# Patient Record
Sex: MALE | Race: WHITE | NOT HISPANIC OR LATINO | Employment: OTHER | RURAL
[De-identification: names, ages, dates, MRNs, and addresses within clinical notes are randomized per-mention and may not be internally consistent; named-entity substitution may affect disease eponyms.]

---

## 2017-01-25 ENCOUNTER — TRANSCRIBE ORDERS (OUTPATIENT)
Dept: PHYSICAL THERAPY | Facility: HOSPITAL | Age: 82
End: 2017-01-25

## 2017-01-25 DIAGNOSIS — M25.552 LEFT HIP PAIN: ICD-10-CM

## 2017-01-25 DIAGNOSIS — Z96.642 STATUS POST TOTAL HIP REPLACEMENT, LEFT: Primary | ICD-10-CM

## 2017-02-02 ENCOUNTER — HOSPITAL ENCOUNTER (OUTPATIENT)
Dept: PHYSICAL THERAPY | Facility: HOSPITAL | Age: 82
Setting detail: THERAPIES SERIES
Discharge: HOME | End: 2017-02-02

## 2017-02-02 DIAGNOSIS — M25.552 LEFT HIP PAIN: ICD-10-CM

## 2017-02-02 DIAGNOSIS — Z96.642 STATUS POST TOTAL HIP REPLACEMENT, LEFT: Primary | ICD-10-CM

## 2017-02-02 PROCEDURE — 97162 PT EVAL MOD COMPLEX 30 MIN: CPT

## 2017-02-02 PROCEDURE — 97110 THERAPEUTIC EXERCISES: CPT

## 2017-02-02 NOTE — PROGRESS NOTES
Outpatient Physical Therapy Ortho Initial Evaluation  [facility]  Luis Garcia, PT  17  10:30 AM       Patient Name: Jesus Smith  : 1935  MRN: [id number]  Today's Date: 2017      Visit Date: 2017     Subjective improvement: 0%     Attendance:     MD follow up:  date: 17           There is no problem list on file for this patient.       No past medical history on file.     Past Surgical History   Procedure Laterality Date   • Appendectomy     • Back surgery       Fusion   • Hernia repair     • Stomach surgery     • Cardiac surgery       Open Heart   • Pacemaker implantation     • Joint replacement Left      s/p L hip replacement     Medications:  Warfarin  Lortab  Metropolol    Visit Dx:     ICD-10-CM ICD-9-CM   1. 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 Left hip pain M25.552 719.45             Patient History       17 1000          History    Chief Complaint Pain;Muscle weakness;Difficulty Walking  -MD      Type of Pain Hip pain  -MD      Date Current Problem(s) Began 16  -MD      Brief Description of Current Complaint Not having the pain that he used to have, but is still having some mild pain. Just says that strength is his main concern at this time at the L hip. Dificulty with walking, stairs and squatting.   -MD      Onset Date- PT 17  -MD      Pain     Difficulties at work? Retired  -MD      Difficulties with recreational activities? Watch sports on TV  -MD        User Key  (r) = Recorded By, (t) = Taken By, (c) = Cosigned By    Initials Name Provider Type    MD Luis Garcia, PT Physical Therapist                PT Ortho       17 1000    Subjective Comments    Subjective Comments Pt reports that he is doing well, just lack of strength at this time.   -MD    Precautions and Contraindications    Precautions/Limitations no known precautions/limitations  -MD    Subjective Pain    Able to rate  subjective pain? yes  -MD    Pre-Treatment Pain Level 0  -MD    Post-Treatment Pain Level 0  -MD    Posture/Observations    Posture- WNL Posture is WNL  -MD    Sensation    Sensation WNL? WNL  -MD    Left Hip    Flexion AROM deficit 0-115  -MD    ABduction AROM Deficit 35  -MD    Left Hip    Hip Flexion Gross Movement (4/5) good  -MD    Hip ABduction Gross Movement (4+/5) good plus  -MD    Hip ADduction Gross Movement (4+/5) good plus  -MD    Left Knee    Knee Extension Gross Movement (4+/5) good plus  -MD    Knee Flexion Gross Movement (4+/5) good plus  -MD    Gait Assessment/Treatment    Gait, Assistive Device straight cane  -MD    Gait, Comment Non ant gt, FF posture.   -MD      User Key  (r) = Recorded By, (t) = Taken By, (c) = Cosigned By    Initials Name Provider Type    MD Luis Garcia, PT Physical Therapist                            Therapy Education       02/02/17 1028    Therapy Education    Given HEP;Symptoms/condition management;Posture/body mechanics  -MD    Program New  -MD    How Provided Verbal;Demonstration;Written  -MD    Provided to Patient;Caregiver  -MD    Level of Understanding Teach back education performed;Verbalized;Demonstrated  -MD      User Key  (r) = Recorded By, (t) = Taken By, (c) = Cosigned By    Initials Name Provider Type    MD Luis Garcia, PT Physical Therapist                PT OP Goals       02/02/17 1028 02/02/17 1000       PT Short Term Goals    STG Date to Achieve  02/23/17  -MD     STG 1  Tolerate 45 minute treatment session, no increase in pain.   -MD     STG 2  Perform sit to stand x10, no use of UEs.  -MD     STG 3  EO/ft together 1 minute.  -MD     STG 4  EC/ft apart 30 secs.  -MD     STG 5  EC/ft together 30 secs.  -MD     Long Term Goals    LTG Date to Achieve  02/23/17  -MD     LTG 1  60% improved  -MD     LTG 2  Ambulate full treatment session, no AD.   -MD     LTG 3  Ascend/descend 5 steps recip, no increase in pain, no comp, unilateral handrail.   -MD      LTG 4  4+/5 L hip flexion strength.   -MD     LTG 5  LEFS to 50/80  -MD     Time Calculation    PT Goal Re-Cert Due Date 02/23/17  -MD 02/23/17  -MD       User Key  (r) = Recorded By, (t) = Taken By, (c) = Cosigned By    Initials Name Provider Type    MD Luis Garcia, PT Physical Therapist                PT Assessment/Plan       02/02/17 1000          PT Assessment    Functional Limitations Impaired gait;Performance in self-care ADL;Performance in leisure activities;Limitations in community activities;Limitation in home management  -MD      Impairments Gait;Endurance;Balance;Pain;Muscle strength;Poor body mechanics  -MD      Assessment Comments Pt is s/p L hip replacment in March 2016, no pain noted, just having strength deficits at this time and wants to increase strength in whole L LE, pt could benefit from skilled PT to improve overall functional strength and endurance.   -MD      Rehab Potential Excellent  -MD      Patient/caregiver participated in establishment of treatment plan and goals Yes  -MD      Patient would benefit from skilled therapy intervention Yes  -MD      PT Plan    PT Frequency 3x/week  -MD      Predicted Duration of Therapy Intervention (days/wks) 6  -MD      Physical Therapy Interventions (Optional Details) gait training;balance training;aquatics exercise;ROM (Range of Motion);manual therapy techniques;stretching;stair training;strengthening;home exercise program  -MD      PT Plan Comments Quad/glute strength, gt training, CKC, balance, ther ex, MHP or ICE PRN, HEP  -MD        User Key  (r) = Recorded By, (t) = Taken By, (c) = Cosigned By    Initials Name Provider Type    MD Luis Garcia, PT Physical Therapist                  Exercises       02/02/17 1000          Subjective Comments    Subjective Comments Pt reports that he is doing well, just lack of strength at this time.   -MD      Subjective Pain    Able to rate subjective pain? yes  -MD      Pre-Treatment Pain Level 0  -MD       Post-Treatment Pain Level 0  -MD      Aquatics    Aquatics performed? No  -MD      Exercise 1    Exercise Name 1 QS  -MD      Sets 1 2  -MD      Reps 1 10  -MD      Time (Seconds) 1 5  -MD      Exercise 2    Exercise Name 2 Add Squeezes  -MD      Sets 2 2  -MD      Reps 2 10  -MD      Time (Seconds) 2 5  -MD      Exercise 3    Exercise Name 3 SL Clamshells  -MD      Sets 3 2  -MD      Reps 3 10  -MD      Exercise 4    Exercise Name 4 Standing CR/TR  -MD      Sets 4 2  -MD      Reps 4 15  -MD        User Key  (r) = Recorded By, (t) = Taken By, (c) = Cosigned By    Initials Name Provider Type    MD Luis Garcia, PT Physical Therapist                              Outcome Measures       02/02/17 1000          Lower Extremity Functional Index    Any of your usual work, housework or school activities 1  -MD      Your usual hobbies, recreational or sporting activities 2  -MD      Getting into or out of the bath 1  -MD      Walking between rooms 2  -MD      Putting on your shoes or socks 2  -MD      Squatting 2  -MD      Lifting an object, like a bag of groceries from the floor 2  -MD      Performing light activities around your home 2  -MD      Performing heavy activities around your home 1  -MD      Getting into or out of a car 1  -MD      Walking 2 blocks 1  -MD      Walking a mile 1  -MD      Going up or down 10 stairs (about 1 flight of stairs) 1  -MD      Standing for 1 hour 2  -MD      Sitting for 1 hour 4  -MD      Running on even ground 2  -MD      Running on uneven ground 2  -MD      Making sharp turns while running fast 2  -MD      Hopping 2  -MD      Rolling over in bed 3  -MD      Total 36  -MD      Functional Assessment    Outcome Measure Options Lower Extremity Functional Scale (LEFS)  -MD        User Key  (r) = Recorded By, (t) = Taken By, (c) = Cosigned By    Initials Name Provider Type    MD Luis Garcia, PT Physical Therapist            Time Calculation:   Start Time: 1000  Stop Time: 1035  Time  Calculation (min): 35 min     Therapy Charges for Today     Code Description Service Date Service Provider Modifiers Qty    87937856113 HC PT EVAL MOD COMPLEXITY 1 2/2/2017 Luis Garcia, PT GP 1    76235251446 HC PT THER PROC EA 15 MIN 2/2/2017 Luis Garcia, PT GP 1          PT G-Codes  Outcome Measure Options: Lower Extremity Functional Scale (LEFS)         Luis Garcia, PT  2/2/2017

## 2017-02-07 ENCOUNTER — HOSPITAL ENCOUNTER (OUTPATIENT)
Dept: PHYSICAL THERAPY | Facility: HOSPITAL | Age: 82
Setting detail: THERAPIES SERIES
Discharge: HOME | End: 2017-02-07

## 2017-02-07 DIAGNOSIS — Z96.642 STATUS POST TOTAL HIP REPLACEMENT, LEFT: Primary | ICD-10-CM

## 2017-02-07 DIAGNOSIS — M25.552 LEFT HIP PAIN: ICD-10-CM

## 2017-02-07 PROCEDURE — 97110 THERAPEUTIC EXERCISES: CPT

## 2017-02-07 NOTE — PROGRESS NOTES
"    Outpatient Physical Therapy Ortho Treatment Note  [facility]  Vani Hassan PTA  17  3:24 PM       Patient Name: Jesus Smith  : 1935  MRN: [id number]  Today's Date: 2017      Visit Date: 2017  Subjective improvement:\"a little\"     Attendance:         MD follow up:   date:  17   '  Visit Dx:    ICD-10-CM ICD-9-CM   1. 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 Left hip pain M25.552 719.45       There is no problem list on file for this patient.       No past medical history on file.     Past Surgical History   Procedure Laterality Date   • Appendectomy     • Back surgery       Fusion   • Hernia repair     • Stomach surgery     • Cardiac surgery       Open Heart   • Pacemaker implantation     • Joint replacement Left      s/p L hip replacement             PT Ortho       17 1400    Subjective Comments    Subjective Comments Pt states that he doesn't have a lot of pain just needs to get stronger/bal/walk.  -PM    Precautions and Contraindications    Precautions/Limitations no known precautions/limitations  -PM    Precautions pacemaker  -PM    Subjective Pain    Able to rate subjective pain? yes  -PM    Pre-Treatment Pain Level 0  -PM    Post-Treatment Pain Level 0  -PM    Posture/Observations    Posture/Observations Comments forward flexed; tight hams  -PM      User Key  (r) = Recorded By, (t) = Taken By, (c) = Cosigned By    Initials Name Provider Type    PM Vani Hassan PTA Physical Therapy Assistant                            PT Assessment/Plan       17 1400 17 1000       PT Assessment    Functional Limitations Impaired gait;Performance in self-care ADL;Performance in leisure activities;Limitations in community activities;Limitation in home management  -PM Impaired gait;Performance in self-care ADL;Performance in leisure activities;Limitations in community activities;Limitation in home management  -MD "     Impairments Gait;Endurance;Balance;Pain;Muscle strength;Poor body mechanics  -PM Gait;Endurance;Balance;Pain;Muscle strength;Poor body mechanics  -MD     Assessment Comments Limited functional activity collin with frequent rest breaks. Tends to hold breath.  -PM Pt is s/p L hip replacment in March 2016, no pain noted, just having strength deficits at this time and wants to increase strength in whole L LE, pt could benefit from skilled PT to improve overall functional strength and endurance.   -MD     Rehab Potential Excellent  -PM Excellent  -MD     Patient/caregiver participated in establishment of treatment plan and goals Yes  -PM Yes  -MD     Patient would benefit from skilled therapy intervention Yes  -PM Yes  -MD     PT Plan    PT Frequency 3x/week  -PM 3x/week  -MD     Predicted Duration of Therapy Intervention (days/wks) 6 wk  -PM 6  -MD     Physical Therapy Interventions (Optional Details)  gait training;balance training;aquatics exercise;ROM (Range of Motion);manual therapy techniques;stretching;stair training;strengthening;home exercise program  -MD     PT Plan Comments cont POC with bal/gait; review clam shell; monitor pt breathing  -PM Quad/glute strength, gt training, CKC, balance, ther ex, MHP or ICE PRN, HEP  -MD       User Key  (r) = Recorded By, (t) = Taken By, (c) = Cosigned By    Initials Name Provider Type    MD Luis Garcia, PT Physical Therapist    PM Vani Hassan PTA Physical Therapy Assistant                Modalities       02/07/17 1400          Ice    Ice Applied Yes  -PM      Location L Lat hip  -PM      Rx Minutes 15 mins  -PM      Ice S/P Rx Yes  -PM        User Key  (r) = Recorded By, (t) = Taken By, (c) = Cosigned By    Initials Name Provider Type    SERGEI Hassan PTA Physical Therapy Assistant                Exercises       02/07/17 1400          Subjective Comments    Subjective Comments Pt states that he doesn't have a lot of pain just needs to get  stronger/bal/walk.  -PM      Subjective Pain    Able to rate subjective pain? yes  -PM      Pre-Treatment Pain Level 0  -PM      Post-Treatment Pain Level 0  -PM      Aquatics    Aquatics performed? No  -PM      Exercise 1    Exercise Name 1 Pro II  -PM      Sets 1 --  -PM      Reps 1 --  -PM      Time (Minutes) 1 8  -PM      Time (Seconds) 1 --  -PM      Exercise 2    Exercise Name 2 Add Squeezes  -PM      Sets 2 2  -PM      Reps 2 10  -PM      Time (Seconds) 2 5  -PM      Exercise 3    Exercise Name 3 SL Clamshells  -PM      Sets 3 --  -PM      Reps 3 10  -PM      Exercise 4    Exercise Name 4 Standing CR/TR  -PM      Sets 4 2  -PM      Reps 4 15  -PM      Exercise 5    Exercise Name 5 standing march  -PM      Sets 5 2  -PM      Reps 5 10  -PM      Exercise 6    Exercise Name 6 standing hip abd SLR  -PM      Sets 6 2  -PM      Reps 6 10  -PM      Exercise 7    Exercise Name 7 std hip ext SLR  -PM      Sets 7 1  -PM      Reps 7 10  -PM      Exercise 8    Exercise Name 8 mini-squats at chair  -PM      Sets 8 2  -PM      Reps 8 10  -PM      Exercise 9    Exercise Name 9 standing feet together EO  -PM      Reps 9 2  -PM      Time (Seconds) 9 30  -PM      Exercise 10    Exercise Name 10 standing feet apart EC   shaky but controlled and no LOB; forward flexed  -PM      Reps 10 2  -PM      Time (Seconds) 10 30  -PM        User Key  (r) = Recorded By, (t) = Taken By, (c) = Cosigned By    Initials Name Provider Type    PM Vani Hassan PTA Physical Therapy Assistant                               PT OP Goals       02/07/17 1400 02/02/17 1028 02/02/17 1000    PT Short Term Goals    STG Date to Achieve 02/23/17  -PM  02/23/17  -MD    STG 1 Tolerate 45 minute treatment session, no increase in pain.   -PM  Tolerate 45 minute treatment session, no increase in pain.   -MD    STG 1 Progress Progressing  -PM      STG 2 Perform sit to stand x10, no use of UEs.  -PM  Perform sit to stand x10, no use of UEs.  -MD    STG 2  Progress Not Met  -PM      STG 3 EO/ft together 1 minute.  -PM  EO/ft together 1 minute.  -MD    STG 3 Progress Progressing  -PM      STG 4 EC/ft apart 30 secs.  -PM  EC/ft apart 30 secs.  -MD    STG 4 Progress Progressing  -PM      STG 5 EC/ft together 30 secs.  -PM  EC/ft together 30 secs.  -MD    Long Term Goals    LTG Date to Achieve 02/23/17  -PM  02/23/17  -MD    LTG 1 60% improved  -PM  60% improved  -MD    LTG 1 Progress Not Met  -PM      LTG 2 Ambulate full treatment session, no AD.   -PM  Ambulate full treatment session, no AD.   -MD    LTG 2 Progress Not Met  -PM      LTG 3 Ascend/descend 5 steps recip, no increase in pain, no comp, unilateral handrail.   -PM  Ascend/descend 5 steps recip, no increase in pain, no comp, unilateral handrail.   -MD    LTG 3 Progress Not Met  -PM      LTG 4 4+/5 L hip flexion strength.   -PM  4+/5 L hip flexion strength.   -MD    LTG 4 Progress Not Met  -PM      LTG 5 LEFS to 50/80  -PM  LEFS to 50/80  -MD    LTG 5 Progress Not Met  -PM      Time Calculation    PT Goal Re-Cert Due Date  02/23/17  -MD 02/23/17  -MD      User Key  (r) = Recorded By, (t) = Taken By, (c) = Cosigned By    Initials Name Provider Type    MD Luis Garcia, PT Physical Therapist    PM Vani Hassan, PTA Physical Therapy Assistant                Therapy Education       02/07/17 1400    Therapy Education    Given HEP;Symptoms/condition management;Posture/body mechanics   avoid breath holding; count reps aloud  -PM    Program New  -PM    How Provided Verbal;Demonstration;Written  -PM    Provided to Patient;Caregiver  -PM    Level of Understanding Teach back education performed;Verbalized;Demonstrated  -PM      02/02/17 1028    Therapy Education    Given HEP;Symptoms/condition management;Posture/body mechanics  -MD    Program New  -MD    How Provided Verbal;Demonstration;Written  -MD    Provided to Patient;Caregiver  -MD    Level of Understanding Teach back education  performed;Verbalized;Demonstrated  -MD      User Key  (r) = Recorded By, (t) = Taken By, (c) = Cosigned By    Initials Name Provider Type    MD Luis Garcia, PT Physical Therapist    PM Vani Hassan PTA Physical Therapy Assistant                Time Calculation:   Start Time: 1400  Stop Time: 1510  Time Calculation (min): 70 min  PT Non-Billable Time (min): 15 min    Therapy Charges for Today     Code Description Service Date Service Provider Modifiers Qty    83353179017 HC PT THER SUPP EA 15 MIN 2/7/2017 Vani Hassan PTA GP 1    12111239781 HC PT THER PROC EA 15 MIN 2/7/2017 Vani Hassan PTA GP 4                    Vani Hassan PTA  2/7/2017

## 2017-02-10 ENCOUNTER — HOSPITAL ENCOUNTER (OUTPATIENT)
Dept: PHYSICAL THERAPY | Facility: HOSPITAL | Age: 82
Setting detail: THERAPIES SERIES
Discharge: HOME | End: 2017-02-10

## 2017-02-10 DIAGNOSIS — M25.552 LEFT HIP PAIN: Primary | ICD-10-CM

## 2017-02-10 PROCEDURE — 97110 THERAPEUTIC EXERCISES: CPT

## 2017-02-10 NOTE — PROGRESS NOTES
"    Outpatient Physical Therapy Ortho Treatment Note  [facility]  Suki Sanchez PTA  02/10/17  11:10 AM       Patient Name: Jesus Smith  : 1935  MRN: [id number]  Today's Date: 2/10/2017      Visit Date: 02/10/2017  Subjective improvement: \"A little\"     Attendance: 3/3        MD follow up:  date: 17        Visit Dx:    ICD-10-CM ICD-9-CM   1. Left hip pain M25.552 719.45       There is no problem list on file for this patient.       No past medical history on file.     Past Surgical History   Procedure Laterality Date   • Appendectomy     • Back surgery       Fusion   • Hernia repair     • Stomach surgery     • Cardiac surgery       Open Heart   • Pacemaker implantation     • Joint replacement Left      s/p L hip replacement             PT Ortho       02/10/17 1000    Subjective Comments    Subjective Comments pt states: \"I spent the best part of yesterday out of town so I'm pretty tired  -SD    Precautions and Contraindications    Precautions pacemaker  -SD    Subjective Pain    Able to rate subjective pain? yes  -SD    Pre-Treatment Pain Level 0  -SD      17 1400    Subjective Comments    Subjective Comments Pt states that he doesn't have a lot of pain just needs to get stronger/bal/walk.  -PM    Precautions and Contraindications    Precautions/Limitations no known precautions/limitations  -PM    Precautions pacemaker  -PM    Subjective Pain    Able to rate subjective pain? yes  -PM    Pre-Treatment Pain Level 0  -PM    Post-Treatment Pain Level 0  -PM    Posture/Observations    Posture/Observations Comments forward flexed; tight hams  -PM      User Key  (r) = Recorded By, (t) = Taken By, (c) = Cosigned By    Initials Name Provider Type    PM Vani Hassan PTA Physical Therapy Assistant    SD Sanchez PTA Physical Therapy Assistant                            PT Assessment/Plan       02/10/17 1000 17 1400       PT Assessment    " "Functional Limitations Impaired gait;Performance in self-care ADL;Performance in leisure activities;Limitations in community activities;Limitation in home management  -SD Impaired gait;Performance in self-care ADL;Performance in leisure activities;Limitations in community activities;Limitation in home management  -PM     Impairments Gait;Endurance;Balance;Pain;Muscle strength;Poor body mechanics  -SD Gait;Endurance;Balance;Pain;Muscle strength;Poor body mechanics  -PM     Assessment Comments pts endurence is lacking, pt required frequent rest breaks taken throughout RX.    -SD Limited functional activity collin with frequent rest breaks. Tends to hold breath.  -PM     Rehab Potential  Excellent  -PM     Patient/caregiver participated in establishment of treatment plan and goals  Yes  -PM     Patient would benefit from skilled therapy intervention  Yes  -PM     PT Plan    PT Frequency 3x/week  -SD 3x/week  -PM     Predicted Duration of Therapy Intervention (days/wks) 6 weeks  -SD 6 wk  -PM     PT Plan Comments Cont to try and increase endurence. Possible Airex beam activities  -SD cont POC with bal/gait; review clam shell; monitor pt breathing  -PM       User Key  (r) = Recorded By, (t) = Taken By, (c) = Cosigned By    Initials Name Provider Type    PM Vani Hassan [facility] Physical Therapy Assistant    SD Sanchez [facility] Physical Therapy Assistant                Modalities       02/10/17 1000          Ice    Ice Applied Yes  -      Location L side of back sitting in chair  -      Rx Minutes 15 mins  -SD      Ice S/P Rx Yes  -SD        User Key  (r) = Recorded By, (t) = Taken By, (c) = Cosigned By    Initials Name Provider Type    SD Sanchez PTA Physical Therapy Assistant                Exercises       02/10/17 1000          Subjective Comments    Subjective Comments pt states: \"I spent the best part of yesterday out of town so I'm pretty tired  -      Subjective Pain    Able to rate " "subjective pain? yes  -SD      Pre-Treatment Pain Level 0  -SD      Aquatics    Aquatics performed? No  -SD      Exercise 1    Exercise Name 1 PROII  -SD      Resistance 1 --   L3.0  -SD      Time (Minutes) 1 10  -SD      Exercise 2    Exercise Name 2 CR/TR  -SD      Sets 2 2  -SD      Reps 2 10  -SD      Exercise 3    Exercise Name 3 Mini squats  -SD      Sets 3 2  -SD      Reps 3 10  -SD      Exercise 4    Exercise Name 4 ST. hip abd /ext  -SD      Sets 4 2  -SD      Reps 4 10  -SD      Exercise 5    Exercise Name 5 ST. march  -SD      Sets 5 2  -SD      Reps 5 10  -SD      Exercise 6    Exercise Name 6 sit to stands  -SD      Reps 6 10  -SD      Exercise 7    Exercise Name 7 Hip add squeeze  -SD      Sets 7 2  -SD      Reps 7 10  -SD      Time (Seconds) 7 5\" hold  -SD      Exercise 8    Exercise Name 8 ST. feet together EO  -SD      Sets 8 2  -SD      Time (Seconds) 8 30 sec hold  -SD      Exercise 9    Exercise Name 9 ST. feet apart EC  -SD      Reps 9 2  -SD      Time (Seconds) 9 30 sec hold  -SD      Exercise 10    Exercise Name 10 SL clamshells  -SD      Sets 10 2  -SD      Reps 10 10  -SD      Exercise 11    Exercise Name 11 SL SLR abduction  -SD      Sets 11 2  -SD      Reps 11 10  -SD        User Key  (r) = Recorded By, (t) = Taken By, (c) = Cosigned By    Initials Name Provider Type    SD Sanchez, PTA Physical Therapy Assistant                               PT OP Goals       02/10/17 1000 02/07/17 1400 02/02/17 1028    PT Short Term Goals    STG Date to Achieve 02/23/17  -SD 02/23/17  -PM     STG 1 Tolerate 45 minute treatment session, no increase in pain.   -SD Tolerate 45 minute treatment session, no increase in pain.   -PM     STG 1 Progress Progressing  -SD Progressing  -PM     STG 2 Perform sit to stand x10, no use of UEs.  -SD Perform sit to stand x10, no use of UEs.  -PM     STG 2 Progress Not Met  -SD Not Met  -PM     STG 3 EO/ft together 1 minute.  -SD EO/ft together 1 minute.  -PM     " STG 3 Progress Progressing  -SD Progressing  -PM     STG 4 EC/ft apart 30 secs.  -SD EC/ft apart 30 secs.  -PM     STG 4 Progress Progressing  -SD Progressing  -PM     STG 5 EC/ft together 30 secs.  -SD EC/ft together 30 secs.  -PM     Long Term Goals    LTG Date to Achieve 02/23/17  -SD 02/23/17  -PM     LTG 1 60% improved  -SD 60% improved  -PM     LTG 1 Progress Not Met  -SD Not Met  -PM     LTG 2 Ambulate full treatment session, no AD.   -SD Ambulate full treatment session, no AD.   -PM     LTG 2 Progress Not Met  -SD Not Met  -PM     LTG 3 Ascend/descend 5 steps recip, no increase in pain, no comp, unilateral handrail.   -SD Ascend/descend 5 steps recip, no increase in pain, no comp, unilateral handrail.   -PM     LTG 3 Progress Not Met  -SD Not Met  -PM     LTG 4 4+/5 L hip flexion strength.   -SD 4+/5 L hip flexion strength.   -PM     LTG 4 Progress Not Met  -SD Not Met  -PM     LTG 5 LEFS to 50/80  -SD LEFS to 50/80  -PM     LTG 5 Progress Not Met  -SD Not Met  -PM     Time Calculation    PT Goal Re-Cert Due Date 02/23/17  -SD  02/23/17  -MD      02/02/17 1000          PT Short Term Goals    STG Date to Achieve 02/23/17  -MD      STG 1 Tolerate 45 minute treatment session, no increase in pain.   -MD      STG 2 Perform sit to stand x10, no use of UEs.  -MD      STG 3 EO/ft together 1 minute.  -MD      STG 4 EC/ft apart 30 secs.  -MD      STG 5 EC/ft together 30 secs.  -MD      Long Term Goals    LTG Date to Achieve 02/23/17  -MD      LTG 1 60% improved  -MD      LTG 2 Ambulate full treatment session, no AD.   -MD      LTG 3 Ascend/descend 5 steps recip, no increase in pain, no comp, unilateral handrail.   -MD      LTG 4 4+/5 L hip flexion strength.   -MD      LTG 5 LEFS to 50/80  -MD      Time Calculation    PT Goal Re-Cert Due Date 02/23/17  -MD        User Key  (r) = Recorded By, (t) = Taken By, (c) = Cosigned By    Initials Name Provider Type    MD Luis Garcia, PT Physical Therapist    SERGEI Dawn  SUSAN Hassan Physical Therapy Assistant    SD Sanchez PTA Physical Therapy Assistant                Therapy Education       02/10/17 1000    Therapy Education    Given HEP;Symptoms/condition management;Posture/body mechanics  -SD    Program New  -SD    How Provided Verbal;Demonstration;Written  -SD    Provided to Patient;Caregiver  -SD    Level of Understanding Teach back education performed;Verbalized;Demonstrated  -SD      02/07/17 1400    Therapy Education    Given HEP;Symptoms/condition management;Posture/body mechanics   avoid breath holding; count reps aloud  -PM    Program New  -PM    How Provided Verbal;Demonstration;Written  -PM    Provided to Patient;Caregiver  -PM    Level of Understanding Teach back education performed;Verbalized;Demonstrated  -PM      User Key  (r) = Recorded By, (t) = Taken By, (c) = Cosigned By    Initials Name Provider Type    PM Vani Hassan PTA Physical Therapy Assistant    SD Sanchez PTA Physical Therapy Assistant                Time Calculation:   Start Time: 1000  Stop Time: 1110  Time Calculation (min): 70 min    Therapy Charges for Today     Code Description Service Date Service Provider Modifiers Qty    04302350776 HC PT THER PROC EA 15 MIN 2/10/2017 Suki Sanchez PTA GP 4    09243872383 HC PT THER SUPP EA 15 MIN 2/10/2017 Suki Sanchez PTA GP 1                    Suki Sanchez PTA  2/10/2017

## 2017-02-14 ENCOUNTER — HOSPITAL ENCOUNTER (OUTPATIENT)
Dept: PHYSICAL THERAPY | Facility: HOSPITAL | Age: 82
Setting detail: THERAPIES SERIES
Discharge: HOME | End: 2017-02-14

## 2017-02-14 DIAGNOSIS — M25.552 LEFT HIP PAIN: Primary | ICD-10-CM

## 2017-02-14 PROCEDURE — 97110 THERAPEUTIC EXERCISES: CPT

## 2017-02-14 NOTE — PROGRESS NOTES
Outpatient Physical Therapy Ortho Treatment Note  [facility]  Suki Sanchez PTA  17  2:55 PM       Patient Name: Jesus Smith  : 1935  MRN: [id number]  Today's Date: 2017      Visit Date: 2017  Subjective improvement: 50%    Attendance:         MD follow up:  date:17        Visit Dx:    ICD-10-CM ICD-9-CM   1. Left hip pain M25.552 719.45       There is no problem list on file for this patient.       No past medical history on file.     Past Surgical History   Procedure Laterality Date   • Appendectomy     • Back surgery       Fusion   • Hernia repair     • Stomach surgery     • Cardiac surgery       Open Heart   • Pacemaker implantation     • Joint replacement Left      s/p L hip replacement             PT Ortho       17 1400    Precautions and Contraindications    Precautions pacemaker  -SD    Subjective Pain    Pre-Treatment Pain Level 0  -SD    Post-Treatment Pain Level 0  -SD      User Key  (r) = Recorded By, (t) = Taken By, (c) = Cosigned By    Initials Name Provider Type     Suki Sanchez PTA Physical Therapy Assistant                            PT Assessment/Plan       17 1400 02/10/17 1000       PT Assessment    Functional Limitations Impaired gait;Performance in self-care ADL;Performance in leisure activities;Limitations in community activities;Limitation in home management  -SD Impaired gait;Performance in self-care ADL;Performance in leisure activities;Limitations in community activities;Limitation in home management  -     Impairments Gait;Endurance;Balance;Pain;Muscle strength;Poor body mechanics  - Gait;Endurance;Balance;Pain;Muscle strength;Poor body mechanics  -     Assessment Comments pt endurence is limited- requesting rest breaks after very few exercises. Compliant with HEP thus far.  - pts endurence is lacking, pt required frequent rest breaks taken throughout RX.    -     PT Plan    PT  "Frequency 3x/week  -SD 3x/week  -SD     Predicted Duration of Therapy Intervention (days/wks) 6 weeks  -SD 6 weeks  -SD     PT Plan Comments Airex beam activities  -SD Cont to try and increase endurence. Possible Airex beam activities  -SD       User Key  (r) = Recorded By, (t) = Taken By, (c) = Cosigned By    Initials Name Provider Type    SD Sanchez PTA Physical Therapy Assistant                Modalities       02/14/17 1400          Ice    Ice Applied Yes  -SD      Location L side of back sitting in chair  -      Rx Minutes 15 mins  -SD      Ice S/P Rx Yes  -SD        User Key  (r) = Recorded By, (t) = Taken By, (c) = Cosigned By    Initials Name Provider Type    SD Sanchez PTA Physical Therapy Assistant                Exercises       02/14/17 1400          Subjective Comments    Subjective Comments Pt states- \"I have been feeling a lot better lately- I had no pain after last therapy session.\"  -SD      Subjective Pain    Able to rate subjective pain? yes  -SD      Pre-Treatment Pain Level 0  -SD      Post-Treatment Pain Level 0  -SD      Aquatics    Aquatics performed? No  -SD      Exercise 1    Exercise Name 1 PROII  -SD      Resistance 1 --   L3.0  -SD      Time (Minutes) 1 10  -SD      Exercise 2    Exercise Name 2 CR/TR  -SD      Sets 2 2  -SD      Reps 2 10  -SD      Exercise 3    Exercise Name 3 Mini squats  -SD      Sets 3 2  -SD      Reps 3 10  -SD      Exercise 4    Exercise Name 4 ST. hip abd /ext  -SD      Sets 4 2  -SD      Reps 4 10  -SD      Exercise 5    Exercise Name 5 ST. march  -SD      Sets 5 2  -SD      Reps 5 10  -SD      Exercise 6    Exercise Name 6 sit to stands  -SD      Reps 6 10  -SD      Exercise 7    Exercise Name 7 Step up fwd 4 inch  -SD      Sets 7 2  -SD      Reps 7 10  -SD      Time (Seconds) 7 --  -SD      Exercise 8    Exercise Name 8 --  -SD      Sets 8 --  -DS      Time (Seconds) 8 --  -SD      Exercise 9    Exercise Name 9 --  -SD      Reps 9 --  " -SD      Time (Seconds) 9 --  -SD      Exercise 10    Exercise Name 10 SL clamshells  -SD      Sets 10 2  -SD      Reps 10 10  -SD      Exercise 11    Exercise Name 11 SL SLR abduction  -SD      Sets 11 2  -SD      Reps 11 10  -SD        User Key  (r) = Recorded By, (t) = Taken By, (c) = Cosigned By    Initials Name Provider Type    SD Sanchez, PTA Physical Therapy Assistant                               PT OP Goals       02/14/17 1400 02/10/17 1000 02/07/17 1400    PT Short Term Goals    STG Date to Achieve 02/23/17  -SD 02/23/17  -SD 02/23/17  -PM    STG 1 Tolerate 45 minute treatment session, no increase in pain.   -SD Tolerate 45 minute treatment session, no increase in pain.   -SD Tolerate 45 minute treatment session, no increase in pain.   -PM    STG 1 Progress Progressing  -SD Progressing  -SD Progressing  -PM    STG 2 Perform sit to stand x10, no use of UEs.  -SD Perform sit to stand x10, no use of UEs.  -SD Perform sit to stand x10, no use of UEs.  -PM    STG 2 Progress Not Met  -SD Not Met  -SD Not Met  -PM    STG 3 EO/ft together 1 minute.  -SD EO/ft together 1 minute.  -SD EO/ft together 1 minute.  -PM    STG 3 Progress Progressing  -SD Progressing  -SD Progressing  -PM    STG 4 EC/ft apart 30 secs.  -SD EC/ft apart 30 secs.  -SD EC/ft apart 30 secs.  -PM    STG 4 Progress Progressing  -SD Progressing  -SD Progressing  -PM    STG 5 EC/ft together 30 secs.  -SD EC/ft together 30 secs.  -SD EC/ft together 30 secs.  -PM    Long Term Goals    LTG Date to Achieve 02/23/17  -SD 02/23/17  -SD 02/23/17  -PM    LTG 1 60% improved  -SD 60% improved  -SD 60% improved  -PM    LTG 1 Progress Not Met  -SD Not Met  -SD Not Met  -PM    LTG 2 Ambulate full treatment session, no AD.   -SD Ambulate full treatment session, no AD.   -SD Ambulate full treatment session, no AD.   -PM    LTG 2 Progress Not Met  -SD Not Met  -SD Not Met  -PM    LTG 3 Ascend/descend 5 steps recip, no increase in pain, no comp,  unilateral handrail.   -SD Ascend/descend 5 steps recip, no increase in pain, no comp, unilateral handrail.   -SD Ascend/descend 5 steps recip, no increase in pain, no comp, unilateral handrail.   -PM    LTG 3 Progress Not Met  -SD Not Met  -SD Not Met  -PM    LTG 4 4+/5 L hip flexion strength.   -SD 4+/5 L hip flexion strength.   -SD 4+/5 L hip flexion strength.   -PM    LTG 4 Progress Not Met  -SD Not Met  -SD Not Met  -PM    LTG 5 LEFS to 50/80  -SD LEFS to 50/80  -SD LEFS to 50/80  -PM    LTG 5 Progress Not Met  -SD Not Met  -SD Not Met  -PM    Time Calculation    PT Goal Re-Cert Due Date 02/23/17  -SD 02/23/17  -SD       02/02/17 1028 02/02/17 1000       PT Short Term Goals    STG Date to Achieve  02/23/17  -MD     STG 1  Tolerate 45 minute treatment session, no increase in pain.   -MD     STG 2  Perform sit to stand x10, no use of UEs.  -MD     STG 3  EO/ft together 1 minute.  -MD     STG 4  EC/ft apart 30 secs.  -MD     STG 5  EC/ft together 30 secs.  -MD     Long Term Goals    LTG Date to Achieve  02/23/17  -MD     LTG 1  60% improved  -MD     LTG 2  Ambulate full treatment session, no AD.   -MD     LTG 3  Ascend/descend 5 steps recip, no increase in pain, no comp, unilateral handrail.   -MD     LTG 4  4+/5 L hip flexion strength.   -MD     LTG 5  LEFS to 50/80  -MD     Time Calculation    PT Goal Re-Cert Due Date 02/23/17  -MD 02/23/17  -MD       User Key  (r) = Recorded By, (t) = Taken By, (c) = Cosigned By    Initials Name Provider Type    MD Luis Garcia, PT Physical Therapist    PM Vani Hassan, PTA Physical Therapy Assistant    SD Sanchez, SUSAN Physical Therapy Assistant                Therapy Education       02/14/17 1400    Therapy Education    Given HEP;Symptoms/condition management;Posture/body mechanics  -SD    Program New  -SD    How Provided Verbal;Demonstration;Written  -SD    Provided to Patient;Caregiver  -SD    Level of Understanding Teach back education  performed;Verbalized;Demonstrated  -SD      02/10/17 1000    Therapy Education    Given HEP;Symptoms/condition management;Posture/body mechanics  -SD    Program New  -SD    How Provided Verbal;Demonstration;Written  -SD    Provided to Patient;Caregiver  -SD    Level of Understanding Teach back education performed;Verbalized;Demonstrated  -SD      User Key  (r) = Recorded By, (t) = Taken By, (c) = Cosigned By    Initials Name Provider Type    SD Sanchez PTA Physical Therapy Assistant                Time Calculation:   Start Time: 1400  Stop Time: 1500  Time Calculation (min): 60 min    Therapy Charges for Today     Code Description Service Date Service Provider Modifiers Qty    21066928029 HC PT THER PROC EA 15 MIN 2/14/2017 Suki Sanchez PTA GP 3    83630830756 HC PT THER SUPP EA 15 MIN 2/14/2017 Suki Sanchez PTA GP 1                    Suki Sanchez PTA  2/14/2017

## 2017-02-15 ENCOUNTER — HOSPITAL ENCOUNTER (OUTPATIENT)
Dept: PHYSICAL THERAPY | Facility: HOSPITAL | Age: 82
Setting detail: THERAPIES SERIES
Discharge: HOME | End: 2017-02-15

## 2017-02-15 DIAGNOSIS — M25.552 LEFT HIP PAIN: Primary | ICD-10-CM

## 2017-02-15 DIAGNOSIS — Z96.642 STATUS POST TOTAL HIP REPLACEMENT, LEFT: ICD-10-CM

## 2017-02-15 PROCEDURE — 97110 THERAPEUTIC EXERCISES: CPT

## 2017-02-15 NOTE — PROGRESS NOTES
Outpatient Physical Therapy Ortho Treatment Note  [facility]  Luis Tyson PTA  02/15/17  4:52 PM       Patient Name: Jesus Smith  : 1935  MRN: [id number]  Today's Date: 2/15/2017      Visit Date: 02/15/2017     Subjective improvement: 50%     Attendance:          MD follow up:  date: 17           Visit Dx:    ICD-10-CM ICD-9-CM   1. Left hip pain M25.552 719.45   2. 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       No past medical history on file.     Past Surgical History   Procedure Laterality Date   • Appendectomy     • Back surgery       Fusion   • Hernia repair     • Stomach surgery     • Cardiac surgery       Open Heart   • Pacemaker implantation     • Joint replacement Left      s/p L hip replacement             PT Ortho       02/15/17 1500    Precautions and Contraindications    Precautions Pacemaker  -MB      17 1400    Precautions and Contraindications    Precautions pacemaker  -SD    Subjective Pain    Pre-Treatment Pain Level 0  -SD    Post-Treatment Pain Level 0  -SD      User Key  (r) = Recorded By, (t) = Taken By, (c) = Cosigned By    Initials Name Provider Type    ASHUTOSH Tyson PTA Physical Therapy Assistant    SD Sanchez PTA Physical Therapy Assistant                            PT Assessment/Plan       02/15/17 1500 17 1400 02/10/17 1000    PT Assessment    Functional Limitations Impaired gait;Performance in self-care ADL;Performance in leisure activities;Limitations in community activities;Limitation in home management  -MB Impaired gait;Performance in self-care ADL;Performance in leisure activities;Limitations in community activities;Limitation in home management  -SD Impaired gait;Performance in self-care ADL;Performance in leisure activities;Limitations in community activities;Limitation in home management  -SD    Impairments  Gait;Endurance;Balance;Pain;Muscle strength;Poor body mechanics  -MB Gait;Endurance;Balance;Pain;Muscle strength;Poor body mechanics  -SD Gait;Endurance;Balance;Pain;Muscle strength;Poor body mechanics  -SD    Assessment Comments Pt needs verbal cues for proper technique with therex. Pt needs frequent rest breaks intermittenly. Pt gives good effort overall.  -MB pt endurence is limited- requesting rest breaks after very few exercises. Compliant with HEP thus far.  -SD pts endurence is lacking, pt required frequent rest breaks taken throughout RX.    -SD    Rehab Potential Good  -MB      Patient would benefit from skilled therapy intervention Yes  -MB      PT Plan    PT Frequency 3x/week  -MB 3x/week  -SD 3x/week  -SD    Predicted Duration of Therapy Intervention (days/wks) 6 weeks  -MB 6 weeks  -SD 6 weeks  -SD    PT Plan Comments Airex beam, balance/proprio.  -MB Airex beam activities  - Cont to try and increase endurence. Possible Airex beam activities  -      User Key  (r) = Recorded By, (t) = Taken By, (c) = Cosigned By    Initials Name Provider Type    MB Luis Tyson, PTA Physical Therapy Assistant    SD Suki Sanchez, PTA Physical Therapy Assistant                    Exercises       02/15/17 1500          Subjective Pain    Able to rate subjective pain? yes  -MB      Pre-Treatment Pain Level 0  -MB      Aquatics    Aquatics performed? No  -MB      Exercise 1    Exercise Name 1 PRO II ROM/End  -MB      Resistance 1 --   4.0  -MB      Time (Minutes) 1 10  -MB      Exercise 2    Exercise Name 2 Hamstring S  -MB      Sets 2 3  -MB      Time (Seconds) 2 30  -MB      Exercise 3    Exercise Name 3 Airex CR/TR  -MB      Sets 3 2  -MB      Reps 3 10  -MB      Exercise 4    Exercise Name 4 Airex Minisquats  -MB      Sets 4 2  -MB      Reps 4 10  -MB      Exercise 5    Exercise Name 5 Airex March  -MB      Sets 5 2  -MB      Reps 5 10  -MB      Exercise 6    Exercise Name 6 SLR Abd/ext standing  -MB       "Sets 6 2  -MB      Reps 6 10  -MB      Exercise 7    Exercise Name 7 Step up Fwd  -MB      Equipment 7 --   4\" step  -MB      Sets 7 2  -MB      Reps 7 10  -MB      Exercise 8    Exercise Name 8 Lat step up  -MB      Equipment 8 --   4\" step  -MB      Sets 8 2  -MB      Reps 8 10  -MB        User Key  (r) = Recorded By, (t) = Taken By, (c) = Cosigned By    Initials Name Provider Type    ASHUTOSH Tyson, PTA Physical Therapy Assistant                               PT OP Goals       02/15/17 1500 02/14/17 1400 02/10/17 1000    PT Short Term Goals    STG Date to Achieve 02/23/17  -MB 02/23/17  -SD 02/23/17  -SD    STG 1 Tolerate 45 minute treatment session, no increase in pain.   -MB Tolerate 45 minute treatment session, no increase in pain.   -SD Tolerate 45 minute treatment session, no increase in pain.   -SD    STG 1 Progress Met  -MB Progressing  -SD Progressing  -SD    STG 2 Perform sit to stand x10, no use of UEs.  -MB Perform sit to stand x10, no use of UEs.  -SD Perform sit to stand x10, no use of UEs.  -SD    STG 2 Progress Not Met  -MB Not Met  -SD Not Met  -SD    STG 3 EO/ft together 1 minute.  -MB EO/ft together 1 minute.  -SD EO/ft together 1 minute.  -SD    STG 3 Progress Progressing  -MB Progressing  -SD Progressing  -SD    STG 4 EC/ft apart 30 secs.  -MB EC/ft apart 30 secs.  -SD EC/ft apart 30 secs.  -SD    STG 4 Progress Progressing  -MB Progressing  -SD Progressing  -SD    STG 5 EC/ft together 30 secs.  -MB EC/ft together 30 secs.  -SD EC/ft together 30 secs.  -SD    Long Term Goals    LTG Date to Achieve 02/23/17  -MB 02/23/17  -SD 02/23/17  -SD    LTG 1 60% improved  -MB 60% improved  -SD 60% improved  -SD    LTG 1 Progress Not Met  -MB Not Met  -SD Not Met  -SD    LTG 2 Ambulate full treatment session, no AD.   -MB Ambulate full treatment session, no AD.   -SD Ambulate full treatment session, no AD.   -SD    LTG 2 Progress Not Met  -MB Not Met  -SD Not Met  -SD    LTG 3 Ascend/descend 5 steps " recip, no increase in pain, no comp, unilateral handrail.   -MB Ascend/descend 5 steps recip, no increase in pain, no comp, unilateral handrail.   -SD Ascend/descend 5 steps recip, no increase in pain, no comp, unilateral handrail.   -SD    LTG 3 Progress Not Met  -MB Not Met  -SD Not Met  -SD    LTG 4 4+/5 L hip flexion strength.   -MB 4+/5 L hip flexion strength.   -SD 4+/5 L hip flexion strength.   -SD    LTG 4 Progress Not Met  -MB Not Met  -SD Not Met  -SD    LTG 5 LEFS to 50/80  -MB LEFS to 50/80  -SD LEFS to 50/80  -SD    LTG 5 Progress Not Met  -MB Not Met  -SD Not Met  -SD    Time Calculation    PT Goal Re-Cert Due Date 02/23/17  -MB 02/23/17  -SD 02/23/17  -SD      02/07/17 1400 02/02/17 1028 02/02/17 1000    PT Short Term Goals    STG Date to Achieve 02/23/17  -PM  02/23/17  -MD    STG 1 Tolerate 45 minute treatment session, no increase in pain.   -PM  Tolerate 45 minute treatment session, no increase in pain.   -MD    STG 1 Progress Progressing  -PM      STG 2 Perform sit to stand x10, no use of UEs.  -PM  Perform sit to stand x10, no use of UEs.  -MD    STG 2 Progress Not Met  -PM      STG 3 EO/ft together 1 minute.  -PM  EO/ft together 1 minute.  -MD    STG 3 Progress Progressing  -PM      STG 4 EC/ft apart 30 secs.  -PM  EC/ft apart 30 secs.  -MD    STG 4 Progress Progressing  -PM      STG 5 EC/ft together 30 secs.  -PM  EC/ft together 30 secs.  -MD    Long Term Goals    LTG Date to Achieve 02/23/17  -PM  02/23/17  -MD    LTG 1 60% improved  -PM  60% improved  -MD    LTG 1 Progress Not Met  -PM      LTG 2 Ambulate full treatment session, no AD.   -PM  Ambulate full treatment session, no AD.   -MD    LTG 2 Progress Not Met  -PM      LTG 3 Ascend/descend 5 steps recip, no increase in pain, no comp, unilateral handrail.   -PM  Ascend/descend 5 steps recip, no increase in pain, no comp, unilateral handrail.   -MD    LTG 3 Progress Not Met  -PM      LTG 4 4+/5 L hip flexion strength.   -PM  4+/5 L hip  flexion strength.   -MD    LTG 4 Progress Not Met  -PM      LTG 5 LEFS to 50/80  -PM  LEFS to 50/80  -MD    LTG 5 Progress Not Met  -PM      Time Calculation    PT Goal Re-Cert Due Date  02/23/17  -MD 02/23/17  -MD      User Key  (r) = Recorded By, (t) = Taken By, (c) = Cosigned By    Initials Name Provider Type    ASHUTOSH Tyson, [facility] Physical Therapy Assistant    MD Luis Garcia, PT Physical Therapist    PM Vani Hassan, [facility] [place] [facility] Physical Therapy Assistant    SD Sanchez [facility] Physical Therapy Assistant                Therapy Education       02/15/17 1500    Therapy Education    Given HEP;Symptoms/condition management;Posture/body mechanics  -MB    Program New  -MB    How Provided Verbal;Demonstration;Written  -MB    Provided to Patient;Caregiver  -MB    Level of Understanding Teach back education performed;Verbalized;Demonstrated  -MB      02/14/17 1400    Therapy Education    Given HEP;Symptoms/condition management;Posture/body mechanics  -SD    Program New  -SD    How Provided Verbal;Demonstration;Written  -SD    Provided to Patient;Caregiver  -SD    Level of Understanding Teach back education performed;Verbalized;Demonstrated  -SD      02/10/17 1000    Therapy Education    Given HEP;Symptoms/condition management;Posture/body mechanics  -SD    Program New  -SD    How Provided Verbal;Demonstration;Written  -SD    Provided to Patient;Caregiver  -SD    Level of Understanding Teach back education performed;Verbalized;Demonstrated  -SD      User Key  (r) = Recorded By, (t) = Taken By, (c) = Cosigned By    Initials Name Provider Type    ASHUTOSH Tyson, [facility] Physical Therapy Assistant    SD Sanchez, [facility] Physical Therapy Assistant                Time Calculation:   Start Time: 1555  Stop Time: 1647  Time Calculation (min): 52 min    Therapy Charges for Today     Code Description Service Date Service Provider Modifiers Qty    83445711807 HC PT THER PROC EA 15 MIN 2/15/2017 Luis LUCAS  [place], PTA GP 3                    Luis Tyson, PTA  2/15/2017

## 2017-02-16 ENCOUNTER — APPOINTMENT (OUTPATIENT)
Dept: PHYSICAL THERAPY | Facility: HOSPITAL | Age: 82
End: 2017-02-16

## 2017-02-21 ENCOUNTER — HOSPITAL ENCOUNTER (OUTPATIENT)
Dept: PHYSICAL THERAPY | Facility: HOSPITAL | Age: 82
Setting detail: THERAPIES SERIES
Discharge: HOME | End: 2017-02-21

## 2017-02-21 DIAGNOSIS — Z96.642 STATUS POST TOTAL HIP REPLACEMENT, LEFT: ICD-10-CM

## 2017-02-21 DIAGNOSIS — M25.552 LEFT HIP PAIN: Primary | ICD-10-CM

## 2017-02-21 PROCEDURE — 97110 THERAPEUTIC EXERCISES: CPT

## 2017-02-21 NOTE — PROGRESS NOTES
Outpatient Physical Therapy Ortho Treatment Note  [facility]  Suki Sanchez PTA  17  11:12 AM       Patient Name: Jesus Smith  : 1935  MRN: [id number]  Today's Date: 2017      Visit Date: 2017  Subjective improvement: 50%     Attendance:          MD follow up:  PRN        date: 17        Visit Dx:    ICD-10-CM ICD-9-CM   1. Left hip pain M25.552 719.45   2. 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       No past medical history on file.     Past Surgical History   Procedure Laterality Date   • Appendectomy     • Back surgery       Fusion   • Hernia repair     • Stomach surgery     • Cardiac surgery       Open Heart   • Pacemaker implantation     • Joint replacement Left      s/p L hip replacement             PT Ortho       17 1000    Precautions and Contraindications    Precautions Pacemaker  -      User Key  (r) = Recorded By, (t) = Taken By, (c) = Cosigned By    Initials Name Provider Type     Suki Sanchez PTA Physical Therapy Assistant                            PT Assessment/Plan       17 1000 02/15/17 1500 17 1400    PT Assessment    Functional Limitations Impaired gait;Performance in self-care ADL;Performance in leisure activities;Limitations in community activities;Limitation in home management  -DS Impaired gait;Performance in self-care ADL;Performance in leisure activities;Limitations in community activities;Limitation in home management  -MB Impaired gait;Performance in self-care ADL;Performance in leisure activities;Limitations in community activities;Limitation in home management  -    Impairments Gait;Endurance;Balance;Pain;Muscle strength;Poor body mechanics  -SD Gait;Endurance;Balance;Pain;Muscle strength;Poor body mechanics  -MB Gait;Endurance;Balance;Pain;Muscle strength;Poor body mechanics  -    Assessment Comments pt met all STGs  "today. Endurance is still lacking but good performance of therex  -SD Pt needs verbal cues for proper technique with therex. Pt needs frequent rest breaks intermittenly. Pt gives good effort overall.  -MB pt endurence is limited- requesting rest breaks after very few exercises. Compliant with HEP thus far.  -SD    Rehab Potential  Good  -MB     Patient would benefit from skilled therapy intervention  Yes  -MB     PT Plan    PT Frequency 3x/week  -SD 3x/week  -MB 3x/week  -SD    Predicted Duration of Therapy Intervention (days/wks) 6 weeks  -SD 6 weeks  -MB 6 weeks  -SD    PT Plan Comments Airex beam activities.  -SD Airex beam, balance/proprio.  -MB Airex beam activities  -      User Key  (r) = Recorded By, (t) = Taken By, (c) = Cosigned By    Initials Name Provider Type    MB Luis Tyson, [facility] Physical Therapy Assistant    SD Suki Sanchez [facility] Physical Therapy Assistant                Modalities       02/21/17 1000          Ice    Ice Applied Yes  -SD      Location L side of back sitting in chair  -SD      Rx Minutes 15 mins  -SD      Ice S/P Rx Yes  -SD        User Key  (r) = Recorded By, (t) = Taken By, (c) = Cosigned By    Initials Name Provider Type    SD Sanchez [facility] Physical Therapy Assistant                Exercises       02/21/17 1000          Subjective Comments    Subjective Comments Pt reports \"I don't have any pain I'm just tired\"  -SD      Subjective Pain    Able to rate subjective pain? yes  -SD      Pre-Treatment Pain Level 0  -SD      Post-Treatment Pain Level 0  -SD      Aquatics    Aquatics performed? No  -SD      Exercise 1    Exercise Name 1 PRO II ROM/End  -SD      Resistance 1 --   4.0  -SD      Time (Minutes) 1 10  -SD      Exercise 2    Exercise Name 2 Hamstring S  -SD      Sets 2 3  -SD      Time (Seconds) 2 30  -SD      Exercise 3    Exercise Name 3 Airex CR/TR  -SD      Sets 3 2  -SD      Reps 3 10  -SD      Exercise 4    Exercise Name 4 Airex Minisquats  -SD      " "Sets 4 2  -SD      Reps 4 10  -SD      Exercise 5    Exercise Name 5 Airex March  -SD      Sets 5 2  -SD      Reps 5 10  -SD      Exercise 6    Exercise Name 6 SLR Abd/ext standing  -SD      Sets 6 2  -DS      Reps 6 10  -SD      Exercise 7    Exercise Name 7 Step up Fwd  -SD      Equipment 7 --   4\" step  -SD      Sets 7 2  -SD      Reps 7 10  -SD      Exercise 8    Exercise Name 8 Lat step up  -SD      Equipment 8 --   4\" step  -SD      Sets 8 2  -SD      Reps 8 10  -SD      Exercise 9    Exercise Name 9 sit to stands- no UE support  -SD      Reps 9 10  -SD      Exercise 10    Exercise Name 10 EO feet together  -SD      Time (Minutes) 10 1 min  -SD      Exercise 11    Exercise Name 11 EC feet apart  -SD      Time (Seconds) 11 30 sec  -SD      Exercise 12    Exercise Name 12 EC feet together   -SD      Time (Seconds) 12 30 sec  -SD        User Key  (r) = Recorded By, (t) = Taken By, (c) = Cosigned By    Initials Name Provider Type    SD Sanchez, PTA Physical Therapy Assistant                               PT OP Goals       02/21/17 1000 02/15/17 1500 02/14/17 1400    PT Short Term Goals    STG Date to Achieve 02/23/17  -SD 02/23/17  -MB 02/23/17  -SD    STG 1 Tolerate 45 minute treatment session, no increase in pain.   -SD Tolerate 45 minute treatment session, no increase in pain.   -MB Tolerate 45 minute treatment session, no increase in pain.   -SD    STG 1 Progress Met  -SD Met  -MB Progressing  -SD    STG 2 Perform sit to stand x10, no use of UEs.  -SD Perform sit to stand x10, no use of UEs.  -MB Perform sit to stand x10, no use of UEs.  -SD    STG 2 Progress Met  -SD Not Met  -MB Not Met  -SD    STG 3 EO/ft together 1 minute.  -SD EO/ft together 1 minute.  -MB EO/ft together 1 minute.  -SD    STG 3 Progress Met  -SD Progressing  -MB Progressing  -SD    STG 4 EC/ft apart 30 secs.  -SD EC/ft apart 30 secs.  -MB EC/ft apart 30 secs.  -SD    STG 4 Progress Met  -SD Progressing  -MB Progressing  -SD    " STG 5 EC/ft together 30 secs.  -SD EC/ft together 30 secs.  -MB EC/ft together 30 secs.  -SD    STG 5 Progress Met  -SD      Long Term Goals    LTG Date to Achieve 02/23/17  -SD 02/23/17  -MB 02/23/17  -SD    LTG 1 60% improved  -SD 60% improved  -MB 60% improved  -SD    LTG 1 Progress Not Met  -SD Not Met  -MB Not Met  -SD    LTG 2 Ambulate full treatment session, no AD.   -SD Ambulate full treatment session, no AD.   -MB Ambulate full treatment session, no AD.   -SD    LTG 2 Progress Not Met  -SD Not Met  -MB Not Met  -SD    LTG 3 Ascend/descend 5 steps recip, no increase in pain, no comp, unilateral handrail.   -SD Ascend/descend 5 steps recip, no increase in pain, no comp, unilateral handrail.   -MB Ascend/descend 5 steps recip, no increase in pain, no comp, unilateral handrail.   -SD    LTG 3 Progress Not Met  -SD Not Met  -MB Not Met  -SD    LTG 4 4+/5 L hip flexion strength.   -SD 4+/5 L hip flexion strength.   -MB 4+/5 L hip flexion strength.   -SD    LTG 4 Progress Not Met  -SD Not Met  -MB Not Met  -SD    LTG 5 LEFS to 50/80  -SD LEFS to 50/80  -MB LEFS to 50/80  -SD    LTG 5 Progress Not Met  -SD Not Met  -MB Not Met  -SD    Time Calculation    PT Goal Re-Cert Due Date 02/23/17   Visits: 6/6 MD: PRN Improvement:50%  -SD 02/23/17  -MB 02/23/17  -SD      02/10/17 1000          PT Short Term Goals    STG Date to Achieve 02/23/17  -SD      STG 1 Tolerate 45 minute treatment session, no increase in pain.   -SD      STG 1 Progress Progressing  -SD      STG 2 Perform sit to stand x10, no use of UEs.  -SD      STG 2 Progress Not Met  -SD      STG 3 EO/ft together 1 minute.  -SD      STG 3 Progress Progressing  -SD      STG 4 EC/ft apart 30 secs.  -SD      STG 4 Progress Progressing  -SD      STG 5 EC/ft together 30 secs.  -SD      Long Term Goals    LTG Date to Achieve 02/23/17  -SD      LTG 1 60% improved  -SD      LTG 1 Progress Not Met  -SD      LTG 2 Ambulate full treatment session, no AD.   -SD      LTG 2  Progress Not Met  -SD      LTG 3 Ascend/descend 5 steps recip, no increase in pain, no comp, unilateral handrail.   -SD      LTG 3 Progress Not Met  -SD      LTG 4 4+/5 L hip flexion strength.   -SD      LTG 4 Progress Not Met  -SD      LTG 5 LEFS to 50/80  -SD      LTG 5 Progress Not Met  -SD      Time Calculation    PT Goal Re-Cert Due Date 02/23/17  -SD        User Key  (r) = Recorded By, (t) = Taken By, (c) = Cosigned By    Initials Name Provider Type    ASHUTOSH Tyson [facility] Physical Therapy Assistant    SD Sanchez [facility] Physical Therapy Assistant                Therapy Education       02/21/17 1100    Therapy Education    Given HEP;Symptoms/condition management;Posture/body mechanics  -SD    Program New  -SD    How Provided Verbal;Demonstration;Written  -SD    Provided to Patient;Caregiver  -SD    Level of Understanding Teach back education performed;Verbalized;Demonstrated  -SD      02/15/17 1500    Therapy Education    Given HEP;Symptoms/condition management;Posture/body mechanics  -MB    Program New  -MB    How Provided Verbal;Demonstration;Written  -MB    Provided to Patient;Caregiver  -MB    Level of Understanding Teach back education performed;Verbalized;Demonstrated  -MB      02/14/17 1400    Therapy Education    Given HEP;Symptoms/condition management;Posture/body mechanics  -SD    Program New  -SD    How Provided Verbal;Demonstration;Written  -SD    Provided to Patient;Caregiver  -SD    Level of Understanding Teach back education performed;Verbalized;Demonstrated  -SD      User Key  (r) = Recorded By, (t) = Taken By, (c) = Cosigned By    Initials Name Provider Type    ASHUTOSH Tyson PTA Physical Therapy Assistant    SD Sanchez [facility] Physical Therapy Assistant                Time Calculation:   Start Time: 1012  Stop Time: 1120  Time Calculation (min): 68 min    Therapy Charges for Today     Code Description Service Date Service Provider Modifiers Qty    [id number]  PT  THER PROC EA 15 MIN 2/21/2017 Suki Sanchez, PTA GP 4    [id number] HC PT THER SUPP EA 15 MIN 2/21/2017 Suki Sanchez PTA GP 1                    Suki Sanchez, SUSAN  2/21/2017

## 2017-02-23 ENCOUNTER — HOSPITAL ENCOUNTER (OUTPATIENT)
Dept: PHYSICAL THERAPY | Facility: HOSPITAL | Age: 82
Setting detail: THERAPIES SERIES
Discharge: HOME | End: 2017-02-23

## 2017-02-23 DIAGNOSIS — Z96.642 STATUS POST TOTAL HIP REPLACEMENT, LEFT: ICD-10-CM

## 2017-02-23 DIAGNOSIS — M25.552 LEFT HIP PAIN: Primary | ICD-10-CM

## 2017-02-23 PROCEDURE — 97110 THERAPEUTIC EXERCISES: CPT

## 2017-02-23 NOTE — PROGRESS NOTES
"    Outpatient Physical Therapy Ortho Treatment Note  [facility]  Suki Sanchez PTA  17  11:44 AM       Patient Name: Jesus Smith  : 1935  MRN: [id number]  Today's Date: 2017      Visit Date: 2017  Subjective improvement: 50%    Attendance:         MD follow up: PRN         RC date: 17        Visit Dx:    ICD-10-CM ICD-9-CM   1. Left hip pain M25.552 719.45   2. 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       No past medical history on file.     Past Surgical History   Procedure Laterality Date   • Appendectomy     • Back surgery       Fusion   • Hernia repair     • Stomach surgery     • Cardiac surgery       Open Heart   • Pacemaker implantation     • Joint replacement Left      s/p L hip replacement             PT Ortho       17 1100    Subjective Comments    Subjective Comments \"I feel pretty good, not great\"  -    Precautions and Contraindications    Precautions Pacemaker  -    Subjective Pain    Able to rate subjective pain? yes  -    Pre-Treatment Pain Level 0  -SD    Post-Treatment Pain Level 0  -    Subjective Pain Comment --   tired  -SD      17 1000    Precautions and Contraindications    Precautions Pacemaker  -      User Key  (r) = Recorded By, (t) = Taken By, (c) = Cosigned By    Initials Name Provider Type    SD Sanchez PTA Physical Therapy Assistant                            PT Assessment/Plan       17 1100 17 1000       PT Assessment    Functional Limitations Impaired gait;Performance in self-care ADL;Performance in leisure activities;Limitations in community activities;Limitation in home management  -SD Impaired gait;Performance in self-care ADL;Performance in leisure activities;Limitations in community activities;Limitation in home management  -SD     Impairments Gait;Endurance;Balance;Pain;Muscle strength;Poor body mechanics " " -SD Gait;Endurance;Balance;Pain;Muscle strength;Poor body mechanics  -     Assessment Comments pt much more tired than previous visits. Required rest breaks after every exercise.   - pt met all STGs today. Endurance is still lacking but good performance of therex  -     PT Plan    PT Frequency 3x/week  -SD 3x/week  -SD     Predicted Duration of Therapy Intervention (days/wks) 6 weeks  -SD 6 weeks  -     PT Plan Comments Airex beam tandem  - Airex beam activities.  -       User Key  (r) = Recorded By, (t) = Taken By, (c) = Cosigned By    Initials Name Provider Type    SD Sanchez PTA Physical Therapy Assistant                Modalities       02/23/17 1100          Ice    Ice Applied Yes  -      Location L side of back sitting in chair  -      Rx Minutes 15 mins  -      Ice S/P Rx Yes  -        User Key  (r) = Recorded By, (t) = Taken By, (c) = Cosigned By    Initials Name Provider Type    SD Sanchez PTA Physical Therapy Assistant                Exercises       02/23/17 1100          Subjective Comments    Subjective Comments \"I feel pretty good, not great\"  -      Subjective Pain    Able to rate subjective pain? yes  -      Pre-Treatment Pain Level 0  -SD      Post-Treatment Pain Level 0  -SD      Subjective Pain Comment --   tired  -SD      Aquatics    Aquatics performed? No  -SD      Exercise 1    Exercise Name 1 PROII ROM/end  -SD      Resistance 1 --   L4.0  -SD      Time (Minutes) 1 10 min  -SD      Exercise 2    Exercise Name 2 HS stretch  -SD      Reps 2 3  -SD      Time (Seconds) 2 30 sec hold  -SD      Exercise 3    Exercise Name 3 Airex: CR/TR  -SD      Reps 3 20  -SD      Exercise 4    Exercise Name 4 Airex mini squats  -SD      Sets 4 2  -SD      Reps 4 10  -SD      Exercise 5    Exercise Name 5 Airex march  -SD      Sets 5 2  -SD      Reps 5 10  -SD      Exercise 6    Exercise Name 6 Airex beam: side stepping  -      Reps 6 --   4 laps  -SD      Exercise " 7    Exercise Name 7 --  -SD      Reps 7 --  -SD      Exercise 8    Exercise Name 8 Step up fwd  -SD      Sets 8 2  -SD      Reps 8 10  -SD      Exercise 9    Exercise Name 9 Lat step up  -SD      Sets 9 2  -SD      Reps 9 10  -SD        User Key  (r) = Recorded By, (t) = Taken By, (c) = Cosigned By    Initials Name Provider Type    SD Sanchez, SUSAN Physical Therapy Assistant                               PT OP Goals       02/23/17 1100 02/23/17 1000 02/21/17 1000    PT Short Term Goals    STG Date to Achieve 02/23/17  -SD 02/23/17  -SD 02/23/17  -SD    STG 1 Tolerate 45 minute treatment session, no increase in pain.   -SD Tolerate 45 minute treatment session, no increase in pain.   -SD Tolerate 45 minute treatment session, no increase in pain.   -SD    STG 1 Progress Met  -SD Met  -SD Met  -SD    STG 2 Perform sit to stand x10, no use of UEs.  -SD Perform sit to stand x10, no use of UEs.  -SD Perform sit to stand x10, no use of UEs.  -SD    STG 2 Progress Met  -SD Met  -SD Met  -SD    STG 3 EO/ft together 1 minute.  -SD EO/ft together 1 minute.  -SD EO/ft together 1 minute.  -SD    STG 3 Progress Met  -SD Met  -SD Met  -SD    STG 4 EC/ft apart 30 secs.  -SD EC/ft apart 30 secs.  -SD EC/ft apart 30 secs.  -SD    STG 4 Progress Met  -SD Met  -SD Met  -SD    STG 5 EC/ft together 30 secs.  -SD EC/ft together 30 secs.  -SD EC/ft together 30 secs.  -SD    STG 5 Progress Met  -SD Met  -SD Met  -SD    Long Term Goals    LTG Date to Achieve 02/23/17  -SD 02/23/17  -SD 02/23/17  -SD    LTG 1 60% improved  -SD 60% improved  -SD 60% improved  -SD    LTG 1 Progress Not Met  -SD Not Met  -SD Not Met  -SD    LTG 2 Ambulate full treatment session, no AD.   -SD Ambulate full treatment session, no AD.   -SD Ambulate full treatment session, no AD.   -SD    LTG 2 Progress Not Met  -SD Not Met  -SD Not Met  -SD    LTG 3 Ascend/descend 5 steps recip, no increase in pain, no comp, unilateral handrail.   -SD Ascend/descend 5  steps recip, no increase in pain, no comp, unilateral handrail.   -SD Ascend/descend 5 steps recip, no increase in pain, no comp, unilateral handrail.   -SD    LTG 3 Progress Not Met  -SD Not Met  -SD Not Met  -SD    LTG 4 4+/5 L hip flexion strength.   -SD 4+/5 L hip flexion strength.   -SD 4+/5 L hip flexion strength.   -SD    LTG 4 Progress Not Met  -SD Not Met  -SD Not Met  -SD    LTG 5 LEFS to 50/80  -SD LEFS to 50/80  -SD LEFS to 50/80  -SD    LTG 5 Progress Not Met  -SD Not Met  -SD Not Met  -SD    Time Calculation    PT Goal Re-Cert Due Date  02/23/17   Visits: 7/7 MD: PRN Improvement: 50%  -SD 02/23/17   Visits: 6/6 MD: PRN Improvement:50%  -SD      02/15/17 1500 02/14/17 1400 02/10/17 1000    PT Short Term Goals    STG Date to Achieve 02/23/17  -MB 02/23/17  -SD 02/23/17  -SD    STG 1 Tolerate 45 minute treatment session, no increase in pain.   -MB Tolerate 45 minute treatment session, no increase in pain.   -SD Tolerate 45 minute treatment session, no increase in pain.   -SD    STG 1 Progress Met  -MB Progressing  -SD Progressing  -SD    STG 2 Perform sit to stand x10, no use of UEs.  -MB Perform sit to stand x10, no use of UEs.  -SD Perform sit to stand x10, no use of UEs.  -SD    STG 2 Progress Not Met  -MB Not Met  -SD Not Met  -SD    STG 3 EO/ft together 1 minute.  -MB EO/ft together 1 minute.  -SD EO/ft together 1 minute.  -SD    STG 3 Progress Progressing  -MB Progressing  -SD Progressing  -SD    STG 4 EC/ft apart 30 secs.  -MB EC/ft apart 30 secs.  -SD EC/ft apart 30 secs.  -SD    STG 4 Progress Progressing  -MB Progressing  -SD Progressing  -SD    STG 5 EC/ft together 30 secs.  -MB EC/ft together 30 secs.  -SD EC/ft together 30 secs.  -SD    Long Term Goals    LTG Date to Achieve 02/23/17  -MB 02/23/17  -SD 02/23/17  -SD    LTG 1 60% improved  -MB 60% improved  -SD 60% improved  -SD    LTG 1 Progress Not Met  -MB Not Met  -SD Not Met  -SD    LTG 2 Ambulate full treatment session, no AD.   -MB  Ambulate full treatment session, no AD.   -SD Ambulate full treatment session, no AD.   -SD    LTG 2 Progress Not Met  -MB Not Met  -SD Not Met  -SD    LTG 3 Ascend/descend 5 steps recip, no increase in pain, no comp, unilateral handrail.   -MB Ascend/descend 5 steps recip, no increase in pain, no comp, unilateral handrail.   -SD Ascend/descend 5 steps recip, no increase in pain, no comp, unilateral handrail.   -SD    LTG 3 Progress Not Met  -MB Not Met  -SD Not Met  -SD    LTG 4 4+/5 L hip flexion strength.   -MB 4+/5 L hip flexion strength.   -SD 4+/5 L hip flexion strength.   -SD    LTG 4 Progress Not Met  -MB Not Met  -SD Not Met  -SD    LTG 5 LEFS to 50/80  -MB LEFS to 50/80  -SD LEFS to 50/80  -SD    LTG 5 Progress Not Met  -MB Not Met  -SD Not Met  -SD    Time Calculation    PT Goal Re-Cert Due Date 02/23/17  -MB 02/23/17  -SD 02/23/17  -SD      User Key  (r) = Recorded By, (t) = Taken By, (c) = Cosigned By    Initials Name Provider Type    ASHUTOSH Tyson, [facility] Physical Therapy Assistant    SD Sanchez, [facility] Physical Therapy Assistant                Therapy Education       02/23/17 1100    Therapy Education    Given HEP;Symptoms/condition management;Posture/body mechanics  -SD    Program Reinforced  -SD    How Provided Verbal;Demonstration;Written  -SD    Provided to Patient;Caregiver  -SD    Level of Understanding Teach back education performed;Verbalized;Demonstrated  -SD      02/23/17 1000    Therapy Education    Given HEP;Symptoms/condition management;Posture/body mechanics  -SD    Program Reinforced  -SD    How Provided Verbal;Demonstration;Written  -SD    Provided to Patient;Caregiver  -SD    Level of Understanding Teach back education performed;Verbalized;Demonstrated  -SD      02/21/17 1100    Therapy Education    Given HEP;Symptoms/condition management;Posture/body mechanics  -SD    Program New  -SD    How Provided Verbal;Demonstration;Written  -SD    Provided to Patient;Caregiver  -SD     Level of Understanding Teach back education performed;Verbalized;Demonstrated  -SD      User Key  (r) = Recorded By, (t) = Taken By, (c) = Cosigned By    Initials Name Provider Type    SD Sanchez PTA Physical Therapy Assistant                Time Calculation:   Start Time: 1055  Stop Time: 1200  Time Calculation (min): 65 min    Therapy Charges for Today     Code Description Service Date Service Provider Modifiers Qty    19467081906 HC PT THER PROC EA 15 MIN 2/23/2017 Suki Sanchez PTA GP 3    27590464301 HC PT THER SUPP EA 15 MIN 2/23/2017 Suki Sanchez PTA GP 1                    Suki Sanchez PTA  2/23/2017

## 2017-02-28 ENCOUNTER — HOSPITAL ENCOUNTER (OUTPATIENT)
Dept: PHYSICAL THERAPY | Facility: HOSPITAL | Age: 82
Setting detail: THERAPIES SERIES
Discharge: HOME | End: 2017-02-28

## 2017-02-28 PROCEDURE — 97164 PT RE-EVAL EST PLAN CARE: CPT

## 2017-02-28 PROCEDURE — 97110 THERAPEUTIC EXERCISES: CPT

## 2017-03-02 ENCOUNTER — HOSPITAL ENCOUNTER (OUTPATIENT)
Dept: PHYSICAL THERAPY | Facility: HOSPITAL | Age: 82
Setting detail: THERAPIES SERIES
Discharge: HOME | End: 2017-03-02

## 2017-03-02 DIAGNOSIS — M25.552 LEFT HIP PAIN: Primary | ICD-10-CM

## 2017-03-02 DIAGNOSIS — Z96.642 STATUS POST TOTAL HIP REPLACEMENT, LEFT: ICD-10-CM

## 2017-03-02 PROCEDURE — 97110 THERAPEUTIC EXERCISES: CPT

## 2017-03-02 NOTE — PROGRESS NOTES
Outpatient Physical Therapy Ortho Treatment Note  [facility]  Suki Sanchez, PTA  17  10:21 AM       Patient Name: Jesus Smith  : 1935  MRN: [id number]  Today's Date: 3/2/2017      Visit Date: 2017  Subjective Improvement: 20-30%       Attendance:   Approved: 23 visits          MD follow up: PRN           RC date: 3/21/17        Visit Dx:    ICD-10-CM ICD-9-CM   1. Left hip pain M25.552 719.45   2. 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       No past medical history on file.     Past Surgical History   Procedure Laterality Date   • Appendectomy     • Back surgery       Fusion   • Hernia repair     • Stomach surgery     • Cardiac surgery       Open Heart   • Pacemaker implantation     • Joint replacement Left      s/p L hip replacement             PT Ortho       17 0900    Precautions and Contraindications    Precautions Pacemaker  -SD      17 0800    Subjective Comments    Subjective Comments [facility] and [facility] out, no pain currently, just tired.   -MD    Precautions and Contraindications    Precautions Pacemaker  -MD    Subjective Pain    Able to rate subjective pain? yes  -MD    Pre-Treatment Pain Level 0  -MD    Posture/Observations    Posture- WNL Posture is WNL  -MD    Posture/Observations Comments Forward flexed posture.  -MD    Sensation    Sensation WNL? WNL  -MD    Left Hip    Hip Flexion Gross Movement (4/5) good  -MD    Hip ABduction Gross Movement (4+/5) good plus  -MD    Hip ADduction Gross Movement (4+/5) good plus  -MD    Left Knee    Knee Extension Gross Movement (5/5) normal  -MD    Knee Flexion Gross Movement (5/5) normal  -MD    Gait Assessment/Treatment    Gait, [place] Level independent  -MD    Gait, Assistive Device straight cane   R UE  -MD    Gait, Comment Slow guarded gt, cues to stand up straight, patient fatigues quickly.   -MD       User Key  (r) = Recorded By, (t) = Taken By, (c) = Cosigned By    Initials Name Provider Type    MD Luis Garcia, PT Physical Therapist    SD Suki Sanchez PTA Physical Therapy Assistant                            PT Assessment/Plan       03/02/17 1000       PT Assessment    Functional Limitations Impaired gait;Decreased safety during functional activities;Limitation in home management;Limitations in community activities;Limitations in functional capacity and performance;Performance in leisure activities;Performance in self-care ADL  -     Impairments Balance;Endurance;Gait;Posture;Pain;Muscle strength  -     Assessment Comments pt needed frequent rests today. Good performance. Cues for pursed lip breathing.  -     PT Plan    PT Frequency 2x/week  -     Predicted Duration of Therapy Intervention (days/wks) 3 more weeks  -     PT Plan Comments Airex beam. Obstacles  -       User Key  (r) = Recorded By, (t) = Taken By, (c) = Cosigned By    Initials Name Provider Type    SD Sanchez PTA Physical Therapy Assistant                Modalities       03/02/17 0900          Ice    Ice Applied Yes  -      Location L side of back sitting in chair  -      Rx Minutes 15 mins  -SD      Ice S/P Rx Yes  -        User Key  (r) = Recorded By, (t) = Taken By, (c) = Cosigned By    Initials Name Provider Type    SD Sanchez PTA Physical Therapy Assistant                Exercises       03/02/17 0900          Subjective Comments    Subjective Comments No pain just tired today  -      Subjective Pain    Able to rate subjective pain? yes  -SD      Pre-Treatment Pain Level 0  -SD      Post-Treatment Pain Level 0  -SD      Aquatics    Aquatics performed? No  -SD      Exercise 1    Exercise Name 1 HS stretch  -SD      Reps 1 3  -SD      Time (Seconds) 1 30 sec hold  -SD      Exercise 2    Exercise Name 2 Airex: CR/TR  -SD      Sets 2 2  -SD      Reps 2 10  -SD      Exercise 3    Exercise  Name 3 Airex: march  -SD      Sets 3 2  -SD      Reps 3 10  -SD      Exercise 4    Exercise Name 4 Airex: Mini squats  -SD      Sets 4 2  -SD      Reps 4 10  -SD      Exercise 5    Exercise Name 5 PRO II for ROM  -SD      Resistance 5 --   L4.0  -SD      Time (Minutes) 5 10 min  -SD      Exercise 6    Exercise Name 6 Step up fwd 4 inch  -SD      Reps 6 10  -SD      Exercise 7    Exercise Name 7 Step up lat 4 inch  -SD      Reps 7 10  -SD        User Key  (r) = Recorded By, (t) = Taken By, (c) = Cosigned By    Initials Name Provider Type    SD Sanchez, PTA Physical Therapy Assistant                               PT OP Goals       03/02/17 1000       PT Short Term Goals    STG Date to Achieve 03/21/17  -SD     STG 1 Tolerate 45 minute treatment session, no increase in pain.   -SD     STG 1 Progress Met  -SD     STG 2 Perform sit to stand x10, no use of UEs.  -SD     STG 2 Progress Met  -SD     STG 3 EO/ft together 1 minute.  -SD     STG 3 Progress Met  -SD     STG 4 EC/ft apart 30 secs.  -SD     STG 4 Progress Met  -SD     STG 5 EC/ft together 30 secs.  -SD     STG 5 Progress Met  -SD     Long Term Goals    LTG Date to Achieve 03/21/17  -SD     LTG 1 60% improved  -SD     LTG 1 Progress Not Met  -SD     LTG 2 Ambulate full treatment session, no AD.   -SD     LTG 2 Progress Not Met  -SD     LTG 3 Ascend/descend 5 steps recip, no increase in pain, no comp, unilateral handrail.   -SD     LTG 3 Progress Not Met  -SD     LTG 4 4+/5 L hip flexion strength.   -SD     LTG 4 Progress Not Met  -SD     LTG 5 LEFS to 50/80  -SD     LTG 5 Progress Not Met  -SD     LTG 6 Perform standing ther ex for 10 min with no seated rest break.   -SD     LTG 6 Progress Ongoing;Progressing  -SD     LTG 7 I with HEP.   -SD     LTG 7 Progress Ongoing  -SD     LTG 8 Tandem R 15 secs or greater.   -SD     LTG 8 Progress Ongoing  -SD     LTG 9 Tandem L 15 secs or greater.   -SD     LTG 9 Progress Ongoing  -SD     Time Calculation    PT  Goal Re-Cert Due Date 03/21/17   Visits:9/9 MD: PRN Improvement: 20-30%  -SD       User Key  (r) = Recorded By, (t) = Taken By, (c) = Cosigned By    Initials Name Provider Type    SD Sanchez PTA Physical Therapy Assistant                Therapy Education       03/02/17 1000          Therapy Education    Given HEP;Symptoms/condition management;Pain management;Posture/body mechanics  -SD      Program Reinforced  -SD      How Provided Verbal;Demonstration  -SD      Provided to Patient  -DS      Level of Understanding Verbalized;Demonstrated;Teach back education performed  -SD        User Key  (r) = Recorded By, (t) = Taken By, (c) = Cosigned By    Initials Name Provider Type    SD Sanchez PTA Physical Therapy Assistant                Time Calculation:   Start Time: 0939  Stop Time: 1030  Time Calculation (min): 51 min    Therapy Charges for Today     Code Description Service Date Service Provider Modifiers Qty    21304263466 HC PT THER PROC EA 15 MIN 3/2/2017 Suki Sanchez PTA GP 3    21564107028 HC PT THER SUPP EA 15 MIN 3/2/2017 Suki Sanchez PTA GP 1                    Suki Sanchez PTA  3/2/2017

## 2017-03-07 ENCOUNTER — APPOINTMENT (OUTPATIENT)
Dept: PHYSICAL THERAPY | Facility: HOSPITAL | Age: 82
End: 2017-03-07

## 2017-08-25 ENCOUNTER — DOCUMENTATION (OUTPATIENT)
Dept: PHYSICAL THERAPY | Facility: HOSPITAL | Age: 82
End: 2017-08-25

## 2017-08-25 DIAGNOSIS — Z96.642 STATUS POST TOTAL HIP REPLACEMENT, LEFT: ICD-10-CM

## 2017-08-25 DIAGNOSIS — M25.552 LEFT HIP PAIN: Primary | ICD-10-CM

## 2017-08-25 NOTE — THERAPY DISCHARGE NOTE
Outpatient Physical Therapy Discharge Summary         Patient Name: Jesus Smith  : 1935  MRN: [id number]    Today's Date: 2017    Visit Dx:    ICD-10-CM ICD-9-CM   1. Left hip pain M25.552 719.45   2. 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   -MB     STG 1 Progress Met  -MB     STG 2 Perform sit to stand x10, no use of UEs.  -MB     STG 2 Progress Met  -MB     STG 3 EO/ft together 1 minute.  -MB     STG 3 Progress Met  -MB     STG 4 EC/ft apart 30 secs.  -MB     STG 4 Progress Met  -MB     STG 5 EC/ft together 30 secs.  -MB     STG 5 Progress Met  -MB     Long Term Goals    LTG Date to Achieve 17  -MB     LTG 1 60% improved  -MB     LTG 1 Progress Not Met  -MB     LTG 2 Ambulate full treatment session, no AD.   -MB     LTG 2 Progress Not Met  -MB     LTG 3 Ascend/descend 5 steps recip, no increase in pain, no comp, unilateral handrail.   -MB     LTG 3 Progress Not Met  -MB     LTG 4 4+/5 L hip flexion strength.   -MB     LTG 4 Progress Not Met  -MB     LTG 5 LEFS to 50/80  -MB     LTG 5 Progress Not Met  -MB     LTG 6 Perform standing ther ex for 10 min with no seated rest break.   -MB     LTG 6 Progress Ongoing;Progressing  -MB     LTG 7 I with HEP.   -MB     LTG 7 Progress Not Met  -MB     LTG 8 Tandem R 15 secs or greater.   -MB     LTG 8 Progress Not Met  -MB     LTG 9 Tandem L 15 secs or greater.   -MB     LTG 9 Progress Not Met  -MB       User Key  (r) = Recorded By, (t) = Taken By, (c) = Cosigned By    Initials Name Provider Type    ASHUTOSH Tyson PTA Physical Therapy Assistant          OP PT Discharge Summary  Date of Discharge: 17  Reason for Discharge: other (comment) (Pt did not return to therapy.)  Outcomes Achieved: Patient able to partially acheive established  goals  Discharge Destination: Home with home program      Time Calculation:                    Luis Tyson, PTA  8/25/2017

## 2020-04-03 ENCOUNTER — HOSPITAL ENCOUNTER (INPATIENT)
Facility: HOSPITAL | Age: 85
LOS: 10 days | Discharge: NURSING, REHAB OR LONG-TERM CARE | End: 2020-04-13
Attending: EMERGENCY MEDICINE | Admitting: SPECIALIST

## 2020-04-03 ENCOUNTER — APPOINTMENT (OUTPATIENT)
Dept: GENERAL RADIOLOGY | Facility: HOSPITAL | Age: 85
End: 2020-04-03

## 2020-04-03 ENCOUNTER — APPOINTMENT (OUTPATIENT)
Dept: CT IMAGING | Facility: HOSPITAL | Age: 85
End: 2020-04-03

## 2020-04-03 DIAGNOSIS — R79.89 ABNORMAL LFTS: ICD-10-CM

## 2020-04-03 DIAGNOSIS — K81.0 ACUTE CHOLECYSTITIS: ICD-10-CM

## 2020-04-03 DIAGNOSIS — R65.10 SYSTEMIC INFLAMMATORY RESPONSE SYNDROME (HCC): ICD-10-CM

## 2020-04-03 DIAGNOSIS — R74.01 TRANSAMINITIS: ICD-10-CM

## 2020-04-03 DIAGNOSIS — I10 BENIGN ESSENTIAL HTN: ICD-10-CM

## 2020-04-03 DIAGNOSIS — N18.30 CKD (CHRONIC KIDNEY DISEASE) STAGE 3, GFR 30-59 ML/MIN (HCC): ICD-10-CM

## 2020-04-03 DIAGNOSIS — R78.81 BACTEREMIA: ICD-10-CM

## 2020-04-03 DIAGNOSIS — N17.9 AKI (ACUTE KIDNEY INJURY) (HCC): ICD-10-CM

## 2020-04-03 DIAGNOSIS — Z78.9 DECREASED ACTIVITIES OF DAILY LIVING (ADL): ICD-10-CM

## 2020-04-03 DIAGNOSIS — Z95.2 HISTORY OF PROSTHETIC AORTIC VALVE: ICD-10-CM

## 2020-04-03 DIAGNOSIS — N39.0 ACUTE UTI (URINARY TRACT INFECTION): Primary | ICD-10-CM

## 2020-04-03 DIAGNOSIS — Z74.09 IMPAIRED FUNCTIONAL MOBILITY AND ACTIVITY TOLERANCE: ICD-10-CM

## 2020-04-03 LAB
ALBUMIN SERPL-MCNC: 3.2 G/DL (ref 3.5–5.2)
ALBUMIN/GLOB SERPL: 0.9 G/DL
ALP SERPL-CCNC: 155 U/L (ref 39–117)
ALT SERPL W P-5'-P-CCNC: 61 U/L (ref 1–41)
ANION GAP SERPL CALCULATED.3IONS-SCNC: 16 MMOL/L (ref 5–15)
APTT PPP: 38.2 SECONDS (ref 24.1–35)
ARTERIAL PATENCY WRIST A: POSITIVE
AST SERPL-CCNC: 93 U/L (ref 1–40)
ATMOSPHERIC PRESS: 751 MMHG
B PARAPERT DNA SPEC QL NAA+PROBE: NOT DETECTED
B PERT DNA SPEC QL NAA+PROBE: NOT DETECTED
BACTERIA UR QL AUTO: ABNORMAL /HPF
BASE EXCESS BLDA CALC-SCNC: 1.1 MMOL/L (ref 0–2)
BDY SITE: ABNORMAL
BILIRUB SERPL-MCNC: 3.8 MG/DL (ref 0.2–1.2)
BILIRUB UR QL STRIP: ABNORMAL
BODY TEMPERATURE: 37 C
BUN BLD-MCNC: 50 MG/DL (ref 8–23)
BUN/CREAT SERPL: 31.8 (ref 7–25)
C PNEUM DNA NPH QL NAA+NON-PROBE: NOT DETECTED
CALCIUM SPEC-SCNC: 8.5 MG/DL (ref 8.6–10.5)
CHLORIDE SERPL-SCNC: 94 MMOL/L (ref 98–107)
CK SERPL-CCNC: 756 U/L (ref 20–200)
CLARITY UR: ABNORMAL
CO2 SERPL-SCNC: 21 MMOL/L (ref 22–29)
COLOR UR: ABNORMAL
CREAT BLD-MCNC: 1.57 MG/DL (ref 0.76–1.27)
D-LACTATE SERPL-SCNC: 2.2 MMOL/L (ref 0.5–2)
D-LACTATE SERPL-SCNC: 3.3 MMOL/L (ref 0.5–2)
DEPRECATED RDW RBC AUTO: 45 FL (ref 37–54)
ERYTHROCYTE [DISTWIDTH] IN BLOOD BY AUTOMATED COUNT: 13.8 % (ref 12.3–15.4)
FLUAV H1 2009 PAND RNA NPH QL NAA+PROBE: NOT DETECTED
FLUAV H1 HA GENE NPH QL NAA+PROBE: NOT DETECTED
FLUAV H3 RNA NPH QL NAA+PROBE: NOT DETECTED
FLUAV SUBTYP SPEC NAA+PROBE: NOT DETECTED
FLUBV RNA ISLT QL NAA+PROBE: NOT DETECTED
GFR SERPL CREATININE-BSD FRML MDRD: 42 ML/MIN/1.73
GLOBULIN UR ELPH-MCNC: 3.5 GM/DL
GLUCOSE BLD-MCNC: 206 MG/DL (ref 65–99)
GLUCOSE UR STRIP-MCNC: NEGATIVE MG/DL
HADV DNA SPEC NAA+PROBE: NOT DETECTED
HCO3 BLDA-SCNC: 24.7 MMOL/L (ref 20–26)
HCOV 229E RNA SPEC QL NAA+PROBE: NOT DETECTED
HCOV HKU1 RNA SPEC QL NAA+PROBE: NOT DETECTED
HCOV NL63 RNA SPEC QL NAA+PROBE: NOT DETECTED
HCOV OC43 RNA SPEC QL NAA+PROBE: NOT DETECTED
HCT VFR BLD AUTO: 34.3 % (ref 37.5–51)
HGB BLD-MCNC: 11.5 G/DL (ref 13–17.7)
HGB UR QL STRIP.AUTO: ABNORMAL
HMPV RNA NPH QL NAA+NON-PROBE: NOT DETECTED
HOLD SPECIMEN: NORMAL
HPIV1 RNA SPEC QL NAA+PROBE: NOT DETECTED
HPIV2 RNA SPEC QL NAA+PROBE: NOT DETECTED
HPIV3 RNA NPH QL NAA+PROBE: NOT DETECTED
HPIV4 P GENE NPH QL NAA+PROBE: NOT DETECTED
HYALINE CASTS UR QL AUTO: ABNORMAL /LPF
INR PPP: 1.64 (ref 0.91–1.09)
KETONES UR QL STRIP: ABNORMAL
LACTATE HOLD SPECIMEN: NORMAL
LDH SERPL-CCNC: 217 U/L (ref 135–225)
LEUKOCYTE ESTERASE UR QL STRIP.AUTO: ABNORMAL
LYMPHOCYTES # BLD MANUAL: 0.85 10*3/MM3 (ref 0.7–3.1)
LYMPHOCYTES NFR BLD MANUAL: 5 % (ref 19.6–45.3)
LYMPHOCYTES NFR BLD MANUAL: 8 % (ref 5–12)
Lab: ABNORMAL
M PNEUMO IGG SER IA-ACNC: NOT DETECTED
MCH RBC QN AUTO: 30 PG (ref 26.6–33)
MCHC RBC AUTO-ENTMCNC: 33.5 G/DL (ref 31.5–35.7)
MCV RBC AUTO: 89.6 FL (ref 79–97)
MODALITY: ABNORMAL
MONOCYTES # BLD AUTO: 1.36 10*3/MM3 (ref 0.1–0.9)
NEUTROPHILS # BLD AUTO: 14.82 10*3/MM3 (ref 1.7–7)
NEUTROPHILS NFR BLD MANUAL: 69 % (ref 42.7–76)
NEUTS BAND NFR BLD MANUAL: 18 % (ref 0–5)
NITRITE UR QL STRIP: POSITIVE
NT-PROBNP SERPL-MCNC: 4334 PG/ML (ref 5–1800)
PCO2 BLDA: 34.7 MM HG (ref 35–45)
PH BLDA: 7.46 PH UNITS (ref 7.35–7.45)
PH UR STRIP.AUTO: <=5 [PH] (ref 5–8)
PLAT MORPH BLD: NORMAL
PLATELET # BLD AUTO: 167 10*3/MM3 (ref 140–450)
PMV BLD AUTO: 10 FL (ref 6–12)
PO2 BLDA: 80.9 MM HG (ref 83–108)
POIKILOCYTOSIS BLD QL SMEAR: ABNORMAL
POLYCHROMASIA BLD QL SMEAR: ABNORMAL
POTASSIUM BLD-SCNC: 4.3 MMOL/L (ref 3.5–5.2)
PROCALCITONIN SERPL-MCNC: 5.87 NG/ML (ref 0.1–0.25)
PROT SERPL-MCNC: 6.7 G/DL (ref 6–8.5)
PROT UR QL STRIP: ABNORMAL
PROTHROMBIN TIME: 19.4 SECONDS (ref 11.9–14.6)
RBC # BLD AUTO: 3.83 10*6/MM3 (ref 4.14–5.8)
RBC # UR: ABNORMAL /HPF
REF LAB TEST METHOD: ABNORMAL
RHINOVIRUS RNA SPEC NAA+PROBE: NOT DETECTED
RSV RNA NPH QL NAA+NON-PROBE: NOT DETECTED
S PNEUM AG SPEC QL LA: NEGATIVE
SAO2 % BLDCOA: 97.1 % (ref 94–99)
SODIUM BLD-SCNC: 131 MMOL/L (ref 136–145)
SP GR UR STRIP: 1.02 (ref 1–1.03)
SQUAMOUS #/AREA URNS HPF: ABNORMAL /HPF
TROPONIN T SERPL-MCNC: 0.03 NG/ML (ref 0–0.03)
UROBILINOGEN UR QL STRIP: ABNORMAL
VENTILATOR MODE: ABNORMAL
WBC MORPH BLD: NORMAL
WBC NRBC COR # BLD: 17.03 10*3/MM3 (ref 3.4–10.8)
WBC UR QL AUTO: ABNORMAL /HPF
WHOLE BLOOD HOLD SPECIMEN: NORMAL
WHOLE BLOOD HOLD SPECIMEN: NORMAL

## 2020-04-03 PROCEDURE — 87040 BLOOD CULTURE FOR BACTERIA: CPT | Performed by: EMERGENCY MEDICINE

## 2020-04-03 PROCEDURE — 85025 COMPLETE CBC W/AUTO DIFF WBC: CPT | Performed by: EMERGENCY MEDICINE

## 2020-04-03 PROCEDURE — 87899 AGENT NOS ASSAY W/OPTIC: CPT | Performed by: EMERGENCY MEDICINE

## 2020-04-03 PROCEDURE — 85730 THROMBOPLASTIN TIME PARTIAL: CPT | Performed by: EMERGENCY MEDICINE

## 2020-04-03 PROCEDURE — 85610 PROTHROMBIN TIME: CPT | Performed by: EMERGENCY MEDICINE

## 2020-04-03 PROCEDURE — 25010000002 ENOXAPARIN PER 10 MG: Performed by: INTERNAL MEDICINE

## 2020-04-03 PROCEDURE — 83880 ASSAY OF NATRIURETIC PEPTIDE: CPT | Performed by: EMERGENCY MEDICINE

## 2020-04-03 PROCEDURE — 84145 PROCALCITONIN (PCT): CPT | Performed by: EMERGENCY MEDICINE

## 2020-04-03 PROCEDURE — 84484 ASSAY OF TROPONIN QUANT: CPT | Performed by: EMERGENCY MEDICINE

## 2020-04-03 PROCEDURE — 36600 WITHDRAWAL OF ARTERIAL BLOOD: CPT

## 2020-04-03 PROCEDURE — 93010 ELECTROCARDIOGRAM REPORT: CPT | Performed by: INTERNAL MEDICINE

## 2020-04-03 PROCEDURE — 74176 CT ABD & PELVIS W/O CONTRAST: CPT

## 2020-04-03 PROCEDURE — 81001 URINALYSIS AUTO W/SCOPE: CPT | Performed by: EMERGENCY MEDICINE

## 2020-04-03 PROCEDURE — 80053 COMPREHEN METABOLIC PANEL: CPT | Performed by: EMERGENCY MEDICINE

## 2020-04-03 PROCEDURE — 82550 ASSAY OF CK (CPK): CPT | Performed by: INTERNAL MEDICINE

## 2020-04-03 PROCEDURE — 0100U HC BIOFIRE FILMARRAY RESP PANEL 2: CPT | Performed by: EMERGENCY MEDICINE

## 2020-04-03 PROCEDURE — 83605 ASSAY OF LACTIC ACID: CPT | Performed by: INTERNAL MEDICINE

## 2020-04-03 PROCEDURE — 93005 ELECTROCARDIOGRAM TRACING: CPT | Performed by: EMERGENCY MEDICINE

## 2020-04-03 PROCEDURE — 99284 EMERGENCY DEPT VISIT MOD MDM: CPT

## 2020-04-03 PROCEDURE — 83605 ASSAY OF LACTIC ACID: CPT | Performed by: EMERGENCY MEDICINE

## 2020-04-03 PROCEDURE — 87186 SC STD MICRODIL/AGAR DIL: CPT | Performed by: EMERGENCY MEDICINE

## 2020-04-03 PROCEDURE — 25010000002 FUROSEMIDE PER 20 MG: Performed by: INTERNAL MEDICINE

## 2020-04-03 PROCEDURE — 83615 LACTATE (LD) (LDH) ENZYME: CPT | Performed by: EMERGENCY MEDICINE

## 2020-04-03 PROCEDURE — 87150 DNA/RNA AMPLIFIED PROBE: CPT | Performed by: EMERGENCY MEDICINE

## 2020-04-03 PROCEDURE — 82803 BLOOD GASES ANY COMBINATION: CPT

## 2020-04-03 PROCEDURE — 36415 COLL VENOUS BLD VENIPUNCTURE: CPT | Performed by: EMERGENCY MEDICINE

## 2020-04-03 PROCEDURE — 85007 BL SMEAR W/DIFF WBC COUNT: CPT | Performed by: EMERGENCY MEDICINE

## 2020-04-03 PROCEDURE — 25010000002 CEFTRIAXONE PER 250 MG: Performed by: EMERGENCY MEDICINE

## 2020-04-03 PROCEDURE — 71045 X-RAY EXAM CHEST 1 VIEW: CPT

## 2020-04-03 RX ORDER — ASPIRIN 81 MG/1
81 TABLET, CHEWABLE ORAL DAILY
Status: DISCONTINUED | OUTPATIENT
Start: 2020-04-03 | End: 2020-04-13 | Stop reason: HOSPADM

## 2020-04-03 RX ORDER — LOSARTAN POTASSIUM 100 MG/1
100 TABLET ORAL DAILY
COMMUNITY

## 2020-04-03 RX ORDER — ROSUVASTATIN CALCIUM 20 MG/1
20 TABLET, COATED ORAL DAILY
Status: DISCONTINUED | OUTPATIENT
Start: 2020-04-04 | End: 2020-04-04

## 2020-04-03 RX ORDER — SODIUM CHLORIDE 0.9 % (FLUSH) 0.9 %
10 SYRINGE (ML) INJECTION EVERY 12 HOURS SCHEDULED
Status: DISCONTINUED | OUTPATIENT
Start: 2020-04-03 | End: 2020-04-05

## 2020-04-03 RX ORDER — SODIUM CHLORIDE 0.9 % (FLUSH) 0.9 %
10 SYRINGE (ML) INJECTION AS NEEDED
Status: DISCONTINUED | OUTPATIENT
Start: 2020-04-03 | End: 2020-04-13 | Stop reason: HOSPADM

## 2020-04-03 RX ORDER — WARFARIN SODIUM 1 MG
1 TABLET ORAL
Status: ON HOLD | COMMUNITY
End: 2020-04-04 | Stop reason: DRUGHIGH

## 2020-04-03 RX ORDER — ONDANSETRON 2 MG/ML
4 INJECTION INTRAMUSCULAR; INTRAVENOUS EVERY 6 HOURS PRN
Status: DISCONTINUED | OUTPATIENT
Start: 2020-04-03 | End: 2020-04-05

## 2020-04-03 RX ORDER — BISOPROLOL FUMARATE 10 MG/1
10 TABLET, FILM COATED ORAL DAILY
Status: ON HOLD | COMMUNITY
End: 2020-04-04

## 2020-04-03 RX ORDER — SODIUM CHLORIDE 0.9 % (FLUSH) 0.9 %
10 SYRINGE (ML) INJECTION AS NEEDED
Status: DISCONTINUED | OUTPATIENT
Start: 2020-04-03 | End: 2020-04-05

## 2020-04-03 RX ORDER — LOSARTAN POTASSIUM 50 MG/1
100 TABLET ORAL DAILY
Status: DISCONTINUED | OUTPATIENT
Start: 2020-04-04 | End: 2020-04-04

## 2020-04-03 RX ORDER — FUROSEMIDE 10 MG/ML
40 INJECTION INTRAMUSCULAR; INTRAVENOUS EVERY 12 HOURS
Status: COMPLETED | OUTPATIENT
Start: 2020-04-03 | End: 2020-04-04

## 2020-04-03 RX ORDER — ASPIRIN 81 MG/1
81 TABLET ORAL DAILY
COMMUNITY

## 2020-04-03 RX ORDER — BISOPROLOL FUMARATE 5 MG/1
10 TABLET, FILM COATED ORAL DAILY
Status: DISCONTINUED | OUTPATIENT
Start: 2020-04-03 | End: 2020-04-13 | Stop reason: HOSPADM

## 2020-04-03 RX ORDER — ROSUVASTATIN CALCIUM 20 MG/1
20 TABLET, COATED ORAL DAILY
Status: ON HOLD | COMMUNITY
End: 2020-04-04

## 2020-04-03 RX ADMIN — CEFTRIAXONE SODIUM 1 G: 1 INJECTION, POWDER, FOR SOLUTION INTRAMUSCULAR; INTRAVENOUS at 15:33

## 2020-04-03 RX ADMIN — ENOXAPARIN SODIUM 30 MG: 30 INJECTION SUBCUTANEOUS at 21:24

## 2020-04-03 RX ADMIN — FUROSEMIDE 40 MG: 10 INJECTION, SOLUTION INTRAMUSCULAR; INTRAVENOUS at 21:24

## 2020-04-03 RX ADMIN — SODIUM CHLORIDE 500 ML: 9 INJECTION, SOLUTION INTRAVENOUS at 15:33

## 2020-04-03 RX ADMIN — ASPIRIN 81 MG: 81 TABLET ORAL at 21:33

## 2020-04-03 RX ADMIN — DOXYCYCLINE 100 MG: 100 INJECTION, POWDER, LYOPHILIZED, FOR SOLUTION INTRAVENOUS at 17:57

## 2020-04-03 RX ADMIN — SODIUM CHLORIDE, PRESERVATIVE FREE 10 ML: 5 INJECTION INTRAVENOUS at 21:25

## 2020-04-03 RX ADMIN — BISOPROLOL FUMARATE 10 MG: 5 TABLET ORAL at 21:24

## 2020-04-03 NOTE — ED PROVIDER NOTES
Subjective   Patient with complains of shortness of breath cough fever nausea no vomiting nonspecific flank pain increased frequency of urination no history of travel no history of exposure to another infection patient but he is being traveling around locally among different stores      Shortness of Breath   Severity:  Moderate  Onset quality:  Gradual  Timing:  Intermittent  Progression:  Worsening  Chronicity:  New  Context: activity    Context: not animal exposure, not emotional upset, not occupational exposure, not pollens, not smoke exposure, not strong odors and not URI    Relieved by:  Nothing  Worsened by:  Nothing  Ineffective treatments:  None tried  Associated symptoms: abdominal pain, cough, diaphoresis, fever and wheezing    Associated symptoms: no chest pain, no claudication, no headaches, no hemoptysis, no neck pain, no PND, no rash, no sputum production, no syncope, no swollen glands and no vomiting    Abdominal pain:     Pain location: Right-sided pain patient states that he had lost balance and fell on the right side 3 to 4 days ago and is having complaining of pain in the right side.  Fever:     Fever duration: Has had fever at home none now.      Review of Systems   Constitutional: Positive for diaphoresis and fever.   HENT: Negative.    Respiratory: Positive for cough, shortness of breath and wheezing. Negative for hemoptysis and sputum production.    Cardiovascular: Negative for chest pain, claudication, syncope and PND.   Gastrointestinal: Positive for abdominal pain. Negative for abdominal distention, nausea and vomiting.   Endocrine: Negative.    Genitourinary: Negative.    Musculoskeletal: Negative.  Negative for back pain and neck pain.   Skin: Negative for color change, pallor and rash.   Neurological: Negative.  Negative for syncope, weakness, light-headedness, numbness and headaches.   Hematological: Negative.  Does not bruise/bleed easily.   All other systems reviewed and are  negative.      History reviewed. No pertinent past medical history.    No Known Allergies    Past Surgical History:   Procedure Laterality Date   • APPENDECTOMY     • BACK SURGERY      Fusion   • CARDIAC SURGERY      Open Heart   • HERNIA REPAIR     • JOINT REPLACEMENT Left     s/p L hip replacement   • PACEMAKER IMPLANTATION     • STOMACH SURGERY         History reviewed. No pertinent family history.    Social History     Socioeconomic History   • Marital status:      Spouse name: Not on file   • Number of children: Not on file   • Years of education: Not on file   • Highest education level: Not on file   Tobacco Use   • Smoking status: Never Smoker   • Smokeless tobacco: Never Used   Substance and Sexual Activity   • Alcohol use: No   • Drug use: No   • Sexual activity: Defer           Objective   Physical Exam   Constitutional: He is oriented to person, place, and time. He appears well-developed.  Non-toxic appearance. He appears ill.   HENT:   Head: Normocephalic and atraumatic.   Mouth/Throat: Uvula is midline and mucous membranes are normal.   Eyes: Pupils are equal, round, and reactive to light. Conjunctivae and lids are normal. Lids are everted and swept, no foreign bodies found.   Neck: Trachea normal, normal range of motion, full passive range of motion without pain and phonation normal. Neck supple. Normal carotid pulses and no JVD present. Carotid bruit is not present. No neck rigidity. No tracheal deviation present.   Cardiovascular: Normal rate, regular rhythm, normal heart sounds, intact distal pulses and normal pulses. PMI is not displaced.   Pulmonary/Chest: Effort normal. No accessory muscle usage or stridor. Tachypnea noted. No apnea. He has decreased breath sounds in the left lower field. He has wheezes in the right middle field, the right lower field, the left middle field and the left lower field. He has no rhonchi. He has no rales.   Abdominal: Soft. Normal appearance, normal aorta and  bowel sounds are normal. There is no hepatosplenomegaly. There is tenderness.   Nonspecific tenderness in the right abdomen no CVA tenderness no bruising no guarding no rebound   Musculoskeletal: Normal range of motion.   Extremity edema   Neurological: He is alert and oriented to person, place, and time. He has normal strength and normal reflexes. He displays normal reflexes. No cranial nerve deficit or sensory deficit. Gait normal. GCS eye subscore is 4. GCS verbal subscore is 5. GCS motor subscore is 6.   Skin: Skin is warm, dry and intact. No cyanosis. No pallor. Nails show no clubbing.   Psychiatric: He has a normal mood and affect. His speech is normal and behavior is normal.   Nursing note and vitals reviewed.      Procedures           ED Course  ED Course as of Apr 03 1716 Fri Apr 03, 2020 1711 This patient has got a confusing history he came in with shortness of breath but he complains about some flank pain and vomiting.  I did not CT his belly on account of the fact that we had him in isolation.  I have discussed this case with the hospitalist and also discussed whether the patient needs to cope with testing after reviewing the case the recommendation was to hold off the testing admit the patient to the hospital the patient probably has prostatitis/UTI.  There is no clinical evidence of shortness of breath that he originally complained about.  Patient also has acute renal insufficiency.  This patient will be admitted to the hospitalist service    [TS]      ED Course User Index  [TS] Juan Antonio Segal MD                                           MDM  Number of Diagnoses or Management Options  Diagnosis management comments: Differential Diagnosis:  I considered pulmonary etiology, asthma, chronic obstructive pulmonary disease, pneumonia, pulmonary embolism, adult respiratory distress syndrome, pneumothorax, pleural effusion, pulmonary fibrosis, cardiac etiology, congestive heart failure, myocardial  infarction, metabolic etiology, diabetic ketoacidosis, uremia, acidosis, sepsis, anemia, drug related etiology, hyperventilation and CNS disease as a possible cause of dyspnea in this patient. This is a partial list of diagnoses considered.            Amount and/or Complexity of Data Reviewed  Clinical lab tests: ordered and reviewed  Tests in the radiology section of CPT®: ordered and reviewed  Tests in the medicine section of CPT®: reviewed and ordered    Risk of Complications, Morbidity, and/or Mortality  Presenting problems: moderate  Diagnostic procedures: moderate  Management options: moderate        Final diagnoses:   Acute UTI (urinary tract infection)   Systemic inflammatory response syndrome (CMS/HCC)   NOLAN (acute kidney injury) (CMS/[facility])   Transaminitis            Juan Antonio Segal MD  04/03/20 5700

## 2020-04-03 NOTE — H&P
[facility] Medicine Services  HISTORY AND PHYSICAL    Date of Admission: 4/3/2020  Primary Care Physician: Matheus Olivera PA    Subjective     Chief Complaint: dry heave,     History of Present Illness  I am asked to admit the patient for acute urinary tract infection, systemic inflammatory response syndrome, acute kidney injury and transaminitis.    Patient claims to have some urinary frequency as well as change in his other.  Patient has been having chills, nausea and vomiting.  He denies any coughing spell.     On evaluation he has leukocytosis with bandemia; creatinine is elevated at 1.57.  He has moderate amount of blood in his urine although been found only with 3-5 per high-power field of RBC.  His procalcitonin is elevated.  He has positive nitrite and trace leukocyte esterase as well as trace bacteriuria. chest x-ray showed no consolidation  Blood gas showed normal pH at 7.46, PCO2 35 and PO2 of 81 taken on room air  Respiratory panel PCR is negative  proBNP is elevated at 4334.  He has a pacemaker in situ and noted to have prior valvuloplasty.  Transaminases are slightly elevated at AST and ALT are 61 and 93 respectively.  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  He had grade 2 diastolic dysfunction.    His main complaint is dry heave culminating to vomiting since Monday.  He complains of abdominal discomfort as he points to his right upper quadrant that goes down to his hypogastric area.  He said that he has no control over his bladder habits and has decreased urinary stream but denies any dysuria.    He also mentioned to have paroxysmal nocturnal dyspnea.  He has been swollen for the past 3 years.  He claimed to have increasing weight.  Positive shortness of breath but denies any fever or chills.  He denies any exposure to  "someone who is sick.  He denies coughing spells    He claimed to have fallen yesterday and must have hit his upper abdomen.  He could not tell me further details pertaining to this    Review of Systems     Otherwise complete ROS reviewed and negative except as mentioned in the HPI.    Past Medical History:   I tried to get information from him about his past medical history however he is not able to share this information.  I was not quite sure what is limiting him but looking at his medications as outlined in the list it indicates that he has hyperlipidemia, hypertension.  I will have to verify why he is on Coumadin.  Past Surgical History:  Past Surgical History:   Procedure Laterality Date   • APPENDECTOMY     • BACK SURGERY      Fusion   • CARDIAC SURGERY      Open Heart   • HERNIA REPAIR     • JOINT REPLACEMENT Left     s/p L hip replacement   • PACEMAKER IMPLANTATION     • STOMACH SURGERY       Social History:  reports that he has never smoked. He has never used smokeless tobacco. He reports that he does not drink alcohol or use drugs. He states that he had quit smoking nor had he drank any alcohol since the 60s.    Family History: He has not endorsed any medical problems that runs in the family.    Allergies:  No Known Allergies  Medications:  Record indicates that his home medications include aspirin 81 mg daily, Zebeta 10 mg daily  Cholecalciferol 5000 units daily  Cozaar 100 mg daily  Crestor 20 mg daily  Coumadin 0.5 mg daily    Objective     Vital Signs: /69   Pulse 52   Temp 98.9 °F (37.2 °C) (Oral)   Resp 25   Ht 180.3 cm (71\")   Wt 99.8 kg (220 lb)   SpO2 99%   BMI 30.68 kg/m²   Physical Exam  He is not in any distress.  His seated comfortably on the rney  Alert and oriented x3  Grossly nonfocal neurologic exam  Normocephalic  Anicteric sclera  Supple neck  No thyromegaly  Diminished breath sounds, positive few fine crackles noted at bases  Noticeable protuberant abdomen, tender to " touch, voluntary guarding  No CVA tenderness  Noticeable swelling of bilateral lower extremities  Warm dry skin      Results Reviewed:  Lab Results (last 24 hours)     Procedure Component Value Units Date/Time    S. Pneumo Ag Urine or CSF - Urine, Urine, Clean Catch [[id number]]  (Normal) Collected:  04/03/20 1524    Specimen:  Urine, Clean Catch Updated:  04/03/20 1558     Strep Pneumo Ag Negative    Urinalysis, Microscopic Only - Urine, Clean Catch [[id number]]  (Abnormal) Collected:  04/03/20 1524    Specimen:  Urine, Clean Catch Updated:  04/03/20 1556     RBC, UA 3-5 /HPF      WBC, UA 0-2 /HPF      Bacteria, UA Trace /HPF      Squamous Epithelial Cells, UA None Seen /HPF      Hyaline Casts, UA None Seen /LPF      Methodology Manual Light Microscopy    Respiratory Panel, PCR - Swab, Nasopharynx [[id number]]  (Normal) Collected:  04/03/20 1441    Specimen:  Swab from Nasopharynx Updated:  04/03/20 1556     ADENOVIRUS, PCR Not Detected     Coronavirus 229E Not Detected     Coronavirus HKU1 Not Detected     Coronavirus NL63 Not Detected     Coronavirus OC43 Not Detected     Human Metapneumovirus Not Detected     Human Rhinovirus/Enterovirus Not Detected     Influenza B PCR Not Detected     Parainfluenza Virus 1 Not Detected     Parainfluenza Virus 2 Not Detected     Parainfluenza Virus 3 Not Detected     Parainfluenza Virus 4 Not Detected     Bordetella pertussis pcr Not Detected     Influenza A H1 2009 PCR Not Detected     Chlamydophila pneumoniae PCR Not Detected     Mycoplasma pneumo by PCR Not Detected     Influenza A PCR Not Detected     Influenza A H3 Not Detected     Influenza A H1 Not Detected     RSV, PCR Not Detected     Bordetella parapertussis PCR Not Detected    Narrative:       The coronavirus on the RVP is NOT COVID-19 and is NOT indicative of infection with COVID-19.     Blood Culture - Blood, Arm, Right [[id number]] Collected:  04/03/20 1532    Specimen:  Blood from Arm, Right Updated:  04/03/20 1550     [place] Draw [[id number]] Collected:  04/03/20 1439    Specimen:  Blood Updated:  04/03/20 1545    Narrative:       The following orders were created for panel order [place] Draw.  Procedure                               Abnormality         Status                     ---------                               -----------         ------                     Light Blue Top[[id number]]                                   Final result               Green Top (Gel)[[id number]]                                  Final result               Lavender Top[[id number]]                                     Final result               Red Top[[id number]]                                          Final result               [place] Blood Culture Priyank...[[id number]]                      Final result               Gray Top - Ice[[id number]]                                   Final result                 Please view results for these tests on the individual orders.    Light Blue Top [[id number]] Collected:  04/03/20 1439    Specimen:  Blood Updated:  04/03/20 1545     Extra Tube hold for add-on     Comment: Auto resulted       Green Top (Gel) [[id number]] Collected:  04/03/20 1439    Specimen:  Blood Updated:  04/03/20 1545     Extra Tube Hold for add-ons.     Comment: Auto resulted.       Lavender Top [[id number]] Collected:  04/03/20 1439    Specimen:  Blood Updated:  04/03/20 1545     Extra Tube hold for add-on     Comment: Auto resulted       Red Top [[id number]] Collected:  04/03/20 1439    Specimen:  Blood Updated:  04/03/20 1545     Extra Tube Hold for add-ons.     Comment: Auto resulted.       [place] Blood Culture Bottle Set [[id number]] Collected:  04/03/20 1439    Specimen:  Blood from Arm, Right Updated:  04/03/20 1545     Extra Tube Hold for add-ons.     Comment: Auto resulted.       Gray Top - Ice [[id number]] Collected:  04/03/20 1439    Specimen:  Blood Updated:  04/03/20 1545     Extra Tube Hold for add-ons.     Comment: Auto resulted.        "Urinalysis With Culture If Indicated - Urine, Clean Catch [[id number]]  (Abnormal) Collected:  04/03/20 1524    Specimen:  Urine, Clean Catch Updated:  04/03/20 1538     Color, UA Dark Yellow     Appearance, UA Cloudy     pH, UA <=5.0     Specific Gravity, UA 1.024     Glucose, UA Negative     Ketones, UA Trace     Bilirubin, UA Moderate (2+)     Blood, UA Moderate (2+)     Protein,  mg/dL (2+)     Leuk Esterase, UA Trace     Nitrite, UA Positive     Urobilinogen, UA 1.0 E.U./dL    CBC Auto Differential [[id number]]  (Abnormal) Collected:  04/03/20 1439    Specimen:  Blood Updated:  04/03/20 1518     WBC 17.03 10*3/mm3      RBC 3.83 10*6/mm3      Hemoglobin 11.5 g/dL      Hematocrit 34.3 %      MCV 89.6 fL      MCH 30.0 pg      MCHC 33.5 g/dL      RDW 13.8 %      RDW-SD 45.0 fl      MPV 10.0 fL      Platelets 167 10*3/mm3     Narrative:       ckd    Procalcitonin [[id number]]  (Abnormal) Collected:  04/03/20 1439    Specimen:  Blood Updated:  04/03/20 1518     Procalcitonin 5.87 ng/mL     Narrative:       As a Marker for Sepsis (Non-Neonates):   1. <0.5 ng/mL represents a low risk of severe sepsis and/or septic shock.  1. >2 ng/mL represents a high risk of severe sepsis and/or septic shock.    As a Marker for Lower Respiratory Tract Infections that require antibiotic therapy:  PCT on Admission     Antibiotic Therapy             6-12 Hrs later  > 0.5                Strongly Recommended            >0.25 - <0.5         Recommended  0.1 - 0.25           Discouraged                   Remeasure/reassess PCT  <0.1                 Strongly Discouraged          Remeasure/reassess PCT      As 28 day mortality risk marker: \"Change in Procalcitonin Result\" (> 80 % or <=80 %) if Day 0 (or Day 1) and Day 4 values are available. Refer to http://www.G-clusters-pct-calculator.com/   Change in PCT <=80 %   A decrease of PCT levels below or equal to 80 % defines a positive change in PCT test result representing a higher risk for 28-day " all-cause mortality of patients diagnosed with severe sepsis or septic shock.  Change in PCT > 80 %   A decrease of PCT levels of more than 80 % defines a negative change in PCT result representing a lower risk for 28-day all-cause mortality of patients diagnosed with severe sepsis or septic shock.                Results may be falsely decreased if patient taking Biotin.     Manual Differential [[id number]]  (Abnormal) Collected:  04/03/20 1439    Specimen:  Blood Updated:  04/03/20 1518     Neutrophil % 69.0 %      Lymphocyte % 5.0 %      Monocyte % 8.0 %      Bands %  18.0 %      Neutrophils Absolute 14.82 10*3/mm3      Lymphocytes Absolute 0.85 10*3/mm3      Monocytes Absolute 1.36 10*3/mm3      Poikilocytes Slight/1+     Polychromasia Slight/1+     WBC Morphology Normal     Platelet Morphology Normal    Lactic Acid, Plasma [[id number]]  (Abnormal) Collected:  04/03/20 1439    Specimen:  Blood Updated:  04/03/20 1514     Lactate 3.3 mmol/L     Lactic Acid, Reflex Timer (This will reflex a repeat order 3-3:15 hours after ordered.) [[id number]] Collected:  04/03/20 1439    Specimen:  Blood Updated:  04/03/20 1514    Comprehensive Metabolic Panel [[id number]]  (Abnormal) Collected:  04/03/20 1439    Specimen:  Blood Updated:  04/03/20 1513     Glucose 206 mg/dL      BUN 50 mg/dL      Creatinine 1.57 mg/dL      Sodium 131 mmol/L      Potassium 4.3 mmol/L      Chloride 94 mmol/L      CO2 21.0 mmol/L      Calcium 8.5 mg/dL      Total Protein 6.7 g/dL      Albumin 3.20 g/dL      ALT (SGPT) 61 U/L      AST (SGOT) 93 U/L      Alkaline Phosphatase 155 U/L      Total Bilirubin 3.8 mg/dL      eGFR Non African Amer 42 mL/min/1.73      Globulin 3.5 gm/dL      A/G Ratio 0.9 g/dL      BUN/Creatinine Ratio 31.8     Anion Gap 16.0 mmol/L     Narrative:       GFR Normal >60  Chronic Kidney Disease <60  Kidney Failure <15      Lactate Dehydrogenase [[id number]]  (Normal) Collected:  04/03/20 1439    Specimen:  Blood Updated:  04/03/20  1513      U/L     Troponin [[id number]]  (Normal) Collected:  04/03/20 1439    Specimen:  Blood Updated:  04/03/20 1512     Troponin T 0.026 ng/mL     Narrative:       Troponin T Reference Range:  <= 0.03 ng/mL-   Negative for AMI  >0.03 ng/mL-     Abnormal for myocardial necrosis.  Clinicians would have to utilize clinical acumen, EKG, Troponin and serial changes to determine if it is an Acute Myocardial Infarction or myocardial injury due to an underlying chronic condition.       Results may be falsely decreased if patient taking Biotin.      BNP [[id number]]  (Abnormal) Collected:  04/03/20 1439    Specimen:  Blood Updated:  04/03/20 1511     proBNP 4,334.0 pg/mL     Narrative:       Among patients with dyspnea, NT-proBNP is highly sensitive for the detection of acute congestive heart failure. In addition NT-proBNP of <300 pg/ml effectively rules out acute congestive heart failure with 99% negative predictive value.    Results may be falsely decreased if patient taking Biotin.      aPTT [[id number]]  (Abnormal) Collected:  04/03/20 1439    Specimen:  Blood Updated:  04/03/20 1504     PTT 38.2 seconds     Protime-INR [[id number]]  (Abnormal) Collected:  04/03/20 1439    Specimen:  Blood Updated:  04/03/20 1504     Protime 19.4 Seconds      INR 1.64    Blood Gas, Arterial [[id number]]  (Abnormal) Collected:  04/03/20 1456    Specimen:  Arterial Blood Updated:  04/03/20 1459     Site Right Radial     Aldo's Test Positive     pH, Arterial 7.460 pH units      Comment: 83 Value above reference range        pCO2, Arterial 34.7 mm Hg      Comment: 84 Value below reference range        pO2, Arterial 80.9 mm Hg      Comment: 84 Value below reference range        HCO3, Arterial 24.7 mmol/L      Base Excess, Arterial 1.1 mmol/L      O2 Saturation, Arterial 97.1 %      Temperature 37.0 C      Barometric Pressure for Blood Gas 751 mmHg      Modality Room Air     Ventilator Mode NA     Collected by [id number]     Comment: Meter:  [id number]     :  [id number]       Blood Culture - Blood, Arm, Right [[id number]] Collected:  04/03/20 1439    Specimen:  Blood from Arm, Right Updated:  04/03/20 1457        Imaging Results (Last 24 Hours)     Procedure Component Value Units Date/Time    XR Chest 1 View [[id number]] Collected:  04/03/20 1646     Updated:  04/03/20 1651    Narrative:       Exam:   XR CHEST 1 VW-       Date:  4/3/2020      History:  Male, age  84 years;soa     COMPARISON:  Chest x-ray dated 2/23/2016     Findings :  Left-sided cardiac device with leads projecting of the right atrium and  right ventricle. Median sternotomy wires appear similar. Prior valvular  replacement. Low lung volumes.  Borderline size of the cardiac size silhouette. Lungs are without focal  infiltrate, mass or effusions.  The bones show no acute pathology.         Impression:       Impression:     1.  Borderline size of the cardiac mediastinal silhouette. No convincing  evidence of fluid overload.  2.  Lower lung volumes.     This report was finalized on 04/03/2020 16:48 by Dr. Irene Valles MD.        I have personally reviewed and interpreted the radiology studies and ECG obtained at time of admission.     Assessment / Plan     Assessment:   Active Hospital Problems    Diagnosis   • Acute UTI (urinary tract infection)       · Abnormal urinalysis possible urinary tract infection   · urinary incontinence  · Abdominal pain/dry heaving and reported vomiting  · Elevated BNP in the setting of paroxysmal nocturnal dyspnea, swelling, shortness of breath and patient with prior open heart surgery and valvuloplasty  · Leukocytosis with bandemia  · Fall  · Mild transaminitis possibly from hepatic congestion from possibility of heart failure  · Lactic acidosis possibly from sepsis.  Blood pressure is adequate.  Patient not meeting requirement for IV fluid bolus.  Also in the light of possibility of heart failure that I did not choose to give patient IV fluid  bolus, in fact the hyponatremia may be from fluid retention as evidenced by crackles on exam, symptoms of PND and swelling.  Therefore I am going to give him diuretic and follow his renal function    Plan:      Echocardiogram  CK (given history of fall and in the setting of presence of blood with minimal RBC in urine)  Empiric treatment for urinary tract infection; abdominopelvic CT scan without contrast  [facility]  Sepsis protocol   Other plans per orders    Code Status:   Full code     I discussed the patient's findings and my recommendations with the patient and nurse Ronda    Estimated length of stay  To be determined  Patient seen and examined by me on   Bharath Maloney MD   04/03/20   17:21

## 2020-04-04 ENCOUNTER — APPOINTMENT (OUTPATIENT)
Dept: CARDIOLOGY | Facility: HOSPITAL | Age: 85
End: 2020-04-04

## 2020-04-04 PROBLEM — I10 BENIGN ESSENTIAL HTN: Status: ACTIVE | Noted: 2020-04-04

## 2020-04-04 PROBLEM — N18.30 CKD (CHRONIC KIDNEY DISEASE) STAGE 3, GFR 30-59 ML/MIN: Status: ACTIVE | Noted: 2020-04-04

## 2020-04-04 PROBLEM — R79.89 ABNORMAL LFTS: Status: ACTIVE | Noted: 2020-04-04

## 2020-04-04 PROBLEM — K81.0 ACUTE CHOLECYSTITIS: Status: ACTIVE | Noted: 2020-04-04

## 2020-04-04 PROBLEM — R78.81 BACTEREMIA: Status: ACTIVE | Noted: 2020-04-04

## 2020-04-04 LAB
ABO GROUP BLD: NORMAL
ALBUMIN SERPL-MCNC: 3 G/DL (ref 3.5–5.2)
ALBUMIN/GLOB SERPL: 0.8 G/DL
ALP SERPL-CCNC: 155 U/L (ref 39–117)
ALT SERPL W P-5'-P-CCNC: 73 U/L (ref 1–41)
ANION GAP SERPL CALCULATED.3IONS-SCNC: 13 MMOL/L (ref 5–15)
AST SERPL-CCNC: 94 U/L (ref 1–40)
BACTERIA BLD CULT: ABNORMAL
BH CV ECHO MEAS - AO MAX PG (FULL): 5.9 MMHG
BH CV ECHO MEAS - AO MAX PG: 9.9 MMHG
BH CV ECHO MEAS - AO MEAN PG (FULL): 3.5 MMHG
BH CV ECHO MEAS - AO MEAN PG: 6.5 MMHG
BH CV ECHO MEAS - AO ROOT AREA (BSA CORRECTED): 1.7
BH CV ECHO MEAS - AO ROOT AREA: 10.8 CM^2
BH CV ECHO MEAS - AO ROOT DIAM: 3.7 CM
BH CV ECHO MEAS - AO V2 MAX: 157 CM/SEC
BH CV ECHO MEAS - AO V2 MEAN: 120 CM/SEC
BH CV ECHO MEAS - AO V2 VTI: 39.7 CM
BH CV ECHO MEAS - AVA(I,A): 2.5 CM^2
BH CV ECHO MEAS - AVA(I,D): 2.5 CM^2
BH CV ECHO MEAS - AVA(V,A): 2.4 CM^2
BH CV ECHO MEAS - AVA(V,D): 2.4 CM^2
BH CV ECHO MEAS - BSA(HAYCOCK): 2.2 M^2
BH CV ECHO MEAS - BSA: 2.2 M^2
BH CV ECHO MEAS - BZI_BMI: 31.6 KILOGRAMS/M^2
BH CV ECHO MEAS - BZI_METRIC_HEIGHT: 177.8 CM
BH CV ECHO MEAS - BZI_METRIC_WEIGHT: 99.8 KG
BH CV ECHO MEAS - EDV(CUBED): 67.9 ML
BH CV ECHO MEAS - EDV(MOD-SP4): 101 ML
BH CV ECHO MEAS - EDV(TEICH): 73.4 ML
BH CV ECHO MEAS - EF(MOD-SP4): 49.2 %
BH CV ECHO MEAS - ESV(MOD-SP4): 51.3 ML
BH CV ECHO MEAS - IVS/LVPW: 1
BH CV ECHO MEAS - IVSD: 1.6 CM
BH CV ECHO MEAS - LA DIMENSION: 4.7 CM
BH CV ECHO MEAS - LA/AO: 1.3
BH CV ECHO MEAS - LAT PEAK E' VEL: 9.9 CM/SEC
BH CV ECHO MEAS - LV DIASTOLIC VOL/BSA (35-75): 46.5 ML/M^2
BH CV ECHO MEAS - LV MASS(C)D: 255.9 GRAMS
BH CV ECHO MEAS - LV MASS(C)DI: 117.7 GRAMS/M^2
BH CV ECHO MEAS - LV MAX PG: 4 MMHG
BH CV ECHO MEAS - LV MEAN PG: 3 MMHG
BH CV ECHO MEAS - LV SYSTOLIC VOL/BSA (12-30): 23.6 ML/M^2
BH CV ECHO MEAS - LV V1 MAX: 99.8 CM/SEC
BH CV ECHO MEAS - LV V1 MEAN: 79 CM/SEC
BH CV ECHO MEAS - LV V1 VTI: 25.8 CM
BH CV ECHO MEAS - LVIDD: 4.1 CM
BH CV ECHO MEAS - LVLD AP4: 7.9 CM
BH CV ECHO MEAS - LVLS AP4: 7.1 CM
BH CV ECHO MEAS - LVOT AREA (M): 3.8 CM^2
BH CV ECHO MEAS - LVOT AREA: 3.8 CM^2
BH CV ECHO MEAS - LVOT DIAM: 2.2 CM
BH CV ECHO MEAS - LVPWD: 1.6 CM
BH CV ECHO MEAS - MED PEAK E' VEL: 8.2 CM/SEC
BH CV ECHO MEAS - MR MAX PG: 91 MMHG
BH CV ECHO MEAS - MR MAX VEL: 477 CM/SEC
BH CV ECHO MEAS - MR MEAN PG: 67 MMHG
BH CV ECHO MEAS - MR MEAN VEL: 397 CM/SEC
BH CV ECHO MEAS - MR VTI: 224 CM
BH CV ECHO MEAS - MV A MAX VEL: 37.5 CM/SEC
BH CV ECHO MEAS - MV DEC SLOPE: 408 CM/SEC^2
BH CV ECHO MEAS - MV DEC TIME: 0.3 SEC
BH CV ECHO MEAS - MV E MAX VEL: 135 CM/SEC
BH CV ECHO MEAS - MV E/A: 3.6
BH CV ECHO MEAS - MV P1/2T MAX VEL: 159 CM/SEC
BH CV ECHO MEAS - MV P1/2T: 114.1 MSEC
BH CV ECHO MEAS - MVA P1/2T LCG: 1.4 CM^2
BH CV ECHO MEAS - MVA(P1/2T): 1.9 CM^2
BH CV ECHO MEAS - RAP SYSTOLE: 10 MMHG
BH CV ECHO MEAS - RVSP: 34 MMHG
BH CV ECHO MEAS - SI(AO): 196.4 ML/M^2
BH CV ECHO MEAS - SI(LVOT): 45.1 ML/M^2
BH CV ECHO MEAS - SI(MOD-SP4): 22.9 ML/M^2
BH CV ECHO MEAS - SV(AO): 426.9 ML
BH CV ECHO MEAS - SV(LVOT): 98.1 ML
BH CV ECHO MEAS - SV(MOD-SP4): 49.7 ML
BH CV ECHO MEAS - TR MAX VEL: 245 CM/SEC
BH CV ECHO MEASUREMENTS AVERAGE E/E' RATIO: 14.92
BILIRUB SERPL-MCNC: 3 MG/DL (ref 0.2–1.2)
BLD GP AB SCN SERPL QL: NEGATIVE
BOTTLE TYPE: ABNORMAL
BUN BLD-MCNC: 50 MG/DL (ref 8–23)
BUN/CREAT SERPL: 37.9 (ref 7–25)
BURR CELLS BLD QL SMEAR: ABNORMAL
CALCIUM SPEC-SCNC: 8.3 MG/DL (ref 8.6–10.5)
CHLORIDE SERPL-SCNC: 96 MMOL/L (ref 98–107)
CK SERPL-CCNC: 423 U/L (ref 20–200)
CO2 SERPL-SCNC: 22 MMOL/L (ref 22–29)
CREAT BLD-MCNC: 1.32 MG/DL (ref 0.76–1.27)
D-LACTATE SERPL-SCNC: 1.8 MMOL/L (ref 0.5–2)
D-LACTATE SERPL-SCNC: 2.2 MMOL/L (ref 0.5–2)
D-LACTATE SERPL-SCNC: 2.3 MMOL/L (ref 0.5–2)
DEPRECATED RDW RBC AUTO: 45.4 FL (ref 37–54)
DOHLE BODIES: PRESENT
ERYTHROCYTE [DISTWIDTH] IN BLOOD BY AUTOMATED COUNT: 13.7 % (ref 12.3–15.4)
GFR SERPL CREATININE-BSD FRML MDRD: 52 ML/MIN/1.73
GIANT PLATELETS: ABNORMAL
GLOBULIN UR ELPH-MCNC: 3.6 GM/DL
GLUCOSE BLD-MCNC: 203 MG/DL (ref 65–99)
GLUCOSE BLDC GLUCOMTR-MCNC: 174 MG/DL (ref 70–130)
GLUCOSE BLDC GLUCOMTR-MCNC: 188 MG/DL (ref 70–130)
HBA1C MFR BLD: 6.6 % (ref 4.8–5.6)
HCT VFR BLD AUTO: 33.7 % (ref 37.5–51)
HGB BLD-MCNC: 11.1 G/DL (ref 13–17.7)
LEFT ATRIUM VOLUME INDEX: 31 ML/M2
LEFT ATRIUM VOLUME: 67.3 CM3
LYMPHOCYTES # BLD MANUAL: 0.65 10*3/MM3 (ref 0.7–3.1)
LYMPHOCYTES NFR BLD MANUAL: 5 % (ref 19.6–45.3)
LYMPHOCYTES NFR BLD MANUAL: 6 % (ref 5–12)
MCH RBC QN AUTO: 29.6 PG (ref 26.6–33)
MCHC RBC AUTO-ENTMCNC: 32.9 G/DL (ref 31.5–35.7)
MCV RBC AUTO: 89.9 FL (ref 79–97)
MONOCYTES # BLD AUTO: 0.78 10*3/MM3 (ref 0.1–0.9)
NEUTROPHILS # BLD AUTO: 11.55 10*3/MM3 (ref 1.7–7)
NEUTROPHILS NFR BLD MANUAL: 81 % (ref 42.7–76)
NEUTS BAND NFR BLD MANUAL: 8 % (ref 0–5)
NEUTS VAC BLD QL SMEAR: ABNORMAL
PLATELET # BLD AUTO: 163 10*3/MM3 (ref 140–450)
PMV BLD AUTO: 10.5 FL (ref 6–12)
POIKILOCYTOSIS BLD QL SMEAR: ABNORMAL
POTASSIUM BLD-SCNC: 3.9 MMOL/L (ref 3.5–5.2)
PROT SERPL-MCNC: 6.6 G/DL (ref 6–8.5)
RBC # BLD AUTO: 3.75 10*6/MM3 (ref 4.14–5.8)
RH BLD: POSITIVE
SODIUM BLD-SCNC: 131 MMOL/L (ref 136–145)
T&S EXPIRATION DATE: NORMAL
TROPONIN T SERPL-MCNC: 0.02 NG/ML (ref 0–0.03)
WBC NRBC COR # BLD: 12.98 10*3/MM3 (ref 3.4–10.8)

## 2020-04-04 PROCEDURE — 84484 ASSAY OF TROPONIN QUANT: CPT | Performed by: INTERNAL MEDICINE

## 2020-04-04 PROCEDURE — 86901 BLOOD TYPING SEROLOGIC RH(D): CPT | Performed by: SPECIALIST

## 2020-04-04 PROCEDURE — 93306 TTE W/DOPPLER COMPLETE: CPT | Performed by: INTERNAL MEDICINE

## 2020-04-04 PROCEDURE — 85025 COMPLETE CBC W/AUTO DIFF WBC: CPT | Performed by: INTERNAL MEDICINE

## 2020-04-04 PROCEDURE — 80053 COMPREHEN METABOLIC PANEL: CPT | Performed by: INTERNAL MEDICINE

## 2020-04-04 PROCEDURE — 86850 RBC ANTIBODY SCREEN: CPT | Performed by: SPECIALIST

## 2020-04-04 PROCEDURE — 25010000002 PIPERACILLIN SOD-TAZOBACTAM PER 1 G: Performed by: INTERNAL MEDICINE

## 2020-04-04 PROCEDURE — 83036 HEMOGLOBIN GLYCOSYLATED A1C: CPT | Performed by: INTERNAL MEDICINE

## 2020-04-04 PROCEDURE — 82962 GLUCOSE BLOOD TEST: CPT

## 2020-04-04 PROCEDURE — 25010000002 ENOXAPARIN PER 10 MG: Performed by: INTERNAL MEDICINE

## 2020-04-04 PROCEDURE — 25010000002 FUROSEMIDE PER 20 MG: Performed by: INTERNAL MEDICINE

## 2020-04-04 PROCEDURE — 83605 ASSAY OF LACTIC ACID: CPT | Performed by: INTERNAL MEDICINE

## 2020-04-04 PROCEDURE — 25010000002 PERFLUTREN 6.52 MG/ML SUSPENSION: Performed by: INTERNAL MEDICINE

## 2020-04-04 PROCEDURE — 82550 ASSAY OF CK (CPK): CPT | Performed by: INTERNAL MEDICINE

## 2020-04-04 PROCEDURE — 86900 BLOOD TYPING SEROLOGIC ABO: CPT | Performed by: SPECIALIST

## 2020-04-04 PROCEDURE — 93306 TTE W/DOPPLER COMPLETE: CPT

## 2020-04-04 PROCEDURE — 63710000001 INSULIN REGULAR HUMAN PER 5 UNITS: Performed by: INTERNAL MEDICINE

## 2020-04-04 PROCEDURE — 85007 BL SMEAR W/DIFF WBC COUNT: CPT | Performed by: INTERNAL MEDICINE

## 2020-04-04 RX ORDER — MULTIPLE VITAMINS W/ MINERALS TAB 9MG-400MCG
1 TAB ORAL 3 TIMES DAILY
COMMUNITY
End: 2020-04-13 | Stop reason: HOSPADM

## 2020-04-04 RX ORDER — NICOTINE POLACRILEX 4 MG
15 LOZENGE BUCCAL
Status: DISCONTINUED | OUTPATIENT
Start: 2020-04-04 | End: 2020-04-13 | Stop reason: HOSPADM

## 2020-04-04 RX ORDER — LANOLIN ALCOHOL/MO/W.PET/CERES
1000 CREAM (GRAM) TOPICAL DAILY
COMMUNITY

## 2020-04-04 RX ORDER — DEXTROSE MONOHYDRATE 25 G/50ML
25 INJECTION, SOLUTION INTRAVENOUS
Status: DISCONTINUED | OUTPATIENT
Start: 2020-04-04 | End: 2020-04-13 | Stop reason: HOSPADM

## 2020-04-04 RX ORDER — WARFARIN SODIUM 1 MG/1
TABLET ORAL TAKE AS DIRECTED
COMMUNITY
End: 2020-04-13 | Stop reason: HOSPADM

## 2020-04-04 RX ADMIN — ROSUVASTATIN CALCIUM 20 MG: 20 TABLET, FILM COATED ORAL at 08:26

## 2020-04-04 RX ADMIN — TAZOBACTAM SODIUM AND PIPERACILLIN SODIUM 3.38 G: 375; 3 INJECTION, SOLUTION INTRAVENOUS at 13:14

## 2020-04-04 RX ADMIN — LOSARTAN POTASSIUM 100 MG: 50 TABLET, FILM COATED ORAL at 08:25

## 2020-04-04 RX ADMIN — ENOXAPARIN SODIUM 30 MG: 30 INJECTION SUBCUTANEOUS at 19:51

## 2020-04-04 RX ADMIN — PERFLUTREN 8.48 MG: 6.52 INJECTION, SUSPENSION INTRAVENOUS at 15:52

## 2020-04-04 RX ADMIN — ASPIRIN 81 MG: 81 TABLET ORAL at 08:23

## 2020-04-04 RX ADMIN — INSULIN HUMAN 2 UNITS: 100 INJECTION, SOLUTION PARENTERAL at 13:23

## 2020-04-04 RX ADMIN — FUROSEMIDE 40 MG: 10 INJECTION, SOLUTION INTRAMUSCULAR; INTRAVENOUS at 05:38

## 2020-04-04 RX ADMIN — BISOPROLOL FUMARATE 10 MG: 5 TABLET ORAL at 08:23

## 2020-04-04 RX ADMIN — SODIUM CHLORIDE, PRESERVATIVE FREE 10 ML: 5 INJECTION INTRAVENOUS at 08:23

## 2020-04-04 RX ADMIN — INSULIN HUMAN 2 UNITS: 100 INJECTION, SOLUTION PARENTERAL at 18:48

## 2020-04-04 RX ADMIN — TAZOBACTAM SODIUM AND PIPERACILLIN SODIUM 3.38 G: 375; 3 INJECTION, SOLUTION INTRAVENOUS at 19:52

## 2020-04-04 NOTE — PROGRESS NOTES
Discharge Planning Assessment  [facility]     Patient Name: Jesus Smith  MRN: [id number]  Today's Date: 4/4/2020    Admit Date: 4/3/2020    Discharge Needs Assessment     Row Name 04/04/20 0952       Living Environment    Lives With  alone  (Pended)     Current Living Arrangements  home/apartment/condo  (Pended)     Primary Care Provided by  self  (Pended)     Provides Primary Care For  no one  (Pended)     Quality of Family Relationships  helpful;involved;supportive  (Pended)     Able to Return to Prior Arrangements  yes  (Pended)        Resource/Environmental Concerns    Transportation Concerns  car, none  (Pended)        Transition Planning    Patient/Family Anticipates Transition to  home  (Pended)     Transportation Anticipated  family or friend will provide  (Pended)        Discharge Needs Assessment    Readmission Within the Last 30 Days  no previous admission in last 30 days  (Pended)     Concerns to be Addressed  no discharge needs identified;denies needs/concerns at this time  (Pended)     Equipment Currently Used at Home  wheelchair;cane, straight;walker, standard;shower chair  (Pended)     Equipment Needed After Discharge  none  (Pended)     Discharge Coordination/Progress  Pt has RX coverage and a PCP. Pt goes to [facility]. Pt is open to ; MD please consider. Pt has sister in laws that can help with transportation. Pt denies any needs at this time. SW will follow and assist with discharge needs as they may arise.   (Pended)         Discharge Plan    No documentation.       Destination      Coordination has not been started for this encounter.      Durable Medical Equipment      Coordination has not been started for this encounter.      Dialysis/Infusion      Coordination has not been started for this encounter.      Home Medical Care      Coordination has not been started for this encounter.      Therapy      Coordination has not been started for this encounter.      Community Resources       Coordination has not been started for this encounter.          Demographic Summary    No documentation.       Functional Status    No documentation.       Psychosocial    No documentation.       Abuse/Neglect    No documentation.       Legal    No documentation.       Substance Abuse    No documentation.       Patient Forms    No documentation.           Octavia Lebron

## 2020-04-04 NOTE — PLAN OF CARE
Problem: Patient Care Overview  Goal: Plan of Care Review  Outcome: Ongoing (interventions implemented as appropriate)  Flowsheets (Taken 4/4/2020 1100)  Progress: no change  Plan of Care Reviewed With: patient  Outcome Summary: Predicted suboptimal nutrient intake r/t increased needs for wound healing. Pt reports his appetite has been declined. He says he ate a little better at breakfast this morning. He does not report any weight loss. He says that he has used supplements in the past and would like to get them here. He prefers vanilla flavor. Will send Boost glucose control BID r/t hyperglycemia. Advised pt of alternate selections as needed. Also obtained specific food preferences. Will cont to follow for nutrition needs.

## 2020-04-04 NOTE — PROGRESS NOTES
[facility] Medicine Services  INPATIENT PROGRESS NOTE    Patient Name: Jesus Smith  Date of Admission: 4/3/2020  Today's Date: 04/04/20  Length of Stay: 1  Primary Care Physician: Matheus Olivera PA    Subjective   Chief Complaint: Follow-up weakness    HPI   Patient seen and examined.  Apparently came to the hospital yesterday for not feeling well for several days.  Story has somewhat changed and or evolved looking at different notes. Patient tells me he was having right upper quadrant abdominal pain with nausea and vomiting earlier in the week but it seems to be settling down.  He has felt weak and not like himself.  Initially admitted yesterday for concern for possible UTI but subsequently a CT his abdomen pelvis done last night showing potential cholecystitis.  This morning patient actually ate a little bit of food and denied any pain with eating.  He denies any nausea or vomiting today.  No chest pain or shortness of breath.  Overall just feels weak.  Afebrile overnight but 1 of his blood cultures came back positive this morning.        Review of Systems   All pertinent negatives and positives are as above. All other systems have been reviewed and are negative unless otherwise stated.     Objective    Temp:  [97.5 °F (36.4 °C)-98.9 °F (37.2 °C)] 97.5 °F (36.4 °C)  Heart Rate:  [50-81] 53  Resp:  [18-25] 18  BP: (107-152)/(46-89) 113/55  Physical Exam  GEN: Awake, alert, interactive, in NAD  HEENT: PERRLA, EOMI, Anicteric, Trachea midline  Lungs: CTAB, no wheezing/rales/rhonchi  Heart: RRR, +S1/s2, no rub  ABD: RUQ tenderness to palpation, +BS, no rebound/guarding  Extremities: atraumatic, no cyanosis, trace edema b/l LE  Skin: no rashes or petechiae   Neuro: AAOx3, no focal deficits  Psych: normal mood & affect        Results Review:  I have reviewed the labs, radiology results, and diagnostic studies.    Laboratory Data:   Results from last 7 days   Lab Units  04/04/20  0520 04/03/20  1439   WBC 10*3/mm3 12.98* 17.03*   HEMOGLOBIN g/dL 11.1* 11.5*   HEMATOCRIT % 33.7* 34.3*   PLATELETS 10*3/mm3 163 167        Results from last 7 days   Lab Units 04/04/20  0520 04/03/20  1439   SODIUM mmol/L 131* 131*   POTASSIUM mmol/L 3.9 4.3   CHLORIDE mmol/L 96* 94*   CO2 mmol/L 22.0 21.0*   BUN mg/dL 50* 50*   CREATININE mg/dL 1.32* 1.57*   CALCIUM mg/dL 8.3* 8.5*   BILIRUBIN mg/dL 3.0* 3.8*   ALK PHOS U/L 155* 155*   ALT (SGPT) U/L 73* 61*   AST (SGOT) U/L 94* 93*   GLUCOSE mg/dL 203* 206*       Culture Data:   Blood Culture   Date Value Ref Range Status   04/03/2020 Abnormal Stain (C)  Preliminary       Radiology Data:   Imaging Results (Last 24 Hours)     Procedure Component Value Units Date/Time    CT Abdomen Pelvis Without Contrast [[id number]] Collected:  04/03/20 1817     Updated:  04/03/20 1826    Narrative:       CT ABDOMEN PELVIS WO CONTRAST- 4/3/2020 6:09 PM CDT     HISTORY: Abdominal pain, unspecified; N39.0-Urinary tract infection,  site not specified; R65.10-Systemic inflammatory response syndrome  (sirs) of non-infectious origin without acute organ dysfunction;  N17.9-Acute kidney failure, unspecified; R74.0-Nonspecific elevation of  levels of transaminase and lactic acid dehydrogenase (ldh)      COMPARISON: None     DOSE LENGTH PRODUCT: 547 mGy cm. Automated exposure control was also  utilized to decrease patient radiation dose.     TECHNIQUE: Axial images of the abdomen pelvis are obtained without IV or  oral contrast     FINDINGS:  The nonenhanced liver and spleen are unremarkable. There is  fatty atrophy of the pancreas. The gallbladder is distended containing  sludge and likely noncalcified stones. There is gallbladder wall  thickening with pericholecystic inflammation. There is trace perihepatic  ascites. No bile duct dilatation identified. Findings suspicious for  acute cholecystitis.     The adrenal glands are prominent but do maintain adreniform shape. There  is  nonspecific perinephric fat stranding but no loculated fluid. There  is a left renal cyst. There is no hydronephrosis. The bladder appears  intact.     There is no free intraperitoneal air or loculated abscess. There is  sigmoid colonic diverticulosis with no convincing acute diverticulitis.  Wall thickening of the hepatic flexure may be reactive from the  inflammatory changes described in the right upper quadrant. No evidence  for bowel obstruction. Stomach distended with fluid. Fatty containing  small umbilical hernia. Moderate vascular calcification with no  aneurysm. No pathologic lymphadenopathy. Reactive appearing portacaval  lymph node.     Prior mediastinal surgery. Prosthetic coronary valve. Cardiac pacer  device. Visible lung bases unremarkable.     Postoperative changes of the lumbar spine with left hip prosthesis.  Degenerative change seen throughout the regional skeleton with a left  convexity to the lumbar spine.       Impression:       1. Findings supporting acute cholecystitis as described above. No  biliary dilatation identified. Trace perihepatic and pelvic ascites.  Wall thickening of the hepatic flexure of the colon may be reactive  given the inflammation of the right upper quadrant.  2. No bowel obstruction. Moderately distended fluid-filled stomach. No  bowel dilatation. Sigmoid colonic diverticulosis.  3. Diffuse vascular calcification. Prior mediastinal surgery with  prosthetic coronary valves. Cardiac pacer device.  This report was finalized on 04/03/2020 18:23 by Dr. Opal Rashid MD.    XR Chest 1 View [[id number]] Collected:  04/03/20 1646     Updated:  04/03/20 1651    Narrative:       Exam:   XR CHEST 1 VW-       Date:  4/3/2020      History:  Male, age  84 years;soa     COMPARISON:  Chest x-ray dated 2/23/2016     Findings :  Left-sided cardiac device with leads projecting of the right atrium and  right ventricle. Median sternotomy wires appear similar. Prior valvular  replacement.  Low lung volumes.  Borderline size of the cardiac size silhouette. Lungs are without focal  infiltrate, mass or effusions.  The bones show no acute pathology.         Impression:       Impression:     1.  Borderline size of the cardiac mediastinal silhouette. No convincing  evidence of fluid overload.  2.  Lower lung volumes.     This report was finalized on 04/03/2020 16:48 by Dr. Irene Valles MD.          I have reviewed the patient's current medications.     Assessment/Plan     Active Hospital Problems    Diagnosis   • Bacteremia   • Abnormal LFTs   • Acute cholecystitis   • Benign essential HTN   • CKD (chronic kidney disease) stage 3, GFR 30-59 ml/min (CMS/[facility])   • Acute UTI (urinary tract infection)       #1 bacteremia -patient's first blood cultures come back positive for GNR.  Second culture is still pending but not positive or negative.  He presented with generalized weakness and elevated white count.  Earlier in the week he abdominal pain with nausea and vomiting.  There was initially some concern for UTI but he only has trace bacteria and 2 white cells on the urinalysis.  However patient also has elevated bilirubin and transaminases with a CT showing acute cholecystitis.  Clinically patient looks nontoxic currently.  Will change patient's antibiotics to Zosyn for better coverage.  Make the patient n.p.o. for now.  Will get a general surgery consult for evaluation.    #2 ?uti -awaiting cultures.  Will be covered by Zosyn most likely.    #3 CKD 3 -possibly by history.  Only previous renal function prior to hospitalization was not normal.  Slight improvement from yesterday.  Not currently getting IV fluids.  Supportive care.  Monitor renal function closely.    #4 abnormal LFTs-presenting with elevated bilirubin and transaminases.  Also had an elevated BNP and there was some potential question if patient had underlying heart issues or chf/low flow state.  He denies any history of liver issues.  Not the  best historian however.  CT of abdomen pelvis showed some possible cholecystitis.  Granted chf can cause gallbladder edema but given patient's recent abdominal pain with nausea and vomiting and now positive blood cultures there is concern for gallbladder issue.  He is tender in the right upper quadrant on exam.  Will get surgery evaluation.  2D echo was also ordered not yet done.  Discontinue statin.    #5 essential hypertension -by history.  Hold losartan.  Monitor blood pressure closely.    #6 hyponatremia -mild at 131.  Had not received any fluid resuscitation with recent poor Po intake given concerns for volume.  He does not look overloaded.  Has not sound wet.  Holding losartan.  Monitor.  May need to trial gentle IVF at some point.  Waiting on echo.     #7 hyperglycemia -check an A1c.  Cover with sliding scale and hypoglycemia protocol.    #8 elevated CK -patient apparently had an elevated creatinine kinase of 756 on arrival.  He is not complaining of any muscle pains or aches.  Just generalized weakness.  No recent downtime.  He has not received fluids at this point.  We will recheck a CK and troponin now.  DC statin.      Discharge Planning: Ongoing    Luis Knox DO   04/04/20   12:22

## 2020-04-04 NOTE — PLAN OF CARE
Problem: Patient Care Overview  Goal: Plan of Care Review  Flowsheets  Taken 4/4/2020 1458 by Tiesha Calderon LPN  Progress: no change  Outcome Summary: GENERAL SURGEON CONSULTED - DR. RUSLAN ARCE. IV ANTIBIOTIS PER ORDER.  PT. C/O ABDOMINAL DISCOMFORT. VOIDING PER URINAL. ROOM AIR. IV ANTIBIOTICS PER ORDER. NO DSITRESS NOTED.  Taken 4/4/2020 1100 by Shannan Restrepo  Plan of Care Reviewed With: patient

## 2020-04-04 NOTE — CONSULTS
"Agustina Saleem MD FACS Consult Note    Referring Provider: Bharath Maloney*    Patient Care Team:  Matheus Olivera PA as PCP - General (Physician Assistant)    Chief complaint abdominal pain    Subjective .     History of present illness:  The patient is an 84-year-old male who presents complaining of right upper quadrant pain, nausea, dry heaves, and weakness.  He was seen in the ED and found to have bacteremia, elevated WBC, and elevated LFT\"s.  CT a/p was then performed and the gallbladder was found to have significant surrounding inflammatory changes, wall thickening, sludge, and stones.  There was no evidence of ductal dilatation.  He has been admitted and placed on IV abx.  He states that he is feeling better.    Review of Systems  All systems were reviewed and negative for    Constitution:chills, fevers, night sweats and weight loss, weight gain  Eyes:  double vision, blurriness and loss of vision  ENT:  earaches, hearing loss and hoarseness  Respiratory:  cough, dry, cough, productive, hemoptysis and shortness of air  Cardiovascular:  chest pressure / pain, at rest, chest pressure / pain, on exertion, irregular pulse and palpitations  Gastrointestinal: bright red blood per rectum, change in bowel habits, constipation, diarrhea, heartburn, hematemesis, melena, nausea, pain and vomiting  Genitourinary:  difficulty / inability to void, pain, blood in urine and painful urination  Integument:  itching, rash, redness and swelling  Breast:  lump / mass, nipple discharge and pain  Hematologic / Lymphatic: easy bruising and lymphadenopathy  Musculoskeletal: joint pain, muscle pain and muscle weakness  Neurological: dizziness, loss of consciousness, numbness, vertigo and weakness  Behavioral/Psych: anxiety and depression  Endocrine: diabetes, thyroid disorder      History  Past Medical History:   Diagnosis Date   • Bradycardia    • Coronary artery disease    • GERD (gastroesophageal reflux disease)  "   • Hypertension    • Injury of back    • TIA (transient ischemic attack)    ,   Past Surgical History:   Procedure Laterality Date   • APPENDECTOMY     • BACK SURGERY      Fusion   • CARDIAC SURGERY      Open Heart   • HERNIA REPAIR     • JOINT REPLACEMENT Left     s/p L hip replacement   • PACEMAKER IMPLANTATION     • STOMACH SURGERY     , History reviewed. No pertinent family history.,   Social History     Tobacco Use   • Smoking status: Never Smoker   • Smokeless tobacco: Never Used   Substance Use Topics   • Alcohol use: No   • Drug use: No   ,   Medications Prior to Admission   Medication Sig Dispense Refill Last Dose   • aspirin 81 MG chewable tablet Chew 81 mg Daily.   Past Week at Unknown time   • Cholecalciferol (VITAMIN D3) 125 MCG (5000 UT) capsule capsule Take 5,000 Units by mouth Daily.   Past Week at Unknown time   • losartan (COZAAR) 100 MG tablet Take 50 mg by mouth Daily.   Past Week at Unknown time   • Multiple Vitamins-Minerals (MULTIVITAMIN WITH MINERALS) tablet tablet Take 1 tablet by mouth 3 (Three) Times a Day.   Past Week at Unknown time   • vitamin B-12 (CYANOCOBALAMIN) 1000 MCG tablet Take 1,000 mcg by mouth Daily.   Past Week at Unknown time   • warfarin (COUMADIN) 1 MG tablet Take  by mouth Take As Directed. Take one whole-tablet (1 mg) on Monday, Wednesday, Friday, and Saturday.  Take one half-tablet (0.5 mg) on Sunday, Tuesday, and Thursday.   Past Week at Unknown time    and Allergies:  Patient has no known allergies.    Current Facility-Administered Medications:   •  aspirin chewable tablet 81 mg, 81 mg, Oral, Daily, Bharath Maloney MD, 81 mg at 04/04/20 0823  •  bisoprolol (ZEBeta) tablet 10 mg, 10 mg, Oral, Daily, Bharath Maloney MD, 10 mg at 04/04/20 0823  •  dextrose (D50W) 25 g/ 50mL Intravenous Solution 25 g, 25 g, Intravenous, Q15 Min PRN, Luis Knox, DO  •  dextrose (GLUTOSE) oral gel 15 g, 15 g, Oral, Q15 Min PRN, Luis Knox, DO  •  enoxaparin  (LOVENOX) syringe 30 mg, 30 mg, Subcutaneous, Q24H, Bharath Maloney MD, 30 mg at 04/03/20 2124  •  glucagon (human recombinant) (GLUCAGEN DIAGNOSTIC) injection 1 mg, 1 mg, Subcutaneous, Q15 Min PRN, Luis Knox DO  •  insulin regular (humuLIN R,novoLIN R) injection 2-7 Units, 2-7 Units, Subcutaneous, Q6H, Luis Knox DO, 2 Units at 04/04/20 1323  •  ondansetron (ZOFRAN) injection 4 mg, 4 mg, Intravenous, Q6H PRN, Bharath Maloney MD  •  piperacillin-tazobactam (ZOSYN) 3.375 g in iso-osmotic dextrose 50 ml (premix), 3.375 g, Intravenous, Q8H, Luis Knox DO  •  [COMPLETED] Insert peripheral IV, , , Once **AND** sodium chloride 0.9 % flush 10 mL, 10 mL, Intravenous, PRN, Bharath Maloney MD  •  sodium chloride 0.9 % flush 10 mL, 10 mL, Intravenous, Q12H, Bharath Maloney MD, 10 mL at 04/04/20 0823  •  sodium chloride 0.9 % flush 10 mL, 10 mL, Intravenous, PRN, Bharath Maloney MD    Objective     Vital Signs   Temp:  [97.5 °F (36.4 °C)-97.8 °F (36.6 °C)] 97.8 °F (36.6 °C)  Heart Rate:  [50-64] 51  Resp:  [18-22] 18  BP: (107-152)/(55-64) 121/64    Physical Exam:  General appearance - alert, well appearing, and in no distress  Mental status - alert, oriented to person, place, and time  Neck - supple, no significant adenopathy  Chest - clear to auscultation, no wheezes, rales or rhonchi, symmetric air entry  Heart - normal rate, regular rhythm, normal S1, S2, no murmurs, rubs, clicks or gallops  Abdomen - soft, nontender, nondistended, tender RUQ, no masses  Neurological - alert, oriented, normal speech, no focal findings or movement disorder noted  Musculoskeletal - no joint tenderness, deformity or swelling  Extremities - peripheral pulses normal, no pedal edema, no clubbing or cyanosis    Results Review:    Lab Results (last 24 hours)     Procedure Component Value Units Date/Time    Blood Culture - Blood, Arm, Right [[id number]] Collected:  04/03/20 1533     Specimen:  Blood from Arm, Right Updated:  04/04/20 1600     Blood Culture No growth at 24 hours    POC Glucose Once [[id number]]  (Abnormal) Collected:  04/04/20 1320    Specimen:  Blood Updated:  04/04/20 1332     Glucose 174 mg/dL      Comment: : [id number] [place] ID: [id number]       Hemoglobin A1c [[id number]]  (Abnormal) Collected:  04/04/20 0520    Specimen:  Blood Updated:  04/04/20 1322     Hemoglobin A1C 6.60 %     Narrative:       Hemoglobin A1C Ranges:    Increased Risk for Diabetes  5.7% to 6.4%  Diabetes                     >= 6.5%  Diabetic Goal                < 7.0%    Troponin [[id number]]  (Normal) Collected:  04/04/20 1252    Specimen:  Blood Updated:  04/04/20 1317     Troponin T 0.023 ng/mL     Narrative:       Troponin T Reference Range:  <= 0.03 ng/mL-   Negative for AMI  >0.03 ng/mL-     Abnormal for myocardial necrosis.  Clinicians would have to utilize clinical acumen, EKG, Troponin and serial changes to determine if it is an Acute Myocardial Infarction or myocardial injury due to an underlying chronic condition.       Results may be falsely decreased if patient taking Biotin.      CK [[id number]]  (Abnormal) Collected:  04/04/20 1252    Specimen:  Blood Updated:  04/04/20 1317     Creatine Kinase 423 U/L     Lactic Acid, Plasma [[id number]]  (Abnormal) Collected:  04/04/20 1209    Specimen:  Blood Updated:  04/04/20 1253     Lactate 2.3 mmol/L     Blood Culture ID, PCR - Blood, Arm, Right [[id number]]  (Abnormal) Collected:  04/03/20 1439    Specimen:  Blood from Arm, Right Updated:  04/04/20 1239     BCID, PCR Escherichia coli. Identification by BCID PCR.     BOTTLE TYPE Aerobic Bottle    Blood Culture - Blood, Arm, Right [[id number]]  (Abnormal) Collected:  04/03/20 1439    Specimen:  Blood from Arm, Right Updated:  04/04/20 1100     Blood Culture Abnormal Stain     Gram Stain Aerobic Bottle Gram negative bacilli    CBC & Differential [[id number]] Collected:  04/04/20 0520     Specimen:  Blood Updated:  04/04/20 0650    Narrative:       The following orders were created for panel order CBC & Differential.  Procedure                               Abnormality         Status                     ---------                               -----------         ------                     CBC Auto Differential[[id number]]        Abnormal            Final result                 Please view results for these tests on the individual orders.    CBC Auto Differential [[id number]]  (Abnormal) Collected:  04/04/20 0520    Specimen:  Blood Updated:  04/04/20 0650     WBC 12.98 10*3/mm3      RBC 3.75 10*6/mm3      Hemoglobin 11.1 g/dL      Hematocrit 33.7 %      MCV 89.9 fL      MCH 29.6 pg      MCHC 32.9 g/dL      RDW 13.7 %      RDW-SD 45.4 fl      MPV 10.5 fL      Platelets 163 10*3/mm3     Manual Differential [[id number]]  (Abnormal) Collected:  04/04/20 0520    Specimen:  Blood Updated:  04/04/20 0650     Neutrophil % 81.0 %      Lymphocyte % 5.0 %      Monocyte % 6.0 %      Bands %  8.0 %      Neutrophils Absolute 11.55 10*3/mm3      Lymphocytes Absolute 0.65 10*3/mm3      Monocytes Absolute 0.78 10*3/mm3      Manilla Cells Slight/1+     Poikilocytes Mod/2+     Dohle Bodies Present     Vacuolated Neutrophils Large/3+     Giant Platelets Slight/1+    Comprehensive Metabolic Panel [[id number]]  (Abnormal) Collected:  04/04/20 0520    Specimen:  Blood Updated:  04/04/20 0557     Glucose 203 mg/dL      BUN 50 mg/dL      Creatinine 1.32 mg/dL      Sodium 131 mmol/L      Potassium 3.9 mmol/L      Chloride 96 mmol/L      CO2 22.0 mmol/L      Calcium 8.3 mg/dL      Total Protein 6.6 g/dL      Albumin 3.00 g/dL      ALT (SGPT) 73 U/L      AST (SGOT) 94 U/L      Alkaline Phosphatase 155 U/L      Total Bilirubin 3.0 mg/dL      eGFR Non African Amer 52 mL/min/1.73      Globulin 3.6 gm/dL      A/G Ratio 0.8 g/dL      BUN/Creatinine Ratio 37.9     Anion Gap 13.0 mmol/L     Narrative:       GFR Normal >60  Chronic Kidney  Disease <60  Kidney Failure <15      Lactic Acid, Plasma [[id number]]  (Normal) Collected:  04/04/20 0520    Specimen:  Blood Updated:  04/04/20 0543     Lactate 1.8 mmol/L     Lactic Acid, Plasma [[id number]]  (Abnormal) Collected:  04/03/20 2335    Specimen:  Blood Updated:  04/04/20 0016     Lactate 2.2 mmol/L     Lactic Acid, Reflex [[id number]]  (Abnormal) Collected:  04/03/20 1854    Specimen:  Blood from Arm, Right Updated:  04/03/20 1931     Lactate 2.2 mmol/L     CK [[id number]]  (Abnormal) Collected:  04/03/20 1439    Specimen:  Blood Updated:  04/03/20 1842     Creatine Kinase 756 U/L     Lactic Acid, Reflex Timer (This will reflex a repeat order 3-3:15 hours after ordered.) [[id number]] Collected:  04/03/20 1439    Specimen:  Blood Updated:  04/03/20 1816     Hold Tube Hold for add-ons.     Comment: Auto resulted.           Imaging Results (Last 24 Hours)     Procedure Component Value Units Date/Time    CT Abdomen Pelvis Without Contrast [[id number]] Collected:  04/03/20 1817     Updated:  04/03/20 1826    Narrative:       CT ABDOMEN PELVIS WO CONTRAST- 4/3/2020 6:09 PM CDT     HISTORY: Abdominal pain, unspecified; N39.0-Urinary tract infection,  site not specified; R65.10-Systemic inflammatory response syndrome  (sirs) of non-infectious origin without acute organ dysfunction;  N17.9-Acute kidney failure, unspecified; R74.0-Nonspecific elevation of  levels of transaminase and lactic acid dehydrogenase (ldh)      COMPARISON: None     DOSE LENGTH PRODUCT: 547 mGy cm. Automated exposure control was also  utilized to decrease patient radiation dose.     TECHNIQUE: Axial images of the abdomen pelvis are obtained without IV or  oral contrast     FINDINGS:  The nonenhanced liver and spleen are unremarkable. There is  fatty atrophy of the pancreas. The gallbladder is distended containing  sludge and likely noncalcified stones. There is gallbladder wall  thickening with pericholecystic inflammation. There is trace  perihepatic  ascites. No bile duct dilatation identified. Findings suspicious for  acute cholecystitis.     The adrenal glands are prominent but do maintain adreniform shape. There  is nonspecific perinephric fat stranding but no loculated fluid. There  is a left renal cyst. There is no hydronephrosis. The bladder appears  intact.     There is no free intraperitoneal air or loculated abscess. There is  sigmoid colonic diverticulosis with no convincing acute diverticulitis.  Wall thickening of the hepatic flexure may be reactive from the  inflammatory changes described in the right upper quadrant. No evidence  for bowel obstruction. Stomach distended with fluid. Fatty containing  small umbilical hernia. Moderate vascular calcification with no  aneurysm. No pathologic lymphadenopathy. Reactive appearing portacaval  lymph node.     Prior mediastinal surgery. Prosthetic coronary valve. Cardiac pacer  device. Visible lung bases unremarkable.     Postoperative changes of the lumbar spine with left hip prosthesis.  Degenerative change seen throughout the regional skeleton with a left  convexity to the lumbar spine.       Impression:       1. Findings supporting acute cholecystitis as described above. No  biliary dilatation identified. Trace perihepatic and pelvic ascites.  Wall thickening of the hepatic flexure of the colon may be reactive  given the inflammation of the right upper quadrant.  2. No bowel obstruction. Moderately distended fluid-filled stomach. No  bowel dilatation. Sigmoid colonic diverticulosis.  3. Diffuse vascular calcification. Prior mediastinal surgery with  prosthetic coronary valves. Cardiac pacer device.  This report was finalized on 04/03/2020 18:23 by Dr. Opal Rashid MD.                Assessment/Plan       Acute cholecystitis with cholelithiasis.  Due to the degree of inflammatory changes about the gallbladder and his anticoagulated state, he will undergo open cholecystostomy tube placement.   Suggest to continue iv abx.  The risks, benefits, complications, and possible alternatives were discussed with the patient who agreed to proceed.      Agustina Saleem MD  04/04/20  17:45

## 2020-04-04 NOTE — PLAN OF CARE
Problem: Patient Care Overview  Goal: Plan of Care Review  Outcome: Ongoing (interventions implemented as appropriate)  Flowsheets (Taken 4/4/2020 0251)  Progress: no change  Plan of Care Reviewed With: patient  Outcome Summary: Pt denies pain.  IID, receiving IV lasix.  Lactic acid 2.2.  Dr. June notified, no new orders.  Mild edema noted to BLL.  Incont of urine at times.  Cont to monitor.

## 2020-04-05 ENCOUNTER — ANESTHESIA EVENT (OUTPATIENT)
Dept: PERIOP | Facility: HOSPITAL | Age: 85
End: 2020-04-05

## 2020-04-05 ENCOUNTER — ANESTHESIA (OUTPATIENT)
Dept: PERIOP | Facility: HOSPITAL | Age: 85
End: 2020-04-05

## 2020-04-05 PROBLEM — Z95.2 HISTORY OF PROSTHETIC AORTIC VALVE: Status: ACTIVE | Noted: 2020-04-05

## 2020-04-05 LAB
ALBUMIN SERPL-MCNC: 3.2 G/DL (ref 3.5–5.2)
ALBUMIN/GLOB SERPL: 0.9 G/DL
ALP SERPL-CCNC: 151 U/L (ref 39–117)
ALT SERPL W P-5'-P-CCNC: 113 U/L (ref 1–41)
ANION GAP SERPL CALCULATED.3IONS-SCNC: 14 MMOL/L (ref 5–15)
AST SERPL-CCNC: 115 U/L (ref 1–40)
BASOPHILS # BLD AUTO: 0.04 10*3/MM3 (ref 0–0.2)
BASOPHILS NFR BLD AUTO: 0.2 % (ref 0–1.5)
BILIRUB SERPL-MCNC: 1.3 MG/DL (ref 0.2–1.2)
BUN BLD-MCNC: 56 MG/DL (ref 8–23)
BUN/CREAT SERPL: 43.1 (ref 7–25)
CALCIUM SPEC-SCNC: 8.5 MG/DL (ref 8.6–10.5)
CHLORIDE SERPL-SCNC: 93 MMOL/L (ref 98–107)
CO2 SERPL-SCNC: 24 MMOL/L (ref 22–29)
CREAT BLD-MCNC: 1.3 MG/DL (ref 0.76–1.27)
DEPRECATED RDW RBC AUTO: 45.1 FL (ref 37–54)
EOSINOPHIL # BLD AUTO: 0 10*3/MM3 (ref 0–0.4)
EOSINOPHIL NFR BLD AUTO: 0 % (ref 0.3–6.2)
ERYTHROCYTE [DISTWIDTH] IN BLOOD BY AUTOMATED COUNT: 13.7 % (ref 12.3–15.4)
GFR SERPL CREATININE-BSD FRML MDRD: 53 ML/MIN/1.73
GLOBULIN UR ELPH-MCNC: 3.6 GM/DL
GLUCOSE BLD-MCNC: 149 MG/DL (ref 65–99)
GLUCOSE BLDC GLUCOMTR-MCNC: 132 MG/DL (ref 70–130)
GLUCOSE BLDC GLUCOMTR-MCNC: 144 MG/DL (ref 70–130)
GLUCOSE BLDC GLUCOMTR-MCNC: 148 MG/DL (ref 70–130)
GLUCOSE BLDC GLUCOMTR-MCNC: 184 MG/DL (ref 70–130)
HCT VFR BLD AUTO: 35.1 % (ref 37.5–51)
HGB BLD-MCNC: 11.6 G/DL (ref 13–17.7)
IMM GRANULOCYTES # BLD AUTO: 0.21 10*3/MM3 (ref 0–0.05)
IMM GRANULOCYTES NFR BLD AUTO: 1.2 % (ref 0–0.5)
LYMPHOCYTES # BLD AUTO: 1.09 10*3/MM3 (ref 0.7–3.1)
LYMPHOCYTES NFR BLD AUTO: 6.4 % (ref 19.6–45.3)
MAGNESIUM SERPL-MCNC: 2.7 MG/DL (ref 1.6–2.4)
MCH RBC QN AUTO: 29.7 PG (ref 26.6–33)
MCHC RBC AUTO-ENTMCNC: 33 G/DL (ref 31.5–35.7)
MCV RBC AUTO: 89.8 FL (ref 79–97)
MONOCYTES # BLD AUTO: 2.11 10*3/MM3 (ref 0.1–0.9)
MONOCYTES NFR BLD AUTO: 12.4 % (ref 5–12)
NEUTROPHILS # BLD AUTO: 13.63 10*3/MM3 (ref 1.7–7)
NEUTROPHILS NFR BLD AUTO: 79.8 % (ref 42.7–76)
NRBC BLD AUTO-RTO: 0.1 /100 WBC (ref 0–0.2)
PLATELET # BLD AUTO: 156 10*3/MM3 (ref 140–450)
PMV BLD AUTO: 10.9 FL (ref 6–12)
POTASSIUM BLD-SCNC: 4.2 MMOL/L (ref 3.5–5.2)
PROT SERPL-MCNC: 6.8 G/DL (ref 6–8.5)
RBC # BLD AUTO: 3.91 10*6/MM3 (ref 4.14–5.8)
SODIUM BLD-SCNC: 131 MMOL/L (ref 136–145)
WBC NRBC COR # BLD: 17.08 10*3/MM3 (ref 3.4–10.8)

## 2020-04-05 PROCEDURE — 0F9400Z DRAINAGE OF GALLBLADDER WITH DRAINAGE DEVICE, OPEN APPROACH: ICD-10-PCS | Performed by: SPECIALIST

## 2020-04-05 PROCEDURE — 25010000002 ONDANSETRON PER 1 MG: Performed by: NURSE ANESTHETIST, CERTIFIED REGISTERED

## 2020-04-05 PROCEDURE — 25010000002 SUCCINYLCHOLINE PER 20 MG: Performed by: NURSE ANESTHETIST, CERTIFIED REGISTERED

## 2020-04-05 PROCEDURE — 25010000002 MORPHINE (PF) 10 MG/ML SOLUTION: Performed by: SPECIALIST

## 2020-04-05 PROCEDURE — 87040 BLOOD CULTURE FOR BACTERIA: CPT | Performed by: INTERNAL MEDICINE

## 2020-04-05 PROCEDURE — 80053 COMPREHEN METABOLIC PANEL: CPT | Performed by: INTERNAL MEDICINE

## 2020-04-05 PROCEDURE — 94799 UNLISTED PULMONARY SVC/PX: CPT

## 2020-04-05 PROCEDURE — 63710000001 INSULIN REGULAR HUMAN PER 5 UNITS: Performed by: INTERNAL MEDICINE

## 2020-04-05 PROCEDURE — 25010000002 PROPOFOL 10 MG/ML EMULSION: Performed by: NURSE ANESTHETIST, CERTIFIED REGISTERED

## 2020-04-05 PROCEDURE — 82962 GLUCOSE BLOOD TEST: CPT

## 2020-04-05 PROCEDURE — 25010000002 FENTANYL CITRATE (PF) 100 MCG/2ML SOLUTION: Performed by: NURSE ANESTHETIST, CERTIFIED REGISTERED

## 2020-04-05 PROCEDURE — 83735 ASSAY OF MAGNESIUM: CPT | Performed by: INTERNAL MEDICINE

## 2020-04-05 PROCEDURE — 25010000002 NEOSTIGMINE 10 MG/10ML SOLUTION: Performed by: NURSE ANESTHETIST, CERTIFIED REGISTERED

## 2020-04-05 PROCEDURE — 85025 COMPLETE CBC W/AUTO DIFF WBC: CPT | Performed by: INTERNAL MEDICINE

## 2020-04-05 PROCEDURE — 25010000002 PIPERACILLIN SOD-TAZOBACTAM PER 1 G: Performed by: INTERNAL MEDICINE

## 2020-04-05 PROCEDURE — 25010000002 CEFTRIAXONE PER 250 MG: Performed by: SPECIALIST

## 2020-04-05 PROCEDURE — 25010000002 ONDANSETRON PER 1 MG: Performed by: SPECIALIST

## 2020-04-05 PROCEDURE — 25010000002 PIPERACILLIN SOD-TAZOBACTAM PER 1 G: Performed by: SPECIALIST

## 2020-04-05 PROCEDURE — 25010000002 FENTANYL CITRATE (PF) 250 MCG/5ML SOLUTION: Performed by: NURSE ANESTHETIST, CERTIFIED REGISTERED

## 2020-04-05 RX ORDER — SODIUM CHLORIDE 0.9 % (FLUSH) 0.9 %
3-10 SYRINGE (ML) INJECTION AS NEEDED
Status: DISCONTINUED | OUTPATIENT
Start: 2020-04-05 | End: 2020-04-05 | Stop reason: HOSPADM

## 2020-04-05 RX ORDER — MORPHINE SULFATE 1 MG/ML
INJECTION INTRAVENOUS CONTINUOUS
Status: DISCONTINUED | OUTPATIENT
Start: 2020-04-05 | End: 2020-04-10

## 2020-04-05 RX ORDER — SUCCINYLCHOLINE CHLORIDE 20 MG/ML
INJECTION INTRAMUSCULAR; INTRAVENOUS AS NEEDED
Status: DISCONTINUED | OUTPATIENT
Start: 2020-04-05 | End: 2020-04-05 | Stop reason: SURG

## 2020-04-05 RX ORDER — LABETALOL HYDROCHLORIDE 5 MG/ML
5 INJECTION, SOLUTION INTRAVENOUS
Status: DISCONTINUED | OUTPATIENT
Start: 2020-04-05 | End: 2020-04-05 | Stop reason: HOSPADM

## 2020-04-05 RX ORDER — NALOXONE HCL 0.4 MG/ML
0.04 VIAL (ML) INJECTION AS NEEDED
Status: DISCONTINUED | OUTPATIENT
Start: 2020-04-05 | End: 2020-04-05 | Stop reason: HOSPADM

## 2020-04-05 RX ORDER — FENTANYL CITRATE 50 UG/ML
25 INJECTION, SOLUTION INTRAMUSCULAR; INTRAVENOUS
Status: DISCONTINUED | OUTPATIENT
Start: 2020-04-05 | End: 2020-04-05 | Stop reason: HOSPADM

## 2020-04-05 RX ORDER — ONDANSETRON 2 MG/ML
INJECTION INTRAMUSCULAR; INTRAVENOUS AS NEEDED
Status: DISCONTINUED | OUTPATIENT
Start: 2020-04-05 | End: 2020-04-05 | Stop reason: SURG

## 2020-04-05 RX ORDER — GLYCOPYRROLATE 0.2 MG/ML
INJECTION INTRAMUSCULAR; INTRAVENOUS AS NEEDED
Status: DISCONTINUED | OUTPATIENT
Start: 2020-04-05 | End: 2020-04-05 | Stop reason: SURG

## 2020-04-05 RX ORDER — NEOSTIGMINE METHYLSULFATE 1 MG/ML
INJECTION, SOLUTION INTRAVENOUS AS NEEDED
Status: DISCONTINUED | OUTPATIENT
Start: 2020-04-05 | End: 2020-04-05 | Stop reason: SURG

## 2020-04-05 RX ORDER — FAMOTIDINE 20 MG/1
20 TABLET, FILM COATED ORAL
Status: DISCONTINUED | OUTPATIENT
Start: 2020-04-05 | End: 2020-04-05 | Stop reason: HOSPADM

## 2020-04-05 RX ORDER — ONDANSETRON 2 MG/ML
4 INJECTION INTRAMUSCULAR; INTRAVENOUS AS NEEDED
Status: DISCONTINUED | OUTPATIENT
Start: 2020-04-05 | End: 2020-04-05 | Stop reason: HOSPADM

## 2020-04-05 RX ORDER — OXYCODONE AND ACETAMINOPHEN 7.5; 325 MG/1; MG/1
2 TABLET ORAL ONCE AS NEEDED
Status: DISCONTINUED | OUTPATIENT
Start: 2020-04-05 | End: 2020-04-05 | Stop reason: HOSPADM

## 2020-04-05 RX ORDER — LIDOCAINE HYDROCHLORIDE 20 MG/ML
INJECTION, SOLUTION INFILTRATION; PERINEURAL AS NEEDED
Status: DISCONTINUED | OUTPATIENT
Start: 2020-04-05 | End: 2020-04-05 | Stop reason: SURG

## 2020-04-05 RX ORDER — FENTANYL CITRATE 50 UG/ML
INJECTION, SOLUTION INTRAMUSCULAR; INTRAVENOUS AS NEEDED
Status: DISCONTINUED | OUTPATIENT
Start: 2020-04-05 | End: 2020-04-05 | Stop reason: SURG

## 2020-04-05 RX ORDER — FAMOTIDINE 10 MG/ML
20 INJECTION, SOLUTION INTRAVENOUS
Status: DISCONTINUED | OUTPATIENT
Start: 2020-04-05 | End: 2020-04-05 | Stop reason: HOSPADM

## 2020-04-05 RX ORDER — MORPHINE SULFATE 2 MG/ML
2 INJECTION, SOLUTION INTRAMUSCULAR; INTRAVENOUS
Status: DISCONTINUED | OUTPATIENT
Start: 2020-04-05 | End: 2020-04-05 | Stop reason: HOSPADM

## 2020-04-05 RX ORDER — SODIUM CHLORIDE, SODIUM LACTATE, POTASSIUM CHLORIDE, CALCIUM CHLORIDE 600; 310; 30; 20 MG/100ML; MG/100ML; MG/100ML; MG/100ML
INJECTION, SOLUTION INTRAVENOUS CONTINUOUS PRN
Status: DISCONTINUED | OUTPATIENT
Start: 2020-04-05 | End: 2020-04-05 | Stop reason: SURG

## 2020-04-05 RX ORDER — SODIUM CHLORIDE 9 MG/ML
50 INJECTION, SOLUTION INTRAVENOUS CONTINUOUS
Status: DISCONTINUED | OUTPATIENT
Start: 2020-04-05 | End: 2020-04-09

## 2020-04-05 RX ORDER — SODIUM CHLORIDE, SODIUM LACTATE, POTASSIUM CHLORIDE, CALCIUM CHLORIDE 600; 310; 30; 20 MG/100ML; MG/100ML; MG/100ML; MG/100ML
100 INJECTION, SOLUTION INTRAVENOUS CONTINUOUS
Status: DISCONTINUED | OUTPATIENT
Start: 2020-04-05 | End: 2020-04-05 | Stop reason: HOSPADM

## 2020-04-05 RX ORDER — NALOXONE HCL 0.4 MG/ML
0.1 VIAL (ML) INJECTION
Status: DISCONTINUED | OUTPATIENT
Start: 2020-04-05 | End: 2020-04-13 | Stop reason: HOSPADM

## 2020-04-05 RX ORDER — OXYCODONE AND ACETAMINOPHEN 10; 325 MG/1; MG/1
1 TABLET ORAL ONCE AS NEEDED
Status: DISCONTINUED | OUTPATIENT
Start: 2020-04-05 | End: 2020-04-05 | Stop reason: HOSPADM

## 2020-04-05 RX ORDER — SODIUM CHLORIDE 0.9 % (FLUSH) 0.9 %
3 SYRINGE (ML) INJECTION EVERY 12 HOURS SCHEDULED
Status: DISCONTINUED | OUTPATIENT
Start: 2020-04-05 | End: 2020-04-05 | Stop reason: HOSPADM

## 2020-04-05 RX ORDER — ONDANSETRON 2 MG/ML
4 INJECTION INTRAMUSCULAR; INTRAVENOUS EVERY 4 HOURS PRN
Status: DISCONTINUED | OUTPATIENT
Start: 2020-04-05 | End: 2020-04-07 | Stop reason: SDUPTHER

## 2020-04-05 RX ORDER — FLUMAZENIL 0.1 MG/ML
0.2 INJECTION INTRAVENOUS AS NEEDED
Status: DISCONTINUED | OUTPATIENT
Start: 2020-04-05 | End: 2020-04-05 | Stop reason: HOSPADM

## 2020-04-05 RX ORDER — FAMOTIDINE 10 MG/ML
INJECTION, SOLUTION INTRAVENOUS
Status: COMPLETED
Start: 2020-04-05 | End: 2020-04-05

## 2020-04-05 RX ORDER — METOPROLOL TARTRATE 5 MG/5ML
2 INJECTION INTRAVENOUS ONCE AS NEEDED
Status: DISCONTINUED | OUTPATIENT
Start: 2020-04-05 | End: 2020-04-05 | Stop reason: HOSPADM

## 2020-04-05 RX ORDER — PROPOFOL 10 MG/ML
VIAL (ML) INTRAVENOUS AS NEEDED
Status: DISCONTINUED | OUTPATIENT
Start: 2020-04-05 | End: 2020-04-05 | Stop reason: SURG

## 2020-04-05 RX ORDER — ROCURONIUM BROMIDE 10 MG/ML
INJECTION, SOLUTION INTRAVENOUS AS NEEDED
Status: DISCONTINUED | OUTPATIENT
Start: 2020-04-05 | End: 2020-04-05 | Stop reason: SURG

## 2020-04-05 RX ORDER — MAGNESIUM HYDROXIDE 1200 MG/15ML
LIQUID ORAL AS NEEDED
Status: DISCONTINUED | OUTPATIENT
Start: 2020-04-05 | End: 2020-04-05 | Stop reason: HOSPADM

## 2020-04-05 RX ORDER — FAMOTIDINE 10 MG/ML
INJECTION, SOLUTION INTRAVENOUS AS NEEDED
Status: DISCONTINUED | OUTPATIENT
Start: 2020-04-05 | End: 2020-04-05 | Stop reason: SURG

## 2020-04-05 RX ORDER — HYDROMORPHONE HYDROCHLORIDE 1 MG/ML
0.5 INJECTION, SOLUTION INTRAMUSCULAR; INTRAVENOUS; SUBCUTANEOUS EVERY 4 HOURS PRN
Status: DISCONTINUED | OUTPATIENT
Start: 2020-04-05 | End: 2020-04-12

## 2020-04-05 RX ORDER — LIDOCAINE HYDROCHLORIDE 10 MG/ML
0.5 INJECTION, SOLUTION EPIDURAL; INFILTRATION; INTRACAUDAL; PERINEURAL ONCE AS NEEDED
Status: DISCONTINUED | OUTPATIENT
Start: 2020-04-05 | End: 2020-04-05 | Stop reason: HOSPADM

## 2020-04-05 RX ORDER — PHENYLEPHRINE HCL IN 0.9% NACL 0.8MG/10ML
SYRINGE (ML) INTRAVENOUS AS NEEDED
Status: DISCONTINUED | OUTPATIENT
Start: 2020-04-05 | End: 2020-04-05 | Stop reason: SURG

## 2020-04-05 RX ORDER — EPHEDRINE SULFATE 50 MG/ML
INJECTION INTRAVENOUS AS NEEDED
Status: DISCONTINUED | OUTPATIENT
Start: 2020-04-05 | End: 2020-04-05 | Stop reason: SURG

## 2020-04-05 RX ADMIN — LIDOCAINE HYDROCHLORIDE 100 MG: 20 INJECTION, SOLUTION INFILTRATION; PERINEURAL at 08:20

## 2020-04-05 RX ADMIN — ONDANSETRON HYDROCHLORIDE 4 MG: 2 SOLUTION INTRAMUSCULAR; INTRAVENOUS at 10:29

## 2020-04-05 RX ADMIN — ROCURONIUM BROMIDE 10 MG: 10 INJECTION INTRAVENOUS at 08:19

## 2020-04-05 RX ADMIN — ONDANSETRON HYDROCHLORIDE 4 MG: 2 SOLUTION INTRAMUSCULAR; INTRAVENOUS at 13:17

## 2020-04-05 RX ADMIN — INSULIN HUMAN 2 UNITS: 100 INJECTION, SOLUTION PARENTERAL at 00:25

## 2020-04-05 RX ADMIN — NEOSTIGMINE METHYLSULFATE 3 MG: 1 INJECTION INTRAVENOUS at 09:43

## 2020-04-05 RX ADMIN — TAZOBACTAM SODIUM AND PIPERACILLIN SODIUM 3.38 G: 375; 3 INJECTION, SOLUTION INTRAVENOUS at 01:12

## 2020-04-05 RX ADMIN — FENTANYL CITRATE 25 MCG: 50 INJECTION, SOLUTION INTRAMUSCULAR; INTRAVENOUS at 10:36

## 2020-04-05 RX ADMIN — SUCCINYLCHOLINE CHLORIDE 120 MG: 20 INJECTION, SOLUTION INTRAMUSCULAR; INTRAVENOUS at 08:20

## 2020-04-05 RX ADMIN — FAMOTIDINE 20 MG: 10 INJECTION INTRAVENOUS at 08:29

## 2020-04-05 RX ADMIN — FENTANYL CITRATE 25 MCG: 50 INJECTION, SOLUTION INTRAMUSCULAR; INTRAVENOUS at 10:31

## 2020-04-05 RX ADMIN — Medication 160 MCG: at 08:25

## 2020-04-05 RX ADMIN — SODIUM CHLORIDE, PRESERVATIVE FREE 10 ML: 5 INJECTION INTRAVENOUS at 00:25

## 2020-04-05 RX ADMIN — MORPHINE SULFATE: 10 INJECTION, SOLUTION INTRAMUSCULAR; INTRAVENOUS at 12:13

## 2020-04-05 RX ADMIN — SODIUM CHLORIDE 50 ML/HR: 9 INJECTION, SOLUTION INTRAVENOUS at 16:34

## 2020-04-05 RX ADMIN — SODIUM CHLORIDE, POTASSIUM CHLORIDE, SODIUM LACTATE AND CALCIUM CHLORIDE: 600; 310; 30; 20 INJECTION, SOLUTION INTRAVENOUS at 08:00

## 2020-04-05 RX ADMIN — EPHEDRINE SULFATE 10 MG: 50 INJECTION INTRAVENOUS at 08:25

## 2020-04-05 RX ADMIN — EPHEDRINE SULFATE 10 MG: 50 INJECTION INTRAVENOUS at 08:55

## 2020-04-05 RX ADMIN — TAZOBACTAM SODIUM AND PIPERACILLIN SODIUM 3.38 G: 375; 3 INJECTION, SOLUTION INTRAVENOUS at 13:11

## 2020-04-05 RX ADMIN — FENTANYL CITRATE 25 MCG: 50 INJECTION, SOLUTION INTRAMUSCULAR; INTRAVENOUS at 10:26

## 2020-04-05 RX ADMIN — Medication 160 MCG: at 08:55

## 2020-04-05 RX ADMIN — GLYCOPYRROLATE 0.4 MG: 0.2 INJECTION, SOLUTION INTRAMUSCULAR; INTRAVENOUS at 09:43

## 2020-04-05 RX ADMIN — TAZOBACTAM SODIUM AND PIPERACILLIN SODIUM 3.38 G: 375; 3 INJECTION, SOLUTION INTRAVENOUS at 19:38

## 2020-04-05 RX ADMIN — FENTANYL CITRATE 150 MCG: 50 INJECTION INTRAMUSCULAR; INTRAVENOUS at 08:18

## 2020-04-05 RX ADMIN — PROPOFOL 150 MG: 10 INJECTION, EMULSION INTRAVENOUS at 08:20

## 2020-04-05 RX ADMIN — ONDANSETRON HYDROCHLORIDE 4 MG: 2 SOLUTION INTRAMUSCULAR; INTRAVENOUS at 21:14

## 2020-04-05 RX ADMIN — BISOPROLOL FUMARATE 10 MG: 5 TABLET ORAL at 12:30

## 2020-04-05 RX ADMIN — Medication 80 MCG: at 09:17

## 2020-04-05 RX ADMIN — EPHEDRINE SULFATE 10 MG: 50 INJECTION INTRAVENOUS at 09:17

## 2020-04-05 RX ADMIN — ONDANSETRON HYDROCHLORIDE 4 MG: 2 SOLUTION INTRAMUSCULAR; INTRAVENOUS at 09:49

## 2020-04-05 NOTE — PLAN OF CARE
Problem: Patient Care Overview  Goal: Plan of Care Review  Outcome: Ongoing (interventions implemented as appropriate)  Flowsheets (Taken 4/5/2020 0695)  Progress: no change  Plan of Care Reviewed With: patient  Note:   Pt is NPO for surgery 4-5-20;  consent signed;  left leg and ankle edema > than right ankle and leg edema;  Rocephin  DC'd  but zosyn started as Abx for pt;  with assist of 2 got pt up to BR to have a small BM;

## 2020-04-05 NOTE — PROGRESS NOTES
[facility] Medicine Services  INPATIENT PROGRESS NOTE    Patient Name: Jesus Smith  Date of Admission: 4/3/2020  Today's Date: 04/05/20  Length of Stay: 2  Primary Care Physician: Matheus Olivera PA    Subjective   Chief Complaint: Follow-up weakness    HPI   Patient seen and examined postop.  Complaining of significant pain in the right upper quadrant surgical site.  Drains are in place currently.  Denies any chest pain or shortness of breath.  No dizziness.       ROS:  All pertinent negatives and positives are as above. All other systems have been reviewed and are negative unless otherwise stated.     Objective    Temp:  [97.3 °F (36.3 °C)-97.9 °F (36.6 °C)] 97.5 °F (36.4 °C)  Heart Rate:  [50-60] 50  Resp:  [14-18] 16  BP: (106-130)/(50-71) 126/67  Physical Exam  GEN: Awake, alert, interactive, appears uncomfortable but in NAD  HEENT: PERRLA, EOMI, Anicteric, Trachea midline  Lungs: CTAB, no wheezing/rales/rhonchi  Heart: RRR, +S1/s2, no rub  ABD: Right upper quadrant bandaged with 2 drains in place, +BS, soft, no guarding  Extremities: atraumatic, no cyanosis, trace edema b/l LE  Skin: no rashes or petechiae   Neuro: AAOx3, no focal deficits  Psych: normal mood & affect        Results Review:  I have reviewed the labs, radiology results, and diagnostic studies.    Laboratory Data:   Results from last 7 days   Lab Units 04/05/20  0405 04/04/20 0520 04/03/20  1439   WBC 10*3/mm3 17.08* 12.98* 17.03*   HEMOGLOBIN g/dL 11.6* 11.1* 11.5*   HEMATOCRIT % 35.1* 33.7* 34.3*   PLATELETS 10*3/mm3 156 163 167        Results from last 7 days   Lab Units 04/05/20  0405 04/04/20  0520 04/03/20  1439   SODIUM mmol/L 131* 131* 131*   POTASSIUM mmol/L 4.2 3.9 4.3   CHLORIDE mmol/L 93* 96* 94*   CO2 mmol/L 24.0 22.0 21.0*   BUN mg/dL 56* 50* 50*   CREATININE mg/dL 1.30* 1.32* 1.57*   CALCIUM mg/dL 8.5* 8.3* 8.5*   BILIRUBIN mg/dL 1.3* 3.0* 3.8*   ALK PHOS U/L 151* 155* 155*   ALT  (SGPT) U/L 113* 73* 61*   AST (SGOT) U/L 115* 94* 93*   GLUCOSE mg/dL 149* 203* 206*       Culture Data:   Blood Culture   Date Value Ref Range Status   04/03/2020 Abnormal Stain (C)  Preliminary       Radiology Data:   Imaging Results (Last 24 Hours)     ** No results found for the last 24 hours. **          I have reviewed the patient's current medications.     Assessment/Plan     Active Hospital Problems    Diagnosis   • Bacteremia   • Abnormal LFTs   • Acute cholecystitis   • Benign essential HTN   • CKD (chronic kidney disease) stage 3, GFR 30-59 ml/min (CMS/formerly [facility])   • Acute UTI (urinary tract infection)       #1  E. coli bacteremia -patient's first blood cultures come back positive for E. coli, sensitivities not yet back.  Second culture ended up being negative.  This occurred in the setting of acute cholecystitis.  Being covered with Zosyn.  Repeat blood cultures drawn this morning.  We will continue to follow.  Patient is postop for open cholecystostomy tube placement with drains in place.    #2 ?uti -abnormal urinalysis on arrival with leukocyte esterase and nitrates.  However there was 0-2 white cells and trace bacteria.  Apparently due to this was never sent for culture.  Hopefully will be covered by Zosyn most likely.  If patient continues to worsen or have fevers may need to reculture.    #3 CKD 3 -possibly by history.  Only previous renal function prior to hospitalization was not normal.  Appears stable.  Will start gentle IVF as his oral intake may be down postop.  Supportive care.  Monitor renal function closely.  Renal ultrasound ordered but not yet done.    #4  acute cholecystitis -taken to the OR today by surgery and had placement of cholecystostomy tube.  Postop care per surgery.  Currently on PCA pump.  Continue Zosyn.  Monitor for any ileus.    #5 essential hypertension -by history.  Pressure stable and holding losartan.  Monitor blood pressure closely.    #6 hyponatremia -mild at 131.  We will  try some gentle IVF today and monitor.  Repeat labs in the morning.    #7  DM 2 -patient known borderline diabetic.  [facility]  A1c found to be 6.6 here.  Covering with sliding scale at this time.  Hypoglycemia protocol in place.    #8 elevated CK -had an elevated CK of 756 on arrival.  Trended down to 423.  Has no muscle pains.  Will recheck in the morning.  Holding statin.    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  Denies any history of arrhythmias.  Getting prophylactic Lovenox today.  If blood count stable in the morning okay for full dose Lovenox per surgery.  Will eventually resume Coumadin when taking stable p.o.  Check a PT/INR in the morning.      Discharge Planning: Ongoing, likely several more days.    Luis Knox DO   04/05/20   10:55

## 2020-04-05 NOTE — ANESTHESIA PROCEDURE NOTES
Airway  Urgency: elective    Date/Time: 4/5/2020 8:21 AM  Airway not difficult    General Information and Staff    Patient location during procedure: OR  CRNA: Jadon Holloway CRNA    Indications and Patient Condition  Indications for airway management: airway protection    Preoxygenated: yes  Mask difficulty assessment: 0 - not attempted    Final Airway Details  Final airway type: endotracheal airway      Successful airway: ETT  Cuffed: yes   Successful intubation technique: direct laryngoscopy  Facilitating devices/methods: intubating stylet  Endotracheal tube insertion site: oral  Blade: Zazueta  Blade size: 2  ETT size (mm): 7.5  Cormack-Lehane Classification: grade IIa - partial view of glottis  Placement verified by: capnometry   Cuff volume (mL): 7  Measured from: lips  ETT/EBT  to lips (cm): 23  Number of attempts at approach: 1  Assessment: lips, teeth, and gum same as pre-op and atraumatic intubation

## 2020-04-05 NOTE — ANESTHESIA POSTPROCEDURE EVALUATION
Patient: Jesus Smith    Procedure Summary     Date:  04/05/20 Room / Location:   [facility] OR  /  [facility] OR    Anesthesia Start:  0809 Anesthesia Stop:  1010    Procedure:  open cholecystostomy tube placement  (N/A Abdomen) Diagnosis:       Acute cholecystitis      (Acute cholecystitis [K81.0])    Surgeon:  Agustina Saleem MD Provider:  Jadon Holloway CRNA    Anesthesia Type:  general ASA Status:  3 - Emergent          Anesthesia Type: general    Vitals  Vitals Value Taken Time   /60 4/5/2020 11:00 AM   Temp 98.1 °F (36.7 °C) 4/5/2020 11:00 AM   Pulse 50 4/5/2020 11:00 AM   Resp 14 4/5/2020 11:00 AM   SpO2 98 % 4/5/2020 11:00 AM           Post Anesthesia Care and Evaluation      Comments: Discharged per PACU Criteria

## 2020-04-05 NOTE — OP NOTE
Preoperative diagnosis:  Acute cholecystitis with cholelithiasis  Postoperative diagnosis: same  Procedure:  Open cholecystostomy tube placement  Surgeon:  Agustina Saleem MD  Anesthesia:  Get  Ebl:  200  Ivf:  See anesthesia  Indications :the patient is an 84-year-old male who is chronically anticoagulated with coumadin who has been found to have acute cholecystitis with cholelithiasis as well as bacteremia.  He presents for open cholecystostomy tube placement.  The risks, benefits, complications, and possible alternatives were discussed with the patient who agreed to proceed.  Description of procedure: the patient was laid supine. The abdomen was prepped and draped.  An incision was created at the right subcostal margin.  The greater omentum was tightly adherent to the gallbladder and was thickened and inflamed.  Blunt dissection and bovie were used to free the greater omentum from the fundus of the gallbladder.  It was dilated with a thickened wall.  Two #15 NAM drains were placed thru the anterior abdominal wall.  An opening was created at the fundus of the gallbladder.  Purulent hydrops was aspirated.  One of the drains was placed into the gallbladder.  It was anchored to the gallbladder with 2-0 silk.  The other drain was placed in the abdominal cavity near the gallbladder.  The abdominal cavity was copiously irrigated with sterile saline with Rochephin.  The drains were anchored to the skin with 2-0 silk and placed to bulb suction.  The posterior layer of the rectus sheath was closed with #1 looped PDS.  The anterior layer was closed with 2-0 vicryl. The deep dermis was closed with 3-0 vicyrl and the skin was closed with staples.  Dry dressings were applied.  The sponge, needle, and instrument counts were correct.  Complications:none  Disposition: good to pacu  Findings:  Inflamed gallbladder with purulent hydrops

## 2020-04-05 NOTE — ANESTHESIA PREPROCEDURE EVALUATION
Anesthesia Evaluation     no history of anesthetic complications:  NPO Solid Status: > 8 hours  NPO Liquid Status: > 8 hours           Airway   Mallampati: II  TM distance: >3 FB  No difficulty expected  Dental    (+) poor dentition        Pulmonary    Cardiovascular   Exercise tolerance: poor (<4 METS)    ECG reviewed  Beta blocker not taken-may be given intraoperatively    (+) hypertension, CAD, dysrhythmias Atrial Flutter, Bradycardia,       Neuro/Psych  (+) TIA,     GI/Hepatic/Renal/Endo    (+) obesity,  GERD,  renal disease CRI,     Musculoskeletal     (+) back pain, gait problem,   Abdominal    Substance History      OB/GYN          Other                      Anesthesia Plan    ASA 3 - emergent     general     intravenous induction     Anesthetic plan, all risks, benefits, and alternatives have been provided, discussed and informed consent has been obtained with: patient.

## 2020-04-05 NOTE — PLAN OF CARE
Problem: Patient Care Overview  Goal: Plan of Care Review  Outcome: Ongoing (interventions implemented as appropriate)  Flowsheets  Taken 4/5/2020 7390  Progress: improving  Outcome Summary: SURGERY TODAY. NAM X 2 PATENT (SEROSANG DRNG IN ONE AND BILE DRNG. IN THE OTHER NAM). PT. TOLERATING CLEAR LIQUIDS OK. ROOM AIR. SCD'S ON. TELEMETRY ON. ZAMBRANO PATENT- URINE CLEAR. IV ANTIBIOTICS PER ORDER. ACCUCHECKS WITH SS COVERAGE AVAILABLE. PCA AVAILABLE FOR PAIN CONTROL. NO DISTRESS NOTED.  Taken 4/5/2020 1120  Plan of Care Reviewed With: patient

## 2020-04-06 ENCOUNTER — APPOINTMENT (OUTPATIENT)
Dept: ULTRASOUND IMAGING | Facility: HOSPITAL | Age: 85
End: 2020-04-06

## 2020-04-06 LAB
ALBUMIN SERPL-MCNC: 3.3 G/DL (ref 3.5–5.2)
ALBUMIN/GLOB SERPL: 0.9 G/DL
ALP SERPL-CCNC: 129 U/L (ref 39–117)
ALT SERPL W P-5'-P-CCNC: 99 U/L (ref 1–41)
ANION GAP SERPL CALCULATED.3IONS-SCNC: 13 MMOL/L (ref 5–15)
AST SERPL-CCNC: 57 U/L (ref 1–40)
BASOPHILS # BLD AUTO: 0.1 10*3/MM3 (ref 0–0.2)
BASOPHILS NFR BLD AUTO: 0.7 % (ref 0–1.5)
BILIRUB SERPL-MCNC: 1.3 MG/DL (ref 0.2–1.2)
BUN BLD-MCNC: 50 MG/DL (ref 8–23)
BUN/CREAT SERPL: 37.6 (ref 7–25)
CALCIUM SPEC-SCNC: 8.5 MG/DL (ref 8.6–10.5)
CHLORIDE SERPL-SCNC: 95 MMOL/L (ref 98–107)
CK SERPL-CCNC: 175 U/L (ref 20–200)
CO2 SERPL-SCNC: 26 MMOL/L (ref 22–29)
CREAT BLD-MCNC: 1.33 MG/DL (ref 0.76–1.27)
D-LACTATE SERPL-SCNC: 1.5 MMOL/L (ref 0.5–2)
DEPRECATED RDW RBC AUTO: 47.6 FL (ref 37–54)
EOSINOPHIL # BLD AUTO: 0.01 10*3/MM3 (ref 0–0.4)
EOSINOPHIL NFR BLD AUTO: 0.1 % (ref 0.3–6.2)
ERYTHROCYTE [DISTWIDTH] IN BLOOD BY AUTOMATED COUNT: 14 % (ref 12.3–15.4)
GFR SERPL CREATININE-BSD FRML MDRD: 51 ML/MIN/1.73
GLOBULIN UR ELPH-MCNC: 3.7 GM/DL
GLUCOSE BLD-MCNC: 157 MG/DL (ref 65–99)
GLUCOSE BLDC GLUCOMTR-MCNC: 133 MG/DL (ref 70–130)
GLUCOSE BLDC GLUCOMTR-MCNC: 136 MG/DL (ref 70–130)
GLUCOSE BLDC GLUCOMTR-MCNC: 141 MG/DL (ref 70–130)
GLUCOSE BLDC GLUCOMTR-MCNC: 148 MG/DL (ref 70–130)
GLUCOSE BLDC GLUCOMTR-MCNC: 151 MG/DL (ref 70–130)
GLUCOSE BLDC GLUCOMTR-MCNC: 156 MG/DL (ref 70–130)
HCT VFR BLD AUTO: 38.2 % (ref 37.5–51)
HGB BLD-MCNC: 12.3 G/DL (ref 13–17.7)
IMM GRANULOCYTES # BLD AUTO: 0.48 10*3/MM3 (ref 0–0.05)
IMM GRANULOCYTES NFR BLD AUTO: 3.4 % (ref 0–0.5)
INR PPP: 1.56 (ref 0.91–1.09)
LYMPHOCYTES # BLD AUTO: 1.25 10*3/MM3 (ref 0.7–3.1)
LYMPHOCYTES NFR BLD AUTO: 8.9 % (ref 19.6–45.3)
MCH RBC QN AUTO: 29.7 PG (ref 26.6–33)
MCHC RBC AUTO-ENTMCNC: 32.2 G/DL (ref 31.5–35.7)
MCV RBC AUTO: 92.3 FL (ref 79–97)
MONOCYTES # BLD AUTO: 2 10*3/MM3 (ref 0.1–0.9)
MONOCYTES NFR BLD AUTO: 14.2 % (ref 5–12)
NEUTROPHILS # BLD AUTO: 10.21 10*3/MM3 (ref 1.7–7)
NEUTROPHILS NFR BLD AUTO: 72.7 % (ref 42.7–76)
NRBC BLD AUTO-RTO: 0.2 /100 WBC (ref 0–0.2)
PHOSPHATE SERPL-MCNC: 4.8 MG/DL (ref 2.5–4.5)
PLATELET # BLD AUTO: 183 10*3/MM3 (ref 140–450)
PMV BLD AUTO: 10.4 FL (ref 6–12)
POTASSIUM BLD-SCNC: 4.6 MMOL/L (ref 3.5–5.2)
PROT SERPL-MCNC: 7 G/DL (ref 6–8.5)
PROTHROMBIN TIME: 18.6 SECONDS (ref 11.9–14.6)
RBC # BLD AUTO: 4.14 10*6/MM3 (ref 4.14–5.8)
SODIUM BLD-SCNC: 134 MMOL/L (ref 136–145)
WBC NRBC COR # BLD: 14.05 10*3/MM3 (ref 3.4–10.8)

## 2020-04-06 PROCEDURE — 25010000002 ENOXAPARIN PER 10 MG: Performed by: INTERNAL MEDICINE

## 2020-04-06 PROCEDURE — 76775 US EXAM ABDO BACK WALL LIM: CPT

## 2020-04-06 PROCEDURE — 25010000002 ONDANSETRON PER 1 MG: Performed by: SPECIALIST

## 2020-04-06 PROCEDURE — 25010000002 HYDROMORPHONE PER 4 MG: Performed by: SPECIALIST

## 2020-04-06 PROCEDURE — 25010000002 MORPHINE (PF) 10 MG/ML SOLUTION: Performed by: SPECIALIST

## 2020-04-06 PROCEDURE — 25010000002 PIPERACILLIN SOD-TAZOBACTAM PER 1 G: Performed by: SPECIALIST

## 2020-04-06 PROCEDURE — 83605 ASSAY OF LACTIC ACID: CPT | Performed by: INTERNAL MEDICINE

## 2020-04-06 PROCEDURE — 84100 ASSAY OF PHOSPHORUS: CPT | Performed by: INTERNAL MEDICINE

## 2020-04-06 PROCEDURE — 82962 GLUCOSE BLOOD TEST: CPT

## 2020-04-06 PROCEDURE — 82550 ASSAY OF CK (CPK): CPT | Performed by: INTERNAL MEDICINE

## 2020-04-06 PROCEDURE — 85025 COMPLETE CBC W/AUTO DIFF WBC: CPT | Performed by: INTERNAL MEDICINE

## 2020-04-06 PROCEDURE — 63710000001 INSULIN REGULAR HUMAN PER 5 UNITS: Performed by: SPECIALIST

## 2020-04-06 PROCEDURE — 85610 PROTHROMBIN TIME: CPT | Performed by: INTERNAL MEDICINE

## 2020-04-06 PROCEDURE — 97162 PT EVAL MOD COMPLEX 30 MIN: CPT | Performed by: PHYSICAL THERAPIST

## 2020-04-06 PROCEDURE — 80053 COMPREHEN METABOLIC PANEL: CPT | Performed by: SPECIALIST

## 2020-04-06 RX ADMIN — ENOXAPARIN SODIUM 100 MG: 100 INJECTION SUBCUTANEOUS at 09:01

## 2020-04-06 RX ADMIN — TAZOBACTAM SODIUM AND PIPERACILLIN SODIUM 3.38 G: 375; 3 INJECTION, SOLUTION INTRAVENOUS at 12:07

## 2020-04-06 RX ADMIN — SODIUM CHLORIDE 125 ML/HR: 9 INJECTION, SOLUTION INTRAVENOUS at 18:34

## 2020-04-06 RX ADMIN — ASPIRIN 81 MG: 81 TABLET ORAL at 09:03

## 2020-04-06 RX ADMIN — INSULIN HUMAN 2 UNITS: 100 INJECTION, SOLUTION PARENTERAL at 00:34

## 2020-04-06 RX ADMIN — ENOXAPARIN SODIUM 100 MG: 100 INJECTION SUBCUTANEOUS at 20:34

## 2020-04-06 RX ADMIN — TAZOBACTAM SODIUM AND PIPERACILLIN SODIUM 3.38 G: 375; 3 INJECTION, SOLUTION INTRAVENOUS at 19:16

## 2020-04-06 RX ADMIN — INSULIN HUMAN 2 UNITS: 100 INJECTION, SOLUTION PARENTERAL at 12:20

## 2020-04-06 RX ADMIN — BISOPROLOL FUMARATE 10 MG: 5 TABLET ORAL at 09:03

## 2020-04-06 RX ADMIN — TAZOBACTAM SODIUM AND PIPERACILLIN SODIUM 3.38 G: 375; 3 INJECTION, SOLUTION INTRAVENOUS at 00:34

## 2020-04-06 RX ADMIN — TAZOBACTAM SODIUM AND PIPERACILLIN SODIUM 3.38 G: 375; 3 INJECTION, SOLUTION INTRAVENOUS at 06:19

## 2020-04-06 RX ADMIN — MORPHINE SULFATE: 10 INJECTION, SOLUTION INTRAMUSCULAR; INTRAVENOUS at 08:53

## 2020-04-06 RX ADMIN — SODIUM CHLORIDE 125 ML/HR: 9 INJECTION, SOLUTION INTRAVENOUS at 09:35

## 2020-04-06 RX ADMIN — HYDROMORPHONE HYDROCHLORIDE 0.5 MG: 1 INJECTION, SOLUTION INTRAMUSCULAR; INTRAVENOUS; SUBCUTANEOUS at 00:35

## 2020-04-06 RX ADMIN — ONDANSETRON HYDROCHLORIDE 4 MG: 2 SOLUTION INTRAMUSCULAR; INTRAVENOUS at 09:12

## 2020-04-06 NOTE — PLAN OF CARE
Problem: Patient Care Overview  Goal: Plan of Care Review  Outcome: Ongoing (interventions implemented as appropriate)  Flowsheets (Taken 4/6/2020 1108)  Progress: improving  Plan of Care Reviewed With: patient  Outcome Summary: PT evaluation completed. The patient presents alert and oriented x4, but is slow to process information and to follow directions. The patient began to dry heave once sitting EOB, nurse notified. The patient was able to take small steps to the chair with a FWW with CGA. He is generally weak and will benefit from skilled PT services. He does live alone with little famiily to assist him. He may need SNF placement if he is not strong enough to care for himself by discharge.

## 2020-04-06 NOTE — PROGRESS NOTES
Agustina Saleem MD FACS  Progress Note     LOS: 3 days   Patient Care Team:  Matheus Olivera PA as PCP - General (Physician Assistant)      Subjective     Interval History:      no events over pm.  No nausea.  Pain controlled     Objective     Vital Signs  Temp:  [97.3 °F (36.3 °C)-98.1 °F (36.7 °C)] 97.4 °F (36.3 °C)  Heart Rate:  [50-63] 52  Resp:  [12-20] 12  BP: (106-133)/(51-89) 127/67    Physical Exam:  General appearance - alert, well appearing, and in no distress  Abdomen - soft, distended, NAM serous/ bilious      Results Review:    Lab Results (last 24 hours)     Procedure Component Value Units Date/Time    Blood Culture - Blood, Arm, Right [[id number]]  (Abnormal)  (Susceptibility) Collected:  04/03/20 1439    Specimen:  Blood from Arm, Right Updated:  04/06/20 0559     Blood Culture Escherichia coli     Gram Stain Aerobic Bottle Gram negative bacilli    Susceptibility      Escherichia coli     MISTI     Ampicillin Susceptible     Ampicillin + Sulbactam Susceptible     Cefepime Susceptible     Ceftazidime Susceptible     Ceftriaxone Susceptible     Gentamicin Susceptible     Levofloxacin Susceptible     Piperacillin + Tazobactam Susceptible     Trimethoprim + Sulfamethoxazole Susceptible                Susceptibility Comments     Escherichia coli    Cefazolin sensitivity will not be reported for Enterobacteriaceae in non-urine isolates. If cefazolin is preferred, please call the microbiology lab to request an E-test.  With the exception of urinary-sourced infections, aminoglycosides should not be used as monotherapy.             POC Glucose Once [[id number]]  (Abnormal) Collected:  04/06/20 0527    Specimen:  Blood Updated:  04/06/20 0538     Glucose 148 mg/dL      Comment: : [id number] Royce RubinbeverleyaMeter ID: [id number]       Blood Culture - Blood, Arm, Right [[id number]] Collected:  04/05/20 0405    Specimen:  Blood from Arm, Right Updated:  04/06/20 0515     Blood Culture No growth at 24 hours     Blood Culture - Blood, Hand, Right [[id number]] Collected:  04/05/20 0405    Specimen:  Blood from Hand, Right Updated:  04/06/20 0515     Blood Culture No growth at 24 hours    Comprehensive Metabolic Panel [[id number]]  (Abnormal) Collected:  04/06/20 0446    Specimen:  Blood Updated:  04/06/20 0505     Glucose 157 mg/dL      BUN 50 mg/dL      Creatinine 1.33 mg/dL      Sodium 134 mmol/L      Potassium 4.6 mmol/L      Chloride 95 mmol/L      CO2 26.0 mmol/L      Calcium 8.5 mg/dL      Total Protein 7.0 g/dL      Albumin 3.30 g/dL      ALT (SGPT) 99 U/L      AST (SGOT) 57 U/L      Alkaline Phosphatase 129 U/L      Total Bilirubin 1.3 mg/dL      eGFR Non African Amer 51 mL/min/1.73      Globulin 3.7 gm/dL      A/G Ratio 0.9 g/dL      BUN/Creatinine Ratio 37.6     Anion Gap 13.0 mmol/L     Narrative:       GFR Normal >60  Chronic Kidney Disease <60  Kidney Failure <15      Phosphorus [[id number]]  (Abnormal) Collected:  04/06/20 0446    Specimen:  Blood Updated:  04/06/20 0505     Phosphorus 4.8 mg/dL     CK [[id number]]  (Normal) Collected:  04/06/20 0446    Specimen:  Blood Updated:  04/06/20 0505     Creatine Kinase 175 U/L     Lactic Acid, Plasma [[id number]]  (Normal) Collected:  04/06/20 0441    Specimen:  Blood Updated:  04/06/20 0501     Lactate 1.5 mmol/L     Protime-INR [[id number]]  (Abnormal) Collected:  04/06/20 0441    Specimen:  Blood Updated:  04/06/20 0458     Protime 18.6 Seconds      INR 1.56    CBC & Differential [[id number]] Collected:  04/06/20 0441    Specimen:  Blood Updated:  04/06/20 0450    Narrative:       The following orders were created for panel order CBC & Differential.  Procedure                               Abnormality         Status                     ---------                               -----------         ------                     CBC Auto Differential[[id number]]        Abnormal            Final result                 Please view results for these tests on the individual orders.     CBC Auto Differential [[id number]]  (Abnormal) Collected:  04/06/20 0441    Specimen:  Blood Updated:  04/06/20 0450     WBC 14.05 10*3/mm3      RBC 4.14 10*6/mm3      Hemoglobin 12.3 g/dL      Hematocrit 38.2 %      MCV 92.3 fL      MCH 29.7 pg      MCHC 32.2 g/dL      RDW 14.0 %      RDW-SD 47.6 fl      MPV 10.4 fL      Platelets 183 10*3/mm3      Neutrophil % 72.7 %      Lymphocyte % 8.9 %      Monocyte % 14.2 %      Eosinophil % 0.1 %      Basophil % 0.7 %      Immature Grans % 3.4 %      Neutrophils, Absolute 10.21 10*3/mm3      Lymphocytes, Absolute 1.25 10*3/mm3      Monocytes, Absolute 2.00 10*3/mm3      Eosinophils, Absolute 0.01 10*3/mm3      Basophils, Absolute 0.10 10*3/mm3      Immature Grans, Absolute 0.48 10*3/mm3      nRBC 0.2 /100 WBC     POC Glucose Once [[id number]]  (Abnormal) Collected:  04/06/20 0016    Specimen:  Blood Updated:  04/06/20 0028     Glucose 151 mg/dL      Comment: : [id number] [place] TeresaMeter ID: [id number]       POC Glucose Once [[id number]]  (Abnormal) Collected:  04/05/20 1712    Specimen:  Blood Updated:  04/05/20 1741     Glucose 132 mg/dL      Comment: : [id number] Calderon Tiesha  AMeter ID: [id number]       Blood Culture - Blood, Arm, Right [[id number]] Collected:  04/03/20 1532    Specimen:  Blood from Arm, Right Updated:  04/05/20 1600     Blood Culture No growth at 2 days    POC Glucose Once [[id number]]  (Abnormal) Collected:  04/05/20 1058    Specimen:  Blood Updated:  04/05/20 1111     Glucose 144 mg/dL      Comment: : [id number] Lakeisha Castrejon  LMeter ID: [id number]           Imaging Results (Last 24 Hours)     ** No results found for the last 24 hours. **            Assessment/Plan       POD#1 cholecystostomy tube placement.  Continue iv abx.  Ok to start therapeutic lovenox.  Continue ivf as dry.  Increase activity      Agustina Saleem MD  04/06/20  07:44

## 2020-04-06 NOTE — PLAN OF CARE
Problem: Patient Care Overview  Goal: Plan of Care Review  Outcome: Ongoing (interventions implemented as appropriate)  Flowsheets (Taken 4/6/2020 9063)  Outcome Summary: Pt has been restless this shift complaining of nausea at times. IV fluids, IV antibiotic and MS per PCA continued. Zofran given prn nausea and order received from Dr. Saleem to insert NG tube if pt begins vomiting.

## 2020-04-06 NOTE — NURSING NOTE
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 male, being seen by WOCN for wound on coccyx.     Patient presents with a wound to the left of the coccyx.  On admission there was concern of him having a DTI.  The picture below was taken on admission by nursing staff:       This is not a DTI.  The area blanches and has faded greatly since this picture was taken.  This is determined to be a bruise. He states he fell the day before admission when at home.  He has other bruises on his left side.      Recommendations:   Turn q2h    )Mena Woodward, АЛЕКСАНДР 2020

## 2020-04-06 NOTE — PROGRESS NOTES
"Pharmacy Dosing Service  Anticoagulant  Enoxaparin    Assessment/Action/Plan:  Pharmacy to dose enoxaparin for prosthetic AVR (full anticoagulation).  Based on indication and renal function, initiated enoxaparin 1 mg/kg (100 mg) subq Q12H.  Pharmacy will continue monitor and adjust dose accordingly.     Subjective:  Jesus Smith is a 84 y.o. male on Enoxaparin 100 mg SQ every 12 hours for indication of prosthetic AVR .  Objective:  [Ht: 180.3 cm (70.98\"); Wt: 100 kg (220 lb 7.4 oz); BMI: Body mass index is 30.76 kg/m².]  Estimated Creatinine Clearance: 50.7 mL/min (A) (by C-G formula based on SCr of 1.33 mg/dL (H)).   Lab Results   Component Value Date    INR 1.56 (H) 04/06/2020    INR 1.64 (H) 04/03/2020    INR 1.19 (H) 03/11/2016    PROTIME 18.6 (H) 04/06/2020    PROTIME 19.4 (H) 04/03/2020    PROTIME 15.4 (H) 03/11/2016      Lab Results   Component Value Date    HGB 12.3 (L) 04/06/2020    HGB 11.6 (L) 04/05/2020    HGB 11.1 (L) 04/04/2020      Lab Results   Component Value Date     04/06/2020     04/05/2020     04/04/2020       Felicia Wilkins, PharmD  04/06/20 08:03     "

## 2020-04-06 NOTE — THERAPY EVALUATION
Patient Name: Jesus Smith  : 1935    MRN: [id number]                              Today's Date: 2020       Admit Date: 4/3/2020    Visit Dx:     ICD-10-CM ICD-9-CM   1. Acute UTI (urinary tract infection) N39.0 599.0   2. Systemic inflammatory response syndrome (CMS/[facility]) R65.10 995.90   3. NOLAN (acute kidney injury) (CMS/[facility]) N17.9 584.9   4. Transaminitis R74.0 790.4   5. Acute cholecystitis K81.0 575.0   6. Impaired functional mobility and activity tolerance Z74.09 V49.89     Patient Active Problem List   Diagnosis   • Acute UTI (urinary tract infection)   • Bacteremia   • Abnormal LFTs   • Acute cholecystitis   • Benign essential HTN   • CKD (chronic kidney disease) stage 3, GFR 30-59 ml/min (CMS/[facility])   • History of prosthetic aortic valve     Past Medical History:   Diagnosis Date   • Bradycardia    • Coronary artery disease    • GERD (gastroesophageal reflux disease)    • Hypertension    • Injury of back    • TIA (transient ischemic attack)      Past Surgical History:   Procedure Laterality Date   • APPENDECTOMY     • BACK SURGERY      Fusion   • CARDIAC SURGERY      Open Heart   • EXPLORATORY LAPAROTOMY N/A 2020    Procedure: open cholecystostomy tube placement ;  Surgeon: Agustina Saleem MD;  Location: [facility];  Service: General;  Laterality: N/A;   • HERNIA REPAIR     • JOINT REPLACEMENT Left     s/p L hip replacement   • PACEMAKER IMPLANTATION     • STOMACH SURGERY       General Information     Row Name 20 1056          PT Evaluation Time/Intention    Document Type  evaluation s/p open cholecystomstomy tube placement, UTI, urinary inccontinence, abdominal pain, acute cholecystitis with cholelithiasis  -MS     Mode of Treatment  physical therapy;individual therapy  -MS     Row Name 20 1051          General Information    Patient Profile Reviewed?  yes  -MS     Prior Level of Function  independent:;all household mobility;community mobility walks with rollator  -MS      Existing Precautions/Restrictions  fall  -MS     Barriers to Rehab  physical barrier  -MS     Row Name 04/06/20 1056          Relationship/Environment    Lives With  alone  -MS     Row Name 04/06/20 1056          Resource/Environmental Concerns    Current Living Arrangements  home/apartment/condo ramp  -MS     Row Name 04/06/20 1056          Home Main Entrance    Number of Stairs, Main Entrance  none  -MS     Row Name 04/06/20 1056          Stairs Within Home, Primary    Number of Stairs, Within Home, Primary  none  -MS     Row Name 04/06/20 1056          Cognitive Assessment/Intervention- PT/OT    Orientation Status (Cognition)  oriented x 4  -MS     Cognitive Assessment/Intervention Comment  pt is slow to respond to directions or questions  -MS     Row Name 04/06/20 1056          Safety Issues, Functional Mobility    Safety Issues Affecting Function (Mobility)  ability to follow commands  -MS     Impairments Affecting Function (Mobility)  strength  -MS       User Key  (r) = Recorded By, (t) = Taken By, (c) = Cosigned By    Initials Name Provider Type    MS Olivia Giles, PT, DPT, NCS Physical Therapist        Mobility     Row Name 04/06/20 1148          Bed Mobility Assessment/Treatment    Bed Mobility Assessment/Treatment  supine-sit  -MS     Supine-Sit [place] (Bed Mobility)  conditional independence  -MS     Assistive Device (Bed Mobility)  bed rails;head of bed elevated  -MS     Comment (Bed Mobility)  HOB fully elevated  -MS     Row Name 04/06/20 1148          Transfer Assessment/Treatment    Comment (Transfers)  the patient was able to take steps from bed to chair  -MS     Row Name 04/06/20 1148          Bed-Chair Transfer    Bed-Chair [place] (Transfers)  contact guard;verbal cues;nonverbal cues (demo/gesture)  -MS     Assistive Device (Bed-Chair Transfers)  walker, front-wheeled  -MS     Row Name 04/06/20 1148          Sit-Stand Transfer    Sit-Stand [place] (Transfers)  minimum assist  (75% patient effort);verbal cues;nonverbal cues (demo/gesture)  -MS     Assistive Device (Sit-Stand Transfers)  walker, front-wheeled  -MS       User Key  (r) = Recorded By, (t) = Taken By, (c) = Cosigned By    Initials Name Provider Type    Olivia Feliciano ADRIAN, PT, DPT, NCS Physical Therapist        Obj/Interventions     Row Name 04/06/20 1149          General ROM    GENERAL ROM COMMENTS  all extremiites WFL  -MS     Row Name 04/06/20 1149          MMT (Manual Muscle Testing)    General MMT Comments  grossly 4-/5  -MS     Row Name 04/06/20 1149          Static Sitting Balance    Level of Walker (Unsupported Sitting, Static Balance)  independent  -MS     Sitting Position (Unsupported Sitting, Static Balance)  sitting on edge of bed  -MS     Row Name 04/06/20 1149          Static Standing Balance    Level of Walker (Supported Standing, Static Balance)  contact guard assist  -MS     Assistive Device Utilized (Supported Standing, Static Balance)  walker, rolling  -MS     Row Name 04/06/20 1149          Dynamic Standing Balance    Level of Walker, Reaches Outside Midline (Standing, Dynamic Balance)  contact guard assist  -MS     Assistive Device Utilized (Supported Standing, Dynamic Balance)  walker, rolling  -MS     Row Name 04/06/20 1149          Sensory Assessment/Intervention    Sensory General Assessment  no sensation deficits identified  -MS       User Key  (r) = Recorded By, (t) = Taken By, (c) = Cosigned By    Initials Name Provider Type    Olivia Feliciano ADRIAN, PT, DPT, NCS Physical Therapist        Goals/Plan     Row Name 04/06/20 1153          Bed Mobility Goal 1 (PT)    Activity/Assistive Device (Bed Mobility Goal 1, PT)  bed mobility activities, all  -MS     Walker Level/Cues Needed (Bed Mobility Goal 1, PT)  independent  -MS     Time Frame (Bed Mobility Goal 1, PT)  long term goal (LTG);by discharge  -MS     Progress/Outcomes (Bed Mobility Goal 1, PT)  goal ongoing  -MS     Row Name  04/06/20 1153          Transfer Goal 1 (PT)    Activity/Assistive Device (Transfer Goal 1, PT)  sit-to-stand/stand-to-sit;walker, rolling  -MS     [place] Level/Cues Needed (Transfer Goal 1, PT)  conditional independence  -MS     Time Frame (Transfer Goal 1, PT)  long term goal (LTG);by discharge  -MS     Progress/Outcome (Transfer Goal 1, PT)  goal ongoing  -MS     Row Name 04/06/20 1153          Gait Training Goal 1 (PT)    Activity/Assistive Device (Gait Training Goal 1, PT)  gait (walking locomotion);assistive device use;improve balance and speed;increase endurance/gait distance;walker, rolling  -MS     [place] Level (Gait Training Goal 1, PT)  supervision required  -MS     Distance (Gait Goal 1, PT)  100ft  -MS     Time Frame (Gait Training Goal 1, PT)  long term goal (LTG);by discharge  -MS     Progress/Outcome (Gait Training Goal 1, PT)  goal ongoing  -MS       User Key  (r) = Recorded By, (t) = Taken By, (c) = Cosigned By    Initials Name Provider Type    Olivia Feliciano R, PT, DPT, NCS Physical Therapist        Clinical Impression     Row Name 04/06/20 1108          Pain Assessment    Additional Documentation  Pain Scale: Numbers Pre/Post-Treatment (Group)  -MS     Row Name 04/06/20 1108          Pain Scale: Numbers Pre/Post-Treatment    Pain Scale: Numbers, Pretreatment  5/10  -MS     Pain Location - Side  Left  -MS     Pain Location - Orientation  incisional  -MS     Pain Location  abdomen  -MS     Pre/Post Treatment Pain Comment  reports no change  -MS     Pain Intervention(s)  Medication (See MAR);Repositioned;Ambulation/increased activity  -MS     Row Name 04/06/20 1108          Plan of Care Review    Plan of Care Reviewed With  patient  -MS     Progress  improving  -MS     Outcome Summary  PT evaluation completed. The patient presents alert and oriented x4, but is slow to process information and to follow directions. The patient began to dry heave once sitting EOB, nurse notified. The  patient was able to take small steps to the chair with a FWW with CGA. He is generally weak and will benefit from skilled PT services. He does live alone with little famiily to assist him. He may need SNF placement if he is not strong enough to care for himself by discharge.   -MS     Row Name 04/06/20 1108          Physical Therapy Clinical Impression    Patient/Family Goals Statement (PT Clinical Impression)  increase activity  -MS     Criteria for Skilled Interventions Met (PT Clinical Impression)  yes;treatment indicated  -MS     Rehab Potential (PT Clinical Summary)  good, to achieve stated therapy goals  -MS     Predicted Duration of Therapy (PT)  until discharge  -MS     Row Name 04/06/20 1108          Positioning and Restraints    Post Treatment Position  chair  -MS     In Chair  reclined;call light within reach;encouraged to call for assist;legs elevated  -MS       User Key  (r) = Recorded By, (t) = Taken By, (c) = Cosigned By    Initials Name Provider Type    Olivia Feliciano, PT, DPT, NCS Physical Therapist        Outcome Measures     Row Name 04/06/20 1155          How much help from another person do you currently need...    Turning from your back to your side while in flat bed without using bedrails?  2  -MS     Moving from lying on back to sitting on the side of a flat bed without bedrails?  2  -MS     Moving to and from a bed to a chair (including a wheelchair)?  3  -MS     Standing up from a chair using your arms (e.g., wheelchair, bedside chair)?  3  -MS     Climbing 3-5 steps with a railing?  1  -MS     To walk in hospital room?  1  -MS     AM-PAC 6 Clicks Score (PT)  12  -MS     Row Name 04/06/20 1155          Functional Assessment    Outcome Measure Options  AM-PAC 6 Clicks Basic Mobility (PT)  -MS       User Key  (r) = Recorded By, (t) = Taken By, (c) = Cosigned By    Initials Name Provider Type    Olivia Feliciano, PT, DPT, NCS Physical Therapist          PT Recommendation and  Plan  Planned Therapy Interventions (PT Eval): balance training, bed mobility training, gait training, patient/family education, strengthening, transfer training  Outcome Summary/Treatment Plan (PT)  Anticipated Discharge Disposition (PT): skilled nursing facility  Plan of Care Reviewed With: patient  Progress: improving  Outcome Summary: PT evaluation completed. The patient presents alert and oriented x4, but is slow to process information and to follow directions. The patient began to dry heave once sitting EOB, nurse notified. The patient was able to take small steps to the chair with a FWW with CGA. He is generally weak and will benefit from skilled PT services. He does live alone with little famiily to assist him. He may need SNF placement if he is not strong enough to care for himself by discharge.      Time Calculation:   PT Charges     Row Name 04/06/20 1155             Time Calculation    Start Time  1050  -MS      Stop Time  1150  -MS      Time Calculation (min)  60 min  -MS      PT Received On  04/06/20  -MS      PT Goal Re-Cert Due Date  04/16/20  -MS        User Key  (r) = Recorded By, (t) = Taken By, (c) = Cosigned By    Initials Name Provider Type    MS Olivia Giles, PT, DPT, NCS Physical Therapist        Therapy Charges for Today     Code Description Service Date Service Provider Modifiers Qty    [id number] HC PT EVAL MOD COMPLEXITY 4 4/6/2020 Olivia Giles PT, DPT, NCS GP 1          PT G-Codes  Outcome Measure Options: AM-PAC 6 Clicks Basic Mobility (PT)  AM-PAC 6 Clicks Score (PT): 12    Olivia Giles PT, DPT, KYLAH  4/6/2020

## 2020-04-07 LAB
ALBUMIN SERPL-MCNC: 3 G/DL (ref 3.5–5.2)
ALBUMIN/GLOB SERPL: 0.9 G/DL
ALP SERPL-CCNC: 105 U/L (ref 39–117)
ALT SERPL W P-5'-P-CCNC: 70 U/L (ref 1–41)
ANION GAP SERPL CALCULATED.3IONS-SCNC: 11 MMOL/L (ref 5–15)
AST SERPL-CCNC: 36 U/L (ref 1–40)
BACTERIA SPEC AEROBE CULT: ABNORMAL
BASOPHILS # BLD AUTO: 0.08 10*3/MM3 (ref 0–0.2)
BASOPHILS NFR BLD AUTO: 0.6 % (ref 0–1.5)
BILIRUB SERPL-MCNC: 1.4 MG/DL (ref 0.2–1.2)
BUN BLD-MCNC: 38 MG/DL (ref 8–23)
BUN/CREAT SERPL: 35.2 (ref 7–25)
CALCIUM SPEC-SCNC: 8 MG/DL (ref 8.6–10.5)
CHLORIDE SERPL-SCNC: 98 MMOL/L (ref 98–107)
CO2 SERPL-SCNC: 26 MMOL/L (ref 22–29)
CREAT BLD-MCNC: 1.08 MG/DL (ref 0.76–1.27)
DEPRECATED RDW RBC AUTO: 48.8 FL (ref 37–54)
EOSINOPHIL # BLD AUTO: 0.16 10*3/MM3 (ref 0–0.4)
EOSINOPHIL NFR BLD AUTO: 1.2 % (ref 0.3–6.2)
ERYTHROCYTE [DISTWIDTH] IN BLOOD BY AUTOMATED COUNT: 14.2 % (ref 12.3–15.4)
GFR SERPL CREATININE-BSD FRML MDRD: 65 ML/MIN/1.73
GLOBULIN UR ELPH-MCNC: 3.3 GM/DL
GLUCOSE BLD-MCNC: 123 MG/DL (ref 65–99)
GLUCOSE BLDC GLUCOMTR-MCNC: 110 MG/DL (ref 70–130)
GLUCOSE BLDC GLUCOMTR-MCNC: 124 MG/DL (ref 70–130)
GLUCOSE BLDC GLUCOMTR-MCNC: 136 MG/DL (ref 70–130)
GLUCOSE BLDC GLUCOMTR-MCNC: 136 MG/DL (ref 70–130)
GRAM STN SPEC: ABNORMAL
GRAM STN SPEC: ABNORMAL
HCT VFR BLD AUTO: 35.7 % (ref 37.5–51)
HGB BLD-MCNC: 11.4 G/DL (ref 13–17.7)
IMM GRANULOCYTES # BLD AUTO: 0.54 10*3/MM3 (ref 0–0.05)
IMM GRANULOCYTES NFR BLD AUTO: 4.1 % (ref 0–0.5)
INR PPP: 1.89 (ref 0.91–1.09)
ISOLATED FROM: ABNORMAL
LYMPHOCYTES # BLD AUTO: 1.31 10*3/MM3 (ref 0.7–3.1)
LYMPHOCYTES NFR BLD AUTO: 9.8 % (ref 19.6–45.3)
MCH RBC QN AUTO: 29.8 PG (ref 26.6–33)
MCHC RBC AUTO-ENTMCNC: 31.9 G/DL (ref 31.5–35.7)
MCV RBC AUTO: 93.2 FL (ref 79–97)
MONOCYTES # BLD AUTO: 1.69 10*3/MM3 (ref 0.1–0.9)
MONOCYTES NFR BLD AUTO: 12.7 % (ref 5–12)
NEUTROPHILS # BLD AUTO: 9.52 10*3/MM3 (ref 1.7–7)
NEUTROPHILS NFR BLD AUTO: 71.6 % (ref 42.7–76)
NRBC BLD AUTO-RTO: 0.2 /100 WBC (ref 0–0.2)
PLATELET # BLD AUTO: 161 10*3/MM3 (ref 140–450)
PMV BLD AUTO: 10.4 FL (ref 6–12)
POTASSIUM BLD-SCNC: 4.3 MMOL/L (ref 3.5–5.2)
PROT SERPL-MCNC: 6.3 G/DL (ref 6–8.5)
PROTHROMBIN TIME: 21.7 SECONDS (ref 11.9–14.6)
RBC # BLD AUTO: 3.83 10*6/MM3 (ref 4.14–5.8)
SODIUM BLD-SCNC: 135 MMOL/L (ref 136–145)
WBC NRBC COR # BLD: 13.3 10*3/MM3 (ref 3.4–10.8)

## 2020-04-07 PROCEDURE — 80053 COMPREHEN METABOLIC PANEL: CPT | Performed by: SPECIALIST

## 2020-04-07 PROCEDURE — 97530 THERAPEUTIC ACTIVITIES: CPT

## 2020-04-07 PROCEDURE — 25010000002 ENOXAPARIN PER 10 MG: Performed by: INTERNAL MEDICINE

## 2020-04-07 PROCEDURE — 85025 COMPLETE CBC W/AUTO DIFF WBC: CPT | Performed by: SPECIALIST

## 2020-04-07 PROCEDURE — 25010000002 PIPERACILLIN SOD-TAZOBACTAM PER 1 G: Performed by: SPECIALIST

## 2020-04-07 PROCEDURE — P9047 ALBUMIN (HUMAN), 25%, 50ML: HCPCS | Performed by: SPECIALIST

## 2020-04-07 PROCEDURE — 25010000002 MORPHINE (PF) 10 MG/ML SOLUTION: Performed by: SPECIALIST

## 2020-04-07 PROCEDURE — 25010000002 ALBUMIN HUMAN 25% PER 50 ML: Performed by: SPECIALIST

## 2020-04-07 PROCEDURE — 82962 GLUCOSE BLOOD TEST: CPT

## 2020-04-07 PROCEDURE — 63710000001 ONDANSETRON PER 8 MG: Performed by: SPECIALIST

## 2020-04-07 PROCEDURE — 25010000002 FUROSEMIDE PER 20 MG: Performed by: SPECIALIST

## 2020-04-07 PROCEDURE — 85610 PROTHROMBIN TIME: CPT | Performed by: SPECIALIST

## 2020-04-07 PROCEDURE — 25010000002 CEFTRIAXONE PER 250 MG: Performed by: INTERNAL MEDICINE

## 2020-04-07 RX ORDER — SUCRALFATE ORAL 1 G/10ML
1 SUSPENSION ORAL EVERY 6 HOURS SCHEDULED
Status: DISCONTINUED | OUTPATIENT
Start: 2020-04-07 | End: 2020-04-13 | Stop reason: HOSPADM

## 2020-04-07 RX ORDER — SIMETHICONE 80 MG
80 TABLET,CHEWABLE ORAL 4 TIMES DAILY PRN
Status: DISCONTINUED | OUTPATIENT
Start: 2020-04-07 | End: 2020-04-10

## 2020-04-07 RX ORDER — FUROSEMIDE 10 MG/ML
20 INJECTION INTRAMUSCULAR; INTRAVENOUS ONCE
Status: COMPLETED | OUTPATIENT
Start: 2020-04-07 | End: 2020-04-07

## 2020-04-07 RX ORDER — ALBUMIN (HUMAN) 12.5 G/50ML
25 SOLUTION INTRAVENOUS ONCE
Status: COMPLETED | OUTPATIENT
Start: 2020-04-07 | End: 2020-04-07

## 2020-04-07 RX ORDER — FAMOTIDINE 20 MG/1
20 TABLET, FILM COATED ORAL 2 TIMES DAILY
Status: DISCONTINUED | OUTPATIENT
Start: 2020-04-07 | End: 2020-04-13 | Stop reason: HOSPADM

## 2020-04-07 RX ORDER — ONDANSETRON HCL IN 0.9 % NACL 8 MG/50 ML
8 INTRAVENOUS SOLUTION, PIGGYBACK (ML) INTRAVENOUS EVERY 6 HOURS PRN
Status: DISCONTINUED | OUTPATIENT
Start: 2020-04-07 | End: 2020-04-13 | Stop reason: HOSPADM

## 2020-04-07 RX ORDER — ONDANSETRON 8 MG/1
8 TABLET, ORALLY DISINTEGRATING ORAL EVERY 6 HOURS PRN
Status: DISCONTINUED | OUTPATIENT
Start: 2020-04-07 | End: 2020-04-10

## 2020-04-07 RX ADMIN — SUCRALFATE 1 G: 1 SUSPENSION ORAL at 13:04

## 2020-04-07 RX ADMIN — ENOXAPARIN SODIUM 100 MG: 100 INJECTION SUBCUTANEOUS at 09:42

## 2020-04-07 RX ADMIN — BISOPROLOL FUMARATE 10 MG: 5 TABLET ORAL at 09:43

## 2020-04-07 RX ADMIN — MORPHINE SULFATE: 10 INJECTION, SOLUTION INTRAMUSCULAR; INTRAVENOUS at 12:53

## 2020-04-07 RX ADMIN — FUROSEMIDE 20 MG: 10 INJECTION, SOLUTION INTRAVENOUS at 18:20

## 2020-04-07 RX ADMIN — TAZOBACTAM SODIUM AND PIPERACILLIN SODIUM 3.38 G: 375; 3 INJECTION, SOLUTION INTRAVENOUS at 06:00

## 2020-04-07 RX ADMIN — SODIUM CHLORIDE 125 ML/HR: 9 INJECTION, SOLUTION INTRAVENOUS at 03:16

## 2020-04-07 RX ADMIN — FUROSEMIDE 20 MG: 10 INJECTION, SOLUTION INTRAVENOUS at 12:49

## 2020-04-07 RX ADMIN — TAZOBACTAM SODIUM AND PIPERACILLIN SODIUM 3.38 G: 375; 3 INJECTION, SOLUTION INTRAVENOUS at 00:21

## 2020-04-07 RX ADMIN — FAMOTIDINE 20 MG: 20 TABLET, FILM COATED ORAL at 09:42

## 2020-04-07 RX ADMIN — ALBUMIN HUMAN 25 G: 0.25 SOLUTION INTRAVENOUS at 12:15

## 2020-04-07 RX ADMIN — CEFTRIAXONE SODIUM 1 G: 1 INJECTION, POWDER, FOR SOLUTION INTRAMUSCULAR; INTRAVENOUS at 12:49

## 2020-04-07 RX ADMIN — SUCRALFATE 1 G: 1 SUSPENSION ORAL at 23:40

## 2020-04-07 RX ADMIN — FAMOTIDINE 20 MG: 20 TABLET, FILM COATED ORAL at 20:29

## 2020-04-07 RX ADMIN — SUCRALFATE 1 G: 1 SUSPENSION ORAL at 18:20

## 2020-04-07 RX ADMIN — ASPIRIN 81 MG: 81 TABLET ORAL at 09:43

## 2020-04-07 RX ADMIN — ONDANSETRON 8 MG: 8 TABLET, ORALLY DISINTEGRATING ORAL at 16:47

## 2020-04-07 RX ADMIN — ENOXAPARIN SODIUM 100 MG: 100 INJECTION SUBCUTANEOUS at 20:28

## 2020-04-07 NOTE — THERAPY TREATMENT NOTE
Patient Name: Jesus Smith  : 1935    MRN: [id number]                              Today's Date: 2020       Admit Date: 4/3/2020    Visit Dx:     ICD-10-CM ICD-9-CM   1. Acute UTI (urinary tract infection) N39.0 599.0   2. Systemic inflammatory response syndrome (CMS/Formerly [facility]) R65.10 995.90   3. NOLAN (acute kidney injury) (CMS/Formerly [facility]) N17.9 584.9   4. Transaminitis R74.0 790.4   5. Acute cholecystitis K81.0 575.0   6. Impaired functional mobility and activity tolerance Z74.09 V49.89     Patient Active Problem List   Diagnosis   • Acute UTI (urinary tract infection)   • Bacteremia   • Abnormal LFTs   • Acute cholecystitis   • Benign essential HTN   • CKD (chronic kidney disease) stage 3, GFR 30-59 ml/min (CMS/Formerly [facility])   • History of prosthetic aortic valve     Past Medical History:   Diagnosis Date   • Bradycardia    • Coronary artery disease    • GERD (gastroesophageal reflux disease)    • Hypertension    • Injury of back    • TIA (transient ischemic attack)      Past Surgical History:   Procedure Laterality Date   • APPENDECTOMY     • BACK SURGERY      Fusion   • CARDIAC SURGERY      Open Heart   • EXPLORATORY LAPAROTOMY N/A 2020    Procedure: open cholecystostomy tube placement ;  Surgeon: Agustina Saleem MD;  Location: [facility];  Service: General;  Laterality: N/A;   • HERNIA REPAIR     • JOINT REPLACEMENT Left     s/p L hip replacement   • PACEMAKER IMPLANTATION     • STOMACH SURGERY       General Information     Row Name 20 1310          PT Evaluation Time/Intention    Document Type  therapy note (daily note)  -NEO     Mode of Treatment  physical therapy  -NEO     Row Name 20 1310          General Information    Patient Profile Reviewed?  yes  -NEO     Existing Precautions/Restrictions  fall NAM drain x 2.  -NEO     Barriers to Rehab  medically complex  -NEO     Row Name 20 1310          Safety Issues, Functional Mobility    Impairments Affecting Function (Mobility)   balance;strength;shortness of breath;pain  -NEO       User Key  (r) = Recorded By, (t) = Taken By, (c) = Cosigned By    Initials Name Provider Type    Prateek Martinez PT DPT Physical Therapist        Mobility     Row Name 04/07/20 1310          Bed Mobility Assessment/Treatment    Bed Mobility Assessment/Treatment  supine-sit;sit-supine  -NEO     Supine-Sit [place] (Bed Mobility)  minimum assist (75% patient effort)  -NEO     Sit-Supine [place] (Bed Mobility)  moderate assist (50% patient effort)  -NEO     Assistive Device (Bed Mobility)  head of bed elevated;bed rails  -NEO     Row Name 04/07/20 1310          Transfer Assessment/Treatment    Comment (Transfers)  (S) Able to take a few side steps toward the head of the bed. Unsteady with steps and at risk for falls.   -NEO     Row Name 04/07/20 1310          Sit-Stand Transfer    Sit-Stand [place] (Transfers)  minimum assist (75% patient effort)  -NEO       User Key  (r) = Recorded By, (t) = Taken By, (c) = Cosigned By    Initials Name Provider Type    Prateek Martinez PT DPT Physical Therapist        Obj/Interventions     Row Name 04/07/20 1310          Static Sitting Balance    Level of [place] (Unsupported Sitting, Static Balance)  supervision  -NEO     Sitting Position (Unsupported Sitting, Static Balance)  sitting on edge of bed  -NEO     Row Name 04/07/20 1310          Dynamic Sitting Balance    Level of [place], Reaches Outside Midline (Sitting, Dynamic Balance)  supervision  -NEO     Sitting Position, Reaches Outside Midline (Sitting, Dynamic Balance)  sitting on edge of bed  -NEO     Row Name 04/07/20 1310          Static Standing Balance    Level of [place] (Supported Standing, Static Balance)  minimal assist, 75% patient effort  -NEO     Row Name 04/07/20 1310          Dynamic Standing Balance    Level of [place], Reaches Outside Midline (Standing, Dynamic Balance)  minimal assist, 75% patient effort  -NEO       User Key   (r) = Recorded By, (t) = Taken By, (c) = Cosigned By    Initials Name Provider Type    Prateek Martinez PT DPT Physical Therapist        Goals/Plan    No documentation.       Clinical Impression     Row Name 04/07/20 1310          Pain Assessment    Additional Documentation  Pain Scale: Numbers Pre/Post-Treatment (Group)  -NEO     Row Name 04/07/20 1310          Pain Scale: Numbers Pre/Post-Treatment    Pain Scale: Numbers, Pretreatment  7/10  -NEO     Pain Location - Orientation  incisional  -NEO     Pain Location  abdomen  -NEO     Pre/Post Treatment Pain Comment  c/o fatigue, pain, decreased balance.   -NEO     Pain Intervention(s)  Repositioned;Ambulation/increased activity  -NEO     Row Name 04/07/20 1310          Plan of Care Review    Plan of Care Reviewed With  patient  -NEO     Row Name 04/07/20 1310          Positioning and Restraints    Pre-Treatment Position  in bed  -NEO     Post Treatment Position  bed  -NEO     In Bed  fowlers;call light within reach;encouraged to call for assist pt refused SCD placement.   -NEO       User Key  (r) = Recorded By, (t) = Taken By, (c) = Cosigned By    Initials Name Provider Type    Prateek Martinez PT DPT Physical Therapist        Outcome Measures     [facility] Name 04/07/20 1310          How much help from another person do you currently need...    Turning from your back to your side while in flat bed without using bedrails?  3  -NEO     Moving from lying on back to sitting on the side of a flat bed without bedrails?  3  -NEO     Moving to and from a bed to a chair (including a wheelchair)?  3  -NEO     Standing up from a chair using your arms (e.g., wheelchair, bedside chair)?  3  -NEO     Climbing 3-5 steps with a railing?  1  -NEO     To walk in hospital room?  2  -NEO     AM-PAC 6 Clicks Score (PT)  15  -NEO     [facility] Name 04/07/20 1310          Functional Assessment    Outcome Measure Options  AM-PAC 6 Clicks Basic Mobility (PT)  -NEO       User Key  (r) = Recorded By, (t) =  Taken By, (c) = Cosigned By    Initials Name Provider Type    Prateek Martinez PT DPT Physical Therapist          PT Recommendation and Plan     Plan of Care Reviewed With: patient  Outcome Summary: PT treatment completed. He needed min assist to get to the EOB and min assist to stand. He was then able to take a few side steps toward the head of the bed. He was unsteady taking this steps with 2 LOB needing min assist to maintain dynamic balance. He is at risk for falls. PT will continue to progress gait, strength and balance. I anticipate he will need placement for therapy.     Time Calculation:   PT Charges     Row Name 04/07/20 1405             Time Calculation    Start Time  1310  -NEO      Stop Time  1355  -NEO      Time Calculation (min)  45 min  -NEO      PT Received On  04/07/20  -NEO      PT Goal Re-Cert Due Date  04/16/20  -NEO         Time Calculation- PT    Total Timed Code Minutes- PT  45 minute(s)  -NEO         Timed Charges    86373 - PT Therapeutic Activity Minutes  45  -NEO        User Key  (r) = Recorded By, (t) = Taken By, (c) = Cosigned By    Initials Name Provider Type    Prateek Martinez PT DPT Physical Therapist        Therapy Charges for Today     Code Description Service Date Service Provider Modifiers Qty    [id number]  PT THERAPEUTIC ACT EA 15 MIN 4/7/2020 Prateek Juan PT DPHEIDI GP 3          PT G-Codes  Outcome Measure Options: AM-PAC 6 Clicks Basic Mobility (PT)  AM-PAC 6 Clicks Score (PT): 15    MERCEDES MachadoT  4/7/2020

## 2020-04-07 NOTE — PLAN OF CARE
Problem: Patient Care Overview  Goal: Plan of Care Review  Flowsheets (Taken 4/7/2020 1310)  Outcome Summary: PT treatment completed. He needed min assist to get to the EOB and min assist to stand. He was then able to take a few side steps toward the head of the bed. He was unsteady taking this steps with 2 LOB needing min assist to maintain dynamic balance. He is at risk for falls. PT will continue to progress gait, strength and balance. I anticipate he will need placement for therapy.

## 2020-04-07 NOTE — PLAN OF CARE
Problem: Patient Care Overview  Goal: Plan of Care Review  Outcome: Ongoing (interventions implemented as appropriate)  Note:   Pt has been on Clear liquids x3 days. Once PO diet is advanced, will resume supplement order- he prefers vanilla Boost. PO intake documented as 50% avg of 2 meals. Will continue to follow.

## 2020-04-07 NOTE — DISCHARGE PLACEMENT REQUEST
"Jesus Smith (84 y.o. Male)     Date of Birth Social Security Number Address Home Phone MRN    1935 [id number] [address] [phone] [id number]    [employer] Marital Status          Religious        Admission Date Admission Type Admitting Provider Attending Provider Department, Room/Bed    4/3/20 Emergency Luis Knox, Luis Madera,  Psychiatric 3A, 331/1    Discharge Date Discharge Disposition Discharge Destination                       Attending Provider:  Luis Knox DO    Allergies:  No Known Allergies    Isolation:  None   Infection:  None   Code Status:  CPR    Ht:  180.3 cm (70.98\")   Wt:  100 kg (220 lb 7.4 oz)    Admission Cmt:  None   Principal Problem:  Bacteremia [R78.81]                 Active Insurance as of 4/3/2020     Primary Coverage     Payor Plan Insurance Group Employer/Plan Group    [facility] DE[id number]      Payor Plan Address Payor Plan Phone Number Payor Plan Fax Number Effective Dates    [employer] 04 [phone]  4/2/2020 - None Entered    [address] [facility] [address]       Subscriber Name Subscriber Birth Date Member ID       JESUS SMITH 1935 [id number]                 Emergency Contacts      (Rel.) Home Phone Work Phone Mobile Phone    HEAVEN RAYMOND (Relative) -- -- [phone]    IGNACIO ANGEL (Relative) -- -- [phone]               History & Physical      Bharath Maloney MD at 04/03/20 1720              [facility]  HISTORY AND PHYSICAL    Date of Admission: 4/3/2020  Primary Care Physician: Matheus Olivera PA    Subjective     Chief Complaint: dry heave,     History of Present Illness  I am asked to admit the patient for acute urinary tract infection, systemic inflammatory response syndrome, acute kidney injury and transaminitis.    Patient claims to have some urinary frequency as well as " change in his other.  Patient has been having chills, nausea and vomiting.  He denies any coughing spell.     On evaluation he has leukocytosis with bandemia; creatinine is elevated at 1.57.  He has moderate amount of blood in his urine although been found only with 3-5 per high-power field of RBC.  His procalcitonin is elevated.  He has positive nitrite and trace leukocyte esterase as well as trace bacteriuria. chest x-ray showed no consolidation  Blood gas showed normal pH at 7.46, PCO2 35 and PO2 of 81 taken on room air  Respiratory panel PCR is negative  proBNP is elevated at 4334.  He has a pacemaker in situ and noted to have prior valvuloplasty.  Transaminases are slightly elevated at AST and ALT are 61 and 93 respectively.  We do not have any echocardiogram of record but review of record dating back in [facility], he has been found with aortic bioprosthetic valve and normal EF of 55 to 60%.  He had grade 2 diastolic dysfunction.    His main complaint is dry heave culminating to vomiting since Monday.  He complains of abdominal discomfort as he points to his right upper quadrant that goes down to his hypogastric area.  He said that he has no control over his bladder habits and has decreased urinary stream but denies any dysuria.    He also mentioned to have paroxysmal nocturnal dyspnea.  He has been swollen for the past 3 years.  He claimed to have increasing weight.  Positive shortness of breath but denies any fever or chills.  He denies any exposure to someone who is sick.  He denies coughing spells    He claimed to have fallen yesterday and must have hit his upper abdomen.  He could not tell me further details pertaining to this    Review of Systems     Otherwise complete ROS reviewed and negative except as mentioned in the HPI.    Past Medical History:   I tried to get information from him about his past medical history however he is not able to share this information.  I was not quite sure what  "is limiting him but looking at his medications as outlined in the list it indicates that he has hyperlipidemia, hypertension.  I will have to verify why he is on Coumadin.  Past Surgical History:  Past Surgical History:   Procedure Laterality Date   • APPENDECTOMY     • BACK SURGERY      Fusion   • CARDIAC SURGERY      Open Heart   • HERNIA REPAIR     • JOINT REPLACEMENT Left     s/p L hip replacement   • PACEMAKER IMPLANTATION     • STOMACH SURGERY       Social History:  reports that he has never smoked. He has never used smokeless tobacco. He reports that he does not drink alcohol or use drugs. He states that he had quit smoking nor had he drank any alcohol since the 60s.    Family History: He has not endorsed any medical problems that runs in the family.    Allergies:  No Known Allergies  Medications:  Record indicates that his home medications include aspirin 81 mg daily, Zebeta 10 mg daily  Cholecalciferol 5000 units daily  Cozaar 100 mg daily  Crestor 20 mg daily  Coumadin 0.5 mg daily    Objective     Vital Signs: /69   Pulse 52   Temp 98.9 °F (37.2 °C) (Oral)   Resp 25   Ht 180.3 cm (71\")   Wt 99.8 kg (220 lb)   SpO2 99%   BMI 30.68 kg/m²    Physical Exam  He is not in any distress.  His seated comfortably on the [facility]  Alert and oriented x3  Grossly nonfocal neurologic exam  Normocephalic  Anicteric sclera  Supple neck  No thyromegaly  Diminished breath sounds, positive few fine crackles noted at bases  Noticeable protuberant abdomen, tender to touch, voluntary guarding  No CVA tenderness  Noticeable swelling of bilateral lower extremities  Warm dry skin      Results Reviewed:  Lab Results (last 24 hours)     Procedure Component Value Units Date/Time    S. Pneumo Ag Urine or CSF - Urine, Urine, Clean Catch [[id number]]  (Normal) Collected:  04/03/20 1524    Specimen:  Urine, Clean Catch Updated:  04/03/20 1558     Strep Pneumo Ag Negative    Urinalysis, Microscopic Only - Urine, Clean Catch " [[id number]]  (Abnormal) Collected:  04/03/20 1524    Specimen:  Urine, Clean Catch Updated:  04/03/20 1556     RBC, UA 3-5 /HPF      WBC, UA 0-2 /HPF      Bacteria, UA Trace /HPF      Squamous Epithelial Cells, UA None Seen /HPF      Hyaline Casts, UA None Seen /LPF      Methodology Manual Light Microscopy    Respiratory Panel, PCR - Swab, Nasopharynx [[id number]]  (Normal) Collected:  04/03/20 1441    Specimen:  Swab from Nasopharynx Updated:  04/03/20 1556     ADENOVIRUS, PCR Not Detected     Coronavirus 229E Not Detected     Coronavirus HKU1 Not Detected     Coronavirus NL63 Not Detected     Coronavirus OC43 Not Detected     Human Metapneumovirus Not Detected     Human Rhinovirus/Enterovirus Not Detected     Influenza B PCR Not Detected     Parainfluenza Virus 1 Not Detected     Parainfluenza Virus 2 Not Detected     Parainfluenza Virus 3 Not Detected     Parainfluenza Virus 4 Not Detected     Bordetella pertussis pcr Not Detected     Influenza A H1 2009 PCR Not Detected     Chlamydophila pneumoniae PCR Not Detected     Mycoplasma pneumo by PCR Not Detected     Influenza A PCR Not Detected     Influenza A H3 Not Detected     Influenza A H1 Not Detected     RSV, PCR Not Detected     Bordetella parapertussis PCR Not Detected    Narrative:       The coronavirus on the RVP is NOT COVID-19 and is NOT indicative of infection with COVID-19.     Blood Culture - Blood, Arm, Right [[id number]] Collected:  04/03/20 1532    Specimen:  Blood from Arm, Right Updated:  04/03/20 1550    [facility] Draw [[id number]] Collected:  04/03/20 1439    Specimen:  Blood Updated:  04/03/20 1545    Narrative:       The following orders were created for panel order [facility] Draw.  Procedure                               Abnormality         Status                     ---------                               -----------         ------                     Light Blue Top[[id number]]                                   Final result               Green Top  (Gel)[[id number]]                                  Final result               Lavender Top[[id number]]                                     Final result               Red Top[[id number]]                                          Final result               [place] Blood Culture Pri[place]...[[id number]]                      Final result               Gray Top - Ice[[id number]]                                   Final result                 Please view results for these tests on the individual orders.    Light Blue Top [[id number]] Collected:  04/03/20 1439    Specimen:  Blood Updated:  04/03/20 1545     Extra Tube hold for add-on     Comment: Auto resulted       Green Top (Gel) [[id number]] Collected:  04/03/20 1439    Specimen:  Blood Updated:  04/03/20 1545     Extra Tube Hold for add-ons.     Comment: Auto resulted.       Lavender Top [[id number]] Collected:  04/03/20 1439    Specimen:  Blood Updated:  04/03/20 1545     Extra Tube hold for add-on     Comment: Auto resulted       Red Top [[id number]] Collected:  04/03/20 1439    Specimen:  Blood Updated:  04/03/20 1545     Extra Tube Hold for add-ons.     Comment: Auto resulted.       [place] Blood Culture Bottle Set [[id number]] Collected:  04/03/20 1439    Specimen:  Blood from Arm, Right Updated:  04/03/20 1545     Extra Tube Hold for add-ons.     Comment: Auto resulted.       Gray Top - Ice [[id number]] Collected:  04/03/20 1439    Specimen:  Blood Updated:  04/03/20 1545     Extra Tube Hold for add-ons.     Comment: Auto resulted.       Urinalysis With Culture If Indicated - Urine, Clean Catch [[id number]]  (Abnormal) Collected:  04/03/20 1524    Specimen:  Urine, Clean Catch Updated:  04/03/20 1538     Color, UA Dark Yellow     Appearance, UA Cloudy     pH, UA <=5.0     Specific Gravity, UA 1.024     Glucose, UA Negative     Ketones, UA Trace     Bilirubin, UA Moderate (2+)     Blood, UA Moderate (2+)     Protein,  mg/dL (2+)     Leuk Esterase, UA Trace     Nitrite, UA  "Positive     Urobilinogen, UA 1.0 E.U./dL    CBC Auto Differential [[id number]]  (Abnormal) Collected:  04/03/20 1439    Specimen:  Blood Updated:  04/03/20 1518     WBC 17.03 10*3/mm3      RBC 3.83 10*6/mm3      Hemoglobin 11.5 g/dL      Hematocrit 34.3 %      MCV 89.6 fL      MCH 30.0 pg      MCHC 33.5 g/dL      RDW 13.8 %      RDW-SD 45.0 fl      MPV 10.0 fL      Platelets 167 10*3/mm3     Narrative:       ckd    Procalcitonin [[id number]]  (Abnormal) Collected:  04/03/20 1439    Specimen:  Blood Updated:  04/03/20 1518     Procalcitonin 5.87 ng/mL     Narrative:       As a Marker for Sepsis (Non-Neonates):   1. <0.5 ng/mL represents a low risk of severe sepsis and/or septic shock.  1. >2 ng/mL represents a high risk of severe sepsis and/or septic shock.    As a Marker for Lower Respiratory Tract Infections that require antibiotic therapy:  PCT on Admission     Antibiotic Therapy             6-12 Hrs later  > 0.5                Strongly Recommended            >0.25 - <0.5         Recommended  0.1 - 0.25           Discouraged                   Remeasure/reassess PCT  <0.1                 Strongly Discouraged          Remeasure/reassess PCT      As 28 day mortality risk marker: \"Change in Procalcitonin Result\" (> 80 % or <=80 %) if Day 0 (or Day 1) and Day 4 values are available. Refer to http://[email or web address]-pct-calculator.com/   Change in PCT <=80 %   A decrease of PCT levels below or equal to 80 % defines a positive change in PCT test result representing a higher risk for 28-day all-cause mortality of patients diagnosed with severe sepsis or septic shock.  Change in PCT > 80 %   A decrease of PCT levels of more than 80 % defines a negative change in PCT result representing a lower risk for 28-day all-cause mortality of patients diagnosed with severe sepsis or septic shock.                Results may be falsely decreased if patient taking Biotin.     Manual Differential [[id number]]  (Abnormal) Collected:  04/03/20 " 1439    Specimen:  Blood Updated:  04/03/20 1518     Neutrophil % 69.0 %      Lymphocyte % 5.0 %      Monocyte % 8.0 %      Bands %  18.0 %      Neutrophils Absolute 14.82 10*3/mm3      Lymphocytes Absolute 0.85 10*3/mm3      Monocytes Absolute 1.36 10*3/mm3      Poikilocytes Slight/1+     Polychromasia Slight/1+     WBC Morphology Normal     Platelet Morphology Normal    Lactic Acid, Plasma [[id number]]  (Abnormal) Collected:  04/03/20 1439    Specimen:  Blood Updated:  04/03/20 1514     Lactate 3.3 mmol/L     Lactic Acid, Reflex Timer (This will reflex a repeat order 3-3:15 hours after ordered.) [[id number]] Collected:  04/03/20 1439    Specimen:  Blood Updated:  04/03/20 1514    Comprehensive Metabolic Panel [[id number]]  (Abnormal) Collected:  04/03/20 1439    Specimen:  Blood Updated:  04/03/20 1513     Glucose 206 mg/dL      BUN 50 mg/dL      Creatinine 1.57 mg/dL      Sodium 131 mmol/L      Potassium 4.3 mmol/L      Chloride 94 mmol/L      CO2 21.0 mmol/L      Calcium 8.5 mg/dL      Total Protein 6.7 g/dL      Albumin 3.20 g/dL      ALT (SGPT) 61 U/L      AST (SGOT) 93 U/L      Alkaline Phosphatase 155 U/L      Total Bilirubin 3.8 mg/dL      eGFR Non African Amer 42 mL/min/1.73      Globulin 3.5 gm/dL      A/G Ratio 0.9 g/dL      BUN/Creatinine Ratio 31.8     Anion Gap 16.0 mmol/L     Narrative:       GFR Normal >60  Chronic Kidney Disease <60  Kidney Failure <15      Lactate Dehydrogenase [[id number]]  (Normal) Collected:  04/03/20 1439    Specimen:  Blood Updated:  04/03/20 1513      U/L     Troponin [[id number]]  (Normal) Collected:  04/03/20 1439    Specimen:  Blood Updated:  04/03/20 1512     Troponin T 0.026 ng/mL     Narrative:       Troponin T Reference Range:  <= 0.03 ng/mL-   Negative for AMI  >0.03 ng/mL-     Abnormal for myocardial necrosis.  Clinicians would have to utilize clinical acumen, EKG, Troponin and serial changes to determine if it is an Acute Myocardial Infarction or myocardial  injury due to an underlying chronic condition.       Results may be falsely decreased if patient taking Biotin.      BNP [[id number]]  (Abnormal) Collected:  04/03/20 1439    Specimen:  Blood Updated:  04/03/20 1511     proBNP 4,334.0 pg/mL     Narrative:       Among patients with dyspnea, NT-proBNP is highly sensitive for the detection of acute congestive heart failure. In addition NT-proBNP of <300 pg/ml effectively rules out acute congestive heart failure with 99% negative predictive value.    Results may be falsely decreased if patient taking Biotin.      aPTT [[id number]]  (Abnormal) Collected:  04/03/20 1439    Specimen:  Blood Updated:  04/03/20 1504     PTT 38.2 seconds     Protime-INR [[id number]]  (Abnormal) Collected:  04/03/20 1439    Specimen:  Blood Updated:  04/03/20 1504     Protime 19.4 Seconds      INR 1.64    Blood Gas, Arterial [[id number]]  (Abnormal) Collected:  04/03/20 1456    Specimen:  Arterial Blood Updated:  04/03/20 1459     Site Right Radial     Aldo's Test Positive     pH, Arterial 7.460 pH units      Comment: 83 Value above reference range        pCO2, Arterial 34.7 mm Hg      Comment: 84 Value below reference range        pO2, Arterial 80.9 mm Hg      Comment: 84 Value below reference range        HCO3, Arterial 24.7 mmol/L      Base Excess, Arterial 1.1 mmol/L      O2 Saturation, Arterial 97.1 %      Temperature 37.0 C      Barometric Pressure for Blood Gas 751 mmHg      Modality Room Air     Ventilator Mode NA     Collected by [id number]     Comment: Meter: [id number]     :  [id number]       Blood Culture - Blood, Arm, Right [[id number]] Collected:  04/03/20 1439    Specimen:  Blood from Arm, Right Updated:  04/03/20 1457        Imaging Results (Last 24 Hours)     Procedure Component Value Units Date/Time    XR Chest 1 View [[id number]] Collected:  04/03/20 1646     Updated:  04/03/20 1651    Narrative:       Exam:   XR CHEST 1 VW-       Date:  4/3/2020      History:  Male, age   84 years;soa     COMPARISON:  Chest x-ray dated 2/23/2016     Findings :  Left-sided cardiac device with leads projecting of the right atrium and  right ventricle. Median sternotomy wires appear similar. Prior valvular  replacement. Low lung volumes.  Borderline size of the cardiac size silhouette. Lungs are without focal  infiltrate, mass or effusions.  The bones show no acute pathology.         Impression:       Impression:     1.  Borderline size of the cardiac mediastinal silhouette. No convincing  evidence of fluid overload.  2.  Lower lung volumes.     This report was finalized on 04/03/2020 16:48 by Dr. Irene Valles MD.        I have personally reviewed and interpreted the radiology studies and ECG obtained at time of admission.     Assessment / Plan     Assessment:   Active Hospital Problems    Diagnosis   • Acute UTI (urinary tract infection)       · Abnormal urinalysis possible urinary tract infection   · urinary incontinence  · Abdominal pain/dry heaving and reported vomiting  · Elevated BNP in the setting of paroxysmal nocturnal dyspnea, swelling, shortness of breath and patient with prior open heart surgery and valvuloplasty  · Leukocytosis with bandemia  · Fall  · Mild transaminitis possibly from hepatic congestion from possibility of heart failure  · Lactic acidosis possibly from sepsis.  Blood pressure is adequate.  Patient not meeting requirement for IV fluid bolus.  Also in the light of possibility of heart failure that I did not choose to give patient IV fluid bolus, in fact the hyponatremia may be from fluid retention as evidenced by crackles on exam, symptoms of PND and swelling.  Therefore I am going to give him diuretic and follow his renal function    Plan:      Echocardiogram  CK (given history of fall and in the setting of presence of blood with minimal RBC in urine)  Empiric treatment for urinary tract infection; abdominopelvic CT scan without contrast  [facility]  Sepsis  protocol   Other plans per orders    Code Status:   Full code     I discussed the patient's findings and my recommendations with the patient and nurse Ronda    Estimated length of stay  To be determined  Patient seen and examined by me on   Bharath Maloney MD   04/03/20   17:21              Electronically signed by Bharath Maloney MD at 04/03/20 1820          Physician Progress Notes (most recent note)      Luis Knox DO at 04/07/20 0800              [facility]  INPATIENT PROGRESS NOTE    Patient Name: Jesus Smith  Date of Admission: 4/3/2020  Today's Date: 04/07/20  Length of Stay: 4  Primary Care Physician: Matheus Olivera PA    Subjective   Chief Complaint: Follow-up weakness    HPI:  Patient seen and examined.  The he looks the best I seen him since arrival.  He says he feels better.  Denies any trouble breathing.  Denies chest pain.  Nausea has improved.  Abdominal pain is improved.  Tolerating p.o. intake.  Patient states he had a bowel movement last evening but per charts and nursing notes he is not had one since the fourth.  No other acute issues or events overnight.  Afebrile.     ROS:  All pertinent negatives and positives are as above. All other systems have been reviewed and are negative unless otherwise stated.     Objective    Temp:  [97.5 °F (36.4 °C)-98 °F (36.7 °C)] 97.5 °F (36.4 °C)  Heart Rate:  [50-60] 50  Resp:  [12-14] 14  BP: (105-149)/(56-81) 118/62  Physical Exam  GEN: Awake, alert, interactive, appears more comfortable today and in NAD  HEENT: PERRLA, EOMI, Anicteric, Trachea midline  Lungs: CTAB, no wheezing/rales/rhonchi  Heart: RRR, +S1/s2, no rub  ABD: Right upper quadrant bandaged with 2 drains in place w/ output, somewhat firm today, no guarding  Extremities: atraumatic, no cyanosis, trace edema b/l LE  Neuro: AAOx3, no focal deficits  Psych: normal mood & affect        Results Review:  I have  reviewed the labs, radiology results, and diagnostic studies.    Laboratory Data:   Results from last 7 days   Lab Units 04/07/20  0507 04/06/20  0441 04/05/20  0405   WBC 10*3/mm3 13.30* 14.05* 17.08*   HEMOGLOBIN g/dL 11.4* 12.3* 11.6*   HEMATOCRIT % 35.7* 38.2 35.1*   PLATELETS 10*3/mm3 161 183 156        Results from last 7 days   Lab Units 04/07/20  0507 04/06/20  0446 04/05/20  0405   SODIUM mmol/L 135* 134* 131*   POTASSIUM mmol/L 4.3 4.6 4.2   CHLORIDE mmol/L 98 95* 93*   CO2 mmol/L 26.0 26.0 24.0   BUN mg/dL 38* 50* 56*   CREATININE mg/dL 1.08 1.33* 1.30*   CALCIUM mg/dL 8.0* 8.5* 8.5*   BILIRUBIN mg/dL 1.4* 1.3* 1.3*   ALK PHOS U/L 105 129* 151*   ALT (SGPT) U/L 70* 99* 113*   AST (SGOT) U/L 36 57* 115*   GLUCOSE mg/dL 123* 157* 149*       Culture Data:   Blood Culture   Date Value Ref Range Status   04/03/2020 Abnormal Stain (C)  Preliminary       Radiology Data:   Imaging Results (Last 24 Hours)     Procedure Component Value Units Date/Time    US Renal Bilateral [[id number]] Collected:  04/06/20 1453     Updated:  04/06/20 1459    Narrative:       US RENAL BILATERAL-     Indication: Acute on chronic kidney injury     Comparison: CT 4/3/2020     Findings:     Exam is slightly limited by difficulties with patient positioning.     The RIGHT kidney measures 11.6 cm in length and demonstrates normal  cortical thickness and echogenicity. No right-sided hydronephrosis. No  shadowing right-sided calculi.     The LEFT kidney measures 11.6 cm in length and demonstrates normal  cortical thickness and echogenicity. No left-sided hydronephrosis. No  shadowing left-sided renal calculi. A small 1.5 cm exophytic LEFT renal  cyst is present.     Urinary bladder is decompressed by Day catheter.       Impression:       Impression:     No evidence of hydronephrosis or renal atrophy.  This report was finalized on 04/06/2020 14:56 by Dr. Chan Goldberg MD.          I have reviewed the patient's current medications.          Assessment/Plan     Active Hospital Problems    Diagnosis   • **Bacteremia   • History of prosthetic aortic valve   • Abnormal LFTs   • Acute cholecystitis   • Benign essential HTN   • CKD (chronic kidney disease) stage 3, GFR 30-59 ml/min (CMS/HCC)   • Acute UTI (urinary tract infection)       #1  E. coli bacteremia -patient's first blood cultures come back positive for E. coli, pansensitive.  Second culture ended up being negative.  This occurred in the setting of acute cholecystitis.  Has been covered with Zosyn.  Repeat blood cultures from 4/5 are NTD. Will change to ceftriaxone today    #2 ?uti -abnormal urinalysis on arrival with leukocyte esterase and nitrates.  However there was 0-2 white cells and trace bacteria.  Apparently due to this was never sent for culture.  Should be covered by Zosyn most likely.  If patient continues to worsen or have fevers may need to reculture.    #3 CKD 3 -based on all labs prior to this hospitalization, however renal u/s is normal. Renal function has now improved w/ GFR of 65.     #4  acute cholecystitis - s/p OR on 4/5 for open diaz tube placement. 2 drains in place. On zosyn, changing to ceftriaxone.      #5 essential hypertension -by history.  Pressure stable and holding losartan.  Monitor blood pressure closely.    #6 hyponatremia -improving with ivf, up to 135    #7  DM 2 -patient known borderline diabetic.  [facility]  A1c found to be 6.6 here.  Covering with sliding scale at this time.  Hypoglycemia protocol in place.    #8 elevated CK -had an elevated CK of 756 on arrival.  No muscle pains. Now normalized. Statin has been on hold. Will resume statin    #9 prosthetic AVR -was on Coumadin prior.  Denies any history of arrhythmias.  On full dose lovenox. Plan for coumadin resumption when taking stable po with adequate bms in a day or two.     Discharge Planning: Ongoing, likely several more days. May need SNF at  d/c.    Luis Knox DO   20   08:00      Electronically signed by Luis Knox DO at 20 1133          Physical Therapy Notes (most recent note)      Olivia Giles R, PT, DPT, NCS at 20 1156  Version 1 of 1         Patient Name: Jesus Smith  : 1935    MRN: [id number]                              Today's Date: 2020       Admit Date: 4/3/2020    Visit Dx:     ICD-10-CM ICD-9-CM   1. Acute UTI (urinary tract infection) N39.0 599.0   2. Systemic inflammatory response syndrome (CMS/[facility]) R65.10 995.90   3. NOLAN (acute kidney injury) (CMS/[facility]) N17.9 584.9   4. Transaminitis R74.0 790.4   5. Acute cholecystitis K81.0 575.0   6. Impaired functional mobility and activity tolerance Z74.09 V49.89     Patient Active Problem List   Diagnosis   • Acute UTI (urinary tract infection)   • Bacteremia   • Abnormal LFTs   • Acute cholecystitis   • Benign essential HTN   • CKD (chronic kidney disease) stage 3, GFR 30-59 ml/min (CMS/[facility])   • History of prosthetic aortic valve     Past Medical History:   Diagnosis Date   • Bradycardia    • Coronary artery disease    • GERD (gastroesophageal reflux disease)    • Hypertension    • Injury of back    • TIA (transient ischemic attack)      Past Surgical History:   Procedure Laterality Date   • APPENDECTOMY     • BACK SURGERY      Fusion   • CARDIAC SURGERY      Open Heart   • EXPLORATORY LAPAROTOMY N/A 2020    Procedure: open cholecystostomy tube placement ;  Surgeon: Agustina Saleem MD;  Location: [facility];  Service: General;  Laterality: N/A;   • HERNIA REPAIR     • JOINT REPLACEMENT Left     s/p L hip replacement   • PACEMAKER IMPLANTATION     • STOMACH SURGERY       General Information     Row Name 20 1056          PT Evaluation Time/Intention    Document Type  evaluation s/p open cholecystomstomy tube placement, UTI, urinary inccontinence, abdominal pain, acute cholecystitis with cholelithiasis  -MS     Mode of Treatment  physical  therapy;individual therapy  -MS     Row Name 04/06/20 1056          General Information    Patient Profile Reviewed?  yes  -MS     Prior Level of Function  independent:;all household mobility;community mobility walks with rollator  -MS     Existing Precautions/Restrictions  fall  -MS     Barriers to Rehab  physical barrier  -MS     Row Name 04/06/20 1056          Relationship/Environment    Lives With  alone  -MS     Row Name 04/06/20 1056          Resource/Environmental Concerns    Current Living Arrangements  home/apartment/condo ramp  -MS     Row Name 04/06/20 1056          Home Main Entrance    Number of Stairs, Main Entrance  none  -MS     Row Name 04/06/20 1056          Stairs Within Home, Primary    Number of Stairs, Within Home, Primary  none  -MS     Row Name 04/06/20 1056          Cognitive Assessment/Intervention- PT/OT    Orientation Status (Cognition)  oriented x 4  -MS     Cognitive Assessment/Intervention Comment  pt is slow to respond to directions or questions  -MS     Row Name 04/06/20 1056          Safety Issues, Functional Mobility    Safety Issues Affecting Function (Mobility)  ability to follow commands  -MS     Impairments Affecting Function (Mobility)  strength  -MS       User Key  (r) = Recorded By, (t) = Taken By, (c) = Cosigned By    Initials Name Provider Type    MS Olivia Giles R, PT, DPT, NCS Physical Therapist        Mobility     Row Name 04/06/20 1148          Bed Mobility Assessment/Treatment    Bed Mobility Assessment/Treatment  supine-sit  -MS     Supine-Sit [place] (Bed Mobility)  conditional independence  -MS     Assistive Device (Bed Mobility)  bed rails;head of bed elevated  -MS     Comment (Bed Mobility)  HOB fully elevated  -MS     Row Name 04/06/20 1148          Transfer Assessment/Treatment    Comment (Transfers)  the patient was able to take steps from bed to chair  -MS     Row Name 04/06/20 1148          Bed-Chair Transfer    Bed-Chair [place] (Transfers)   contact guard;verbal cues;nonverbal cues (demo/gesture)  -MS     Assistive Device (Bed-Chair Transfers)  walker, front-wheeled  -MS     Row Name 04/06/20 1148          Sit-Stand Transfer    Sit-Stand [place] (Transfers)  minimum assist (75% patient effort);verbal cues;nonverbal cues (demo/gesture)  -MS     Assistive Device (Sit-Stand Transfers)  walker, front-wheeled  -MS       User Key  (r) = Recorded By, (t) = Taken By, (c) = Cosigned By    Initials Name Provider Type    Olivia Feliciano, PT, DPT, NCS Physical Therapist        Obj/Interventions     Row Name 04/06/20 1149          General ROM    GENERAL ROM COMMENTS  all extremiites WFL  -MS     Row Name 04/06/20 1149          MMT (Manual Muscle Testing)    General MMT Comments  grossly 4-/5  -MS     Row Name 04/06/20 1149          Static Sitting Balance    Level of [place] (Unsupported Sitting, Static Balance)  independent  -MS     Sitting Position (Unsupported Sitting, Static Balance)  sitting on edge of bed  -MS     Row Name 04/06/20 1149          Static Standing Balance    Level of [place] (Supported Standing, Static Balance)  contact guard assist  -MS     Assistive Device Utilized (Supported Standing, Static Balance)  walker, rolling  -MS     Row Name 04/06/20 1149          Dynamic Standing Balance    Level of [place], Reaches Outside Midline (Standing, Dynamic Balance)  contact guard assist  -MS     Assistive Device Utilized (Supported Standing, Dynamic Balance)  walker, rolling  -MS     Row Name 04/06/20 1149          Sensory Assessment/Intervention    Sensory General Assessment  no sensation deficits identified  -MS       User Key  (r) = Recorded By, (t) = Taken By, (c) = Cosigned By    Initials Name Provider Type    Olivia Feliciano, PT, DPT, NCS Physical Therapist        Goals/Plan     Row Name 04/06/20 1153          Bed Mobility Goal 1 (PT)    Activity/Assistive Device (Bed Mobility Goal 1, PT)  bed mobility activities, all  -MS      Heard Level/Cues Needed (Bed Mobility Goal 1, PT)  independent  -MS     Time Frame (Bed Mobility Goal 1, PT)  long term goal (LTG);by discharge  -MS     Progress/Outcomes (Bed Mobility Goal 1, PT)  goal ongoing  -MS     Row Name 04/06/20 1153          Transfer Goal 1 (PT)    Activity/Assistive Device (Transfer Goal 1, PT)  sit-to-stand/stand-to-sit;walker, rolling  -MS     Heard Level/Cues Needed (Transfer Goal 1, PT)  conditional independence  -MS     Time Frame (Transfer Goal 1, PT)  long term goal (LTG);by discharge  -MS     Progress/Outcome (Transfer Goal 1, PT)  goal ongoing  -MS     Row Name 04/06/20 1153          Gait Training Goal 1 (PT)    Activity/Assistive Device (Gait Training Goal 1, PT)  gait (walking locomotion);assistive device use;improve balance and speed;increase endurance/gait distance;walker, rolling  -MS     Heard Level (Gait Training Goal 1, PT)  supervision required  -MS     Distance (Gait Goal 1, PT)  100ft  -MS     Time Frame (Gait Training Goal 1, PT)  long term goal (LTG);by discharge  -MS     Progress/Outcome (Gait Training Goal 1, PT)  goal ongoing  -MS       User Key  (r) = Recorded By, (t) = Taken By, (c) = Cosigned By    Initials Name Provider Type    Olivia Feliciano R, PT, DPT, NCS Physical Therapist        Clinical Impression     Row Name 04/06/20 1108          Pain Assessment    Additional Documentation  Pain Scale: Numbers Pre/Post-Treatment (Group)  -MS     Row Name 04/06/20 1108          Pain Scale: Numbers Pre/Post-Treatment    Pain Scale: Numbers, Pretreatment  5/10  -MS     Pain Location - Side  Left  -MS     Pain Location - Orientation  incisional  -MS     Pain Location  abdomen  -MS     Pre/Post Treatment Pain Comment  reports no change  -MS     Pain Intervention(s)  Medication (See MAR);Repositioned;Ambulation/increased activity  -MS     Row Name 04/06/20 1105          Plan of Care Review    Plan of Care Reviewed With  patient  -MS     Progress   improving  -MS     Outcome Summary  PT evaluation completed. The patient presents alert and oriented x4, but is slow to process information and to follow directions. The patient began to dry heave once sitting EOB, nurse notified. The patient was able to take small steps to the chair with a FWW with CGA. He is generally weak and will benefit from skilled PT services. He does live alone with little famiily to assist him. He may need SNF placement if he is not strong enough to care for himself by discharge.   -MS     Row Name 04/06/20 1108          Physical Therapy Clinical Impression    Patient/Family Goals Statement (PT Clinical Impression)  increase activity  -MS     Criteria for Skilled Interventions Met (PT Clinical Impression)  yes;treatment indicated  -MS     Rehab Potential (PT Clinical Summary)  good, to achieve stated therapy goals  -MS     Predicted Duration of Therapy (PT)  until discharge  -MS     Row Name 04/06/20 110          Positioning and Restraints    Post Treatment Position  chair  -MS     In Chair  reclined;call light within reach;encouraged to call for assist;legs elevated  -MS       User Key  (r) = Recorded By, (t) = Taken By, (c) = Cosigned By    Initials Name Provider Type    MS Olivia Giles R, PT, DPT, NCS Physical Therapist        Outcome Measures     Row Name 04/06/20 1156          How much help from another person do you currently need...    Turning from your back to your side while in flat bed without using bedrails?  2  -MS     Moving from lying on back to sitting on the side of a flat bed without bedrails?  2  -MS     Moving to and from a bed to a chair (including a wheelchair)?  3  -MS     Standing up from a chair using your arms (e.g., wheelchair, bedside chair)?  3  -MS     Climbing 3-5 steps with a railing?  1  -MS     To walk in hospital room?  1  -MS     AM-PAC 6 Clicks Score (PT)  12  -MS     Row Name 04/06/20 1159          Functional Assessment    Outcome Measure Options   AM-PAC 6 Clicks Basic Mobility (PT)  -MS       User Key  (r) = Recorded By, (t) = Taken By, (c) = Cosigned By    Initials Name Provider Type    Paxton Felicianotodd CAMPBELL, MERCEDES, DPT, [place] Physical Therapist          PT Recommendation and Plan  Planned Therapy Interventions (PT Eval): balance training, bed mobility training, gait training, patient/family education, strengthening, transfer training  Outcome Summary/Treatment Plan (PT)  Anticipated Discharge Disposition (PT): skilled nursing facility  Plan of Care Reviewed With: patient  Progress: improving  Outcome Summary: PT evaluation completed. The patient presents alert and oriented x4, but is slow to process information and to follow directions. The patient began to dry heave once sitting EOB, nurse notified. The patient was able to take small steps to the chair with a FWW with CGA. He is generally weak and will benefit from skilled PT services. He does live alone with little famiily to assist him. He may need SNF placement if he is not strong enough to care for himself by discharge.      Time Calculation:   PT Charges     Row Name 04/06/20 1155             Time Calculation    Start Time  1050  -MS      Stop Time  1150  -MS      Time Calculation (min)  60 min  -MS      PT Received On  04/06/20  -MS      PT Goal Re-Cert Due Date  04/16/20  -MS        User Key  (r) = Recorded By, (t) = Taken By, (c) = Cosigned By    Initials Name Provider Type    MS Giles Olivia ADRIAN, VENKATA LONGT, [place] Physical Therapist        Therapy Charges for Today     Code Description Service Date Service Provider Modifiers Qty    [id number] HC PT EVAL MOD COMPLEXITY 4 4/6/2020 Olivia Giles PT DPT, [place] GP 1          PT G-Codes  Outcome Measure Options: AM-PAC 6 Clicks Basic Mobility (PT)  AM-PAC 6 Clicks Score (PT): 12    Olivia Giles PT, DPT, NCS  4/6/2020         Electronically signed by Oliiva Giles PT, DPT, NCS at 04/06/20 1156       Occupational Therapy Notes (most recent note)    No  notes exist for this encounter.         Speech Language Pathology Notes (most recent note)    No notes exist for this encounter.         Discharge Summary    No notes of this type exist for this encounter.

## 2020-04-07 NOTE — PLAN OF CARE
Problem: Patient Care Overview  Goal: Plan of Care Review  Flowsheets (Taken 4/7/2020 0138)  Progress: no change  Plan of Care Reviewed With: patient  Note:   Ivf/ iv abx/ patino / morphine pca cont. Abd large and distended, no bowel sounds noted. Chani x1 with serosang drainage, chani x1 with bile drainage. Denies nausea. Resting at intervals. Noted some difficulty understanding instructions. Small amount of bruising noted left buttocks. No acute distress noted. Cont to monitor.

## 2020-04-07 NOTE — PROGRESS NOTES
[facility] Medicine Services  INPATIENT PROGRESS NOTE    Patient Name: Jesus Smith  Date of Admission: 4/3/2020  Today's Date: 04/07/20  Length of Stay: 4  Primary Care Physician: Matheus Olivera PA    Subjective   Chief Complaint: Follow-up weakness    HPI:  Patient seen and examined.  The he looks the best I seen him since arrival.  He says he feels better.  Denies any trouble breathing.  Denies chest pain.  Nausea has improved.  Abdominal pain is improved.  Tolerating p.o. intake.  Patient states he had a bowel movement last evening but per charts and nursing notes he is not had one since the fourth.  No other acute issues or events overnight.  Afebrile.     ROS:  All pertinent negatives and positives are as above. All other systems have been reviewed and are negative unless otherwise stated.     Objective    Temp:  [97.5 °F (36.4 °C)-98 °F (36.7 °C)] 97.5 °F (36.4 °C)  Heart Rate:  [50-60] 50  Resp:  [12-14] 14  BP: (105-149)/(56-81) 118/62  Physical Exam  GEN: Awake, alert, interactive, appears more comfortable today and in NAD  HEENT: PERRLA, EOMI, Anicteric, Trachea midline  Lungs: CTAB, no wheezing/rales/rhonchi  Heart: RRR, +S1/s2, no rub  ABD: Right upper quadrant bandaged with 2 drains in place w/ output, somewhat firm today, no guarding  Extremities: atraumatic, no cyanosis, trace edema b/l LE  Neuro: AAOx3, no focal deficits  Psych: normal mood & affect        Results Review:  I have reviewed the labs, radiology results, and diagnostic studies.    Laboratory Data:   Results from last 7 days   Lab Units 04/07/20  0507 04/06/20  0441 04/05/20  0405   WBC 10*3/mm3 13.30* 14.05* 17.08*   HEMOGLOBIN g/dL 11.4* 12.3* 11.6*   HEMATOCRIT % 35.7* 38.2 35.1*   PLATELETS 10*3/mm3 161 183 156        Results from last 7 days   Lab Units 04/07/20  0507 04/06/20  0446 04/05/20  0405   SODIUM mmol/L 135* 134* 131*   POTASSIUM mmol/L 4.3 4.6 4.2   CHLORIDE mmol/L 98 95* 93*    CO2 mmol/L 26.0 26.0 24.0   BUN mg/dL 38* 50* 56*   CREATININE mg/dL 1.08 1.33* 1.30*   CALCIUM mg/dL 8.0* 8.5* 8.5*   BILIRUBIN mg/dL 1.4* 1.3* 1.3*   ALK PHOS U/L 105 129* 151*   ALT (SGPT) U/L 70* 99* 113*   AST (SGOT) U/L 36 57* 115*   GLUCOSE mg/dL 123* 157* 149*       Culture Data:   Blood Culture   Date Value Ref Range Status   04/03/2020 Abnormal Stain (C)  Preliminary       Radiology Data:   Imaging Results (Last 24 Hours)     Procedure Component Value Units Date/Time    US Renal Bilateral [[id number]] Collected:  04/06/20 1453     Updated:  04/06/20 1459    Narrative:       US RENAL BILATERAL-     Indication: Acute on chronic kidney injury     Comparison: CT 4/3/2020     Findings:     Exam is slightly limited by difficulties with patient positioning.     The RIGHT kidney measures 11.6 cm in length and demonstrates normal  cortical thickness and echogenicity. No right-sided hydronephrosis. No  shadowing right-sided calculi.     The LEFT kidney measures 11.6 cm in length and demonstrates normal  cortical thickness and echogenicity. No left-sided hydronephrosis. No  shadowing left-sided renal calculi. A small 1.5 cm exophytic LEFT renal  cyst is present.     Urinary bladder is decompressed by Day catheter.       Impression:       Impression:     No evidence of hydronephrosis or renal atrophy.  This report was finalized on 04/06/2020 14:56 by Dr. Chan Goldberg MD.          I have reviewed the patient's current medications.     Assessment/Plan     Active Hospital Problems    Diagnosis   • **Bacteremia   • History of prosthetic aortic valve   • Abnormal LFTs   • Acute cholecystitis   • Benign essential HTN   • CKD (chronic kidney disease) stage 3, GFR 30-59 ml/min (CMS/[facility])   • Acute UTI (urinary tract infection)       #1  E. coli bacteremia -patient's first blood cultures come back positive for E. coli, pansensitive.  Second culture ended up being negative.  This occurred in the setting of acute  cholecystitis.  Has been covered with Zosyn.  Repeat blood cultures from 4/5 are NTD. Will change to ceftriaxone today    #2 ?uti -abnormal urinalysis on arrival with leukocyte esterase and nitrates.  However there was 0-2 white cells and trace bacteria.  Apparently due to this was never sent for culture.  Should be covered by Zosyn most likely.  If patient continues to worsen or have fevers may need to reculture.    #3 CKD 3 -based on all labs prior to this hospitalization, however renal u/s is normal. Renal function has now improved w/ GFR of 65.     #4  acute cholecystitis - s/p OR on 4/5 for open diaz tube placement. 2 drains in place. On zosyn, changing to ceftriaxone.      #5 essential hypertension -by history.  Pressure stable and holding losartan.  Monitor blood pressure closely.    #6 hyponatremia -improving with ivf, up to 135    #7  DM 2 -patient known borderline diabetic.  [facility]  A1c found to be 6.6 here.  Covering with sliding scale at this time.  Hypoglycemia protocol in place.    #8 elevated CK -had an elevated CK of 756 on arrival.  No muscle pains. Now normalized. Statin has been on hold. Will resume statin    #9 prosthetic AVR -was on Coumadin prior.  Denies any history of arrhythmias.  On full dose lovenox. Plan for coumadin resumption when taking stable po with adequate bms in a day or two.     Discharge Planning: Ongoing, likely several more days. May need SNF at d/c.    Luis Knox DO   04/07/20   08:00

## 2020-04-07 NOTE — PLAN OF CARE
Problem: Patient Care Overview  Goal: Plan of Care Review  Outcome: Ongoing (interventions implemented as appropriate)  Note:   VSS, ventricularly paced in 50's; pt has no complaints of pain during shift, PCA pump in use with capnography; pt complains of intermittent nausea, tx w/ PO zofran; abdominal incision dressing changed, site appeared slightly red and staples intact; NAM drain sites appeared slightly red but intact as well; both drains had approx. 50 mL output during shift; drainage in left NAM drain was bile, and the right NAM drainage was serosanguineous; pt reports very occasional SOA, saturations in mid to upper 90's on rm air; pt received albumin, followed by IV lasix 20 mg per Dr. Emery, and had 450 mL urine output per patino cath; IV fluids and abx continued; walked approx. 5-10 steps with PT today; will continue to monitor       Problem: Patient Care Overview  Goal: Individualization and Mutuality  Outcome: Ongoing (interventions implemented as appropriate)     Problem: Patient Care Overview  Goal: Discharge Needs Assessment  Outcome: Ongoing (interventions implemented as appropriate)     Problem: Patient Care Overview  Goal: Interprofessional Rounds/Family Conf  Outcome: Ongoing (interventions implemented as appropriate)     Problem: Fall Risk (Adult)  Goal: Identify Related Risk Factors and Signs and Symptoms  Outcome: Ongoing (interventions implemented as appropriate)     Problem: Fall Risk (Adult)  Goal: Absence of Fall  Outcome: Ongoing (interventions implemented as appropriate)     Problem: Surgery Nonspecified (Adult)  Goal: Signs and Symptoms of Listed Potential Problems Will be Absent, Minimized or Managed (Surgery Nonspecified)  Outcome: Ongoing (interventions implemented as appropriate)     Problem: Surgery Nonspecified (Adult)  Goal: Anesthesia/Sedation Recovery  Outcome: Ongoing (interventions implemented as appropriate)

## 2020-04-07 NOTE — PROGRESS NOTES
LOS: 4 days   Patient Care Team:  Matheus Olivera PA as PCP - General (Physician Assistant)    Chief Complaint: Sepsis from cholecystitis  Subjective     Subjective     Postoperative day 4 following treatment for sepsis from cholecystitis.  His bilious tube is bile from his cholecystostomy there is minimal bile from the outside drain, he is awake he is alert I do not know how oriented he is we asked him several questions and he does not know when his surgery was which day it is is able to converse and respond to questions but has very poor recall.  Does not know his medical situation very well.  And he seems to have some tightness in his chest from fluid overload.  Objective      Objective     Vital Signs  Temp:  [97.5 °F (36.4 °C)-98 °F (36.7 °C)] 97.5 °F (36.4 °C)  Heart Rate:  [50-60] 50  Resp:  [12-14] 14  BP: (105-149)/(56-81) 118/62    Intake & Output (last 3 days)       04/04 0701 - 04/05 0700 04/05 0701 - 04/06 0700 04/06 0701 - 04/07 0700 04/07 0701 - 04/08 0700    P.O. 240  100     I.V. (mL/kg)  771 (7.7) 2146 (21.5)     IV Piggyback 250 300      Total Intake(mL/kg) 490 (4.9) 1071 (10.7) 2246 (22.5)     Urine (mL/kg/hr) 875 (0.4) 1225 (0.5) 1050 (0.4)     Drains  600 365     Stool 0  0     Total Output 875 1825 1415     Net -385 -754 +831             Urine Unmeasured Occurrence 201 x       Stool Unmeasured Occurrence 1 x             Physical Exam:     General Appearance:    Alert, cooperative, in no acute distress   Lungs:    Shallow, some wheezes noted, nonproductive.    Heart:    Regular rhythm and normal rate, normal S1 and S2, no            murmur, no gallop, no rub, no click   Chest Wall:    No abnormalities observed   Abdomen:    Occasional bowel sounds, the cholecystostomy tube with bile, there is no bile in the tube lateral to this, white count is reduced from 17-13 liver functions are normalizing.        Results Review:     I reviewed the patient's new clinical results.  I reviewed the  patient's new imaging results and agree with the interpretation.    Results from last 7 days   Lab Units 04/07/20  0507 04/06/20  0441 04/05/20  0405   WBC 10*3/mm3 13.30* 14.05* 17.08*   HEMOGLOBIN g/dL 11.4* 12.3* 11.6*   HEMATOCRIT % 35.7* 38.2 35.1*   PLATELETS 10*3/mm3 161 183 156        Results from last 7 days   Lab Units 04/07/20  0507 04/06/20  0446 04/05/20  0405   SODIUM mmol/L 135* 134* 131*   POTASSIUM mmol/L 4.3 4.6 4.2   CHLORIDE mmol/L 98 95* 93*   CO2 mmol/L 26.0 26.0 24.0   BUN mg/dL 38* 50* 56*   CREATININE mg/dL 1.08 1.33* 1.30*   CALCIUM mg/dL 8.0* 8.5* 8.5*   BILIRUBIN mg/dL 1.4* 1.3* 1.3*   ALK PHOS U/L 105 129* 151*   ALT (SGPT) U/L 70* 99* 113*   AST (SGOT) U/L 36 57* 115*   GLUCOSE mg/dL 123* 157* 149*       Assessment/Plan     Assessment/Plan       Bacteremia    Acute UTI (urinary tract infection)    Abnormal LFTs    Acute cholecystitis    Benign essential HTN    CKD (chronic kidney disease) stage 3, GFR 30-59 ml/min (CMS/[facility])    History of prosthetic aortic valve    Patient with improving liver functions some shortness of breath concerned about some overload will treat with albumin and Lasix to get this fluid off.  Suspect this is some of his problems are the majority of them we will check lab work in the morning potassium is adequate right now and as I said liver functions are normalizing.    Mk Emery MD  04/07/20  08:00      Time: time spent with patient 15 minutes     EMR Dragon/Transcription disclaimer: Much of this encounter note is an electronic transcription/translation of spoken language to printed text. The electronic translation of spoken language may permit erroneous, or at times, nonsensical words or phrases to be inadvertently transcribed; although I have reviewed the note for such errors, some may still exist.

## 2020-04-08 LAB
ALBUMIN SERPL-MCNC: 2.8 G/DL (ref 3.5–5.2)
ALBUMIN/GLOB SERPL: 0.9 G/DL
ALP SERPL-CCNC: 94 U/L (ref 39–117)
ALT SERPL W P-5'-P-CCNC: 55 U/L (ref 1–41)
ANION GAP SERPL CALCULATED.3IONS-SCNC: 11 MMOL/L (ref 5–15)
AST SERPL-CCNC: 33 U/L (ref 1–40)
BACTERIA SPEC AEROBE CULT: ABNORMAL
BILIRUB SERPL-MCNC: 1.8 MG/DL (ref 0.2–1.2)
BUN BLD-MCNC: 29 MG/DL (ref 8–23)
BUN/CREAT SERPL: 26.9 (ref 7–25)
CALCIUM SPEC-SCNC: 7.9 MG/DL (ref 8.6–10.5)
CHLORIDE SERPL-SCNC: 99 MMOL/L (ref 98–107)
CO2 SERPL-SCNC: 27 MMOL/L (ref 22–29)
CREAT BLD-MCNC: 1.08 MG/DL (ref 0.76–1.27)
DEPRECATED RDW RBC AUTO: 47.7 FL (ref 37–54)
ERYTHROCYTE [DISTWIDTH] IN BLOOD BY AUTOMATED COUNT: 14 % (ref 12.3–15.4)
GFR SERPL CREATININE-BSD FRML MDRD: 65 ML/MIN/1.73
GLOBULIN UR ELPH-MCNC: 3.1 GM/DL
GLUCOSE BLD-MCNC: 125 MG/DL (ref 65–99)
GLUCOSE BLDC GLUCOMTR-MCNC: 114 MG/DL (ref 70–130)
GLUCOSE BLDC GLUCOMTR-MCNC: 130 MG/DL (ref 70–130)
GLUCOSE BLDC GLUCOMTR-MCNC: 142 MG/DL (ref 70–130)
GLUCOSE BLDC GLUCOMTR-MCNC: 146 MG/DL (ref 70–130)
GRAM STN SPEC: ABNORMAL
HCT VFR BLD AUTO: 34.2 % (ref 37.5–51)
HGB BLD-MCNC: 10.8 G/DL (ref 13–17.7)
INR PPP: 2.03 (ref 0.91–1.09)
ISOLATED FROM: ABNORMAL
MAGNESIUM SERPL-MCNC: 2.2 MG/DL (ref 1.6–2.4)
MCH RBC QN AUTO: 29.5 PG (ref 26.6–33)
MCHC RBC AUTO-ENTMCNC: 31.6 G/DL (ref 31.5–35.7)
MCV RBC AUTO: 93.4 FL (ref 79–97)
PLATELET # BLD AUTO: 181 10*3/MM3 (ref 140–450)
PMV BLD AUTO: 10.6 FL (ref 6–12)
POTASSIUM BLD-SCNC: 3.9 MMOL/L (ref 3.5–5.2)
PROT SERPL-MCNC: 5.9 G/DL (ref 6–8.5)
PROTHROMBIN TIME: 23 SECONDS (ref 11.9–14.6)
RBC # BLD AUTO: 3.66 10*6/MM3 (ref 4.14–5.8)
SODIUM BLD-SCNC: 137 MMOL/L (ref 136–145)
WBC NRBC COR # BLD: 13.56 10*3/MM3 (ref 3.4–10.8)

## 2020-04-08 PROCEDURE — 97110 THERAPEUTIC EXERCISES: CPT

## 2020-04-08 PROCEDURE — 80053 COMPREHEN METABOLIC PANEL: CPT | Performed by: SPECIALIST

## 2020-04-08 PROCEDURE — 82962 GLUCOSE BLOOD TEST: CPT

## 2020-04-08 PROCEDURE — 83735 ASSAY OF MAGNESIUM: CPT | Performed by: INTERNAL MEDICINE

## 2020-04-08 PROCEDURE — 25010000002 CEFTRIAXONE PER 250 MG: Performed by: INTERNAL MEDICINE

## 2020-04-08 PROCEDURE — 97530 THERAPEUTIC ACTIVITIES: CPT

## 2020-04-08 PROCEDURE — 85027 COMPLETE CBC AUTOMATED: CPT | Performed by: SPECIALIST

## 2020-04-08 PROCEDURE — 85610 PROTHROMBIN TIME: CPT | Performed by: INTERNAL MEDICINE

## 2020-04-08 PROCEDURE — 25010000002 ENOXAPARIN PER 10 MG: Performed by: INTERNAL MEDICINE

## 2020-04-08 RX ADMIN — SUCRALFATE 1 G: 1 SUSPENSION ORAL at 23:42

## 2020-04-08 RX ADMIN — SODIUM CHLORIDE 50 ML/HR: 9 INJECTION, SOLUTION INTRAVENOUS at 06:21

## 2020-04-08 RX ADMIN — ENOXAPARIN SODIUM 100 MG: 100 INJECTION SUBCUTANEOUS at 09:12

## 2020-04-08 RX ADMIN — ASPIRIN 81 MG: 81 TABLET ORAL at 09:12

## 2020-04-08 RX ADMIN — SUCRALFATE 1 G: 1 SUSPENSION ORAL at 06:21

## 2020-04-08 RX ADMIN — CEFTRIAXONE SODIUM 1 G: 1 INJECTION, POWDER, FOR SOLUTION INTRAMUSCULAR; INTRAVENOUS at 11:57

## 2020-04-08 RX ADMIN — SUCRALFATE 1 G: 1 SUSPENSION ORAL at 17:30

## 2020-04-08 RX ADMIN — BISOPROLOL FUMARATE 10 MG: 5 TABLET ORAL at 09:12

## 2020-04-08 RX ADMIN — ENOXAPARIN SODIUM 100 MG: 100 INJECTION SUBCUTANEOUS at 21:37

## 2020-04-08 RX ADMIN — FAMOTIDINE 20 MG: 20 TABLET, FILM COATED ORAL at 09:12

## 2020-04-08 RX ADMIN — SUCRALFATE 1 G: 1 SUSPENSION ORAL at 11:57

## 2020-04-08 RX ADMIN — FAMOTIDINE 20 MG: 20 TABLET, FILM COATED ORAL at 21:38

## 2020-04-08 NOTE — DISCHARGE PLACEMENT REQUEST
"Jesus Smith (84 y.o. Male)     Date of Birth Social Security Number Address Home Phone MRN    [id number] [address] [phone] [id number]    Protestant Marital Status          Gnosticism        Admission Date Admission Type Admitting Provider Attending Provider Department, Room/Bed    4/3/20 Emergency Luis Knox, Luis Madera,  [facility] 3A, 331/1    Discharge Date Discharge Disposition Discharge Destination                       Attending Provider:  Luis Knox DO    Allergies:  No Known Allergies    Isolation:  None   Infection:  None   Code Status:  CPR    Ht:  180.3 cm (70.98\")   Wt:  100 kg (220 lb 7.4 oz)    Admission Cmt:  None   Principal Problem:  Bacteremia [R78.81]                 Active Insurance as of 4/3/2020     Primary Coverage     Payor Plan Insurance Group Employer/Plan Group    [facility] DEPT [id number]      Payor Plan Address Payor Plan Phone Number Payor Plan Fax Number Effective Dates    M[employer]HA [employer] 04 [phone]  4/2/2020 - None Entered    [address] [facility] [address]       Subscriber Name Subscriber Birth Date Member ID       JESUS SMITH [id number]                 Emergency Contacts      (Rel.) Home Phone Work Phone Mobile Phone    HEAVEN RAYMOND (Relative) -- -- [phone]    IGNACIO ANGEL (Relative) -- -- [phone]               History & Physical      Bharath Maloney MD at 04/03/20 1720              [facility] Medicine Services  HISTORY AND PHYSICAL    Date of Admission: 4/3/2020  Primary Care Physician: Matheus Olivera PA    Subjective     Chief Complaint: dry heave,     History of Present Illness  I am asked to admit the patient for acute urinary tract infection, systemic inflammatory response syndrome, acute kidney injury and transaminitis.    Patient claims to have some urinary frequency as well as " change in his other.  Patient has been having chills, nausea and vomiting.  He denies any coughing spell.     On evaluation he has leukocytosis with bandemia; creatinine is elevated at 1.57.  He has moderate amount of blood in his urine although been found only with 3-5 per high-power field of RBC.  His procalcitonin is elevated.  He has positive nitrite and trace leukocyte esterase as well as trace bacteriuria. chest x-ray showed no consolidation  Blood gas showed normal pH at 7.46, PCO2 35 and PO2 of 81 taken on room air  Respiratory panel PCR is negative  proBNP is elevated at 4334.  He has a pacemaker in situ and noted to have prior valvuloplasty.  Transaminases are slightly elevated at AST and ALT are 61 and 93 respectively.  We do not have any echocardiogram of record but review of record dating back in 2016 at [facility], he has been found with aortic bioprosthetic valve and normal EF of 55 to 60%.  He had grade 2 diastolic dysfunction.    His main complaint is dry heave culminating to vomiting since Monday.  He complains of abdominal discomfort as he points to his right upper quadrant that goes down to his hypogastric area.  He said that he has no control over his bladder habits and has decreased urinary stream but denies any dysuria.    He also mentioned to have paroxysmal nocturnal dyspnea.  He has been swollen for the past 3 years.  He claimed to have increasing weight.  Positive shortness of breath but denies any fever or chills.  He denies any exposure to someone who is sick.  He denies coughing spells    He claimed to have fallen yesterday and must have hit his upper abdomen.  He could not tell me further details pertaining to this    Review of Systems     Otherwise complete ROS reviewed and negative except as mentioned in the HPI.    Past Medical History:   I tried to get information from him about his past medical history however he is not able to share this information.  I was not quite sure what  "is limiting him but looking at his medications as outlined in the list it indicates that he has hyperlipidemia, hypertension.  I will have to verify why he is on Coumadin.  Past Surgical History:  Past Surgical History:   Procedure Laterality Date   • APPENDECTOMY     • BACK SURGERY      Fusion   • CARDIAC SURGERY      Open Heart   • HERNIA REPAIR     • JOINT REPLACEMENT Left     s/p L hip replacement   • PACEMAKER IMPLANTATION     • STOMACH SURGERY       Social History:  reports that he has never smoked. He has never used smokeless tobacco. He reports that he does not drink alcohol or use drugs. He states that he had quit smoking nor had he drank any alcohol since the 60s.    Family History: He has not endorsed any medical problems that runs in the family.    Allergies:  No Known Allergies  Medications:  Record indicates that his home medications include aspirin 81 mg daily, Zebeta 10 mg daily  Cholecalciferol 5000 units daily  Cozaar 100 mg daily  Crestor 20 mg daily  Coumadin 0.5 mg daily    Objective     Vital Signs: /69   Pulse 52   Temp 98.9 °F (37.2 °C) (Oral)   Resp 25   Ht 180.3 cm (71\")   Wt 99.8 kg (220 lb)   SpO2 99%   BMI 30.68 kg/m²    Physical Exam  He is not in any distress.  His seated comfortably on the [facility]  Alert and oriented x3  Grossly nonfocal neurologic exam  Normocephalic  Anicteric sclera  Supple neck  No thyromegaly  Diminished breath sounds, positive few fine crackles noted at bases  Noticeable protuberant abdomen, tender to touch, voluntary guarding  No CVA tenderness  Noticeable swelling of bilateral lower extremities  Warm dry skin      Results Reviewed:  Lab Results (last 24 hours)     Procedure Component Value Units Date/Time    S. Pneumo Ag Urine or CSF - Urine, Urine, Clean Catch [[id number]]  (Normal) Collected:  04/03/20 1524    Specimen:  Urine, Clean Catch Updated:  04/03/20 1558     Strep Pneumo Ag Negative    Urinalysis, Microscopic Only - Urine, Clean Catch " [[id number]]  (Abnormal) Collected:  04/03/20 1524    Specimen:  Urine, Clean Catch Updated:  04/03/20 1556     RBC, UA 3-5 /HPF      WBC, UA 0-2 /HPF      Bacteria, UA Trace /HPF      Squamous Epithelial Cells, UA None Seen /HPF      Hyaline Casts, UA None Seen /LPF      Methodology Manual Light Microscopy    Respiratory Panel, PCR - Swab, Nasopharynx [[id number]]  (Normal) Collected:  04/03/20 1441    Specimen:  Swab from Nasopharynx Updated:  04/03/20 1556     ADENOVIRUS, PCR Not Detected     Coronavirus 229E Not Detected     Coronavirus HKU1 Not Detected     Coronavirus NL63 Not Detected     Coronavirus OC43 Not Detected     Human Metapneumovirus Not Detected     Human Rhinovirus/Enterovirus Not Detected     Influenza B PCR Not Detected     Parainfluenza Virus 1 Not Detected     Parainfluenza Virus 2 Not Detected     Parainfluenza Virus 3 Not Detected     Parainfluenza Virus 4 Not Detected     Bordetella pertussis pcr Not Detected     Influenza A H1 2009 PCR Not Detected     Chlamydophila pneumoniae PCR Not Detected     Mycoplasma pneumo by PCR Not Detected     Influenza A PCR Not Detected     Influenza A H3 Not Detected     Influenza A H1 Not Detected     RSV, PCR Not Detected     Bordetella parapertussis PCR Not Detected    Narrative:       The coronavirus on the RVP is NOT COVID-19 and is NOT indicative of infection with COVID-19.     Blood Culture - Blood, Arm, Right [[id number]] Collected:  04/03/20 1532    Specimen:  Blood from Arm, Right Updated:  04/03/20 1550    [place] Draw [[id number]] Collected:  04/03/20 1439    Specimen:  Blood Updated:  04/03/20 1545    Narrative:       The following orders were created for panel order [place] Draw.  Procedure                               Abnormality         Status                     ---------                               -----------         ------                     Light Blue Top[[id number]]                                   Final result               Green Top  (Gel)[[id number]]                                  Final result               Lavender Top[[id number]]                                     Final result               Red Top[[id number]]                                          Final result               [place] Blood Culture Priyank...[[id number]]                      Final result               Gray Top - Ice[[id number]]                                   Final result                 Please view results for these tests on the individual orders.    Light Blue Top [[id number]] Collected:  04/03/20 1439    Specimen:  Blood Updated:  04/03/20 1545     Extra Tube hold for add-on     Comment: Auto resulted       Green Top (Gel) [[id number]] Collected:  04/03/20 1439    Specimen:  Blood Updated:  04/03/20 1545     Extra Tube Hold for add-ons.     Comment: Auto resulted.       Lavender Top [[id number]] Collected:  04/03/20 1439    Specimen:  Blood Updated:  04/03/20 1545     Extra Tube hold for add-on     Comment: Auto resulted       Red Top [[id number]] Collected:  04/03/20 1439    Specimen:  Blood Updated:  04/03/20 1545     Extra Tube Hold for add-ons.     Comment: Auto resulted.       [place] Blood Culture Bottle Set [[id number]] Collected:  04/03/20 1439    Specimen:  Blood from Arm, Right Updated:  04/03/20 1545     Extra Tube Hold for add-ons.     Comment: Auto resulted.       Gray Top - Ice [[id number]] Collected:  04/03/20 1439    Specimen:  Blood Updated:  04/03/20 1545     Extra Tube Hold for add-ons.     Comment: Auto resulted.       Urinalysis With Culture If Indicated - Urine, Clean Catch [[id number]]  (Abnormal) Collected:  04/03/20 1524    Specimen:  Urine, Clean Catch Updated:  04/03/20 1538     Color, UA Dark Yellow     Appearance, UA Cloudy     pH, UA <=5.0     Specific Gravity, UA 1.024     Glucose, UA Negative     Ketones, UA Trace     Bilirubin, UA Moderate (2+)     Blood, UA Moderate (2+)     Protein,  mg/dL (2+)     Leuk Esterase, UA Trace     Nitrite, UA  "Positive     Urobilinogen, UA 1.0 E.U./dL    CBC Auto Differential [[id number]]  (Abnormal) Collected:  04/03/20 1439    Specimen:  Blood Updated:  04/03/20 1518     WBC 17.03 10*3/mm3      RBC 3.83 10*6/mm3      Hemoglobin 11.5 g/dL      Hematocrit 34.3 %      MCV 89.6 fL      MCH 30.0 pg      MCHC 33.5 g/dL      RDW 13.8 %      RDW-SD 45.0 fl      MPV 10.0 fL      Platelets 167 10*3/mm3     Narrative:       ckd    Procalcitonin [[id number]]  (Abnormal) Collected:  04/03/20 1439    Specimen:  Blood Updated:  04/03/20 1518     Procalcitonin 5.87 ng/mL     Narrative:       As a Marker for Sepsis (Non-Neonates):   1. <0.5 ng/mL represents a low risk of severe sepsis and/or septic shock.  1. >2 ng/mL represents a high risk of severe sepsis and/or septic shock.    As a Marker for Lower Respiratory Tract Infections that require antibiotic therapy:  PCT on Admission     Antibiotic Therapy             6-12 Hrs later  > 0.5                Strongly Recommended            >0.25 - <0.5         Recommended  0.1 - 0.25           Discouraged                   Remeasure/reassess PCT  <0.1                 Strongly Discouraged          Remeasure/reassess PCT      As 28 day mortality risk marker: \"Change in Procalcitonin Result\" (> 80 % or <=80 %) if Day 0 (or Day 1) and Day 4 values are available. Refer to http://www.[employer]-pct-calculator.com/   Change in PCT <=80 %   A decrease of PCT levels below or equal to 80 % defines a positive change in PCT test result representing a higher risk for 28-day all-cause mortality of patients diagnosed with severe sepsis or septic shock.  Change in PCT > 80 %   A decrease of PCT levels of more than 80 % defines a negative change in PCT result representing a lower risk for 28-day all-cause mortality of patients diagnosed with severe sepsis or septic shock.                Results may be falsely decreased if patient taking Biotin.     Manual Differential [[id number]]  (Abnormal) Collected:  04/03/20 " 1439    Specimen:  Blood Updated:  04/03/20 1518     Neutrophil % 69.0 %      Lymphocyte % 5.0 %      Monocyte % 8.0 %      Bands %  18.0 %      Neutrophils Absolute 14.82 10*3/mm3      Lymphocytes Absolute 0.85 10*3/mm3      Monocytes Absolute 1.36 10*3/mm3      Poikilocytes Slight/1+     Polychromasia Slight/1+     WBC Morphology Normal     Platelet Morphology Normal    Lactic Acid, Plasma [[id number]]  (Abnormal) Collected:  04/03/20 1439    Specimen:  Blood Updated:  04/03/20 1514     Lactate 3.3 mmol/L     Lactic Acid, Reflex Timer (This will reflex a repeat order 3-3:15 hours after ordered.) [[id number]] Collected:  04/03/20 1439    Specimen:  Blood Updated:  04/03/20 1514    Comprehensive Metabolic Panel [[id number]]  (Abnormal) Collected:  04/03/20 1439    Specimen:  Blood Updated:  04/03/20 1513     Glucose 206 mg/dL      BUN 50 mg/dL      Creatinine 1.57 mg/dL      Sodium 131 mmol/L      Potassium 4.3 mmol/L      Chloride 94 mmol/L      CO2 21.0 mmol/L      Calcium 8.5 mg/dL      Total Protein 6.7 g/dL      Albumin 3.20 g/dL      ALT (SGPT) 61 U/L      AST (SGOT) 93 U/L      Alkaline Phosphatase 155 U/L      Total Bilirubin 3.8 mg/dL      eGFR Non African Amer 42 mL/min/1.73      Globulin 3.5 gm/dL      A/G Ratio 0.9 g/dL      BUN/Creatinine Ratio 31.8     Anion Gap 16.0 mmol/L     Narrative:       GFR Normal >60  Chronic Kidney Disease <60  Kidney Failure <15      Lactate Dehydrogenase [[id number]]  (Normal) Collected:  04/03/20 1439    Specimen:  Blood Updated:  04/03/20 1513      U/L     Troponin [[id number]]  (Normal) Collected:  04/03/20 1439    Specimen:  Blood Updated:  04/03/20 1512     Troponin T 0.026 ng/mL     Narrative:       Troponin T Reference Range:  <= 0.03 ng/mL-   Negative for AMI  >0.03 ng/mL-     Abnormal for myocardial necrosis.  Clinicians would have to utilize clinical acumen, EKG, Troponin and serial changes to determine if it is an Acute Myocardial Infarction or myocardial  injury due to an underlying chronic condition.       Results may be falsely decreased if patient taking Biotin.      BNP [[id number]]  (Abnormal) Collected:  04/03/20 1439    Specimen:  Blood Updated:  04/03/20 1511     proBNP 4,334.0 pg/mL     Narrative:       Among patients with dyspnea, NT-proBNP is highly sensitive for the detection of acute congestive heart failure. In addition NT-proBNP of <300 pg/ml effectively rules out acute congestive heart failure with 99% negative predictive value.    Results may be falsely decreased if patient taking Biotin.      aPTT [[id number]]  (Abnormal) Collected:  04/03/20 1439    Specimen:  Blood Updated:  04/03/20 1504     PTT 38.2 seconds     Protime-INR [[id number]]  (Abnormal) Collected:  04/03/20 1439    Specimen:  Blood Updated:  04/03/20 1504     Protime 19.4 Seconds      INR 1.64    Blood Gas, Arterial [[id number]]  (Abnormal) Collected:  04/03/20 1456    Specimen:  Arterial Blood Updated:  04/03/20 1459     Site Right Radial     Aldo's Test Positive     pH, Arterial 7.460 pH units      Comment: 83 Value above reference range        pCO2, Arterial 34.7 mm Hg      Comment: 84 Value below reference range        pO2, Arterial 80.9 mm Hg      Comment: 84 Value below reference range        HCO3, Arterial 24.7 mmol/L      Base Excess, Arterial 1.1 mmol/L      O2 Saturation, Arterial 97.1 %      Temperature 37.0 C      Barometric Pressure for Blood Gas 751 mmHg      Modality Room Air     Ventilator Mode NA     Collected by [id number]     Comment: Meter: [id number]     :  [id number]       Blood Culture - Blood, Arm, Right [[id number]] Collected:  04/03/20 1439    Specimen:  Blood from Arm, Right Updated:  04/03/20 1457        Imaging Results (Last 24 Hours)     Procedure Component Value Units Date/Time    XR Chest 1 View [[id number]] Collected:  04/03/20 1646     Updated:  04/03/20 1651    Narrative:       Exam:   XR CHEST 1 VW-       Date:  4/3/2020      History:  Male, age   84 years;soa     COMPARISON:  Chest x-ray dated 2/23/2016     Findings :  Left-sided cardiac device with leads projecting of the right atrium and  right ventricle. Median sternotomy wires appear similar. Prior valvular  replacement. Low lung volumes.  Borderline size of the cardiac size silhouette. Lungs are without focal  infiltrate, mass or effusions.  The bones show no acute pathology.         Impression:       Impression:     1.  Borderline size of the cardiac mediastinal silhouette. No convincing  evidence of fluid overload.  2.  Lower lung volumes.     This report was finalized on 04/03/2020 16:48 by Dr. Irene Valles MD.        I have personally reviewed and interpreted the radiology studies and ECG obtained at time of admission.     Assessment / Plan     Assessment:   Active Hospital Problems    Diagnosis   • Acute UTI (urinary tract infection)       · Abnormal urinalysis possible urinary tract infection   · urinary incontinence  · Abdominal pain/dry heaving and reported vomiting  · Elevated BNP in the setting of paroxysmal nocturnal dyspnea, swelling, shortness of breath and patient with prior open heart surgery and valvuloplasty  · Leukocytosis with bandemia  · Fall  · Mild transaminitis possibly from hepatic congestion from possibility of heart failure  · Lactic acidosis possibly from sepsis.  Blood pressure is adequate.  Patient not meeting requirement for IV fluid bolus.  Also in the light of possibility of heart failure that I did not choose to give patient IV fluid bolus, in fact the hyponatremia may be from fluid retention as evidenced by crackles on exam, symptoms of PND and swelling.  Therefore I am going to give him diuretic and follow his renal function    Plan:      Echocardiogram  CK (given history of fall and in the setting of presence of blood with minimal RBC in urine)  Empiric treatment for urinary tract infection; abdominopelvic CT scan without contrast  [facility]  Sepsis  protocol   Other plans per orders    Code Status:   Full code     I discussed the patient's findings and my recommendations with the patient and nurse Ronda    Estimated length of stay  To be determined  Patient seen and examined by me on   Bharath Maloney MD   04/03/20   17:21              Electronically signed by Bharath Maloney MD at 04/03/20 1820          Physician Progress Notes (most recent note)      Luis Knox DO at 04/08/20 0925              [facility] Medicine Services  INPATIENT PROGRESS NOTE    Patient Name: Jesus Smith  Date of Admission: 4/3/2020  Today's Date: 04/08/20  Length of Stay: 5  Primary Care Physician: Matheus Olivera PA    Subjective   Chief Complaint: Follow-up weakness    HPI:  Patient seen and examined.  He is alert and oriented but appears confused at times.  Said he is passing gas but has not had a bowel movement today.  Nurses state he has not been having any flatulence or bowel movements.  Also hard to interpret how much he is taking.  Says he is tolerating p.o. fine but then says he was nauseous and threw up little bit.  Denies any chest pain or shortness of breath.  Overall he feels better abdominal pain is improving.     ROS:  All pertinent negatives and positives are as above. All other systems have been reviewed and are negative unless otherwise stated.     Objective    Temp:  [97.4 °F (36.3 °C)-98.2 °F (36.8 °C)] 97.4 °F (36.3 °C)  Heart Rate:  [] 68  Resp:  [16-18] 16  BP: (105-164)/(45-82) 164/73  Physical Exam  GEN: Awake, alert, interactive, appears more comfortable today and in NAD  HEENT: PERRLA, EOMI, Anicteric, Trachea midline  Lungs: CTAB, no wheezing/rales/rhonchi  Heart: RRR, +S1/s2, no rub  ABD: Right upper quadrant bandaged with drains in place w/ serous/bilious output, no guarding  Extremities: atraumatic, no cyanosis, trace edema b/l LE  Neuro: AAOx3, no focal deficits  Psych: normal mood  & affect        Results Review:  I have reviewed the labs, radiology results, and diagnostic studies.    Laboratory Data:   Results from last 7 days   Lab Units 04/08/20  0454 04/07/20  0507 04/06/20  0441   WBC 10*3/mm3 13.56* 13.30* 14.05*   HEMOGLOBIN g/dL 10.8* 11.4* 12.3*   HEMATOCRIT % 34.2* 35.7* 38.2   PLATELETS 10*3/mm3 181 161 183        Results from last 7 days   Lab Units 04/08/20  0454 04/07/20  0507 04/06/20 0446   SODIUM mmol/L 137 135* 134*   POTASSIUM mmol/L 3.9 4.3 4.6   CHLORIDE mmol/L 99 98 95*   CO2 mmol/L 27.0 26.0 26.0   BUN mg/dL 29* 38* 50*   CREATININE mg/dL 1.08 1.08 1.33*   CALCIUM mg/dL 7.9* 8.0* 8.5*   BILIRUBIN mg/dL 1.8* 1.4* 1.3*   ALK PHOS U/L 94 105 129*   ALT (SGPT) U/L 55* 70* 99*   AST (SGOT) U/L 33 36 57*   GLUCOSE mg/dL 125* 123* 157*       Culture Data:   Blood Culture   Date Value Ref Range Status   04/03/2020 Abnormal Stain (C)  Preliminary       Radiology Data:   Imaging Results (Last 24 Hours)     ** No results found for the last 24 hours. **          I have reviewed the patient's current medications.     Assessment/Plan     Active Hospital Problems    Diagnosis   • **Bacteremia   • History of prosthetic aortic valve   • Abnormal LFTs   • Acute cholecystitis   • Benign essential HTN   • CKD (chronic kidney disease) stage 3, GFR 30-59 ml/min (CMS/[facility])   • Acute UTI (urinary tract infection)       #1  E. coli bacteremia -patient's first blood cultures come back positive for E. coli, pansensitive.  Second culture ended up being negative.  This occurred in the setting of acute cholecystitis.  Has been covered with Zosyn.  Repeat blood cultures from 4/5 are NTD. Changed from zosyn to ceftriaxone on 4/7.     #2 ?uti -abnormal urinalysis on arrival with leukocyte esterase and nitrates.  However there was 0-2 white cells and trace bacteria.  Apparently due to this was never sent for culture.  He has not had any repeat fevers or worsening of condition with current abx.     #3 CKD  "3 -based on all labs prior to this hospitalization, however renal u/s is normal. Renal function has now improved w/ GFR of 65.     #4  acute cholecystitis - s/p OR on 4/5 for open diaz tube placement. drains in place. Was on zosyn, now on ceftriaxone.      #5 essential hypertension -by history.  Pressure stable here off meds, starting to elevate, may need to add back losartan soon    #6 hyponatremia - resolved w/ ivf    #7  DM 2 -patient known borderline diabetic.  [facility]  A1c found to be 6.6 here.  Covering with sliding scale at this time.  Hypoglycemia protocol in place.    #8 elevated CK -had an elevated CK of 756 on arrival.  No muscle pains. Now normalized    #9 prosthetic AVR -was on Coumadin prior.  Denies any history of arrhythmias.  On full dose lovenox. Plan for coumadin resumption when taking stable po with adequate bms     Discharge Planning: Ongoing, likely several more days. May need SNF at d/c.    Luis Knox DO   04/08/20   09:25      Electronically signed by Luis Knox DO at 04/08/20 1127          Consult Notes (most recent note)      Agustina Saleem MD at 04/04/20 1417          Agustina PATEL. MD [facility] Consult Note    Referring Provider: Bharath Maloney*    Patient Care Team:  Matheus Olivera PA as PCP - General (Physician Assistant)    Chief complaint abdominal pain    Subjective .     History of present illness:  The patient is an 84-year-old male who presents complaining of right upper quadrant pain, nausea, dry heaves, and weakness.  He was seen in the ED and found to have bacteremia, elevated WBC, and elevated LFT\"s.  CT a/p was then performed and the gallbladder was found to have significant surrounding inflammatory changes, wall thickening, sludge, and stones.  There was no evidence of ductal dilatation.  He has been admitted and placed on IV abx.  He states that he is feeling better.    Review of Systems  All systems " were reviewed and negative for    Constitution:chills, fevers, night sweats and weight loss, weight gain  Eyes:  double vision, blurriness and loss of vision  ENT:  earaches, hearing loss and hoarseness  Respiratory:  cough, dry, cough, productive, hemoptysis and shortness of air  Cardiovascular:  chest pressure / pain, at rest, chest pressure / pain, on exertion, irregular pulse and palpitations  Gastrointestinal: bright red blood per rectum, change in bowel habits, constipation, diarrhea, heartburn, hematemesis, melena, nausea, pain and vomiting  Genitourinary:  difficulty / inability to void, pain, blood in urine and painful urination  Integument:  itching, rash, redness and swelling  Breast:  lump / mass, nipple discharge and pain  Hematologic / Lymphatic: easy bruising and lymphadenopathy  Musculoskeletal: joint pain, muscle pain and muscle weakness  Neurological: dizziness, loss of consciousness, numbness, vertigo and weakness  Behavioral/Psych: anxiety and depression  Endocrine: diabetes, thyroid disorder      History  Past Medical History:   Diagnosis Date   • Bradycardia    • Coronary artery disease    • GERD (gastroesophageal reflux disease)    • Hypertension    • Injury of back    • TIA (transient ischemic attack)    ,   Past Surgical History:   Procedure Laterality Date   • APPENDECTOMY     • BACK SURGERY      Fusion   • CARDIAC SURGERY      Open Heart   • HERNIA REPAIR     • JOINT REPLACEMENT Left     s/p L hip replacement   • PACEMAKER IMPLANTATION     • STOMACH SURGERY     , History reviewed. No pertinent family history.,   Social History     Tobacco Use   • Smoking status: Never Smoker   • Smokeless tobacco: Never Used   Substance Use Topics   • Alcohol use: No   • Drug use: No   ,   Medications Prior to Admission   Medication Sig Dispense Refill Last Dose   • aspirin 81 MG chewable tablet Chew 81 mg Daily.   Past Week at Unknown time   • Cholecalciferol (VITAMIN D3) 125 MCG (5000 UT) capsule capsule  Take 5,000 Units by mouth Daily.   Past Week at Unknown time   • losartan (COZAAR) 100 MG tablet Take 50 mg by mouth Daily.   Past Week at Unknown time   • Multiple Vitamins-Minerals (MULTIVITAMIN WITH MINERALS) tablet tablet Take 1 tablet by mouth 3 (Three) Times a Day.   Past Week at Unknown time   • vitamin B-12 (CYANOCOBALAMIN) 1000 MCG tablet Take 1,000 mcg by mouth Daily.   Past Week at Unknown time   • warfarin (COUMADIN) 1 MG tablet Take  by mouth Take As Directed. Take one whole-tablet (1 mg) on Monday, Wednesday, Friday, and Saturday.  Take one half-tablet (0.5 mg) on Sunday, Tuesday, and Thursday.   Past Week at Unknown time    and Allergies:  Patient has no known allergies.    Current Facility-Administered Medications:   •  aspirin chewable tablet 81 mg, 81 mg, Oral, Daily, Bharath Maloney MD, 81 mg at 04/04/20 0823  •  bisoprolol (ZEBeta) tablet 10 mg, 10 mg, Oral, Daily, Bharath Maloney MD, 10 mg at 04/04/20 0823  •  dextrose (D50W) 25 g/ 50mL Intravenous Solution 25 g, 25 g, Intravenous, Q15 Min PRN, Luis Knox DO  •  dextrose (GLUTOSE) oral gel 15 g, 15 g, Oral, Q15 Min PRN, Luis Knox DO  •  enoxaparin (LOVENOX) syringe 30 mg, 30 mg, Subcutaneous, Q24H, Bharath Maloney MD, 30 mg at 04/03/20 2124  •  glucagon (human recombinant) (GLUCAGEN DIAGNOSTIC) injection 1 mg, 1 mg, Subcutaneous, Q15 Min PRN, Luis Knox DO  •  insulin regular (humuLIN R,novoLIN R) injection 2-7 Units, 2-7 Units, Subcutaneous, Q6H, Luis Knox DO, 2 Units at 04/04/20 1323  •  ondansetron (ZOFRAN) injection 4 mg, 4 mg, Intravenous, Q6H PRN, Bharath Maloney MD  •  piperacillin-tazobactam (ZOSYN) 3.375 g in iso-osmotic dextrose 50 ml (premix), 3.375 g, Intravenous, Q8H, Luis Knox, DO  •  [COMPLETED] Insert peripheral IV, , , Once **AND** sodium chloride 0.9 % flush 10 mL, 10 mL, Intravenous, PRN, Bharath Maloney MD  •  sodium chloride 0.9 % flush 10  mL, 10 mL, Intravenous, Q12H, Bharath Maloney MD, 10 mL at 04/04/20 0823  •  sodium chloride 0.9 % flush 10 mL, 10 mL, Intravenous, PRN, Bharath Maloney MD    Objective     Vital Signs   Temp:  [97.5 °F (36.4 °C)-97.8 °F (36.6 °C)] 97.8 °F (36.6 °C)  Heart Rate:  [50-64] 51  Resp:  [18-22] 18  BP: (107-152)/(55-64) 121/64    Physical Exam:  General appearance - alert, well appearing, and in no distress  Mental status - alert, oriented to person, place, and time  Neck - supple, no significant adenopathy  Chest - clear to auscultation, no wheezes, rales or rhonchi, symmetric air entry  Heart - normal rate, regular rhythm, normal S1, S2, no murmurs, rubs, clicks or gallops  Abdomen - soft, nontender, nondistended, tender RUQ, no masses  Neurological - alert, oriented, normal speech, no focal findings or movement disorder noted  Musculoskeletal - no joint tenderness, deformity or swelling  Extremities - peripheral pulses normal, no pedal edema, no clubbing or cyanosis    Results Review:    Lab Results (last 24 hours)     Procedure Component Value Units Date/Time    Blood Culture - Blood, Arm, Right [[id number]] Collected:  04/03/20 1532    Specimen:  Blood from Arm, Right Updated:  04/04/20 1600     Blood Culture No growth at 24 hours    POC Glucose Once [[id number]]  (Abnormal) Collected:  04/04/20 1320    Specimen:  Blood Updated:  04/04/20 1332     Glucose 174 mg/dL      Comment: : [id number] Kvng Tiesha  Peggy ID: [id number]       Hemoglobin A1c [[id number]]  (Abnormal) Collected:  04/04/20 0520    Specimen:  Blood Updated:  04/04/20 1322     Hemoglobin A1C 6.60 %     Narrative:       Hemoglobin A1C Ranges:    Increased Risk for Diabetes  5.7% to 6.4%  Diabetes                     >= 6.5%  Diabetic Goal                < 7.0%    Troponin [[id number]]  (Normal) Collected:  04/04/20 1252    Specimen:  Blood Updated:  04/04/20 1317     Troponin T 0.023 ng/mL     Narrative:       Troponin T  Reference Range:  <= 0.03 ng/mL-   Negative for AMI  >0.03 ng/mL-     Abnormal for myocardial necrosis.  Clinicians would have to utilize clinical acumen, EKG, Troponin and serial changes to determine if it is an Acute Myocardial Infarction or myocardial injury due to an underlying chronic condition.       Results may be falsely decreased if patient taking Biotin.      CK [[id number]]  (Abnormal) Collected:  04/04/20 1252    Specimen:  Blood Updated:  04/04/20 1317     Creatine Kinase 423 U/L     Lactic Acid, Plasma [[id number]]  (Abnormal) Collected:  04/04/20 1209    Specimen:  Blood Updated:  04/04/20 1253     Lactate 2.3 mmol/L     Blood Culture ID, PCR - Blood, Arm, Right [[id number]]  (Abnormal) Collected:  04/03/20 1439    Specimen:  Blood from Arm, Right Updated:  04/04/20 1239     BCID, PCR Escherichia coli. Identification by BCID PCR.     BOTTLE TYPE Aerobic Bottle    Blood Culture - Blood, Arm, Right [[id number]]  (Abnormal) Collected:  04/03/20 1439    Specimen:  Blood from Arm, Right Updated:  04/04/20 1100     Blood Culture Abnormal Stain     Gram Stain Aerobic Bottle Gram negative bacilli    CBC & Differential [[id number]] Collected:  04/04/20 0520    Specimen:  Blood Updated:  04/04/20 0650    Narrative:       The following orders were created for panel order CBC & Differential.  Procedure                               Abnormality         Status                     ---------                               -----------         ------                     CBC Auto Differential[[id number]]        Abnormal            Final result                 Please view results for these tests on the individual orders.    CBC Auto Differential [[id number]]  (Abnormal) Collected:  04/04/20 0520    Specimen:  Blood Updated:  04/04/20 0650     WBC 12.98 10*3/mm3      RBC 3.75 10*6/mm3      Hemoglobin 11.1 g/dL      Hematocrit 33.7 %      MCV 89.9 fL      MCH 29.6 pg      MCHC 32.9 g/dL      RDW 13.7 %      RDW-SD 45.4 fl      MPV  10.5 fL      Platelets 163 10*3/mm3     Manual Differential [[id number]]  (Abnormal) Collected:  04/04/20 0520    Specimen:  Blood Updated:  04/04/20 0650     Neutrophil % 81.0 %      Lymphocyte % 5.0 %      Monocyte % 6.0 %      Bands %  8.0 %      Neutrophils Absolute 11.55 10*3/mm3      Lymphocytes Absolute 0.65 10*3/mm3      Monocytes Absolute 0.78 10*3/mm3      [place] Cells Slight/1+     Poikilocytes Mod/2+     Dohle Bodies Present     Vacuolated Neutrophils Large/3+     Giant Platelets Slight/1+    Comprehensive Metabolic Panel [[id number]]  (Abnormal) Collected:  04/04/20 0520    Specimen:  Blood Updated:  04/04/20 0557     Glucose 203 mg/dL      BUN 50 mg/dL      Creatinine 1.32 mg/dL      Sodium 131 mmol/L      Potassium 3.9 mmol/L      Chloride 96 mmol/L      CO2 22.0 mmol/L      Calcium 8.3 mg/dL      Total Protein 6.6 g/dL      Albumin 3.00 g/dL      ALT (SGPT) 73 U/L      AST (SGOT) 94 U/L      Alkaline Phosphatase 155 U/L      Total Bilirubin 3.0 mg/dL      eGFR Non African Amer 52 mL/min/1.73      Globulin 3.6 gm/dL      A/G Ratio 0.8 g/dL      BUN/Creatinine Ratio 37.9     Anion Gap 13.0 mmol/L     Narrative:       GFR Normal >60  Chronic Kidney Disease <60  Kidney Failure <15      Lactic Acid, Plasma [[id number]]  (Normal) Collected:  04/04/20 0520    Specimen:  Blood Updated:  04/04/20 0543     Lactate 1.8 mmol/L     Lactic Acid, Plasma [[id number]]  (Abnormal) Collected:  04/03/20 2335    Specimen:  Blood Updated:  04/04/20 0016     Lactate 2.2 mmol/L     Lactic Acid, Reflex [[id number]]  (Abnormal) Collected:  04/03/20 1854    Specimen:  Blood from Arm, Right Updated:  04/03/20 1931     Lactate 2.2 mmol/L     CK [[id number]]  (Abnormal) Collected:  04/03/20 1439    Specimen:  Blood Updated:  04/03/20 1842     Creatine Kinase 756 U/L     Lactic Acid, Reflex Timer (This will reflex a repeat order 3-3:15 hours after ordered.) [[id number]] Collected:  04/03/20 1439    Specimen:  Blood Updated:  04/03/20 1819       Hold Tube Hold for add-ons.     Comment: Auto resulted.           Imaging Results (Last 24 Hours)     Procedure Component Value Units Date/Time    CT Abdomen Pelvis Without Contrast [[id number]] Collected:  04/03/20 1817     Updated:  04/03/20 1826    Narrative:       CT ABDOMEN PELVIS WO CONTRAST- 4/3/2020 6:09 PM CDT     HISTORY: Abdominal pain, unspecified; N39.0-Urinary tract infection,  site not specified; R65.10-Systemic inflammatory response syndrome  (sirs) of non-infectious origin without acute organ dysfunction;  N17.9-Acute kidney failure, unspecified; R74.0-Nonspecific elevation of  levels of transaminase and lactic acid dehydrogenase (ldh)      COMPARISON: None     DOSE LENGTH PRODUCT: 547 mGy cm. Automated exposure control was also  utilized to decrease patient radiation dose.     TECHNIQUE: Axial images of the abdomen pelvis are obtained without IV or  oral contrast     FINDINGS:  The nonenhanced liver and spleen are unremarkable. There is  fatty atrophy of the pancreas. The gallbladder is distended containing  sludge and likely noncalcified stones. There is gallbladder wall  thickening with pericholecystic inflammation. There is trace perihepatic  ascites. No bile duct dilatation identified. Findings suspicious for  acute cholecystitis.     The adrenal glands are prominent but do maintain adreniform shape. There  is nonspecific perinephric fat stranding but no loculated fluid. There  is a left renal cyst. There is no hydronephrosis. The bladder appears  intact.     There is no free intraperitoneal air or loculated abscess. There is  sigmoid colonic diverticulosis with no convincing acute diverticulitis.  Wall thickening of the hepatic flexure may be reactive from the  inflammatory changes described in the right upper quadrant. No evidence  for bowel obstruction. Stomach distended with fluid. Fatty containing  small umbilical hernia. Moderate vascular calcification with no  aneurysm. No pathologic  lymphadenopathy. Reactive appearing portacaval  lymph node.     Prior mediastinal surgery. Prosthetic coronary valve. Cardiac pacer  device. Visible lung bases unremarkable.     Postoperative changes of the lumbar spine with left hip prosthesis.  Degenerative change seen throughout the regional skeleton with a left  convexity to the lumbar spine.       Impression:       1. Findings supporting acute cholecystitis as described above. No  biliary dilatation identified. Trace perihepatic and pelvic ascites.  Wall thickening of the hepatic flexure of the colon may be reactive  given the inflammation of the right upper quadrant.  2. No bowel obstruction. Moderately distended fluid-filled stomach. No  bowel dilatation. Sigmoid colonic diverticulosis.  3. Diffuse vascular calcification. Prior mediastinal surgery with  prosthetic coronary valves. Cardiac pacer device.  This report was finalized on 2020 18:23 by Dr. Opal Rashid MD.                Assessment/Plan       Acute cholecystitis with cholelithiasis.  Due to the degree of inflammatory changes about the gallbladder and his anticoagulated state, he will undergo open cholecystostomy tube placement.  Suggest to continue iv abx.  The risks, benefits, complications, and possible alternatives were discussed with the patient who agreed to proceed.      Agustina Saleem MD  20  17:45            Electronically signed by Agustina Saleem MD at 20 [phone]          Physical Therapy Notes (most recent note)      Melissa Bains, SUSAN at 20 [phone]  Version 1 of 1         Acute Care - Physical Therapy Treatment Note  [facility]     Patient Name: Jesus Smith  : 1935  MRN: [id number]  Today's Date: 2020             Admit Date: 4/3/2020    Visit Dx:    ICD-10-CM ICD-9-CM   1. Acute UTI (urinary tract infection) N39.0 599.0   2. Systemic inflammatory response syndrome (CMS/HCC) R65.10 995.90   3. NOLAN (acute kidney injury) (CMS/HCC) N17.9 584.9   4.  Transaminitis R74.0 790.4   5. Acute cholecystitis K81.0 575.0   6. Impaired functional mobility and activity tolerance Z74.09 V49.89     Patient Active Problem List   Diagnosis   • Acute UTI (urinary tract infection)   • Bacteremia   • Abnormal LFTs   • Acute cholecystitis   • Benign essential HTN   • CKD (chronic kidney disease) stage 3, GFR 30-59 ml/min (CMS/HCC)   • History of prosthetic aortic valve       Therapy Treatment    Rehabilitation Treatment Summary     Row Name 04/08/20 0900 04/08/20 0803          Treatment Time/Intention    Discipline  physical therapy assistant  -  physical therapy assistant  -     Document Type  therapy note (daily note)  -LC2  --     Subjective Information  complains of;dyspnea;pain  -2  --     Mode of Treatment  physical therapy  -2  --     Comment  --  eating breakfast, will check back  -2     Existing Precautions/Restrictions  fall NAM drain x 2.  -LC2  --     Recorded by [] Melissa Bains, PTA 04/08/20 0901  [LC2] Melissa Bains, [facility] 04/08/20 0951 [LC] Melissa Bains, PTA 04/08/20 0804  [LC2] Melissa Bains, PTA 04/08/20 0808     Row Name 04/08/20 0900             Bed Mobility Assessment/Treatment    Supine-Sit [place] (Bed Mobility)  verbal cues;contact guard  -      Sit-Supine [place] (Bed Mobility)  verbal cues;moderate assist (50% patient effort)  -      Assistive Device (Bed Mobility)  bed rails;head of bed elevated  -      Recorded by [] Melissa Bains, PTA 04/08/20 0951      Row Name 04/08/20 0900             Transfer Assessment/Treatment    Transfer Assessment/Treatment  stand-sit transfer  -      Recorded by [] Melissa Bains, PTA 04/08/20 0951      Row Name 04/08/20 0900             Sit-Stand Transfer    Sit-Stand [place] (Transfers)  verbal cues;contact guard;minimum assist (75% patient effort) x3 reps  -      Assistive Device (Sit-Stand Transfers)  walker, front-wheeled  -      Recorded by [] Melissa Bains, PTA 04/08/20 0951       Row Name 04/08/20 0900             Stand-Sit Transfer    Stand-Sit [place] (Transfers)  verbal cues;contact guard  -LC      Assistive Device (Stand-Sit Transfers)  walker, front-wheeled  -LC      Recorded by [LC] Melissa Bains, [facility] 04/08/20 0951      Row Name 04/08/20 0900             Gait/Stairs Assessment/Training    [place] Level (Gait)  verbal cues;contact guard  -LC      Assistive Device (Gait)  walker, front-wheeled  -LC      Distance in Feet (Gait)  5 steps in each direction  -LC      Pattern (Gait)  step-to  -LC      Deviations/Abnormal Patterns (Gait)  gait speed decreased;stride length decreased  -LC      Bilateral Gait Deviations  forward flexed posture  -LC      Recorded by [LC] Melissa Bains, [facility] 04/08/20 0951      Row Name 04/08/20 0900             Motor Skills Assessment/Interventions    Additional Documentation  Therapeutic Exercise (Group);Therapeutic Exercise Interventions (Group)  -LC      Recorded by [] Melissa Bains, [facility] 04/08/20 0951      Row Name 04/08/20 0900             Therapeutic Exercise    Lower Extremity (Therapeutic Exercise)  LAQ (long arc quad), bilateral;marching while seated;marching while standing  -      Lower Extremity Range of Motion (Therapeutic Exercise)  hip abduction/adduction, bilateral;ankle dorsiflexion/plantar flexion, bilateral  -      Exercise Type (Therapeutic Exercise)  AROM (active range of motion)  -LC      Position (Therapeutic Exercise)  seated;standing  -LC      Sets/Reps (Therapeutic Exercise)  15  -LC      Recorded by [LC] Melissa Bains, [facility] 04/08/20 0951      Row Name 04/08/20 0900             Static Standing Balance    Level of [place] (Supported Standing, Static Balance)  contact guard assist  -LC      Assistive Device Utilized (Supported Standing, Static Balance)  walker, rolling  -LC      Recorded by [LC] Melissa Bains, PTA 04/08/20 0951      Row Name 04/08/20 0900             Positioning and Restraints    Pre-Treatment Position   in bed  -LC      Post Treatment Position  bed  -LC      In Bed  fowlers;call light within reach;encouraged to call for assist;SCD pump applied;legs elevated  -      Recorded by [LC] Melissa Bains, PTA 04/08/20 0951      Row Name 04/08/20 0900             Pain Scale: Numbers Pre/Post-Treatment    Pain Scale: Numbers, Pretreatment  9/10  -LC      Pain Location - Side  Right  -LC      Pain Location - Orientation  incisional  -LC      Pain Location  abdomen  -LC      Pre/Post Treatment Pain Comment  c/o dyspnea with mobility  -LC      Pain Intervention(s)  Medication (See MAR)  -      Recorded by [LC] Melissa Bains, PTA 04/08/20 0951      Row Name                Wound 04/03/20 2115 Left coccyx    Wound - Properties Group Date first assessed: 04/03/20 [DF] Time first assessed: 2115 [DF] Present on Hospital Admission: Y [FT] Side: Left [FT] Location: coccyx [DF] Recorded by:  [DF] Casi Ayala RN 04/04/20 0101 [FT] Letty Quiroz RN 04/04/20 0115    Row Name                Wound 04/05/20 0901 Right upper abdomen Incision    Wound - Properties Group Date first assessed: 04/05/20 [JH] Time first assessed: 0901 [JH] Side: Right [JH2] Orientation: upper [JH2] Location: abdomen [JH] Primary Wound Type: Incision [JH] Recorded by:  [JH] Alejandrina Maravilla RN 04/05/20 0901 [JH2] Alejandrina Maravilla RN 04/05/20 0906    Row Name 04/08/20 0900             Plan of Care Review    Plan of Care Reviewed With  patient  -LC      Progress  improving  -      Outcome Summary  PT tx completed. CGA for supine to sit with HOB fully elevated and Mod A for sit to supine. CGA-Min for sit to stands with RWX x3 reps. Patient able to take 5 steps forward/backward and right/left with CGA and cues for AD safety. Performed seated BLE AROM x15 as well as standing marches x15 with RWX CGA. Patient c/o dyspnea with all mobility and requried frequent rest breaks. Will cont to progress with PT for improved strength and endurance.  -       Recorded by [LC] Melissa Bains, PTA 04/08/20 0951        User Key  (r) = Recorded By, (t) = Taken By, (c) = Cosigned By    Initials Name Effective Dates Discipline    DF Casi Ayala RN 08/02/16 -  Nurse    Letty Chiang RN 08/02/16 -  Nurse    Alejandrina Jj RN 06/07/17 -  Nurse    Melissa Najera, PTA 10/18/19 -  PT          Wound 04/03/20 2115 Left coccyx (Active)   Dressing Appearance open to air 4/7/2020  8:20 PM   Closure Open to air 4/7/2020  8:20 PM   Base red;blanchable 4/7/2020  8:20 PM   Periwound dry;intact 4/7/2020  8:20 PM   Drainage Amount none 4/7/2020  8:20 PM   Dressing Care, Wound open to air 4/7/2020  8:20 PM       Wound 04/05/20 0901 Right upper abdomen Incision (Active)   Dressing Appearance dry;intact 4/7/2020  8:20 PM   Closure NEETA 4/7/2020  8:20 PM   Base dressing in place, unable to visualize 4/7/2020  8:20 PM   Drainage Amount none 4/7/2020  8:20 PM   Dressing Care, Wound gauze 4/7/2020  8:20 PM               PT Recommendation and Plan     Plan of Care Reviewed With: patient  Progress: improving  Outcome Summary: PT tx completed. CGA for supine to sit with HOB fully elevated and Mod A for sit to supine. CGA-Min for sit to stands with RWX x3 reps. Patient able to take 5 steps forward/backward and right/left with CGA and cues for AD safety. Performed seated BLE AROM x15 as well as standing marches x15 with RWX CGA. Patient c/o dyspnea with all mobility and requried frequent rest breaks. Will cont to progress with PT for improved strength and endurance.     Time Calculation:   PT Charges     Row Name 04/08/20 0951             Time Calculation    Start Time  0900  -      Stop Time  0940  -      Time Calculation (min)  40 min  -      PT Received On  04/08/20  -         Time Calculation- PT    Total Timed Code Minutes- PT  40 minute(s)  -        User Key  (r) = Recorded By, (t) = Taken By, (c) = Cosigned By    Initials Name Provider Type    LC Melissa Bains, PTA  Physical Therapy Assistant        Therapy Charges for Today     Code Description Service Date Service Provider Modifiers Qty    56072161467 HC PT THERAPEUTIC ACT EA 15 MIN 4/8/2020 Melissa Bains PTA GP 2    23863094550 HC PT THER PROC EA 15 MIN 4/8/2020 Melissa Bains PTA GP 1          PT G-Codes  Outcome Measure Options: AM-PAC 6 Clicks Basic Mobility (PT)  AM-PAC 6 Clicks Score (PT): 15    Melissa Bains PTA  4/8/2020         Electronically signed by Melissa Bains PTA at 04/08/20 0954       Occupational Therapy Notes (most recent note)    No notes exist for this encounter.         Speech Language Pathology Notes (most recent note)    No notes exist for this encounter.         Discharge Summary    No notes of this type exist for this encounter.

## 2020-04-08 NOTE — PROGRESS NOTES
Continued Stay Note   Ayanna     Patient Name: Jesus Smith  MRN: [id number]  Today's Date: 4/8/2020    Admit Date: 4/3/2020    Discharge Plan     Row Name 04/08/20 1442       Plan    Plan  [facility] Referral     Patient/Family in Agreement with Plan  yes    Plan Comments  Have spoken with [facility] and [facility] [phone] and as long as his financial works out, he will be accepted. Awaiting final decision.        Discharge Codes    No documentation.             MEGAN Knowles

## 2020-04-08 NOTE — PROGRESS NOTES
[facility] Medicine Services  INPATIENT PROGRESS NOTE    Patient Name: Jesus Smith  Date of Admission: 4/3/2020  Today's Date: 04/08/20  Length of Stay: 5  Primary Care Physician: Matheus Olivera PA    Subjective   Chief Complaint: Follow-up weakness    HPI:  Patient seen and examined.  He is alert and oriented but appears confused at times.  Said he is passing gas but has not had a bowel movement today.  Nurses state he has not been having any flatulence or bowel movements.  Also hard to interpret how much he is taking.  Says he is tolerating p.o. fine but then says he was nauseous and threw up little bit.  Denies any chest pain or shortness of breath.  Overall he feels better abdominal pain is improving.     ROS:  All pertinent negatives and positives are as above. All other systems have been reviewed and are negative unless otherwise stated.     Objective    Temp:  [97.4 °F (36.3 °C)-98.2 °F (36.8 °C)] 97.4 °F (36.3 °C)  Heart Rate:  [] 68  Resp:  [16-18] 16  BP: (105-164)/(45-82) 164/73  Physical Exam  GEN: Awake, alert, interactive, appears more comfortable today and in NAD  HEENT: PERRLA, EOMI, Anicteric, Trachea midline  Lungs: CTAB, no wheezing/rales/rhonchi  Heart: RRR, +S1/s2, no rub  ABD: Right upper quadrant bandaged with drains in place w/ serous/bilious output, no guarding  Extremities: atraumatic, no cyanosis, trace edema b/l LE  Neuro: AAOx3, no focal deficits  Psych: normal mood & affect        Results Review:  I have reviewed the labs, radiology results, and diagnostic studies.    Laboratory Data:   Results from last 7 days   Lab Units 04/08/20  0454 04/07/20  0507 04/06/20 0441   WBC 10*3/mm3 13.56* 13.30* 14.05*   HEMOGLOBIN g/dL 10.8* 11.4* 12.3*   HEMATOCRIT % 34.2* 35.7* 38.2   PLATELETS 10*3/mm3 181 161 183        Results from last 7 days   Lab Units 04/08/20  0454 04/07/20  0507 04/06/20  0446   SODIUM mmol/L 137 135* 134*   POTASSIUM  mmol/L 3.9 4.3 4.6   CHLORIDE mmol/L 99 98 95*   CO2 mmol/L 27.0 26.0 26.0   BUN mg/dL 29* 38* 50*   CREATININE mg/dL 1.08 1.08 1.33*   CALCIUM mg/dL 7.9* 8.0* 8.5*   BILIRUBIN mg/dL 1.8* 1.4* 1.3*   ALK PHOS U/L 94 105 129*   ALT (SGPT) U/L 55* 70* 99*   AST (SGOT) U/L 33 36 57*   GLUCOSE mg/dL 125* 123* 157*       Culture Data:   Blood Culture   Date Value Ref Range Status   04/03/2020 Abnormal Stain (C)  Preliminary       Radiology Data:   Imaging Results (Last 24 Hours)     ** No results found for the last 24 hours. **          I have reviewed the patient's current medications.     Assessment/Plan     Active Hospital Problems    Diagnosis   • **Bacteremia   • History of prosthetic aortic valve   • Abnormal LFTs   • Acute cholecystitis   • Benign essential HTN   • CKD (chronic kidney disease) stage 3, GFR 30-59 ml/min (CMS/[facility])   • Acute UTI (urinary tract infection)       #1  E. coli bacteremia -patient's first blood cultures come back positive for E. coli, pansensitive.  Second culture ended up being negative.  This occurred in the setting of acute cholecystitis.  Has been covered with Zosyn.  Repeat blood cultures from 4/5 are NTD. Changed from zosyn to ceftriaxone on 4/7.     #2 ?uti -abnormal urinalysis on arrival with leukocyte esterase and nitrates.  However there was 0-2 white cells and trace bacteria.  Apparently due to this was never sent for culture.  He has not had any repeat fevers or worsening of condition with current abx.     #3 CKD 3 -based on all labs prior to this hospitalization, however renal u/s is normal. Renal function has now improved w/ GFR of 65.     #4  acute cholecystitis - s/p OR on 4/5 for open diaz tube placement. drains in place. Was on zosyn, now on ceftriaxone.      #5 essential hypertension -by history.  Pressure stable here off meds, starting to elevate, may need to add back losartan soon    #6 hyponatremia - resolved w/ ivf    #7  DM 2 -patient known borderline diabetic.   [facility]  A1c found to be 6.6 here.  Covering with sliding scale at this time.  Hypoglycemia protocol in place.    #8 elevated CK -had an elevated CK of 756 on arrival.  No muscle pains. Now normalized    #9 prosthetic AVR -was on Coumadin prior.  Denies any history of arrhythmias.  On full dose lovenox. Plan for coumadin resumption when taking stable po with adequate bms     Discharge Planning: Ongoing, likely several more days. May need SNF at d/c.    Luis Knox DO   04/08/20   09:25

## 2020-04-08 NOTE — PROGRESS NOTES
Agustina Saleem MD FACS  Progress Note     LOS: 5 days   Patient Care Team:  Matheus Olivera PA as PCP - General (Physician Assistant)      Subjective     Interval History:      feeling better.  Still some nausea.  No flatus  No bm.  Pain controlled     Objective     Vital Signs  Temp:  [97.5 °F (36.4 °C)-98.2 °F (36.8 °C)] 97.5 °F (36.4 °C)  Heart Rate:  [] 79  Resp:  [16-18] 16  BP: (105-148)/(45-82) 148/71    Physical Exam:  General appearance - alert, well appearing, and in no distress  Abdomen - soft, still some distension, clean, NAM serous/bilious      Results Review:    Lab Results (last 24 hours)     Procedure Component Value Units Date/Time    POC Glucose Once [[id number]]  (Normal) Collected:  04/08/20 0616    Specimen:  Blood Updated:  04/08/20 0628     Glucose 114 mg/dL      Comment: : [id number] Richie Saenz  LMeter ID: [id number]       Blood Culture - Blood, Arm, Right [[id number]]  (Abnormal) Collected:  04/03/20 1532    Specimen:  Blood from Arm, Right Updated:  04/08/20 0541     Blood Culture Escherichia coli     Comment: Refer to previous blood culture collected on 4/3/2020 1539 for MICs.          Isolated from Aerobic Bottle     Gram Stain Aerobic Bottle Gram negative bacilli    Comprehensive Metabolic Panel [[id number]]  (Abnormal) Collected:  04/08/20 0454    Specimen:  Blood Updated:  04/08/20 0541     Glucose 125 mg/dL      BUN 29 mg/dL      Creatinine 1.08 mg/dL      Sodium 137 mmol/L      Potassium 3.9 mmol/L      Chloride 99 mmol/L      CO2 27.0 mmol/L      Calcium 7.9 mg/dL      Total Protein 5.9 g/dL      Albumin 2.80 g/dL      ALT (SGPT) 55 U/L      AST (SGOT) 33 U/L      Alkaline Phosphatase 94 U/L      Total Bilirubin 1.8 mg/dL      eGFR Non African Amer 65 mL/min/1.73      Globulin 3.1 gm/dL      A/G Ratio 0.9 g/dL      BUN/Creatinine Ratio 26.9     Anion Gap 11.0 mmol/L     Narrative:       GFR Normal >60  Chronic Kidney Disease <60  Kidney Failure <15       Magnesium [[id number]]  (Normal) Collected:  04/08/20 0454    Specimen:  Blood Updated:  04/08/20 0541     Magnesium 2.2 mg/dL     Protime-INR [[id number]]  (Abnormal) Collected:  04/08/20 0454    Specimen:  Blood Updated:  04/08/20 0525     Protime 23.0 Seconds      INR 2.03    CBC (No Diff) [[id number]]  (Abnormal) Collected:  04/08/20 0454    Specimen:  Blood Updated:  04/08/20 0517     WBC 13.56 10*3/mm3      RBC 3.66 10*6/mm3      Hemoglobin 10.8 g/dL      Hematocrit 34.2 %      MCV 93.4 fL      MCH 29.5 pg      MCHC 31.6 g/dL      RDW 14.0 %      RDW-SD 47.7 fl      MPV 10.6 fL      Platelets 181 10*3/mm3     Blood Culture - Blood, Arm, Right [[id number]] Collected:  04/05/20 0405    Specimen:  Blood from Arm, Right Updated:  04/08/20 0515     Blood Culture No growth at 3 days    Blood Culture - Blood, Hand, Right [[id number]] Collected:  04/05/20 0405    Specimen:  Blood from Hand, Right Updated:  04/08/20 0515     Blood Culture No growth at 3 days    POC Glucose Once [[id number]]  (Abnormal) Collected:  04/07/20 2343    Specimen:  Blood Updated:  04/07/20 2354     Glucose 136 mg/dL      Comment: : [id number] Richie Saenz  LMeter ID: [id number]       POC Glucose Once [[id number]]  (Normal) Collected:  04/07/20 1819    Specimen:  Blood Updated:  04/07/20 1830     Glucose 124 mg/dL      Comment: : [id number] [employer]eter ID: [id number]       POC Glucose Once [[id number]]  (Abnormal) Collected:  04/07/20 1246    Specimen:  Blood Updated:  04/07/20 1258     Glucose 136 mg/dL      Comment: : [id number] [employer] ID: [id number]           Imaging Results (Last 24 Hours)     ** No results found for the last 24 hours. **            Assessment/Plan       POD#3 cholecystostomy tube.  Clears as tolerated.  Continue drainage and iv abx.  PT for ambulation.      Agustina Saleem MD  04/08/20  08:10

## 2020-04-08 NOTE — PLAN OF CARE
Problem: Patient Care Overview  Goal: Plan of Care Review  Outcome: Ongoing (interventions implemented as appropriate)  Flowsheets  Taken 4/8/2020 1757 by Tiesha Calderon LPN  Progress: improving  Outcome Summary: PHYSICAL THERAPY WORKS WITH PT. PCA AVAILABLE FOR PAIN CONTROL. PT. TOLERATING CLEAR LIQUIDS OK. ZAMBRANO PATENT- URINE CLEAR. NAM X2 PATENT. ACCUCHECKS WITH SS COVERAGE AVAILABLE. NO DISTRESS NOTED.  Taken 4/8/2020 0900 by Melissa Bains, SUSAN  Plan of Care Reviewed With: patient

## 2020-04-08 NOTE — PLAN OF CARE
Problem: Patient Care Overview  Goal: Plan of Care Review  Flowsheets (Taken 4/8/2020 0900)  Progress: improving  Plan of Care Reviewed With: patient  Outcome Summary: PT tx completed. CGA for supine to sit with HOB fully elevated and Mod A for sit to supine. CGA-Min for sit to stands with RWX x3 reps. Patient able to take 5 steps forward/backward and right/left with CGA and cues for AD safety. Performed seated BLE AROM x15 as well as standing marches x15 with RWX CGA. Patient c/o dyspnea with all mobility and required frequent rest breaks. Will cont to progress with PT for improved strength and endurance.

## 2020-04-08 NOTE — PLAN OF CARE
Problem: Patient Care Overview  Goal: Plan of Care Review  Outcome: Ongoing (interventions implemented as appropriate)  Flowsheets (Taken 4/8/2020 8128)  Progress: no change  Plan of Care Reviewed With: patient  Outcome Summary: Drsg to abd guaze/tape, c/d/i.  NAM x2 intact.  IVF with MS PCA in use for pain control.  Day.  Accuchecks q6 with SS.  Tolerating cl liquid diet.  Working with PT.

## 2020-04-08 NOTE — THERAPY TREATMENT NOTE
Acute Care - Physical Therapy Treatment Note  [facility]     Patient Name: Jesus Smith  : 1935  MRN: [id number]  Today's Date: 2020             Admit Date: 4/3/2020    Visit Dx:    ICD-10-CM ICD-9-CM   1. Acute UTI (urinary tract infection) N39.0 599.0   2. Systemic inflammatory response syndrome (CMS/[facility]) R65.10 995.90   3. NOLAN (acute kidney injury) (CMS/[facility]) N17.9 584.9   4. Transaminitis R74.0 790.4   5. Acute cholecystitis K81.0 575.0   6. Impaired functional mobility and activity tolerance Z74.09 V49.89     Patient Active Problem List   Diagnosis   • Acute UTI (urinary tract infection)   • Bacteremia   • Abnormal LFTs   • Acute cholecystitis   • Benign essential HTN   • CKD (chronic kidney disease) stage 3, GFR 30-59 ml/min (CMS/HCC)   • History of prosthetic aortic valve       Therapy Treatment    Rehabilitation Treatment Summary     Row Name 20          Treatment Time/Intention    Discipline  physical therapy assistant  -  physical therapy assistant  -     Document Type  therapy note (daily note)  -LC2  --     Subjective Information  complains of;dyspnea;pain  -LC2  --     Mode of Treatment  physical therapy  -2  --     Comment  --  eating breakfast, will check back  -2     Existing Precautions/Restrictions  fall NAM drain x 2.  -LC2  --     Recorded by [] Melissa Bains, PTA 20  [LC2] Melissa Bains, PTA 20 [LC] Melissa Bains, PTA 20 08  [LC2] Melissa Bains, PTA 20 0808     Row Name 20             Bed Mobility Assessment/Treatment    Supine-Sit [place] (Bed Mobility)  verbal cues;contact guard  -      Sit-Supine [place] (Bed Mobility)  verbal cues;moderate assist (50% patient effort)  -      Assistive Device (Bed Mobility)  bed rails;head of bed elevated  -      Recorded by [] Melissa Bains, PTA 20      Row Name 20             Transfer Assessment/Treatment    Transfer  Assessment/Treatment  stand-sit transfer  -LC      Recorded by [LC] Melissa Bains, PTA 04/08/20 0951      Row Name 04/08/20 0900             Sit-Stand Transfer    Sit-Stand [place] (Transfers)  verbal cues;contact guard;minimum assist (75% patient effort) x3 reps  -      Assistive Device (Sit-Stand Transfers)  walker, front-wheeled  -LC      Recorded by [LC] Melissa Bains, [facility] 04/08/20 0951      Row Name 04/08/20 0900             Stand-Sit Transfer    Stand-Sit [place] (Transfers)  verbal cues;contact guard  -LC      Assistive Device (Stand-Sit Transfers)  walker, front-wheeled  -LC      Recorded by [LC] Melissa Bains, [facility] 04/08/20 0951      Row Name 04/08/20 0900             Gait/Stairs Assessment/Training    [place] Level (Gait)  verbal cues;contact guard  -      Assistive Device (Gait)  walker, front-wheeled  -      Distance in Feet (Gait)  5 steps in each direction  -LC      Pattern (Gait)  step-to  -LC      Deviations/Abnormal Patterns (Gait)  gait speed decreased;stride length decreased  -LC      Bilateral Gait Deviations  forward flexed posture  -LC      Recorded by [LC] Melissa Bains, [facility] 04/08/20 0951      Row Name 04/08/20 0900             Motor Skills Assessment/Interventions    Additional Documentation  Therapeutic Exercise (Group);Therapeutic Exercise Interventions (Group)  -LC      Recorded by [LC] Melissa Bains, PTA 04/08/20 0951      Row Name 04/08/20 0900             Therapeutic Exercise    Lower Extremity (Therapeutic Exercise)  LAQ (long arc quad), bilateral;marching while seated;marching while standing  -      Lower Extremity Range of Motion (Therapeutic Exercise)  hip abduction/adduction, bilateral;ankle dorsiflexion/plantar flexion, bilateral  -      Exercise Type (Therapeutic Exercise)  AROM (active range of motion)  -LC      Position (Therapeutic Exercise)  seated;standing  -      Sets/Reps (Therapeutic Exercise)  15  -LC      Recorded by [LC] Melissa Bains, PTA  04/08/20 0951      Row Name 04/08/20 0900             Static Standing Balance    Level of [place] (Supported Standing, Static Balance)  contact guard assist  -LC      Assistive Device Utilized (Supported Standing, Static Balance)  walker, rolling  -      Recorded by [LC] Melissa Bains, PTA 04/08/20 0951      Row Name 04/08/20 0900             Positioning and Restraints    Pre-Treatment Position  in bed  -      Post Treatment Position  bed  -LC      In Bed  fowlers;call light within reach;encouraged to call for assist;SCD pump applied;legs elevated  -      Recorded by [LC] Melissa Bains, PTA 04/08/20 0951      Row Name 04/08/20 0900             Pain Scale: Numbers Pre/Post-Treatment    Pain Scale: Numbers, Pretreatment  9/10  -      Pain Location - Side  Right  -LC      Pain Location - Orientation  incisional  -      Pain Location  abdomen  -LC      Pre/Post Treatment Pain Comment  c/o dyspnea with mobility  -      Pain Intervention(s)  Medication (See MAR)  -      Recorded by [LC] Melissa Bains, PTA 04/08/20 0951      Row Name                Wound 04/03/20 2115 Left coccyx    Wound - Properties Group Date first assessed: 04/03/20 [DF] Time first assessed: 2115 [DF] Present on Hospital Admission: Y [FT] Side: Left [FT] Location: coccyx [DF] Recorded by:  [DF] Casi Ayala RN 04/04/20 0101 [FT] Letty Quiroz RN 04/04/20 0115    Row Name                Wound 04/05/20 0901 Right upper abdomen Incision    Wound - Properties Group Date first assessed: 04/05/20 [JH] Time first assessed: 0901 [JH] Side: Right [JH2] Orientation: upper [JH2] Location: abdomen [JH] Primary Wound Type: Incision [JH] Recorded by:  [JH] Alejandrina Maravilla RN 04/05/20 0901 [JH2] Alejandrina Maravilla RN 04/05/20 0906    Row Name 04/08/20 0900             Plan of Care Review    Plan of Care Reviewed With  patient  -LC      Progress  improving  -      Outcome Summary  PT tx completed. CGA for supine to sit with HOB fully  elevated and Mod A for sit to supine. CGA-Min for sit to stands with RWX x3 reps. Patient able to take 5 steps forward/backward and right/left with CGA and cues for AD safety. Performed seated BLE AROM x15 as well as standing marches x15 with RWX CGA. Patient c/o dyspnea with all mobility and requried frequent rest breaks. Will cont to progress with PT for improved strength and endurance.  -LC      Recorded by [LC] Melissa Bains, PTA 04/08/20 0951        User Key  (r) = Recorded By, (t) = Taken By, (c) = Cosigned By    Initials Name Effective Dates Discipline    DF Casi Ayala RN 08/02/16 -  Nurse    Letty Chiang RN 08/02/16 -  Nurse    Alejandrina Jj RN 06/07/17 -  Nurse    Melissa Najera, PTA 10/18/19 -  PT          Wound 04/03/20 2115 Left coccyx (Active)   Dressing Appearance open to air 4/7/2020  8:20 PM   Closure Open to air 4/7/2020  8:20 PM   Base red;blanchable 4/7/2020  8:20 PM   Periwound dry;intact 4/7/2020  8:20 PM   Drainage Amount none 4/7/2020  8:20 PM   Dressing Care, Wound open to air 4/7/2020  8:20 PM       Wound 04/05/20 0901 Right upper abdomen Incision (Active)   Dressing Appearance dry;intact 4/7/2020  8:20 PM   Closure NEETA 4/7/2020  8:20 PM   Base dressing in place, unable to visualize 4/7/2020  8:20 PM   Drainage Amount none 4/7/2020  8:20 PM   Dressing Care, Wound gauze 4/7/2020  8:20 PM               PT Recommendation and Plan     Plan of Care Reviewed With: patient  Progress: improving  Outcome Summary: PT tx completed. CGA for supine to sit with HOB fully elevated and Mod A for sit to supine. CGA-Min for sit to stands with RWX x3 reps. Patient able to take 5 steps forward/backward and right/left with CGA and cues for AD safety. Performed seated BLE AROM x15 as well as standing marches x15 with RWX CGA. Patient c/o dyspnea with all mobility and requried frequent rest breaks. Will cont to progress with PT for improved strength and endurance.     Time Calculation:    PT Charges     Row Name 04/08/20 0951             Time Calculation    Start Time  0900  -LC      Stop Time  0940  -LC      Time Calculation (min)  40 min  -LC      PT Received On  04/08/20  -         Time Calculation- PT    Total Timed Code Minutes- PT  40 minute(s)  -        User Key  (r) = Recorded By, (t) = Taken By, (c) = Cosigned By    Initials Name Provider Type    Melissa Najera PTA Physical Therapy Assistant        Therapy Charges for Today     Code Description Service Date Service Provider Modifiers Qty    34617210565 HC PT THERAPEUTIC ACT EA 15 MIN 4/8/2020 Melissa Bains PTA GP 2    43067528466 HC PT THER PROC EA 15 MIN 4/8/2020 Melissa Bains PTA GP 1          PT G-Codes  Outcome Measure Options: AM-PAC 6 Clicks Basic Mobility (PT)  AM-PAC 6 Clicks Score (PT): 15    Melissa Bains PTA  4/8/2020

## 2020-04-09 LAB
ALBUMIN SERPL-MCNC: 2.6 G/DL (ref 3.5–5.2)
ALBUMIN/GLOB SERPL: 0.9 G/DL
ALP SERPL-CCNC: 97 U/L (ref 39–117)
ALT SERPL W P-5'-P-CCNC: 47 U/L (ref 1–41)
ANION GAP SERPL CALCULATED.3IONS-SCNC: 9 MMOL/L (ref 5–15)
AST SERPL-CCNC: 29 U/L (ref 1–40)
BILIRUB SERPL-MCNC: 1.9 MG/DL (ref 0.2–1.2)
BUN BLD-MCNC: 19 MG/DL (ref 8–23)
BUN/CREAT SERPL: 24.1 (ref 7–25)
CALCIUM SPEC-SCNC: 7.7 MG/DL (ref 8.6–10.5)
CHLORIDE SERPL-SCNC: 102 MMOL/L (ref 98–107)
CO2 SERPL-SCNC: 26 MMOL/L (ref 22–29)
CREAT BLD-MCNC: 0.79 MG/DL (ref 0.76–1.27)
DEPRECATED RDW RBC AUTO: 48.3 FL (ref 37–54)
ERYTHROCYTE [DISTWIDTH] IN BLOOD BY AUTOMATED COUNT: 14.5 % (ref 12.3–15.4)
GFR SERPL CREATININE-BSD FRML MDRD: 94 ML/MIN/1.73
GLOBULIN UR ELPH-MCNC: 3 GM/DL
GLUCOSE BLD-MCNC: 131 MG/DL (ref 65–99)
GLUCOSE BLDC GLUCOMTR-MCNC: 111 MG/DL (ref 70–130)
GLUCOSE BLDC GLUCOMTR-MCNC: 135 MG/DL (ref 70–130)
GLUCOSE BLDC GLUCOMTR-MCNC: 180 MG/DL (ref 70–130)
GLUCOSE BLDC GLUCOMTR-MCNC: 92 MG/DL (ref 70–130)
HCT VFR BLD AUTO: 35.2 % (ref 37.5–51)
HGB BLD-MCNC: 11.2 G/DL (ref 13–17.7)
INR PPP: 1.85 (ref 0.91–1.09)
MCH RBC QN AUTO: 29.6 PG (ref 26.6–33)
MCHC RBC AUTO-ENTMCNC: 31.8 G/DL (ref 31.5–35.7)
MCV RBC AUTO: 92.9 FL (ref 79–97)
PLATELET # BLD AUTO: 186 10*3/MM3 (ref 140–450)
PMV BLD AUTO: 10.7 FL (ref 6–12)
POTASSIUM BLD-SCNC: 3.7 MMOL/L (ref 3.5–5.2)
PROT SERPL-MCNC: 5.6 G/DL (ref 6–8.5)
PROTHROMBIN TIME: 21.3 SECONDS (ref 11.9–14.6)
RBC # BLD AUTO: 3.79 10*6/MM3 (ref 4.14–5.8)
SODIUM BLD-SCNC: 137 MMOL/L (ref 136–145)
WBC NRBC COR # BLD: 11.41 10*3/MM3 (ref 3.4–10.8)

## 2020-04-09 PROCEDURE — 25010000002 ENOXAPARIN PER 10 MG: Performed by: INTERNAL MEDICINE

## 2020-04-09 PROCEDURE — 63710000001 INSULIN LISPRO (HUMAN) PER 5 UNITS: Performed by: INTERNAL MEDICINE

## 2020-04-09 PROCEDURE — 97110 THERAPEUTIC EXERCISES: CPT

## 2020-04-09 PROCEDURE — 85027 COMPLETE CBC AUTOMATED: CPT | Performed by: INTERNAL MEDICINE

## 2020-04-09 PROCEDURE — 80053 COMPREHEN METABOLIC PANEL: CPT | Performed by: INTERNAL MEDICINE

## 2020-04-09 PROCEDURE — 85610 PROTHROMBIN TIME: CPT | Performed by: INTERNAL MEDICINE

## 2020-04-09 PROCEDURE — 25010000002 MORPHINE (PF) 10 MG/ML SOLUTION: Performed by: SPECIALIST

## 2020-04-09 PROCEDURE — 82962 GLUCOSE BLOOD TEST: CPT

## 2020-04-09 PROCEDURE — 25010000002 CEFTRIAXONE PER 250 MG: Performed by: INTERNAL MEDICINE

## 2020-04-09 PROCEDURE — 97530 THERAPEUTIC ACTIVITIES: CPT

## 2020-04-09 RX ORDER — DOCUSATE SODIUM 100 MG/1
100 CAPSULE, LIQUID FILLED ORAL 2 TIMES DAILY
Status: DISCONTINUED | OUTPATIENT
Start: 2020-04-09 | End: 2020-04-10

## 2020-04-09 RX ORDER — BISACODYL 10 MG
10 SUPPOSITORY, RECTAL RECTAL DAILY PRN
Status: DISCONTINUED | OUTPATIENT
Start: 2020-04-09 | End: 2020-04-13 | Stop reason: HOSPADM

## 2020-04-09 RX ADMIN — ASPIRIN 81 MG: 81 TABLET ORAL at 08:38

## 2020-04-09 RX ADMIN — DOCUSATE SODIUM 100 MG: 100 CAPSULE ORAL at 20:47

## 2020-04-09 RX ADMIN — BISOPROLOL FUMARATE 10 MG: 5 TABLET ORAL at 08:38

## 2020-04-09 RX ADMIN — MORPHINE SULFATE: 10 INJECTION, SOLUTION INTRAMUSCULAR; INTRAVENOUS at 12:55

## 2020-04-09 RX ADMIN — FAMOTIDINE 20 MG: 20 TABLET, FILM COATED ORAL at 08:38

## 2020-04-09 RX ADMIN — SUCRALFATE 1 G: 1 SUSPENSION ORAL at 06:45

## 2020-04-09 RX ADMIN — FAMOTIDINE 20 MG: 20 TABLET, FILM COATED ORAL at 20:47

## 2020-04-09 RX ADMIN — SUCRALFATE 1 G: 1 SUSPENSION ORAL at 17:29

## 2020-04-09 RX ADMIN — INSULIN LISPRO 2 UNITS: 100 INJECTION, SOLUTION INTRAVENOUS; SUBCUTANEOUS at 12:55

## 2020-04-09 RX ADMIN — ENOXAPARIN SODIUM 100 MG: 100 INJECTION SUBCUTANEOUS at 08:38

## 2020-04-09 RX ADMIN — SUCRALFATE 1 G: 1 SUSPENSION ORAL at 12:27

## 2020-04-09 RX ADMIN — ENOXAPARIN SODIUM 100 MG: 100 INJECTION SUBCUTANEOUS at 20:47

## 2020-04-09 RX ADMIN — CEFTRIAXONE SODIUM 1 G: 1 INJECTION, POWDER, FOR SOLUTION INTRAMUSCULAR; INTRAVENOUS at 12:28

## 2020-04-09 RX ADMIN — DOCUSATE SODIUM 100 MG: 100 CAPSULE ORAL at 12:55

## 2020-04-09 NOTE — THERAPY TREATMENT NOTE
Acute Care - Physical Therapy Treatment Note  [facility]     Patient Name: Jesus Smith  : 1935  MRN: [id number]  Today's Date: 2020             Admit Date: 4/3/2020    Visit Dx:    ICD-10-CM ICD-9-CM   1. Acute UTI (urinary tract infection) N39.0 599.0   2. Systemic inflammatory response syndrome (CMS/[facility]) R65.10 995.90   3. NOLAN (acute kidney injury) (CMS/HCC) N17.9 584.9   4. Transaminitis R74.0 790.4   5. Acute cholecystitis K81.0 575.0   6. Impaired functional mobility and activity tolerance Z74.09 V49.89     Patient Active Problem List   Diagnosis   • Acute UTI (urinary tract infection)   • Bacteremia   • Abnormal LFTs   • Acute cholecystitis   • Benign essential HTN   • CKD (chronic kidney disease) stage 3, GFR 30-59 ml/min (CMS/HCC)   • History of prosthetic aortic valve       Therapy Treatment    Rehabilitation Treatment Summary     Row Name 20 1334             Treatment Time/Intention    Discipline  physical therapy assistant  -      Document Type  therapy note (daily note)  -2      Subjective Information  complains of;pain;weakness;nausea/vomiting  -2      Existing Precautions/Restrictions  fall  -2      Treatment Considerations/Comments  NAM drain x 2  -2      Recorded by [] Connie Arcos, [facility] 20 1335  [MF2] Connie Arcos, PTA 20 1417      Row Name 20 1334             Bed Mobility Assessment/Treatment    Supine-Sit Pleasants (Bed Mobility)  verbal cues;contact guard;minimum assist (75% patient effort)  -      Assistive Device (Bed Mobility)  head of bed elevated;bed rails  -      Recorded by [] Connie Arcos, PTA 20 1417      Row Name 20 1334             Transfer Assessment/Treatment    Comment (Transfers)  stood x 2 from bed  -      Recorded by [] Connie Arcos, PTA 20 1417      Row Name 20 1334             Sit-Stand Transfer    Sit-Stand Pleasants (Transfers)  verbal cues;minimum assist (75%  patient effort)  -      Recorded by [] Connie Arcos, [facility] 04/09/20 1417      Row Name 04/09/20 1334             Stand-Sit Transfer    Stand-Sit Kinsey (Transfers)  verbal cues;minimum assist (75% patient effort)  -      Recorded by [] Connie Arcos, [facility] 04/09/20 1417      Row Name 04/09/20 1334             Gait/Stairs Assessment/Training    Kinsey Level (Gait)  verbal cues;contact guard  -      Assistive Device (Gait)  walker, front-wheeled  -      Distance in Feet (Gait)  5 to chair  -      Deviations/Abnormal Patterns (Gait)  stride length decreased;tahir decreased  -      Bilateral Gait Deviations  forward flexed posture;heel strike decreased  -      Recorded by [] Connie Arcos, [facility] 04/09/20 1417      Row Name 04/09/20 1334             Therapeutic Exercise    Lower Extremity (Therapeutic Exercise)  LAQ (long arc quad), bilateral  -      Lower Extremity Range of Motion (Therapeutic Exercise)  hip flexion/extension, bilateral;ankle dorsiflexion/plantar flexion, bilateral  -      Exercise Type (Therapeutic Exercise)  AROM (active range of motion)  -      Position (Therapeutic Exercise)  seated  -      Sets/Reps (Therapeutic Exercise)  20  -      Recorded by [] Connie Arcos, [facility] 04/09/20 1417      Row Name 04/09/20 1334             Positioning and Restraints    Pre-Treatment Position  in bed  -      Post Treatment Position  chair  -MF      In Chair  notified nsg;reclined;call light within reach;encouraged to call for assist  -MF      Recorded by [] Connie Arcos, [facility] 04/09/20 1419      Row Name 04/09/20 1334             Pain Scale: FACES Pre/Post-Treatment    Pain: FACES Scale, Pretreatment  0-->no hurt  -      Pain: FACES Scale, Post-Treatment  4-->hurts little more  -      Pre/Post Treatment Pain Comment  R lower abdomen  -      Recorded by [] Connie Arcos, [facility] 04/09/20 1419      Row Name                Wound 04/03/20  2115 Left coccyx    Wound - Properties Group Date first assessed: 04/03/20 [DF] Time first assessed: 2115 [DF] Present on Hospital Admission: Y [FT] Side: Left [FT] Location: coccyx [DF] Recorded by:  [DF] Casi Ayala RN 04/04/20 0101 [FT] Letty Quiroz RN 04/04/20 0115    Row Name                Wound 04/05/20 0901 Right upper abdomen Incision    Wound - Properties Group Date first assessed: 04/05/20 [JH] Time first assessed: 0901 [JH] Side: Right [JH2] Orientation: upper [JH2] Location: abdomen [JH] Primary Wound Type: Incision [JH] Recorded by:  [JH] Alejandrina Maravilla RN 04/05/20 0901 [JH2] Alejandrina Maravilla RN 04/05/20 0906      User Key  (r) = Recorded By, (t) = Taken By, (c) = Cosigned By    Initials Name Effective Dates Discipline    DF Casi Ayala RN 08/02/16 -  Nurse    Letty Chiang RN 08/02/16 -  Nurse    Alejandrina Jj RN 06/07/17 -  Nurse    Connie Medrano PTA 08/02/16 -  PT          Wound 04/03/20 2115 Left coccyx (Active)   Dressing Appearance open to air 4/9/2020  8:39 AM   Closure Open to air 4/8/2020  9:37 PM   Base red;blanchable 4/8/2020  9:37 PM   Drainage Amount none 4/9/2020  8:39 AM       Wound 04/05/20 0901 Right upper abdomen Incision (Active)   Dressing Appearance dry;intact 4/9/2020  8:39 AM   Closure NEETA 4/9/2020  8:39 AM   Base dressing in place, unable to visualize 4/9/2020  8:39 AM   Drainage Amount none 4/9/2020  8:39 AM   Dressing Care, Wound gauze 4/9/2020  8:39 AM               PT Recommendation and Plan     Plan of Care Reviewed With: patient  Progress: no change  Outcome Summary: Pt. agreeable to therapy. He required MIN assist for almost all mobility. He participated with leg exercises and stood twice at bedside. He was able to walk about 5' to get over to chair. Pt. remains weak and with decreased endurance. He would benefit from further therapy.     Time Calculation:   PT Charges     Row Name 04/09/20 9653             Time  Calculation    Start Time  1334  -      Stop Time  1415  -      Time Calculation (min)  41 min  -MF      PT Received On  04/09/20  -      PT Goal Re-Cert Due Date  04/16/20  -         Time Calculation- PT    Total Timed Code Minutes- PT  41 minute(s)  -         Timed Charges    98159 - PT Therapeutic Exercise Minutes  15  -MF      81607 - PT Therapeutic Activity Minutes  26  -MF        User Key  (r) = Recorded By, (t) = Taken By, (c) = Cosigned By    Initials Name Provider Type    Connie Medrano PTA Physical Therapy Assistant        Therapy Charges for Today     Code Description Service Date Service Provider Modifiers Qty    69982161077 HC PT THER PROC EA 15 MIN 4/9/2020 Connie Arcos PTA GP 1    85381896994 HC PT THERAPEUTIC ACT EA 15 MIN 4/9/2020 Connie Arcos PTA GP 2          PT G-Codes  Outcome Measure Options: AM-PAC 6 Clicks Basic Mobility (PT)  AM-PAC 6 Clicks Score (PT): 15    Connie Arcos PTA  4/9/2020

## 2020-04-09 NOTE — PLAN OF CARE
Problem: Patient Care Overview  Goal: Plan of Care Review  Outcome: Ongoing (interventions implemented as appropriate)  Flowsheets (Taken 4/9/2020 8934)  Progress: improving  Plan of Care Reviewed With: patient  Outcome Summary: Pt states today he's tolerated cream of wheat, tomato soup, ashley pudding, and some juices. He denies any N/V/abd pain, does state he's passing gas but has not yet had a BM.  Diet progressed to full liquid this am and now on a GI soft diet. He does request to be able to drink Boost vanilla supplements that he was drinking previoulsy; reordered Boost Gluc Control BID. RD will continue to follow.

## 2020-04-09 NOTE — PROGRESS NOTES
Continued Stay Note  [facility]     Patient Name: eJsus Smith  MRN: [id number]  Today's Date: 4/9/2020    Admit Date: 4/3/2020    Discharge Plan     Row Name 04/09/20 1112       Plan    Plan  [facility]    Provided Post Acute Provider List?  Yes    Post Acute Provider List  Nursing Home    Provided Post Acute Provider Quality & Resource List?  Yes    Post Acute Provider Quality and Resource List  Nursing Home    Patient/Family in Agreement with Plan  yes    Plan Comments  [facility] has offered pt a bed. Spoke with pt and he accepts. Will follow for d/c.         Discharge Codes    No documentation.             MEGAN Knowles

## 2020-04-09 NOTE — PLAN OF CARE
Problem: Patient Care Overview  Goal: Plan of Care Review  Outcome: Ongoing (interventions implemented as appropriate)  Flowsheets (Taken 4/9/2020 1416)  Progress: no change  Plan of Care Reviewed With: patient  Outcome Summary: Pt. agreeable to therapy. He required MIN assist for almost all mobility. He participated with leg exercises and stood twice at bedside. He was able to walk about 5' to get over to chair. Pt. remains weak and with decreased endurance. He would benefit from further therapy.

## 2020-04-09 NOTE — PLAN OF CARE
Problem: Patient Care Overview  Goal: Plan of Care Review  Outcome: Ongoing (interventions implemented as appropriate)  Flowsheets (Taken 4/9/2020 5964)  Progress: no change  Plan of Care Reviewed With: patient  Outcome Summary: Pt got up with PT today to the chair. F/C in place, IVF, PCA in place. Pt pushes call light button frequently. Resting well. VSS cont to monitor.

## 2020-04-09 NOTE — PLAN OF CARE
Problem: Patient Care Overview  Goal: Plan of Care Review  Outcome: Ongoing (interventions implemented as appropriate)  Flowsheets  Taken 4/8/2020 1757 by Tiesha Calderon LPN  Progress: improving  Taken 4/8/2020 2137 by Stephanie Olmedo RNA  Plan of Care Reviewed With: patient  Taken 4/9/2020 0438 by Stephanie Olmedo RNA  Outcome Summary: Pt c/o pain x 2; pain pump in use. IVF. F/c in place. PT working with patient. Drains x 2. VSS. Safety maintained. Will continue to monitor.

## 2020-04-09 NOTE — PROGRESS NOTES
[facility] Medicine Services  INPATIENT PROGRESS NOTE    Patient Name: Jesus Smith  Date of Admission: 4/3/2020  Today's Date: 04/09/20  Length of Stay: 6  Primary Care Physician: Matheus Olivera PA    Subjective   Chief Complaint: Follow-up weakness    HPI:  Patient seen and examined.  Currently states his pain is about a 4 out of 10.  Denies any nausea or vomiting today.  Says he is having flatus but still had a bowel movement.  No chest pain or shortness of breath.  No acute issues or events overnight.  No fevers noted.     ROS:  All pertinent negatives and positives are as above. All other systems have been reviewed and are negative unless otherwise stated.     Objective    Temp:  [97.6 °F (36.4 °C)-98.1 °F (36.7 °C)] 97.7 °F (36.5 °C)  Heart Rate:  [50-78] 50  Resp:  [16-18] 18  BP: (131-161)/(62-84) 161/62  Physical Exam  GEN: Awake, alert, interactive, appears more comfortable today and in NAD  HEENT: PERRLA, EOMI, Anicteric, Trachea midline  Lungs: Somewhat diminished in his bases but otherwise clear without wheezing or rales  Heart: RRR, +S1/s2, no rub  ABD: Right upper quadrant bandaged with drains in place w/ serous/bilious output, no guarding  Extremities: atraumatic, no cyanosis, trace edema b/l LE  Neuro: AAOx3, no focal deficits  Psych: normal mood & affect        Results Review:  I have reviewed the labs, radiology results, and diagnostic studies.    Laboratory Data:   Results from last 7 days   Lab Units 04/09/20  0433 04/08/20  0454 04/07/20  0507   WBC 10*3/mm3 11.41* 13.56* 13.30*   HEMOGLOBIN g/dL 11.2* 10.8* 11.4*   HEMATOCRIT % 35.2* 34.2* 35.7*   PLATELETS 10*3/mm3 186 181 161        Results from last 7 days   Lab Units 04/09/20  0433 04/08/20  0454 04/07/20  0507   SODIUM mmol/L 137 137 135*   POTASSIUM mmol/L 3.7 3.9 4.3   CHLORIDE mmol/L 102 99 98   CO2 mmol/L 26.0 27.0 26.0   BUN mg/dL 19 29* 38*   CREATININE mg/dL 0.79 1.08 1.08   CALCIUM  mg/dL 7.7* 7.9* 8.0*   BILIRUBIN mg/dL 1.9* 1.8* 1.4*   ALK PHOS U/L 97 94 105   ALT (SGPT) U/L 47* 55* 70*   AST (SGOT) U/L 29 33 36   GLUCOSE mg/dL 131* 125* 123*       Culture Data:   Blood Culture   Date Value Ref Range Status   04/03/2020 Abnormal Stain (C)  Preliminary       Radiology Data:   Imaging Results (Last 24 Hours)     ** No results found for the last 24 hours. **          I have reviewed the patient's current medications.     Assessment/Plan     Active Hospital Problems    Diagnosis   • **Bacteremia   • History of prosthetic aortic valve   • Abnormal LFTs   • Acute cholecystitis   • Benign essential HTN   • CKD (chronic kidney disease) stage 3, GFR 30-59 ml/min (CMS/[facility])   • Acute UTI (urinary tract infection)       #1  E. coli bacteremia -patient's first blood cultures come back positive for E. coli, pansensitive.  Second culture ended up being negative.  This occurred in the setting of acute cholecystitis.  Has been covered with Zosyn.  Repeat blood cultures from 4/5 are NTD. Changed from zosyn to ceftriaxone on 4/7.  Doing well.    #2 ?uti -abnormal urinalysis on arrival with leukocyte esterase and nitrates.  However there was 0-2 white cells and trace bacteria.  Apparently due to this was never sent for culture.  He has not had any repeat fevers or worsening of condition with current abx.     #3 CKD 3 -based on all labs prior to this hospitalization, however renal u/s is normal. Renal function has now improved w/ GFR of 94.     #4  acute cholecystitis - s/p OR on 4/5 for open diaz tube placement. drains in place. Was on zosyn, now on ceftriaxone.      #5 essential hypertension -by history.  On bisoprolol, monitor    #6 hyponatremia - resolved w/ ivf    #7  DM 2 -patient known borderline diabetic.  [facility]  A1c found to be 6.6 here.  Covering with sliding scale at this time.  Hypoglycemia protocol in place.    #8 elevated CK -had an elevated CK of 756  on arrival.  No muscle pains. Now normalized    #9 prosthetic AVR -was on Coumadin prior.  Denies any history of arrhythmias.  On full dose lovenox. Plan for coumadin resumption when taking stable po with adequate bms     Discharge Planning: Ongoing, will need SNF at d/c.    Luis Knox DO   04/09/20   10:11

## 2020-04-09 NOTE — PROGRESS NOTES
Agustina Saleem MD FACS  Progress Note     LOS: 6 days   Patient Care Team:  Matheus Olivera PA as PCP - General (Physician Assistant)      Subjective     Interval History:      feeling better.  collin fulls.  +flatus  No bm.  Pain controlled     Objective     Vital Signs  Temp:  [97.6 °F (36.4 °C)-98.1 °F (36.7 °C)] 98 °F (36.7 °C)  Heart Rate:  [50-52] 50  Resp:  [16-18] 18  BP: (139-161)/(59-79) 139/59    Physical Exam:  General appearance - alert, well appearing, and in no distress  Abdomen - softer, less distended, clean, NAM serous/bilious      Results Review:    Lab Results (last 24 hours)     Procedure Component Value Units Date/Time    POC Glucose Once [[id number]]  (Abnormal) Collected:  04/09/20 0643    Specimen:  Blood Updated:  04/09/20 0654     Glucose 135 mg/dL      Comment: : [id number] Shara Montieler ID: [id number]       Comprehensive Metabolic Panel [[id number]]  (Abnormal) Collected:  04/09/20 0433    Specimen:  Blood Updated:  04/09/20 0522     Glucose 131 mg/dL      BUN 19 mg/dL      Creatinine 0.79 mg/dL      Sodium 137 mmol/L      Potassium 3.7 mmol/L      Chloride 102 mmol/L      CO2 26.0 mmol/L      Calcium 7.7 mg/dL      Total Protein 5.6 g/dL      Albumin 2.60 g/dL      ALT (SGPT) 47 U/L      AST (SGOT) 29 U/L      Alkaline Phosphatase 97 U/L      Total Bilirubin 1.9 mg/dL      eGFR Non African Amer 94 mL/min/1.73      Globulin 3.0 gm/dL      A/G Ratio 0.9 g/dL      BUN/Creatinine Ratio 24.1     Anion Gap 9.0 mmol/L     Narrative:       GFR Normal >60  Chronic Kidney Disease <60  Kidney Failure <15      Blood Culture - Blood, Arm, Right [[id number]] Collected:  04/05/20 0405    Specimen:  Blood from Arm, Right Updated:  04/09/20 0515     Blood Culture No growth at 4 days    Blood Culture - Blood, Hand, Right [[id number]] Collected:  04/05/20 0405    Specimen:  Blood from Hand, Right Updated:  04/09/20 0515     Blood Culture No growth at 4 days    Protime-INR [[id number]]  (Abnormal)  Collected:  04/09/20 0433    Specimen:  Blood Updated:  04/09/20 0510     Protime 21.3 Seconds      INR 1.85    CBC (No Diff) [[id number]]  (Abnormal) Collected:  04/09/20 0433    Specimen:  Blood Updated:  04/09/20 0502     WBC 11.41 10*3/mm3      RBC 3.79 10*6/mm3      Hemoglobin 11.2 g/dL      Hematocrit 35.2 %      MCV 92.9 fL      MCH 29.6 pg      MCHC 31.8 g/dL      RDW 14.5 %      RDW-SD 48.3 fl      MPV 10.7 fL      Platelets 186 10*3/mm3     POC Glucose Once [[id number]]  (Normal) Collected:  04/08/20 2341    Specimen:  Blood Updated:  04/08/20 2352     Glucose 130 mg/dL      Comment: : [id number] Shara Vu ID: [id number]       POC Glucose Once [[id number]]  (Abnormal) Collected:  04/08/20 1722    Specimen:  Blood Updated:  04/08/20 1742     Glucose 146 mg/dL      Comment: : [id number] Kvng Woods ID: [id number]           Imaging Results (Last 24 Hours)     ** No results found for the last 24 hours. **            Assessment/Plan       POD# 4 cholecystostomy tube placement.  Continue iv abx.  ADAT.      Agustina Saleem MD  04/09/20  11:51

## 2020-04-10 LAB
BACTERIA SPEC AEROBE CULT: NORMAL
BACTERIA SPEC AEROBE CULT: NORMAL
GLUCOSE BLDC GLUCOMTR-MCNC: 140 MG/DL (ref 70–130)

## 2020-04-10 PROCEDURE — 25010000002 CEFTRIAXONE PER 250 MG: Performed by: INTERNAL MEDICINE

## 2020-04-10 PROCEDURE — 82962 GLUCOSE BLOOD TEST: CPT

## 2020-04-10 PROCEDURE — 97110 THERAPEUTIC EXERCISES: CPT

## 2020-04-10 PROCEDURE — 25010000002 ENOXAPARIN PER 10 MG: Performed by: INTERNAL MEDICINE

## 2020-04-10 PROCEDURE — 97116 GAIT TRAINING THERAPY: CPT

## 2020-04-10 RX ORDER — AMOXICILLIN 250 MG
1 CAPSULE ORAL 2 TIMES DAILY
Status: DISCONTINUED | OUTPATIENT
Start: 2020-04-10 | End: 2020-04-13 | Stop reason: HOSPADM

## 2020-04-10 RX ORDER — OXYCODONE HYDROCHLORIDE AND ACETAMINOPHEN 5; 325 MG/1; MG/1
1 TABLET ORAL EVERY 4 HOURS PRN
Status: DISCONTINUED | OUTPATIENT
Start: 2020-04-10 | End: 2020-04-13 | Stop reason: HOSPADM

## 2020-04-10 RX ADMIN — DOCUSATE SODIUM 50 MG AND SENNOSIDES 8.6 MG 1 TABLET: 8.6; 5 TABLET, FILM COATED ORAL at 21:40

## 2020-04-10 RX ADMIN — BISOPROLOL FUMARATE 10 MG: 5 TABLET ORAL at 08:43

## 2020-04-10 RX ADMIN — ASPIRIN 81 MG: 81 TABLET ORAL at 08:43

## 2020-04-10 RX ADMIN — CEFTRIAXONE SODIUM 1 G: 1 INJECTION, POWDER, FOR SOLUTION INTRAMUSCULAR; INTRAVENOUS at 13:07

## 2020-04-10 RX ADMIN — FAMOTIDINE 20 MG: 20 TABLET, FILM COATED ORAL at 08:43

## 2020-04-10 RX ADMIN — FAMOTIDINE 20 MG: 20 TABLET, FILM COATED ORAL at 21:20

## 2020-04-10 RX ADMIN — ENOXAPARIN SODIUM 100 MG: 100 INJECTION SUBCUTANEOUS at 08:43

## 2020-04-10 RX ADMIN — ENOXAPARIN SODIUM 100 MG: 100 INJECTION SUBCUTANEOUS at 21:19

## 2020-04-10 NOTE — PROGRESS NOTES
Continued Stay Note   [place]     Patient Name: Jesus Smith  MRN: [id number]  Today's Date: 4/10/2020    Admit Date: 4/3/2020    Discharge Plan     Row Name 04/10/20 1024       Plan    Plan Comments  Chart reviewed-- Bed offered and accepted at [facility]. BRAVO spoke with Isai from [facility] who states that admissions is not in house today but facility will not be able to accept pt over weekend if pt is medically stable for discharge. BRAVO will await for Monday to determine discharge date.         Discharge Codes    No documentation.             Melissa Willis

## 2020-04-10 NOTE — THERAPY TREATMENT NOTE
Acute Care - Physical Therapy Treatment Note  [facility]     Patient Name: Jesus Smith  : 1935  MRN: [id number]  Today's Date: 4/10/2020             Admit Date: 4/3/2020    Visit Dx:    ICD-10-CM ICD-9-CM   1. Acute UTI (urinary tract infection) N39.0 599.0   2. Systemic inflammatory response syndrome (CMS/[facility]) R65.10 995.90   3. NOLAN (acute kidney injury) (CMS/[facility]) N17.9 584.9   4. Transaminitis R74.0 790.4   5. Acute cholecystitis K81.0 575.0   6. Impaired functional mobility and activity tolerance Z74.09 V49.89     Patient Active Problem List   Diagnosis   • Acute UTI (urinary tract infection)   • Bacteremia   • Abnormal LFTs   • Acute cholecystitis   • Benign essential HTN   • CKD (chronic kidney disease) stage 3, GFR 30-59 ml/min (CMS/HCC)   • History of prosthetic aortic valve       Therapy Treatment    Rehabilitation Treatment Summary     Row Name 04/10/20 1542 04/10/20 1045          Treatment Time/Intention    Discipline  physical therapy assistant  -MF  physical therapy assistant  -     Document Type  therapy note (daily note)  -MF2  --     Subjective Information  complains of;weakness  -MF2  --     Mode of Treatment  physical therapy  -MF2  --     Comment  --  nursing just getting pt back to bed following BSC use.   -     Reason Treatment Not Performed  --  other (see comments)  -     Existing Precautions/Restrictions  fall  -MF2  --     Treatment Considerations/Comments  NAM x 2  -MF2  --     Patient Response to Treatment  pt's dressing wet. notified nsg and changed gown  -MF2  --     Recorded by [MF] Connie Arcos, PTA 04/10/20 1543  [MF2] Connie Arcos, PTA 04/10/20 1620 [MF] Connie Arcos, PTA 04/10/20 1150     Row Name 04/10/20 1542             Bed Mobility Assessment/Treatment    Comment (Bed Mobility)  up in chair  -      Recorded by [MF] Connie Arcos, PTA 04/10/20 1620      Row Name 04/10/20 1542             Sit-Stand Transfer    Sit-Stand [place]  (Transfers)  verbal cues;minimum assist (75% patient effort);moderate assist (50% patient effort)  -MF      Recorded by [] Connie Arcos, [facility] 04/10/20 1620      Row Name 04/10/20 1542             Stand-Sit Transfer    Stand-Sit [place] (Transfers)  verbal cues;minimum assist (75% patient effort);moderate assist (50% patient effort)  -MF      Recorded by [] Connie Arcos, [facility] 04/10/20 1620      Row Name 04/10/20 1542             Gait/Stairs Assessment/Training    [place] Level (Gait)  verbal cues;contact guard  -      Assistive Device (Gait)  walker, front-wheeled  -      Distance in Feet (Gait)  15  -MF      Deviations/Abnormal Patterns (Gait)  stride length decreased;tahir decreased  -      Bilateral Gait Deviations  forward flexed posture;heel strike decreased  -MF      Recorded by [] Connie Arcos, [facility] 04/10/20 1620      Row Name 04/10/20 1542             Therapeutic Exercise    Lower Extremity (Therapeutic Exercise)  LAQ (long arc quad), bilateral  -MF      Lower Extremity Range of Motion (Therapeutic Exercise)  hip flexion/extension, bilateral;hip abduction/adduction, bilateral;ankle dorsiflexion/plantar flexion, bilateral  -MF      Exercise Type (Therapeutic Exercise)  AROM (active range of motion)  -MF      Position (Therapeutic Exercise)  seated  -      Sets/Reps (Therapeutic Exercise)  20  -MF      Recorded by [MF] Connie Arcos, [facility] 04/10/20 1620      Row Name 04/10/20 1542             Positioning and Restraints    Pre-Treatment Position  sitting in chair/recliner  -MF      Post Treatment Position  chair  -MF      In Chair  reclined;call light within reach;encouraged to call for assist;notified nsg  -MF      Recorded by [] Connie Arcos, [facility] 04/10/20 1620      Row Name 04/10/20 1542             Pain Scale: Numbers Pre/Post-Treatment    Pain Scale: Numbers, Pretreatment  0/10 - no pain  -MF      Recorded by [MF] Connie Arcos, [facility] 04/10/20 1620       Row Name                Wound 04/03/20 2115 Left coccyx    Wound - Properties Group Date first assessed: 04/03/20 [DF] Time first assessed: 2115 [DF] Present on Hospital Admission: Y [FT] Side: Left [FT] Location: coccyx [DF] Recorded by:  [DF] Casi Ayala RN 04/04/20 0101 [FT] Letty Quiroz RN 04/04/20 0115    Row Name                Wound 04/05/20 0901 Right upper abdomen Incision    Wound - Properties Group Date first assessed: 04/05/20 [JH] Time first assessed: 0901 [JH] Side: Right [JH2] Orientation: upper [JH2] Location: abdomen [JH] Primary Wound Type: Incision [JH] Recorded by:  [JH] Alejandrina Maravilla RN 04/05/20 0901 [JH2] Alejandrina Maravilla RN 04/05/20 0906      User Key  (r) = Recorded By, (t) = Taken By, (c) = Cosigned By    Initials Name Effective Dates Discipline    DF Casi Ayala RN 08/02/16 -  Nurse    Letty Chiang RN 08/02/16 -  Nurse    Alejandrina Jj RN 06/07/17 -  Nurse    Connie Medrano PTA 08/02/16 -  PT          Wound 04/03/20 2115 Left coccyx (Active)   Dressing Appearance open to air 4/10/2020  8:00 AM   Closure Open to air 4/9/2020  7:49 PM   Base red;blanchable 4/9/2020  7:49 PM   Periwound dry;intact 4/9/2020  7:49 PM   Dressing Care, Wound open to air 4/9/2020  7:49 PM       Wound 04/05/20 0901 Right upper abdomen Incision (Active)   Dressing Appearance dry;intact 4/10/2020  8:00 AM   Closure NEETA 4/9/2020  7:49 PM   Base dressing in place, unable to visualize 4/9/2020  7:49 PM   Dressing Care, Wound gauze 4/9/2020  7:49 PM               PT Recommendation and Plan     Plan of Care Reviewed With: patient  Progress: improving  Outcome Summary: Pt. agreeable to therapy and improving overall. Pt. was MIN-MOD assist for transfers. He walked 15' in room with RWX and participated with leg exercises. Will continue to work on strengthening and mobility.     Time Calculation:   PT Charges     Row Name 04/10/20 7807             Time Calculation     Start Time  1542  -      Stop Time  1615  -MF      Time Calculation (min)  33 min  -MF      PT Non-Billable Time (min)  3 min  -MF      PT Received On  04/10/20  -      PT Goal Re-Cert Due Date  04/16/20  -         Time Calculation- PT    Total Timed Code Minutes- PT  33 minute(s)  -         Timed Charges    04681 - PT Therapeutic Exercise Minutes  15  -MF      02226 - Gait Training Minutes   15  -MF        User Key  (r) = Recorded By, (t) = Taken By, (c) = Cosigned By    Initials Name Provider Type    Connie Medrano PTA Physical Therapy Assistant        Therapy Charges for Today     Code Description Service Date Service Provider Modifiers Qty    94572496432 HC PT THER PROC EA 15 MIN 4/9/2020 Connie Arcos, PTA GP 1    49002153156 HC PT THERAPEUTIC ACT EA 15 MIN 4/9/2020 Connie Arcos, SUSAN GP 2    84722935256 HC PT THER PROC EA 15 MIN 4/10/2020 Connie Arcos, PTA GP 1    41676719487 HC GAIT TRAINING EA 15 MIN 4/10/2020 Connie Arcos, PTA GP 1          PT G-Codes  Outcome Measure Options: AM-PAC 6 Clicks Basic Mobility (PT)  AM-PAC 6 Clicks Score (PT): 15    Connie Arcos PTA  4/10/2020

## 2020-04-10 NOTE — PLAN OF CARE
Problem: Patient Care Overview  Goal: Plan of Care Review  Outcome: Ongoing (interventions implemented as appropriate)  Flowsheets (Taken 4/10/2020 1611)  Plan of Care Reviewed With: patient  Outcome Summary: pt tolerating diet dc'd patino and pca pt is voiding and tolerating po pain meds

## 2020-04-10 NOTE — PLAN OF CARE
Problem: Patient Care Overview  Goal: Plan of Care Review  Outcome: Ongoing (interventions implemented as appropriate)  Flowsheets  Taken 4/10/2020 0528 by Stephanie Olmedo RNA  Progress: no change  Outcome Summary: Pt has NAM x 2. PCA in use. IV in left forearm. IVF. [facility] has accepted. Why does pt still have Day? I was unable to find the reasoning and he is on day 5. Q4H catheter care should have already been started. Will pass along to day nurse to find out. VSS. Safety maintained. Will continue to monitor.  Taken 4/9/2020 1857 by Genia Infante, RN  Plan of Care Reviewed With: patient

## 2020-04-10 NOTE — PLAN OF CARE
Problem: Patient Care Overview  Goal: Plan of Care Review  Outcome: Ongoing (interventions implemented as appropriate)  Flowsheets (Taken 4/10/2020 1620)  Progress: improving  Plan of Care Reviewed With: patient  Outcome Summary: Pt. agreeable to therapy and improving overall. Pt. was MIN-MOD assist for transfers. He walked 15' in room with RWX and participated with leg exercises. Will continue to work on strengthening and mobility.

## 2020-04-10 NOTE — PROGRESS NOTES
Agustina Saleem MD FACS  Progress Note     LOS: 7 days   Patient Care Team:  Matheus Olivera PA as PCP - General (Physician Assistant)      Subjective     Interval History:      eating breakfast.  Complains of pain at back of head.       Objective     Vital Signs  Temp:  [97.6 °F (36.4 °C)-98.3 °F (36.8 °C)] 97.6 °F (36.4 °C)  Heart Rate:  [50-54] 51  Resp:  [14-18] 16  BP: (134-167)/(52-72) 146/54    Physical Exam:  General appearance - alert, no evidence of skin lesion at posterior scalp  Abdomen - soft, clean, NAM serous/bilious      Results Review:    Lab Results (last 24 hours)     Procedure Component Value Units Date/Time    Blood Culture - Blood, Arm, Right [[id number]] Collected:  04/05/20 0405    Specimen:  Blood from Arm, Right Updated:  04/10/20 0515     Blood Culture No growth at 5 days    Blood Culture - Blood, Hand, Right [[id number]] Collected:  04/05/20 0405    Specimen:  Blood from Hand, Right Updated:  04/10/20 0515     Blood Culture No growth at 5 days    POC Glucose Once [[id number]]  (Normal) Collected:  04/09/20 2038    Specimen:  Blood Updated:  04/09/20 2050     Glucose 111 mg/dL      Comment: : [id number] Randy GalloieMeter ID: [id number]       POC Glucose Once [[id number]]  (Normal) Collected:  04/09/20 1655    Specimen:  Blood Updated:  04/09/20 1717     Glucose 92 mg/dL      Comment: : [id number] Naresh GainesenMeter ID: [id number]       POC Glucose Once [[id number]]  (Abnormal) Collected:  04/09/20 1227    Specimen:  Blood Updated:  04/09/20 1257     Glucose 180 mg/dL      Comment: : [id number] Naresh GainesenMeter ID: [id number]           Imaging Results (Last 24 Hours)     ** No results found for the last 24 hours. **            Assessment/Plan       POD# 5 open cholecystostomy tube placement.  Continue drainage until cholecystectomy planned for 4-6 weeks.  Continue iv abx while in house.        Agustina Saleem MD  04/10/20  08:42

## 2020-04-10 NOTE — PROGRESS NOTES
[facility] Medicine Services  INPATIENT PROGRESS NOTE    Patient Name: Jesus Smith  Date of Admission: 4/3/2020  Today's Date: 04/10/20  Length of Stay: 7  Primary Care Physician: Matheus Olivera PA    Subjective   Chief Complaint: follow-up cholecystitis  HPI   Patient is doing well this morning.  He reports that his pain is well controlled at this time.  He states that he has been passing a lot of gas, but still has not had a bowel movement.  He does feel very weak but states that this is improving as well.  He is agreeable to placement at a skilled nursing facility for rehabilitation.  He reports that he was told this morning by Dr. Saleem that he would likely have cholecystectomy performed in about 4 weeks.        Review of Systems     All pertinent negatives and positives are as above. All other systems have been reviewed and are negative unless otherwise stated.     Objective    Temp:  [97.6 °F (36.4 °C)-98.3 °F (36.8 °C)] 97.6 °F (36.4 °C)  Heart Rate:  [50-54] 51  Resp:  [14-18] 16  BP: (134-167)/(52-72) 146/54  Physical Exam   Constitutional: He is oriented to person, place, and time. No distress.   Nontoxic appearing   HENT:   Head: Normocephalic.   Mouth/Throat: No oropharyngeal exudate.   Eyes: No scleral icterus.   Neck: No tracheal deviation present.   Pulmonary/Chest: Effort normal. No respiratory distress.   Abdominal: Soft. He exhibits no distension.   RUQ drain in place; bowel sounds present   Musculoskeletal: He exhibits edema.   Neurological: He is alert and oriented to person, place, and time.   Skin: Skin is warm. He is not diaphoretic.   Psychiatric: He has a normal mood and affect.   Vitals reviewed.    Results Review:  I have reviewed the labs, radiology results, and diagnostic studies.    Laboratory Data:   Results from last 7 days   Lab Units 04/09/20  0433 04/08/20  0454 04/07/20  0507   WBC 10*3/mm3 11.41* 13.56* 13.30*   HEMOGLOBIN g/dL  11.2* 10.8* 11.4*   HEMATOCRIT % 35.2* 34.2* 35.7*   PLATELETS 10*3/mm3 186 181 161        Results from last 7 days   Lab Units 04/09/20  0433 04/08/20  0454 04/07/20  0507   SODIUM mmol/L 137 137 135*   POTASSIUM mmol/L 3.7 3.9 4.3   CHLORIDE mmol/L 102 99 98   CO2 mmol/L 26.0 27.0 26.0   BUN mg/dL 19 29* 38*   CREATININE mg/dL 0.79 1.08 1.08   CALCIUM mg/dL 7.7* 7.9* 8.0*   BILIRUBIN mg/dL 1.9* 1.8* 1.4*   ALK PHOS U/L 97 94 105   ALT (SGPT) U/L 47* 55* 70*   AST (SGOT) U/L 29 33 36   GLUCOSE mg/dL 131* 125* 123*       Culture Data:   Blood Culture   Date Value Ref Range Status   04/05/2020 No growth at 5 days  Final   04/05/2020 No growth at 5 days  Final   04/03/2020 Escherichia coli (C)  Final     Comment:     Refer to previous blood culture collected on 4/3/2020 1539 for MICs.     04/03/2020 Escherichia coli (C)  Final       Radiology Data:   Imaging Results (Last 24 Hours)     ** No results found for the last 24 hours. **          I have reviewed the patient's current medications.     Assessment/Plan     Active Hospital Problems    Diagnosis   • **Bacteremia   • History of prosthetic aortic valve   • Abnormal LFTs   • Acute cholecystitis   • Benign essential HTN   • CKD (chronic kidney disease) stage 3, GFR 30-59 ml/min (CMS/[facility])   • Acute UTI (urinary tract infection)     Plan:  1.  E. Coli bacteremia susceptible to ceftriaxone - day 8 of Abxs today (was on Zosyn 4/4 through 4/7)  2.  Surveillance blood cultures 4/5 are negative  3.  POD #5 open cholecystostomy tube placement for acute cholcystitis  4.  Surgery recommendations reviewed; plan for cholecystectomy in 4-6 weeks  5.  GI soft diet  6.  Nutrition supplements  7.  Physical therapy  8.  Currently on Lovenox 1mg/kg SQ q12hrs  9.  INR yesterday was 1.85, however patient has not bee on Coumadin recently.  He is on very low dose Coumadin as outpatient (alternating between 1mg and 0.5mg).  He does not have mechanical valve per records; rather  bioprosthetic valve.  Plan to resume oral anticoagulation soon and transition off of therapeutic Lovenox  10.  Bowel regimen  11.  Dispo: patient lives in [place]; looking into SNF placement in [place].      Alf Michelle MD   04/10/20   09:46

## 2020-04-11 LAB
ALBUMIN SERPL-MCNC: 2.6 G/DL (ref 3.5–5.2)
ALBUMIN/GLOB SERPL: 0.9 G/DL
ALP SERPL-CCNC: 131 U/L (ref 39–117)
ALT SERPL W P-5'-P-CCNC: 45 U/L (ref 1–41)
ANION GAP SERPL CALCULATED.3IONS-SCNC: 10 MMOL/L (ref 5–15)
AST SERPL-CCNC: 30 U/L (ref 1–40)
BILIRUB SERPL-MCNC: 1 MG/DL (ref 0.2–1.2)
BUN BLD-MCNC: 13 MG/DL (ref 8–23)
BUN/CREAT SERPL: 17.1 (ref 7–25)
CALCIUM SPEC-SCNC: 8.2 MG/DL (ref 8.6–10.5)
CHLORIDE SERPL-SCNC: 102 MMOL/L (ref 98–107)
CO2 SERPL-SCNC: 27 MMOL/L (ref 22–29)
CREAT BLD-MCNC: 0.76 MG/DL (ref 0.76–1.27)
DEPRECATED RDW RBC AUTO: 47.9 FL (ref 37–54)
ERYTHROCYTE [DISTWIDTH] IN BLOOD BY AUTOMATED COUNT: 14.6 % (ref 12.3–15.4)
GFR SERPL CREATININE-BSD FRML MDRD: 98 ML/MIN/1.73
GLOBULIN UR ELPH-MCNC: 3 GM/DL
GLUCOSE BLD-MCNC: 136 MG/DL (ref 65–99)
GLUCOSE BLDC GLUCOMTR-MCNC: 124 MG/DL (ref 70–130)
GLUCOSE BLDC GLUCOMTR-MCNC: 135 MG/DL (ref 70–130)
GLUCOSE BLDC GLUCOMTR-MCNC: 136 MG/DL (ref 70–130)
GLUCOSE BLDC GLUCOMTR-MCNC: 161 MG/DL (ref 70–130)
HCT VFR BLD AUTO: 35.6 % (ref 37.5–51)
HGB BLD-MCNC: 11.4 G/DL (ref 13–17.7)
INR PPP: 1.54 (ref 0.91–1.09)
MCH RBC QN AUTO: 29.5 PG (ref 26.6–33)
MCHC RBC AUTO-ENTMCNC: 32 G/DL (ref 31.5–35.7)
MCV RBC AUTO: 92 FL (ref 79–97)
PLATELET # BLD AUTO: 216 10*3/MM3 (ref 140–450)
PMV BLD AUTO: 10.7 FL (ref 6–12)
POTASSIUM BLD-SCNC: 4 MMOL/L (ref 3.5–5.2)
PROT SERPL-MCNC: 5.6 G/DL (ref 6–8.5)
PROTHROMBIN TIME: 18.5 SECONDS (ref 11.9–14.6)
RBC # BLD AUTO: 3.87 10*6/MM3 (ref 4.14–5.8)
SODIUM BLD-SCNC: 139 MMOL/L (ref 136–145)
WBC NRBC COR # BLD: 13.19 10*3/MM3 (ref 3.4–10.8)

## 2020-04-11 PROCEDURE — 82962 GLUCOSE BLOOD TEST: CPT

## 2020-04-11 PROCEDURE — 85027 COMPLETE CBC AUTOMATED: CPT | Performed by: INTERNAL MEDICINE

## 2020-04-11 PROCEDURE — 25010000002 CEFTRIAXONE PER 250 MG: Performed by: INTERNAL MEDICINE

## 2020-04-11 PROCEDURE — 63710000001 INSULIN LISPRO (HUMAN) PER 5 UNITS: Performed by: INTERNAL MEDICINE

## 2020-04-11 PROCEDURE — 25010000002 ENOXAPARIN PER 10 MG: Performed by: INTERNAL MEDICINE

## 2020-04-11 PROCEDURE — 80053 COMPREHEN METABOLIC PANEL: CPT | Performed by: INTERNAL MEDICINE

## 2020-04-11 PROCEDURE — 85610 PROTHROMBIN TIME: CPT | Performed by: SPECIALIST

## 2020-04-11 PROCEDURE — 97110 THERAPEUTIC EXERCISES: CPT

## 2020-04-11 PROCEDURE — 97116 GAIT TRAINING THERAPY: CPT

## 2020-04-11 RX ADMIN — BISOPROLOL FUMARATE 10 MG: 5 TABLET ORAL at 10:09

## 2020-04-11 RX ADMIN — ASPIRIN 81 MG: 81 TABLET ORAL at 10:08

## 2020-04-11 RX ADMIN — POLYETHYLENE GLYCOL 3350 17 G: 17 POWDER, FOR SOLUTION ORAL at 10:08

## 2020-04-11 RX ADMIN — SUCRALFATE 1 G: 1 SUSPENSION ORAL at 17:30

## 2020-04-11 RX ADMIN — INSULIN LISPRO 2 UNITS: 100 INJECTION, SOLUTION INTRAVENOUS; SUBCUTANEOUS at 12:23

## 2020-04-11 RX ADMIN — SUCRALFATE 1 G: 1 SUSPENSION ORAL at 07:15

## 2020-04-11 RX ADMIN — DOCUSATE SODIUM 50 MG AND SENNOSIDES 8.6 MG 1 TABLET: 8.6; 5 TABLET, FILM COATED ORAL at 10:09

## 2020-04-11 RX ADMIN — FAMOTIDINE 20 MG: 20 TABLET, FILM COATED ORAL at 10:09

## 2020-04-11 RX ADMIN — CEFTRIAXONE SODIUM 1 G: 1 INJECTION, POWDER, FOR SOLUTION INTRAMUSCULAR; INTRAVENOUS at 12:14

## 2020-04-11 RX ADMIN — SUCRALFATE 1 G: 1 SUSPENSION ORAL at 23:47

## 2020-04-11 RX ADMIN — FAMOTIDINE 20 MG: 20 TABLET, FILM COATED ORAL at 20:41

## 2020-04-11 RX ADMIN — ENOXAPARIN SODIUM 100 MG: 100 INJECTION SUBCUTANEOUS at 10:08

## 2020-04-11 RX ADMIN — SUCRALFATE 1 G: 1 SUSPENSION ORAL at 12:14

## 2020-04-11 RX ADMIN — ENOXAPARIN SODIUM 100 MG: 100 INJECTION SUBCUTANEOUS at 20:41

## 2020-04-11 RX ADMIN — DOCUSATE SODIUM 50 MG AND SENNOSIDES 8.6 MG 1 TABLET: 8.6; 5 TABLET, FILM COATED ORAL at 20:41

## 2020-04-11 RX ADMIN — SUCRALFATE 1 G: 1 SUSPENSION ORAL at 00:12

## 2020-04-11 NOTE — PROGRESS NOTES
[facility] Medicine Services  INPATIENT PROGRESS NOTE    Length of Stay: 8  Date of Admission: 4/3/2020  Primary Care Physician: Matheus Olivera PA    Subjective   Chief Complaint: Cholecystitis/failure to thrive/prosthetic aortic valve    HPI   Patient remain extremely weak. Patient denies any chest pain or shortness of breath.  Discussed the patient for placement.  Discussed patient plan for cholecystectomy in about 4 to 6 weeks.  Pain is fairly controlled p.o. medication.    Review of Systems   Constitutional: Positive for activity change, appetite change and fatigue. Negative for chills and fever.   HENT: Negative for hearing loss, nosebleeds, tinnitus and trouble swallowing.    Eyes: Negative for visual disturbance.   Respiratory: Negative for cough, chest tightness, shortness of breath and wheezing.    Cardiovascular: Negative for chest pain, palpitations and leg swelling.   Gastrointestinal: Positive for abdominal pain. Negative for abdominal distention, blood in stool, constipation, diarrhea, nausea and vomiting.   Endocrine: Negative for cold intolerance, heat intolerance, polydipsia, polyphagia and polyuria.   Genitourinary: Negative for decreased urine volume, difficulty urinating, dysuria, flank pain, frequency and hematuria.   Musculoskeletal: Positive for arthralgias, gait problem and myalgias. Negative for joint swelling.   Skin: Negative for rash.   Allergic/Immunologic: Negative for immunocompromised state.   Neurological: Positive for weakness. Negative for dizziness, syncope, light-headedness and headaches.   Hematological: Negative for adenopathy. Does not bruise/bleed easily.   Psychiatric/Behavioral: Negative for confusion and sleep disturbance. The patient is not nervous/anxious.           All pertinent negatives and positives are as above. All other systems have been reviewed and are negative unless otherwise stated.     Objective    Temp:  [98.6 °F  (37 °C)-98.8 °F (37.1 °C)] 98.7 °F (37.1 °C)  Heart Rate:  [50-55] 55  Resp:  [16] 16  BP: (143-156)/(52-71) 156/71    Intake/Output Summary (Last 24 hours) at 4/11/2020 0840  Last data filed at 4/11/2020 0700  Gross per 24 hour   Intake 900 ml   Output 1070 ml   Net -170 ml     Physical Exam   Constitutional: He appears well-developed.   HENT:   Head: Normocephalic.   Eyes: Pupils are equal, round, and reactive to light. Conjunctivae are normal.   Neck: Neck supple. No JVD present.   Cardiovascular: Normal rate, regular rhythm, normal heart sounds and intact distal pulses. Exam reveals no gallop and no friction rub.   No murmur heard.  Pulmonary/Chest: No respiratory distress. He has no wheezes. He has no rales. He exhibits no tenderness.   Diminished breath sound bilateral, clear.   Abdominal: He exhibits no distension. There is tenderness. There is guarding. There is no rebound.   Right upper quadrant discomfort.   Musculoskeletal: He exhibits edema. He exhibits no tenderness or deformity.   Neurological: He is alert. He displays normal reflexes. No cranial nerve deficit. He exhibits abnormal muscle tone. Coordination abnormal.   Skin: Skin is warm and dry. Capillary refill takes 2 to 3 seconds. No rash noted.   Psychiatric: He has a normal mood and affect. His behavior is normal.   Nursing note and vitals reviewed.      Results Review:  Lab Results (last 24 hours)     Procedure Component Value Units Date/Time    POC Glucose Once [[id number]]  (Normal) Collected:  04/11/20 0737    Specimen:  Blood Updated:  04/11/20 0748     Glucose 124 mg/dL      Comment: : [id number] Randy StephanieMeter ID: [id number]       Comprehensive Metabolic Panel [[id number]]  (Abnormal) Collected:  04/11/20 0509    Specimen:  Blood Updated:  04/11/20 0611     Glucose 136 mg/dL      BUN 13 mg/dL      Creatinine 0.76 mg/dL      Sodium 139 mmol/L      Potassium 4.0 mmol/L      Chloride 102 mmol/L      CO2 27.0 mmol/L      Calcium 8.2  mg/dL      Total Protein 5.6 g/dL      Albumin 2.60 g/dL      ALT (SGPT) 45 U/L      AST (SGOT) 30 U/L      Alkaline Phosphatase 131 U/L      Total Bilirubin 1.0 mg/dL      eGFR Non African Amer 98 mL/min/1.73      Globulin 3.0 gm/dL      A/G Ratio 0.9 g/dL      BUN/Creatinine Ratio 17.1     Anion Gap 10.0 mmol/L     Narrative:       GFR Normal >60  Chronic Kidney Disease <60  Kidney Failure <15      Protime-INR [[id number]]  (Abnormal) Collected:  04/11/20 0509    Specimen:  Blood Updated:  04/11/20 0558     Protime 18.5 Seconds      INR 1.54    CBC (No Diff) [[id number]]  (Abnormal) Collected:  04/11/20 0509    Specimen:  Blood Updated:  04/11/20 0550     WBC 13.19 10*3/mm3      RBC 3.87 10*6/mm3      Hemoglobin 11.4 g/dL      Hematocrit 35.6 %      MCV 92.0 fL      MCH 29.5 pg      MCHC 32.0 g/dL      RDW 14.6 %      RDW-SD 47.9 fl      MPV 10.7 fL      Platelets 216 10*3/mm3     POC Glucose Once [[id number]]  (Abnormal) Collected:  04/10/20 2139    Specimen:  Blood Updated:  04/10/20 2150     Glucose 140 mg/dL      Comment: : [id number] Randy GalloieMeter ID: [id number]              Cultures:  Blood Culture   Date Value Ref Range Status   04/05/2020 No growth at 5 days  Final   04/05/2020 No growth at 5 days  Final       Radiology Data:    Imaging Results (Last 24 Hours)     ** No results found for the last 24 hours. **          No Known Allergies    Scheduled meds:     aspirin 81 mg Oral Daily   bisoprolol 10 mg Oral Daily   cefTRIAXone 1 g Intravenous Q24H   enoxaparin 1 mg/kg Subcutaneous Q12H   famotidine 20 mg Oral BID   insulin lispro 2-7 Units Subcutaneous 4x Daily With Meals & Nightly   polyethylene glycol 17 g Oral Daily   sennosides-docusate 1 tablet Oral BID   sucralfate 1 g Oral Q6H       PRN meds:  bisacodyl  •  dextrose  •  dextrose  •  glucagon (human recombinant)  •  HYDROmorphone  •  naloxone  •  ondansetron  •  oxyCODONE-acetaminophen  •  Pharmacy to Dose enoxaparin (LOVENOX)  •   polyethylene glycol  •  [COMPLETED] Insert peripheral IV **AND** sodium chloride    Assessment/Plan       Bacteremia    Acute UTI (urinary tract infection)    Abnormal LFTs    Acute cholecystitis    Benign essential HTN    CKD (chronic kidney disease) stage 3, GFR 30-59 ml/min (CMS/[facility])    History of prosthetic aortic valve      Plan:  Cholecystitis/abdomen pain.  Consult general surgery.  Status post cholecystectomy tube placement day 6.   Patient had Zosyn  to .  Patient is currently on Rocephin, day 9 of antibiotics today.    General surgery- plan for cholecystectomy in 4 to 6 weeks.    CAD/history of prosthetic aortic valve/hypertension.  Continue aspirin.  Continue Lovenox therapeutic.    Pain control.  Dilaudid as needed.  Percocet PRN.    Nausea/vomiting.  Pepcid.  Zofran as needed.  Carafate.    Chronic kidney disease stage III.    Diabetes.  Sliding scale.  Hemoglobin A1 C 6.6.    UTI.  On Rocephin antibiotics.    Elevated liver enzyme.    Constipation.  Rebecca-Colace.  Dulcolax suppository as needed.    Nutrition.  Soft diet.  Nutrition supplement.    Deconditioning.  PT consult.    Blood culture- negative for 5 days.    Discharge Plannin-2 days.  Waiting for rehab placement. Patient's agreeable with going to [place] (he lives in [place]).  Mamadou Dodd MD   20   08:40

## 2020-04-11 NOTE — PLAN OF CARE
Problem: Patient Care Overview  Goal: Plan of Care Review  Outcome: Ongoing (interventions implemented as appropriate)  Flowsheets (Taken 4/11/2020 1018)  Progress: improving  Plan of Care Reviewed With: patient  Outcome Summary: Pt was able to transfer sit to stand with CGA.  Amb 30' X 2 with RWX and CGA.  Will continue to work with pt to increase strength and progress with amb as pt is able.

## 2020-04-11 NOTE — NURSING NOTE
No requests for pain or nausea medication. One NAM drain remains. Up with assist. Voiding per urinal. IID. Glucose checks ACHS. Resting between care, will continue to monitor.

## 2020-04-11 NOTE — THERAPY TREATMENT NOTE
Acute Care - Physical Therapy Treatment Note  [facility]     Patient Name: Jesus Smith  : 1935  MRN: [id number]  Today's Date: 2020             Admit Date: 4/3/2020    Visit Dx:    ICD-10-CM ICD-9-CM   1. Acute UTI (urinary tract infection) N39.0 599.0   2. Systemic inflammatory response syndrome (CMS/Formerly [facility]) R65.10 995.90   3. NOLAN (acute kidney injury) (CMS/HCC) N17.9 584.9   4. Transaminitis R74.0 790.4   5. Acute cholecystitis K81.0 575.0   6. Impaired functional mobility and activity tolerance Z74.09 V49.89     Patient Active Problem List   Diagnosis   • Acute UTI (urinary tract infection)   • Bacteremia   • Abnormal LFTs   • Acute cholecystitis   • Benign essential HTN   • CKD (chronic kidney disease) stage 3, GFR 30-59 ml/min (CMS/HCC)   • History of prosthetic aortic valve       Therapy Treatment    Rehabilitation Treatment Summary     Row Name 20 1018             Treatment Time/Intention    Discipline  physical therapy assistant  -EDUARD      Document Type  therapy note (daily note)  -EDUARD      Subjective Information  complains of;weakness  -JB2      Existing Precautions/Restrictions  fall  -JB2      Treatment Considerations/Comments  NAM X 1  -JB2      Recorded by [EDUARD] Randy Edmond, PTA 20 1025  [JB2] Randy Edmond, PTA 20 1050      Row Name 20 1018             Bed Mobility Assessment/Treatment    Comment (Bed Mobility)  up in the chair  -EDUARD      Recorded by [EDUARD] Randy Edmond, PTA 20 1050      Row Name 20 1018             Sit-Stand Transfer    Sit-Stand [place] (Transfers)  verbal cues;contact guard  -EDUARD      Assistive Device (Sit-Stand Transfers)  walker, front-wheeled  -EDUARD      Recorded by [EDUARD] Randy Edmond, PTA 20 1050      Row Name 20 1018             Stand-Sit Transfer    Stand-Sit [place] (Transfers)  verbal cues;contact guard  -EDUARD      Recorded by [EDUARD] Randy Edmond, PTA 20 1050      Row Name 20 1018              Gait/Stairs Assessment/Training    19417 - Gait Training Minutes   15  -EDUARD      [place] Level (Gait)  verbal cues;contact guard  -EDUARD      Assistive Device (Gait)  walker, front-wheeled  -EDUARD      Distance in Feet (Gait)  30' X 2  -EDUARD      Bilateral Gait Deviations  forward flexed posture  -EDUARD      Comment (Gait/Stairs)  one sitting rest in between   -EDUARD      Recorded by [EDUARD] Randy Edmond, PTA 04/11/20 1050      Row Name 04/11/20 1018             Therapeutic Exercise    81025 - PT Therapeutic Exercise Minutes  10  -EDUARD      Recorded by [EDUARD] Randy Edmond, PTA 04/11/20 1050      Row Name 04/11/20 1018             Therapeutic Exercise    Comment (Therapeutic Exercise)  sitting, AROM BLE X 20   -EDUARD      Recorded by [EDUARD] Randy Edmond, PTA 04/11/20 1050      Row Name 04/11/20 1018             Positioning and Restraints    Pre-Treatment Position  sitting in chair/recliner  -EDUARD      Post Treatment Position  chair  -EDUARD      In Chair  reclined;call light within reach;encouraged to call for assist  -EDUARD      Recorded by [EDUARD] Randy Edmond, PTA 04/11/20 1050      Row Name 04/11/20 1018             Pain Scale: Numbers Pre/Post-Treatment    Pain Scale: Numbers, Pretreatment  0/10 - no pain  -EDUARD      Recorded by [EDUARD] Randy Edmond, PTA 04/11/20 1050      Row Name                Wound 04/03/20 2115 Left coccyx    Wound - Properties Group Date first assessed: 04/03/20 [DF] Time first assessed: 2115 [DF] Present on Hospital Admission: Y [FT] Side: Left [FT] Location: coccyx [DF] Recorded by:  [DF] Casi Ayala RN 04/04/20 0101 [FT] Letty Quiroz RN 04/04/20 0115    Row Name                Wound 04/05/20 0901 Right upper abdomen Incision    Wound - Properties Group Date first assessed: 04/05/20 [JH] Time first assessed: 0901 [JH] Side: Right [JH2] Orientation: upper [JH2] Location: abdomen [JH] Primary Wound Type: Incision [JH] Recorded by:  [JH] Alejandrina Maravilla RN 04/05/20 0901  [JH2] Alejandrina Maravilla RN 04/05/20 0906      User Key  (r) = Recorded By, (t) = Taken By, (c) = Cosigned By    Initials Name Effective Dates Discipline    EDUARD Randy Edmond PTA 12/08/16 -  PT    Casi Ortega RN 08/02/16 -  Nurse    Letty Chiang RN 08/02/16 -  Nurse    Alejandrina Jj RN 06/07/17 -  Nurse          Wound 04/03/20 2115 Left coccyx (Active)   Dressing Appearance open to air 4/10/2020  8:47 PM   Closure Open to air 4/10/2020  8:47 PM   Base red;blanchable 4/10/2020  8:47 PM   Periwound dry;intact 4/10/2020  8:47 PM   Drainage Amount none 4/10/2020  8:47 PM   Dressing Care, Wound open to air 4/10/2020  8:47 PM               PT Recommendation and Plan     Plan of Care Reviewed With: patient  Progress: improving  Outcome Summary: Pt was able to transfer sit to stand with CGA.  Amb 30' X 2 with RWX and CGA.  Will continue to work with pt to increase strength and progress with amb as pt is able.  Outcome Measures     Row Name 04/11/20 1018             How much help from another person do you currently need...    Turning from your back to your side while in flat bed without using bedrails?  3  -EDUARD      Moving from lying on back to sitting on the side of a flat bed without bedrails?  3  -EDUARD      Moving to and from a bed to a chair (including a wheelchair)?  3  -EDUARD      Standing up from a chair using your arms (e.g., wheelchair, bedside chair)?  3  -EDUARD      Climbing 3-5 steps with a railing?  3  -EDUARD      To walk in hospital room?  3  -EDUARD      AM-PAC 6 Clicks Score (PT)  18  -EDUARD         Functional Assessment    Outcome Measure Options  AM-PAC 6 Clicks Basic Mobility (PT)  -EDUARD        User Key  (r) = Recorded By, (t) = Taken By, (c) = Cosigned By    Initials Name Provider Type    Randy Ledezma PTA Physical Therapy Assistant         Time Calculation:   PT Charges     Row Name 04/11/20 1018             Time Calculation    Start Time  1018  -EDUARD      Stop Time  1043  -EDUARD      Time  Calculation (min)  25 min  -EDUARD      PT Received On  04/11/20  -EDUARD         Time Calculation- PT    Total Timed Code Minutes- PT  25 minute(s)  -EDUARD         Timed Charges    59817 - PT Therapeutic Exercise Minutes  10  -EDUARD      76224 - Gait Training Minutes   15  -EDUARD        User Key  (r) = Recorded By, (t) = Taken By, (c) = Cosigned By    Initials Name Provider Type    Randy Ledezma PTA Physical Therapy Assistant        Therapy Charges for Today     Code Description Service Date Service Provider Modifiers Qty    46842120838 HC PT THER PROC EA 15 MIN 4/11/2020 Randy Edmond, SUSAN GP 1    28164193731 HC GAIT TRAINING EA 15 MIN 4/11/2020 Randy Edmond, PTA GP 1          PT G-Codes  Outcome Measure Options: AM-PAC 6 Clicks Basic Mobility (PT)  AM-PAC 6 Clicks Score (PT): 18    Randy Edmond PTA  4/11/2020

## 2020-04-11 NOTE — NURSING NOTE
When emptying the patients NAM drain it was noted that the drain in the abdominal cavity would not hold suction. Upon further inspection about 18cm's of the drainage end was outside of the body. Surgeon on call was notified who ordered to remove the drain in the abdominal cavity.

## 2020-04-11 NOTE — PLAN OF CARE
VSS, up with SBA - spent most of day in chair, BM today, voiding per urinal, VSS, room air, SCDS, BLE edema, IV abx as ordered, dsg CDI to abdomen, NAM X1 with small amount of serosanguineous output, denies pain, v-paced per telemetry, accuchecks and sliding scale as ordered, likely d/c to NH on Monday    Problem: Patient Care Overview  Goal: Plan of Care Review  Outcome: Ongoing (interventions implemented as appropriate)  Flowsheets  Taken 4/11/2020 2744 by Melissa Michelle, RN  Progress: improving  Taken 4/11/2020 1018 by Randy Edmond PTA  Plan of Care Reviewed With: patient

## 2020-04-11 NOTE — PROGRESS NOTES
1 of his drains came out last night.  Nurse stated that it was the nonbilious 1 however the NAM drain that is remaining is serosanguineous more bloody without bile.  No bile staining on his dressing.  Abdomen is otherwise soft.  We will continue with antibiotics and monitor labs and exam

## 2020-04-12 LAB
ALBUMIN SERPL-MCNC: 2.7 G/DL (ref 3.5–5.2)
ALBUMIN/GLOB SERPL: 0.9 G/DL
ALP SERPL-CCNC: 142 U/L (ref 39–117)
ALT SERPL W P-5'-P-CCNC: 42 U/L (ref 1–41)
ANION GAP SERPL CALCULATED.3IONS-SCNC: 11 MMOL/L (ref 5–15)
AST SERPL-CCNC: 30 U/L (ref 1–40)
BASOPHILS # BLD AUTO: 0.06 10*3/MM3 (ref 0–0.2)
BASOPHILS NFR BLD AUTO: 0.5 % (ref 0–1.5)
BILIRUB SERPL-MCNC: 1 MG/DL (ref 0.2–1.2)
BUN BLD-MCNC: 11 MG/DL (ref 8–23)
BUN/CREAT SERPL: 14.5 (ref 7–25)
CALCIUM SPEC-SCNC: 8.6 MG/DL (ref 8.6–10.5)
CHLORIDE SERPL-SCNC: 101 MMOL/L (ref 98–107)
CO2 SERPL-SCNC: 29 MMOL/L (ref 22–29)
CREAT BLD-MCNC: 0.76 MG/DL (ref 0.76–1.27)
DEPRECATED RDW RBC AUTO: 48.5 FL (ref 37–54)
EOSINOPHIL # BLD AUTO: 0.2 10*3/MM3 (ref 0–0.4)
EOSINOPHIL NFR BLD AUTO: 1.8 % (ref 0.3–6.2)
ERYTHROCYTE [DISTWIDTH] IN BLOOD BY AUTOMATED COUNT: 14.8 % (ref 12.3–15.4)
GFR SERPL CREATININE-BSD FRML MDRD: 98 ML/MIN/1.73
GLOBULIN UR ELPH-MCNC: 3 GM/DL
GLUCOSE BLD-MCNC: 121 MG/DL (ref 65–99)
GLUCOSE BLDC GLUCOMTR-MCNC: 121 MG/DL (ref 70–130)
GLUCOSE BLDC GLUCOMTR-MCNC: 128 MG/DL (ref 70–130)
GLUCOSE BLDC GLUCOMTR-MCNC: 142 MG/DL (ref 70–130)
HCT VFR BLD AUTO: 35.9 % (ref 37.5–51)
HGB BLD-MCNC: 11.6 G/DL (ref 13–17.7)
IMM GRANULOCYTES # BLD AUTO: 0.44 10*3/MM3 (ref 0–0.05)
IMM GRANULOCYTES NFR BLD AUTO: 3.9 % (ref 0–0.5)
LYMPHOCYTES # BLD AUTO: 1.77 10*3/MM3 (ref 0.7–3.1)
LYMPHOCYTES NFR BLD AUTO: 15.8 % (ref 19.6–45.3)
MCH RBC QN AUTO: 29.7 PG (ref 26.6–33)
MCHC RBC AUTO-ENTMCNC: 32.3 G/DL (ref 31.5–35.7)
MCV RBC AUTO: 91.8 FL (ref 79–97)
MONOCYTES # BLD AUTO: 1.06 10*3/MM3 (ref 0.1–0.9)
MONOCYTES NFR BLD AUTO: 9.4 % (ref 5–12)
NEUTROPHILS # BLD AUTO: 7.69 10*3/MM3 (ref 1.7–7)
NEUTROPHILS NFR BLD AUTO: 68.6 % (ref 42.7–76)
NRBC BLD AUTO-RTO: 0 /100 WBC (ref 0–0.2)
PLATELET # BLD AUTO: 229 10*3/MM3 (ref 140–450)
PMV BLD AUTO: 10.9 FL (ref 6–12)
POTASSIUM BLD-SCNC: 4.3 MMOL/L (ref 3.5–5.2)
PROT SERPL-MCNC: 5.7 G/DL (ref 6–8.5)
RBC # BLD AUTO: 3.91 10*6/MM3 (ref 4.14–5.8)
SODIUM BLD-SCNC: 141 MMOL/L (ref 136–145)
WBC NRBC COR # BLD: 11.22 10*3/MM3 (ref 3.4–10.8)
WHOLE BLOOD HOLD SPECIMEN: NORMAL

## 2020-04-12 PROCEDURE — 97110 THERAPEUTIC EXERCISES: CPT

## 2020-04-12 PROCEDURE — 82962 GLUCOSE BLOOD TEST: CPT

## 2020-04-12 PROCEDURE — 80053 COMPREHEN METABOLIC PANEL: CPT | Performed by: FAMILY MEDICINE

## 2020-04-12 PROCEDURE — 97165 OT EVAL LOW COMPLEX 30 MIN: CPT

## 2020-04-12 PROCEDURE — 25010000002 ENOXAPARIN PER 10 MG: Performed by: FAMILY MEDICINE

## 2020-04-12 PROCEDURE — 97116 GAIT TRAINING THERAPY: CPT

## 2020-04-12 PROCEDURE — 85025 COMPLETE CBC W/AUTO DIFF WBC: CPT | Performed by: FAMILY MEDICINE

## 2020-04-12 PROCEDURE — 25010000002 CEFTRIAXONE PER 250 MG: Performed by: INTERNAL MEDICINE

## 2020-04-12 PROCEDURE — 25010000002 ENOXAPARIN PER 10 MG: Performed by: INTERNAL MEDICINE

## 2020-04-12 RX ADMIN — FAMOTIDINE 20 MG: 20 TABLET, FILM COATED ORAL at 21:46

## 2020-04-12 RX ADMIN — POLYETHYLENE GLYCOL 3350 17 G: 17 POWDER, FOR SOLUTION ORAL at 08:21

## 2020-04-12 RX ADMIN — FAMOTIDINE 20 MG: 20 TABLET, FILM COATED ORAL at 08:21

## 2020-04-12 RX ADMIN — ASPIRIN 81 MG: 81 TABLET ORAL at 08:20

## 2020-04-12 RX ADMIN — SUCRALFATE 1 G: 1 SUSPENSION ORAL at 12:16

## 2020-04-12 RX ADMIN — CEFTRIAXONE SODIUM 1 G: 1 INJECTION, POWDER, FOR SOLUTION INTRAMUSCULAR; INTRAVENOUS at 12:16

## 2020-04-12 RX ADMIN — ENOXAPARIN SODIUM 100 MG: 100 INJECTION SUBCUTANEOUS at 08:21

## 2020-04-12 RX ADMIN — SUCRALFATE 1 G: 1 SUSPENSION ORAL at 17:09

## 2020-04-12 RX ADMIN — BISOPROLOL FUMARATE 10 MG: 5 TABLET ORAL at 08:20

## 2020-04-12 RX ADMIN — ENOXAPARIN SODIUM 110 MG: 120 INJECTION SUBCUTANEOUS at 21:47

## 2020-04-12 RX ADMIN — DOCUSATE SODIUM 50 MG AND SENNOSIDES 8.6 MG 1 TABLET: 8.6; 5 TABLET, FILM COATED ORAL at 08:21

## 2020-04-12 RX ADMIN — DOCUSATE SODIUM 50 MG AND SENNOSIDES 8.6 MG 1 TABLET: 8.6; 5 TABLET, FILM COATED ORAL at 21:46

## 2020-04-12 RX ADMIN — SUCRALFATE 1 G: 1 SUSPENSION ORAL at 06:59

## 2020-04-12 RX ADMIN — GLYCERIN 1 DROP: .002; .002; .01 SOLUTION/ DROPS OPHTHALMIC at 13:41

## 2020-04-12 NOTE — PLAN OF CARE
Problem: Patient Care Overview  Goal: Plan of Care Review  4/12/2020 1857 by Nilsa Padilla RN  Outcome: Ongoing (interventions implemented as appropriate)  Flowsheets (Taken 4/12/2020 0345)  Outcome Summary: No c/o pain or nausea; up with assist; voiding per urinal; IID; Accu checks ACHS; NAM x1; Plan to go to NH at discharge.

## 2020-04-12 NOTE — THERAPY TREATMENT NOTE
Acute Care - Physical Therapy Treatment Note  [facility]     Patient Name: Jesus Smith  : 1935  MRN: [id number]  Today's Date: 2020  Onset of Illness/Injury or Date of Surgery: 20     Referring Physician: Dr. Dodd    Admit Date: 4/3/2020    Visit Dx:    ICD-10-CM ICD-9-CM   1. Acute UTI (urinary tract infection) N39.0 599.0   2. Systemic inflammatory response syndrome (CMS/Formerly [facility]) R65.10 995.90   3. NOLAN (acute kidney injury) (CMS/Formerly [facility]) N17.9 584.9   4. Transaminitis R74.0 790.4   5. Acute cholecystitis K81.0 575.0   6. Impaired functional mobility and activity tolerance Z74.09 V49.89   7. Decreased activities of daily living (ADL) Z78.9 V49.89     Patient Active Problem List   Diagnosis   • Acute UTI (urinary tract infection)   • Bacteremia   • Abnormal LFTs   • Acute cholecystitis   • Benign essential HTN   • CKD (chronic kidney disease) stage 3, GFR 30-59 ml/min (CMS/Formerly [facility])   • History of prosthetic aortic valve       Therapy Treatment    Rehabilitation Treatment Summary     Row Name 20 1039             Treatment Time/Intention    Discipline  physical therapy assistant  -      Document Type  therapy note (daily note)  -      Subjective Information  no complaints  -LC2      Mode of Treatment  physical therapy  -      Existing Precautions/Restrictions  fall  -LC2      Treatment Considerations/Comments  NAM x1  -LC2      Recorded by [LC] Melissa Bains, [facility] 20 1039  [LC2] Melissa Bains, [facility] 20 1101      Row Name 20 1039             Bed Mobility Assessment/Treatment    Comment (Bed Mobility)  up in chair  -      Recorded by [LC] Melissa Bains, PTA 20 1101      Row Name 20 1039             Sit-Stand Transfer    Sit-Stand [place] (Transfers)  contact guard  -      Assistive Device (Sit-Stand Transfers)  walker, front-wheeled  -      Recorded by [LC] Melissa Bains, PTA 20 1103      Row Name 20 1039             Stand-Sit Transfer     Stand-Sit [place] (Transfers)  contact guard  -LC      Assistive Device (Stand-Sit Transfers)  walker, front-wheeled  -LC      Recorded by [LC] Melissa Bains, PTA 04/12/20 1103      Row Name 04/12/20 1039             Gait/Stairs Assessment/Training    [place] Level (Gait)  verbal cues;contact guard  -LC      Assistive Device (Gait)  walker, front-wheeled  -LC      Distance in Feet (Gait)  60'' x2  -LC2      Pattern (Gait)  step-to  -LC2      Deviations/Abnormal Patterns (Gait)  tahir decreased;stride length decreased  -LC2      Bilateral Gait Deviations  forward flexed posture  -LC2      Comment (Gait/Stairs)  seated rest break between trials  -LC2      Recorded by [LC] Melissa Bains, PTA 04/12/20 1103  [LC2] Melissa Bains, [facility] 04/12/20 1106      Row Name 04/12/20 1039             Therapeutic Exercise    Lower Extremity (Therapeutic Exercise)  LAQ (long arc quad), bilateral;marching while seated  -LC      Lower Extremity Range of Motion (Therapeutic Exercise)  hip internal/external rotation, bilateral;ankle dorsiflexion/plantar flexion, bilateral  -LC      Exercise Type (Therapeutic Exercise)  AROM (active range of motion)  -LC      Position (Therapeutic Exercise)  seated  -LC      Sets/Reps (Therapeutic Exercise)  20  -LC      Recorded by [LC] Melissa Bains, [facility] 04/12/20 1106      Row Name 04/12/20 1039             Positioning and Restraints    Pre-Treatment Position  sitting in chair/recliner  -LC      Post Treatment Position  chair  -LC      In Chair  reclined;call light within reach;encouraged to call for assist  -LC      Recorded by [LC] Melissa Bains, PTA 04/12/20 1106      Row Name 04/12/20 1039             Pain Scale: Numbers Pre/Post-Treatment    Pain Scale: Numbers, Pretreatment  0/10 - no pain  -LC      Pain Scale: Numbers, Post-Treatment  0/10 - no pain  -LC      Recorded by [LC] Melissa Bains, PTA 04/12/20 1106      Row Name                Wound 04/03/20 2115 Left coccyx    Wound -  Properties Group Date first assessed: 04/03/20 [DF] Time first assessed: 2115 [DF] Present on Hospital Admission: Y [FT] Side: Left [FT] Location: coccyx [DF] Recorded by:  [DF] Casi Ayala RN 04/04/20 0101 [FT] Letty Quiroz RN 04/04/20 0115    Row Name                Wound 04/05/20 0901 Right upper abdomen Incision    Wound - Properties Group Date first assessed: 04/05/20 [JH] Time first assessed: 0901 [JH] Side: Right [JH2] Orientation: upper [JH2] Location: abdomen [JH] Primary Wound Type: Incision [JH] Recorded by:  [JH] Alejandrina Maravilla RN 04/05/20 0901 [JH2] Alejandrina Maravilla RN 04/05/20 0906    Row Name 04/12/20 1039             Plan of Care Review    Plan of Care Reviewed With  patient  -LC      Progress  improving  -LC      Outcome Summary  PT tx completed. BLE AROM x20 in chair. Sit to stands CGA with RWX. Amb 60' x2 with RWX and CGA in room, required  seated rest break between trials due to fatigue. Will cont to progress with PT for improved functional mobility and safety.   -LC      Recorded by [LC] Melissa Bains, SUSAN 04/12/20 1106        User Key  (r) = Recorded By, (t) = Taken By, (c) = Cosigned By    Initials Name Effective Dates Discipline    DF Casi Ayala RN 08/02/16 -  Nurse    Letty Chiang RN 08/02/16 -  Nurse    Alejandrina Jj RN 06/07/17 -  Nurse     Melissa Bains, PTA 10/18/19 -  PT          Wound 04/03/20 2115 Left coccyx (Active)   Dressing Appearance open to air 4/11/2020  8:42 PM   Closure Open to air 4/11/2020  8:42 PM   Base red;blanchable 4/11/2020  8:42 PM   Periwound dry;intact 4/11/2020  8:42 PM   Drainage Amount none 4/11/2020  8:42 PM       Wound 04/05/20 0901 Right upper abdomen Incision (Active)   Dressing Appearance dry;intact;no drainage 4/11/2020  8:42 PM   Closure NEETA 4/11/2020  8:42 PM   Base dressing in place, unable to visualize 4/11/2020  8:42 PM   Drainage Amount none 4/11/2020  8:42 PM   Dressing Care, Wound gauze 4/11/2020   8:42 PM       Rehab Goal Summary     Row Name 04/12/20 0955             Occupational Therapy Goals    Transfer Goal Selection (OT)  transfer, OT goal 1  -AC      Bathing Goal Selection (OT)  bathing, OT goal 1  -AC      Dressing Goal Selection (OT)  dressing, OT goal 1  -AC         Transfer Goal 1 (OT)    Activity/Assistive Device (Transfer Goal 1, OT)  sit-to-stand/stand-to-sit;bed-to-chair/chair-to-bed;toilet;walker, rolling  -AC      [place] Level/Cues Needed (Transfer Goal 1, OT)  conditional independence  -AC      Time Frame (Transfer Goal 1, OT)  long term goal (LTG);10 days  -AC      Progress/Outcome (Transfer Goal 1, OT)  goal ongoing  -AC         Bathing Goal 1 (OT)    Activity/Assistive Device (Bathing Goal 1, OT)  bathing skills, all;shower chair  -AC      [place] Level/Cues Needed (Bathing Goal 1, OT)  conditional independence  -AC      Time Frame (Bathing Goal 1, OT)  long term goal (LTG);10 days  -AC      Progress/Outcomes (Bathing Goal 1, OT)  goal ongoing  -AC         Dressing Goal 1 (OT)    Activity/Assistive Device (Dressing Goal 1, OT)  dressing skills, all  -AC      [place]/Cues Needed (Dressing Goal 1, OT)  conditional independence  -AC      Time Frame (Dressing Goal 1, OT)  long term goal (LTG);10 days  -AC      Progress/Outcome (Dressing Goal 1, OT)  goal ongoing  -AC        User Key  (r) = Recorded By, (t) = Taken By, (c) = Cosigned By    Initials Name Provider Type Discipline    AC Roc Jameson, OTR/L, CNT Occupational Therapist OT              PT Recommendation and Plan     Plan of Care Reviewed With: patient  Progress: improving  Outcome Summary: PT tx completed. BLE AROM x20 in chair. Sit to stands CGA with RWX. Amb 60' x2 with RWX and CGA in room, required  seated rest break between trials due to fatigue. Will cont to progress with PT for improved functional mobility and safety.   Outcome Measures     Row Name 04/12/20 0955 04/11/20 1018          How much help from  another person do you currently need...    Turning from your back to your side while in flat bed without using bedrails?  --  3  -EDUARD     Moving from lying on back to sitting on the side of a flat bed without bedrails?  --  3  -EDUARD     Moving to and from a bed to a chair (including a wheelchair)?  --  3  -EDUARD     Standing up from a chair using your arms (e.g., wheelchair, bedside chair)?  --  3  -EDUARD     Climbing 3-5 steps with a railing?  --  3  -EDUARD     To walk in hospital room?  --  3  -EDUARD     AM-PAC 6 Clicks Score (PT)  --  18  -EDUARD        How much help from another is currently needed...    Putting on and taking off regular lower body clothing?  2  -AC  --     Bathing (including washing, rinsing, and drying)  2  -AC  --     Toileting (which includes using toilet bed pan or urinal)  4  -AC  --     Putting on and taking off regular upper body clothing  4  -AC  --     Taking care of personal grooming (such as brushing teeth)  4  -AC  --     Eating meals  4  -AC  --     AM-PAC 6 Clicks Score (OT)  20  -AC  --        Functional Assessment    Outcome Measure Options  AM-PAC 6 Clicks Daily Activity (OT)  -AC  AM-PAC 6 Clicks Basic Mobility (PT)  -       User Key  (r) = Recorded By, (t) = Taken By, (c) = Cosigned By    Initials Name Provider Type     Roc Jameson N, OTR/L, CNT Occupational Therapist    Randy Ledezma, PTA Physical Therapy Assistant         Time Calculation:   PT Charges     Row Name 04/12/20 1106             Time Calculation    Start Time  1039  -      Stop Time  1102  -      Time Calculation (min)  23 min  -      PT Received On  04/12/20  -         Time Calculation- PT    Total Timed Code Minutes- PT  23 minute(s)  -        User Key  (r) = Recorded By, (t) = Taken By, (c) = Cosigned By    Initials Name Provider Type    Melissa Najera, PTA Physical Therapy Assistant        Therapy Charges for Today     Code Description Service Date Service Provider Modifiers Qty    46881873325 HC PT  THER PROC EA 15 MIN 4/12/2020 Melissa Bains, PTA GP 1    93812530548 HC GAIT TRAINING EA 15 MIN 4/12/2020 Melissa Bains, PTA GP 1          PT G-Codes  Outcome Measure Options: AM-PAC 6 Clicks Daily Activity (OT)  AM-PAC 6 Clicks Score (PT): 18  AM-PAC 6 Clicks Score (OT): 20    Melissa Bains PTA  4/12/2020

## 2020-04-12 NOTE — PLAN OF CARE
Problem: Patient Care Overview  Goal: Plan of Care Review  Outcome: Ongoing (interventions implemented as appropriate)  Flowsheets (Taken 4/12/2020 0846)  Outcome Summary: Pt denies pain/nausea, abd incision C/D/I with g/medipore tape, NAM cont with 30mL serousang drainage, iv abx daily, accu checks cont no ssi given per orders, up with SBA and walker, tele v paced, will cont to monitor

## 2020-04-12 NOTE — PLAN OF CARE
Problem: Patient Care Overview  Goal: Plan of Care Review  Flowsheets  Taken 4/12/2020 1107 by Melissa Bains, PTA  Progress: improving  Outcome Summary: PT tx completed. BLE AROM x20 in chair. Sit to stands CGA with RWX. Amb 60' x2 with RWX and CGA in room, required  seated rest break between trials due to fatigue. Will cont to progress with PT for improved functional mobility and safety.

## 2020-04-12 NOTE — PROGRESS NOTES
Yessi Roque MD Progress Note     LOS: 9 days   Patient Care Team:  Matheus Olivera PA as PCP - General (Physician Assistant)        Subjective     He is up in chair tolerating regular diet.  Bowels moving.  No complaints    Objective     Vital Signs  Temp:  [97.7 °F (36.5 °C)-99.1 °F (37.3 °C)] 98.2 °F (36.8 °C)  Heart Rate:  [50-65] 65  Resp:  [16-18] 16  BP: (146-181)/(51-63) 146/51    Intake & Output (last 3 days)       04/09 0701 - 04/10 0700 04/10 0701 - 04/11 0700 04/11 0701 - 04/12 0700 04/12 0701 - 04/13 0700    P.O. 240 900  240    I.V. (mL/kg) 2 (0)       IV Piggyback   100     Total Intake(mL/kg) 242 (2.4) 900 (8.4) 100 (0.9) 240 (2.2)    Urine (mL/kg/hr) 2700 (1.1) 950 (0.4) 2725 (1.1) 200 (0.6)    Drains 485 120 20 30    Stool    0    Total Output 3185 1070 2745 230    Net -5505 -170 -2645 +10            Urine Unmeasured Occurrence  3 x  1 x    Stool Unmeasured Occurrence  1 x  1 x          Physical Exam:     General Appearance:    Alert, cooperative, in no acute distress   Lungs:     respirations regular, even and unlabored    Heart:    Regular rhythm and normal rate, normal S1 and S2, no            murmur, no gallop, no rub   Chest Wall:    No abnormalities observed   Abdomen:      Soft dressing dry and intact.  Remaining NAM serosanguineous without bile   Extremities: No edema,    Results Review:     I reviewed the patient's new clinical results.    Lab Results (last 72 hours)     Procedure Component Value Units Date/Time    POC Glucose Once [[id number]]  (Abnormal) Collected:  04/12/20 0808    Specimen:  Blood Updated:  04/12/20 0820     Glucose 142 mg/dL      Comment: : [id number] Naresh BrittaneyMeter ID: [id number]       Extra Tubes [[id number]] Collected:  04/12/20 0516    Specimen:  Blood, Venous Line Updated:  04/12/20 0631    Narrative:       The following orders were created for panel order Extra Tubes.  Procedure                               Abnormality         Status                      ---------                               -----------         ------                     Light Blue Top[[id number]]                                   Final result                 Please view results for these tests on the individual orders.    Light Blue Top [[id number]] Collected:  04/12/20 0516    Specimen:  Blood Updated:  04/12/20 0631     Extra Tube hold for add-on     Comment: Auto resulted       Comprehensive Metabolic Panel [[id number]]  (Abnormal) Collected:  04/12/20 0516    Specimen:  Blood Updated:  04/12/20 0604     Glucose 121 mg/dL      BUN 11 mg/dL      Creatinine 0.76 mg/dL      Sodium 141 mmol/L      Potassium 4.3 mmol/L      Chloride 101 mmol/L      CO2 29.0 mmol/L      Calcium 8.6 mg/dL      Total Protein 5.7 g/dL      Albumin 2.70 g/dL      ALT (SGPT) 42 U/L      AST (SGOT) 30 U/L      Alkaline Phosphatase 142 U/L      Total Bilirubin 1.0 mg/dL      eGFR Non African Amer 98 mL/min/1.73      Globulin 3.0 gm/dL      A/G Ratio 0.9 g/dL      BUN/Creatinine Ratio 14.5     Anion Gap 11.0 mmol/L     Narrative:       GFR Normal >60  Chronic Kidney Disease <60  Kidney Failure <15      CBC & Differential [[id number]] Collected:  04/12/20 0516    Specimen:  Blood Updated:  04/12/20 0550    Narrative:       The following orders were created for panel order CBC & Differential.  Procedure                               Abnormality         Status                     ---------                               -----------         ------                     CBC Auto Differential[[id number]]        Abnormal            Final result                 Please view results for these tests on the individual orders.    CBC Auto Differential [[id number]]  (Abnormal) Collected:  04/12/20 0516    Specimen:  Blood Updated:  04/12/20 0550     WBC 11.22 10*3/mm3      RBC 3.91 10*6/mm3      Hemoglobin 11.6 g/dL      Hematocrit 35.9 %      MCV 91.8 fL      MCH 29.7 pg      MCHC 32.3 g/dL      RDW 14.8 %      RDW-SD 48.5 fl      MPV 10.9 fL       Platelets 229 10*3/mm3      Neutrophil % 68.6 %      Lymphocyte % 15.8 %      Monocyte % 9.4 %      Eosinophil % 1.8 %      Basophil % 0.5 %      Immature Grans % 3.9 %      Neutrophils, Absolute 7.69 10*3/mm3      Lymphocytes, Absolute 1.77 10*3/mm3      Monocytes, Absolute 1.06 10*3/mm3      Eosinophils, Absolute 0.20 10*3/mm3      Basophils, Absolute 0.06 10*3/mm3      Immature Grans, Absolute 0.44 10*3/mm3      nRBC 0.0 /100 WBC     POC Glucose Once [[id number]]  (Abnormal) Collected:  04/11/20 2040    Specimen:  Blood Updated:  04/11/20 2055     Glucose 135 mg/dL      Comment: : [id number] Randy StephanieMeter ID: [id number]       POC Glucose Once [[id number]]  (Abnormal) Collected:  04/11/20 1651    Specimen:  Blood Updated:  04/11/20 1702     Glucose 136 mg/dL      Comment: : [id number] Michelle PamelaMeter ID: [id number]       POC Glucose Once [[id number]]  (Abnormal) Collected:  04/11/20 1213    Specimen:  Blood Updated:  04/11/20 1224     Glucose 161 mg/dL      Comment: : [id number] Michelle PamelaMeter ID: [id number]       POC Glucose Once [[id number]]  (Normal) Collected:  04/11/20 0737    Specimen:  Blood Updated:  04/11/20 0748     Glucose 124 mg/dL      Comment: : [id number] Randy StephanieMeter ID: [id number]       Comprehensive Metabolic Panel [[id number]]  (Abnormal) Collected:  04/11/20 0509    Specimen:  Blood Updated:  04/11/20 0611     Glucose 136 mg/dL      BUN 13 mg/dL      Creatinine 0.76 mg/dL      Sodium 139 mmol/L      Potassium 4.0 mmol/L      Chloride 102 mmol/L      CO2 27.0 mmol/L      Calcium 8.2 mg/dL      Total Protein 5.6 g/dL      Albumin 2.60 g/dL      ALT (SGPT) 45 U/L      AST (SGOT) 30 U/L      Alkaline Phosphatase 131 U/L      Total Bilirubin 1.0 mg/dL      eGFR Non African Amer 98 mL/min/1.73      Globulin 3.0 gm/dL      A/G Ratio 0.9 g/dL      BUN/Creatinine Ratio 17.1     Anion Gap 10.0 mmol/L     Narrative:       GFR Normal >60  Chronic Kidney Disease  <60  Kidney Failure <15      Protime-INR [[id number]]  (Abnormal) Collected:  04/11/20 0509    Specimen:  Blood Updated:  04/11/20 0558     Protime 18.5 Seconds      INR 1.54    CBC (No Diff) [[id number]]  (Abnormal) Collected:  04/11/20 0509    Specimen:  Blood Updated:  04/11/20 0550     WBC 13.19 10*3/mm3      RBC 3.87 10*6/mm3      Hemoglobin 11.4 g/dL      Hematocrit 35.6 %      MCV 92.0 fL      MCH 29.5 pg      MCHC 32.0 g/dL      RDW 14.6 %      RDW-SD 47.9 fl      MPV 10.7 fL      Platelets 216 10*3/mm3     POC Glucose Once [[id number]]  (Abnormal) Collected:  04/10/20 2139    Specimen:  Blood Updated:  04/10/20 2150     Glucose 140 mg/dL      Comment: : [id number] Randy StephanieMeter ID: [id number]       Blood Culture - Blood, Arm, Right [[id number]] Collected:  04/05/20 0405    Specimen:  Blood from Arm, Right Updated:  04/10/20 0515     Blood Culture No growth at 5 days    Blood Culture - Blood, Hand, Right [[id number]] Collected:  04/05/20 0405    Specimen:  Blood from Hand, Right Updated:  04/10/20 0515     Blood Culture No growth at 5 days    POC Glucose Once [[id number]]  (Normal) Collected:  04/09/20 2038    Specimen:  Blood Updated:  04/09/20 2050     Glucose 111 mg/dL      Comment: : [id number] Randy StephanieMeter ID: [id number]       POC Glucose Once [[id number]]  (Normal) Collected:  04/09/20 1655    Specimen:  Blood Updated:  04/09/20 1717     Glucose 92 mg/dL      Comment: : [id number] Babel StreetenMeter ID: [id number]       POC Glucose Once [[id number]]  (Abnormal) Collected:  04/09/20 1227    Specimen:  Blood Updated:  04/09/20 1257     Glucose 180 mg/dL      Comment: : [id number] Naresh Atom EntertainmentenMeter ID: [id number]           Imaging Results (Last 72 Hours)     ** No results found for the last 72 hours. **              Assessment/Plan       Bacteremia    Acute UTI (urinary tract infection)    Abnormal LFTs    Acute cholecystitis    Benign essential HTN    CKD (chronic kidney disease)  stage 3, GFR 30-59 ml/min (CMS/HCC)    History of prosthetic aortic valve      Labs show continued improvement.  Continue current treatment      Yessi Roque MD  04/12/20  10:02

## 2020-04-12 NOTE — THERAPY TREATMENT NOTE
Acute Care - Occupational Therapy Treatment Note  [facility]     Patient Name: eJsus Smith  : 1935  MRN: [id number]  Today's Date: 2020  Onset of Illness/Injury or Date of Surgery: 20  Date of Referral to OT: 20  Referring Physician: Dr. Dodd    Admit Date: 4/3/2020       ICD-10-CM ICD-9-CM   1. Acute UTI (urinary tract infection) N39.0 599.0   2. Systemic inflammatory response syndrome (CMS/[facility]) R65.10 995.90   3. NOLAN (acute kidney injury) (CMS/[facility]) N17.9 584.9   4. Transaminitis R74.0 790.4   5. Acute cholecystitis K81.0 575.0   6. Impaired functional mobility and activity tolerance Z74.09 V49.89   7. Decreased activities of daily living (ADL) Z78.9 V49.89     Patient Active Problem List   Diagnosis   • Acute UTI (urinary tract infection)   • Bacteremia   • Abnormal LFTs   • Acute cholecystitis   • Benign essential HTN   • CKD (chronic kidney disease) stage 3, GFR 30-59 ml/min (CMS/[facility])   • History of prosthetic aortic valve     Past Medical History:   Diagnosis Date   • Bradycardia    • Coronary artery disease    • GERD (gastroesophageal reflux disease)    • Hypertension    • Injury of back    • TIA (transient ischemic attack)      Past Surgical History:   Procedure Laterality Date   • APPENDECTOMY     • BACK SURGERY      Fusion   • CARDIAC SURGERY      Open Heart   • EXPLORATORY LAPAROTOMY N/A 2020    Procedure: open cholecystostomy tube placement ;  Surgeon: Agustina Saleem MD;  Location: [facility];  Service: General;  Laterality: N/A;   • HERNIA REPAIR     • JOINT REPLACEMENT Left     s/p L hip replacement   • PACEMAKER IMPLANTATION     • STOMACH SURGERY         Therapy Treatment    Rehabilitation Treatment Summary     Row Name 20 1502 20 1039          Treatment Time/Intention    Discipline  occupational therapy assistant  -CJ  physical therapy assistant  -LUDWIG     Document Type  therapy note (daily note)  -CJ  therapy note (daily note)  -LC     Subjective  Information  complains of;weakness;fatigue  -CJ  no complaints  -LC2     Mode of Treatment  occupational therapy  -CJ  physical therapy  -     Patient Effort  good  -CJ  --     Existing Precautions/Restrictions  fall  -CJ  fall  -LC2     Treatment Considerations/Comments  --  NAM x1  -LC2     Recorded by [CJ] Cesar Dupree COTA/L 04/12/20 1519 [LC] Melissa Bains, [facility] 04/12/20 1039  [LC2] Melissa Bains, [facility] 04/12/20 1101     Row Name 04/12/20 1502 04/12/20 1039          Bed Mobility Assessment/Treatment    Comment (Bed Mobility)  up in chair!  -CJ  up in chair  -     Recorded by [CJ] Cesar Dupree COTA/L 04/12/20 1519 [LC] Melissa Bains, [facility] 04/12/20 1101     Row Name 04/12/20 1039             Sit-Stand Transfer    Sit-Stand Rhea (Transfers)  contact guard  -LC      Assistive Device (Sit-Stand Transfers)  walker, front-wheeled  -LC      Recorded by [LC] Melissa Bains, [facility] 04/12/20 1103      Row Name 04/12/20 1039             Stand-Sit Transfer    Stand-Sit Rhea (Transfers)  contact guard  -LC      Assistive Device (Stand-Sit Transfers)  walker, front-wheeled  -LC      Recorded by [LC] Melissa Bains, [facility] 04/12/20 1103      Row Name 04/12/20 1039             Gait/Stairs Assessment/Training    Rhea Level (Gait)  verbal cues;contact guard  -LC      Assistive Device (Gait)  walker, front-wheeled  -LC      Distance in Feet (Gait)  60'' x2  -LC2      Pattern (Gait)  step-to  -LC2      Deviations/Abnormal Patterns (Gait)  tahir decreased;stride length decreased  -LC2      Bilateral Gait Deviations  forward flexed posture  -LC2      Comment (Gait/Stairs)  seated rest break between trials  -LC2      Recorded by [LC] Melissa Bains, [facility] 04/12/20 1103  [LC2] Melissa Bains, [facility] 04/12/20 1106      Row Name 04/12/20 1502             Motor Skills Assessment/Interventions    Additional Documentation  Therapeutic Exercise (Group)  -CJ      Recorded by [CJ] Cesar Dupree COTA/AMANDA 04/12/20  1519      Row Name 04/12/20 1502 04/12/20 1039          Therapeutic Exercise    Upper Extremity (Therapeutic Exercise)  bicep curl, bilateral;wand exercises  -  --     Upper Extremity Range of Motion (Therapeutic Exercise)  shoulder flexion/extension, bilateral;elbow flexion/extension, bilateral;wrist flexion/extension, bilateral  -  --     Lower Extremity (Therapeutic Exercise)  --  LAQ (long arc quad), bilateral;marching while seated  -     Lower Extremity Range of Motion (Therapeutic Exercise)  --  hip internal/external rotation, bilateral;ankle dorsiflexion/plantar flexion, bilateral  -     Weight/Resistance (Therapeutic Exercise)  2 pounds;3 pounds  -  --     Exercise Type (Therapeutic Exercise)  AROM (active range of motion)  -  AROM (active range of motion)  -     Position (Therapeutic Exercise)  seated  -  seated  -LC     Sets/Reps (Therapeutic Exercise)  1 set x 20 reps!  -  20  -LC     Equipment (Therapeutic Exercise)  dowel zaheer/wand;free weight, barbell  -  --     Expected Outcome (Therapeutic Exercise)  facilitate normal movement patterns;improve functional stability;improve functional tolerance, self-care activity;improve motor control;improve neuromuscular control;improve performance, BADLs;improve performance, fine motor coordination skills;improve performance, gait skills;improve performance, transfer skills;improve postural control;increase active range of motion  -  --     Recorded by [CJ] Cesar Dupree COTA/L 04/12/20 1519 [LC] Melissa Bains, PTA 04/12/20 1106     Row Name 04/12/20 1502 04/12/20 1039          Positioning and Restraints    Pre-Treatment Position  sitting in chair/recliner  -  sitting in chair/recliner  -     Post Treatment Position  chair  -  chair  -LC     In Chair  sitting;call light within reach;encouraged to call for assist  -  reclined;call light within reach;encouraged to call for assist  -LC     Recorded by [CJ] Cesar Dupree COTA/L  04/12/20 1519 [LC] Melissa Bains, PTA 04/12/20 1106     Row Name 04/12/20 1502             Pain Assessment    Additional Documentation  Pain Scale 2: Word Pre/Post-Treatment (Group)  -      Recorded by [CJ] Cesar Dupree KESSLER/L 04/12/20 1519      Row Name 04/12/20 1502 04/12/20 1039          Pain Scale: Numbers Pre/Post-Treatment    Pain Scale: Numbers, Pretreatment  0/10 - no pain  -CJ  0/10 - no pain  -LC     Pain Scale: Numbers, Post-Treatment  0/10 - no pain  -CJ  0/10 - no pain  -     Recorded by [CJ] Cesar Dupree KESSLER/L 04/12/20 1519 [LC] Melissa Bains, PTA 04/12/20 1106     Row Name                Wound 04/03/20 2115 Left coccyx    Wound - Properties Group Date first assessed: 04/03/20 [DF] Time first assessed: 2115 [DF] Present on Hospital Admission: Y [FT] Side: Left [FT] Location: coccyx [DF] Recorded by:  [DF] Casi Ayala RN 04/04/20 0101 [FT] Letty Quiroz RN 04/04/20 0115    Row Name                Wound 04/05/20 0901 Right upper abdomen Incision    Wound - Properties Group Date first assessed: 04/05/20 [JH] Time first assessed: 0901 [JH] Side: Right [JH2] Orientation: upper [JH2] Location: abdomen [JH] Primary Wound Type: Incision [JH] Recorded by:  [JH] Alejandrina Maravilla RN 04/05/20 0901 [JH2] Alejandrina Maravilla RN 04/05/20 0906    Row Name 04/12/20 1039             Plan of Care Review    Plan of Care Reviewed With  patient  -      Progress  improving  -      Outcome Summary  PT tx completed. BLE AROM x20 in chair. Sit to stands CGA with RWX. Amb 60' x2 with RWX and CGA in room, required  seated rest break between trials due to fatigue. Will cont to progress with PT for improved functional mobility and safety.   -      Recorded by [LC] Melissa Bains PTA 04/12/20 1106      Row Name 04/12/20 1502             Outcome Summary/Treatment Plan (OT)    Daily Summary of Progress (OT)  progress toward functional goals is good  -CJ      Barriers to Overall Progress (OT)  Fall   -      Plan for Continued Treatment (OT)  contiue with ot poc!  -      Recorded by [CJ] Cesar Dupree, KESSLER/L 04/12/20 1519        User Key  (r) = Recorded By, (t) = Taken By, (c) = Cosigned By    Initials Name Effective Dates Discipline    CJ Cesar Dupree, KESSLER/L 08/02/16 -  OT    DF Casi Ayala RN 08/02/16 -  Nurse    Letty Chiang RN 08/02/16 -  Nurse    Alejandrina Jj RN 06/07/17 -  Nurse    Melissa Najera, PTA 10/18/19 -  PT        Wound 04/03/20 2115 Left coccyx (Active)   Dressing Appearance open to air 4/12/2020  8:20 AM   Closure Open to air 4/12/2020  8:20 AM   Base red;blanchable 4/12/2020  8:20 AM   Periwound dry;intact 4/12/2020  8:20 AM   Drainage Amount none 4/12/2020  8:20 AM       Wound 04/05/20 0901 Right upper abdomen Incision (Active)   Dressing Appearance dry;intact;no drainage 4/12/2020  8:20 AM   Closure NEETA 4/12/2020  8:20 AM   Base dressing in place, unable to visualize 4/12/2020  8:20 AM   Drainage Amount none 4/12/2020  8:20 AM   Dressing Care, Wound gauze 4/11/2020  8:42 PM     Rehab Goal Summary     Row Name 04/12/20 0955             Occupational Therapy Goals    Transfer Goal Selection (OT)  transfer, OT goal 1  -AC      Bathing Goal Selection (OT)  bathing, OT goal 1  -AC      Dressing Goal Selection (OT)  dressing, OT goal 1  -AC         Transfer Goal 1 (OT)    Activity/Assistive Device (Transfer Goal 1, OT)  sit-to-stand/stand-to-sit;bed-to-chair/chair-to-bed;toilet;walker, rolling  -AC      [place] Level/Cues Needed (Transfer Goal 1, OT)  conditional independence  -AC      Time Frame (Transfer Goal 1, OT)  long term goal (LTG);10 days  -AC      Progress/Outcome (Transfer Goal 1, OT)  goal ongoing  -AC         Bathing Goal 1 (OT)    Activity/Assistive Device (Bathing Goal 1, OT)  bathing skills, all;shower chair  -AC      [place] Level/Cues Needed (Bathing Goal 1, OT)  conditional independence  -AC      Time Frame (Bathing Goal 1,  OT)  long term goal (LTG);10 days  -AC      Progress/Outcomes (Bathing Goal 1, OT)  goal ongoing  -AC         Dressing Goal 1 (OT)    Activity/Assistive Device (Dressing Goal 1, OT)  dressing skills, all  -AC      [place]/Cues Needed (Dressing Goal 1, OT)  conditional independence  -AC      Time Frame (Dressing Goal 1, OT)  long term goal (LTG);10 days  -AC      Progress/Outcome (Dressing Goal 1, OT)  goal ongoing  -AC        User Key  (r) = Recorded By, (t) = Taken By, (c) = Cosigned By    Initials Name Provider Type Discipline    AC Roc Jameson, OTR/L, CNT Occupational Therapist OT            OT Recommendation and Plan  Outcome Summary/Treatment Plan (OT)  Daily Summary of Progress (OT): progress toward functional goals is good  Barriers to Overall Progress (OT): Fall  Plan for Continued Treatment (OT): contiue with ot poc!  Daily Summary of Progress (OT): progress toward functional goals is good  Plan of Care Review  Plan of Care Reviewed With: patient  Plan of Care Reviewed With: patient  Outcome Measures     Row Name 04/12/20 1502 04/12/20 0955 04/11/20 1018       How much help from another person do you currently need...    Turning from your back to your side while in flat bed without using bedrails?  --  --  3  -EDUARD    Moving from lying on back to sitting on the side of a flat bed without bedrails?  --  --  3  -EDUARD    Moving to and from a bed to a chair (including a wheelchair)?  --  --  3  -EDUARD    Standing up from a chair using your arms (e.g., wheelchair, bedside chair)?  --  --  3  -EDUARD    Climbing 3-5 steps with a railing?  --  --  3  -EDUARD    To walk in hospital room?  --  --  3  -EDUARD    AM-PAC 6 Clicks Score (PT)  --  --  18  -EDUARD       How much help from another is currently needed...    Putting on and taking off regular lower body clothing?  2  -CJ  2  -AC  --    Bathing (including washing, rinsing, and drying)  2  -CJ  2  -AC  --    Toileting (which includes using toilet bed pan or urinal)  4  -CJ   4  -AC  --    Putting on and taking off regular upper body clothing  4 -CJ 4 -AC  --    Taking care of personal grooming (such as brushing teeth)  4 -CJ 4 -AC  --    Eating meals  4 -CJ 4 -AC  --    AM-PAC 6 Clicks Score (OT)  20 -CJ 20 -AC  --       Functional Assessment    Outcome Measure Options  AM-PAC 6 Clicks Daily Activity (OT)  -  AM-PAC 6 Clicks Daily Activity (OT)  -  AM-PAC 6 Clicks Basic Mobility (PT)  -      User Key  (r) = Recorded By, (t) = Taken By, (c) = Cosigned By    Initials Name Provider Type     Roc Jameson, OTR/L, NICOLE Occupational Therapist    Cesar Joseph COTA/L Occupational Therapy Assistant    Randy Ledezma, [facility] Physical Therapy Assistant           Time Calculation:   Time Calculation- OT     Row Name 04/12/20 1502 04/12/20 1041          Time Calculation- OT    OT Start Time  (S) 1502  -  0955  -     OT Stop Time  1515  -  1041  -     OT Time Calculation (min)  13 min  -  46 min  -     Total Timed Code Minutes- OT  13 minute(s)  -  --     TCU Minutes- OT  13 min  -  --     OT Received On  04/12/20  -  04/12/20  -     OT Goal Re-Cert Due Date  04/22/20  -  04/22/20  -       User Key  (r) = Recorded By, (t) = Taken By, (c) = Cosigned By    Initials Name Provider Type     Roc Jameson, OTR/L, CNT Occupational Therapist     Cesar Dupree COTA/L Occupational Therapy Assistant        Therapy Charges for Today     Code Description Service Date Service Provider Modifiers Qty    [id number] HC OT THER PROC EA 15 MIN 4/12/2020 Cesar Dupree COTA/L GO 1               VICTORIA Estrada/AMANDA  4/12/2020

## 2020-04-12 NOTE — PROGRESS NOTES
[facility]  INPATIENT PROGRESS NOTE    Length of Stay: 9  Date of Admission: 4/3/2020  Primary Care Physician: Matheus Olivera PA    Subjective   Chief Complaint: Cholecystitis/failure to thrive/prosthetic aortic valve    HPI   Patient remains generally weak.  Patient denies any chest pain or shortness of breath.  Patient complained dry eye.  Discussed with patient placement possibly tomorrow to the nursing home.  Pain is well controlled at this time.    Review of Systems   Constitutional: Positive for activity change, appetite change and fatigue. Negative for chills and fever.   HENT: Negative for hearing loss, nosebleeds, tinnitus and trouble swallowing.    Eyes: Negative for visual disturbance.   Respiratory: Negative for cough, chest tightness, shortness of breath and wheezing.    Cardiovascular: Negative for chest pain, palpitations and leg swelling.   Gastrointestinal: Positive for abdominal pain. Negative for abdominal distention, blood in stool, constipation, diarrhea, nausea and vomiting.   Endocrine: Negative for cold intolerance, heat intolerance, polydipsia, polyphagia and polyuria.   Genitourinary: Negative for decreased urine volume, difficulty urinating, dysuria, flank pain, frequency and hematuria.   Musculoskeletal: Positive for arthralgias, gait problem and myalgias. Negative for joint swelling.   Skin: Negative for rash.   Allergic/Immunologic: Negative for immunocompromised state.   Neurological: Positive for weakness. Negative for dizziness, syncope, light-headedness and headaches.   Hematological: Negative for adenopathy. Does not bruise/bleed easily.   Psychiatric/Behavioral: Negative for confusion and sleep disturbance. The patient is not nervous/anxious.      All pertinent negatives and positives are as above. All other systems have been reviewed and are negative unless otherwise stated.     Objective    Temp:  [97.7 °F (36.5  °C)-99.1 °F (37.3 °C)] 98.2 °F (36.8 °C)  Heart Rate:  [50-65] 65  Resp:  [16-18] 16  BP: (146-181)/(51-71) 146/51    Intake/Output Summary (Last 24 hours) at 4/12/2020 0813  Last data filed at 4/12/2020 0715  Gross per 24 hour   Intake 100 ml   Output 2945 ml   Net -2845 ml     Physical Exam  Constitutional: He appears well-developed.   HENT:   Head: Normocephalic.   Eyes: Pupils are equal, round, and reactive to light. Conjunctivae are normal.   Neck: Neck supple. No JVD present.   Cardiovascular: Normal rate, regular rhythm, normal heart sounds and intact distal pulses. Exam reveals no gallop and no friction rub.   No murmur heard.  Pulmonary/Chest: No respiratory distress. He has no wheezes. He has no rales. He exhibits no tenderness.   Diminished breath sound bilateral, clear.   Abdominal: He exhibits no distension. There is tenderness. There is guarding. There is no rebound.   Right upper quadrant discomfort/NAM drainage tubes in place..   Musculoskeletal: He exhibits edema. He exhibits no tenderness or deformity.   Neurological: He is alert. He displays normal reflexes. No cranial nerve deficit. He exhibits abnormal muscle tone. Coordination abnormal.   Skin: Skin is warm and dry. Capillary refill takes 2 to 3 seconds. No rash noted.   Psychiatric: He has a normal mood and affect. His behavior is normal.   Nursing note and vitals reviewed.  Results Review:  Lab Results (last 24 hours)     Procedure Component Value Units Date/Time    Extra Tubes [[id number]] Collected:  04/12/20 0516    Specimen:  Blood, Venous Line Updated:  04/12/20 0631    Narrative:       The following orders were created for panel order Extra Tubes.  Procedure                               Abnormality         Status                     ---------                               -----------         ------                     Light Blue Top[[id number]]                                   Final result                 Please view results for these  tests on the individual orders.    Light Blue Top [[id number]] Collected:  04/12/20 0516    Specimen:  Blood Updated:  04/12/20 0631     Extra Tube hold for add-on     Comment: Auto resulted       Comprehensive Metabolic Panel [[id number]]  (Abnormal) Collected:  04/12/20 0516    Specimen:  Blood Updated:  04/12/20 0604     Glucose 121 mg/dL      BUN 11 mg/dL      Creatinine 0.76 mg/dL      Sodium 141 mmol/L      Potassium 4.3 mmol/L      Chloride 101 mmol/L      CO2 29.0 mmol/L      Calcium 8.6 mg/dL      Total Protein 5.7 g/dL      Albumin 2.70 g/dL      ALT (SGPT) 42 U/L      AST (SGOT) 30 U/L      Alkaline Phosphatase 142 U/L      Total Bilirubin 1.0 mg/dL      eGFR Non African Amer 98 mL/min/1.73      Globulin 3.0 gm/dL      A/G Ratio 0.9 g/dL      BUN/Creatinine Ratio 14.5     Anion Gap 11.0 mmol/L     Narrative:       GFR Normal >60  Chronic Kidney Disease <60  Kidney Failure <15      CBC & Differential [[id number]] Collected:  04/12/20 0516    Specimen:  Blood Updated:  04/12/20 0550    Narrative:       The following orders were created for panel order CBC & Differential.  Procedure                               Abnormality         Status                     ---------                               -----------         ------                     CBC Auto Differential[[id number]]        Abnormal            Final result                 Please view results for these tests on the individual orders.    CBC Auto Differential [[id number]]  (Abnormal) Collected:  04/12/20 0516    Specimen:  Blood Updated:  04/12/20 0550     WBC 11.22 10*3/mm3      RBC 3.91 10*6/mm3      Hemoglobin 11.6 g/dL      Hematocrit 35.9 %      MCV 91.8 fL      MCH 29.7 pg      MCHC 32.3 g/dL      RDW 14.8 %      RDW-SD 48.5 fl      MPV 10.9 fL      Platelets 229 10*3/mm3      Neutrophil % 68.6 %      Lymphocyte % 15.8 %      Monocyte % 9.4 %      Eosinophil % 1.8 %      Basophil % 0.5 %      Immature Grans % 3.9 %      Neutrophils, Absolute 7.69  10*3/mm3      Lymphocytes, Absolute 1.77 10*3/mm3      Monocytes, Absolute 1.06 10*3/mm3      Eosinophils, Absolute 0.20 10*3/mm3      Basophils, Absolute 0.06 10*3/mm3      Immature Grans, Absolute 0.44 10*3/mm3      nRBC 0.0 /100 WBC     POC Glucose Once [[id number]]  (Abnormal) Collected:  04/11/20 2040    Specimen:  Blood Updated:  04/11/20 2055     Glucose 135 mg/dL      Comment: : [id number] Randy StephanieMeter ID: [id number]       POC Glucose Once [[id number]]  (Abnormal) Collected:  04/11/20 1651    Specimen:  Blood Updated:  04/11/20 1702     Glucose 136 mg/dL      Comment: : [id number] Miguel Angel PamelaMeter ID: [id number]       POC Glucose Once [[id number]]  (Abnormal) Collected:  04/11/20 1213    Specimen:  Blood Updated:  04/11/20 1224     Glucose 161 mg/dL      Comment: : [id number] Miguel Angel PamelaMeter ID: [id number]              Cultures:  No results found for: BLOODCX, URINECX, WOUNDCX, MRSACX, RESPCX, STOOLCX    Radiology Data:    Imaging Results (Last 24 Hours)     ** No results found for the last 24 hours. **          No Known Allergies    Scheduled meds:     aspirin 81 mg Oral Daily   bisoprolol 10 mg Oral Daily   cefTRIAXone 1 g Intravenous Q24H   enoxaparin 1 mg/kg Subcutaneous Q12H   famotidine 20 mg Oral BID   insulin lispro 2-7 Units Subcutaneous 4x Daily With Meals & Nightly   polyethylene glycol 17 g Oral Daily   sennosides-docusate 1 tablet Oral BID   sucralfate 1 g Oral Q6H       PRN meds:  bisacodyl  •  dextrose  •  dextrose  •  glucagon (human recombinant)  •  HYDROmorphone  •  naloxone  •  ondansetron  •  oxyCODONE-acetaminophen  •  Pharmacy to Dose enoxaparin (LOVENOX)  •  polyethylene glycol  •  [COMPLETED] Insert peripheral IV **AND** sodium chloride    Assessment/Plan       Bacteremia    Acute UTI (urinary tract infection)    Abnormal LFTs    Acute cholecystitis    Benign essential HTN    CKD (chronic kidney disease) stage 3, GFR 30-59 ml/min (CMS/[facility])    History of prosthetic  aortic valve      Plan:  Cholecystitis/abdomen pain.  Consult general surgery.  Status post cholecystectomy tube placement day 6.   Patient had Zosyn  to .  Patient is currently on Rocephin, day 9 of antibiotics today.    General surgery- plan for cholecystectomy in 4 to 6 weeks.     CAD/history of prosthetic aortic valve/hypertension.  Continue aspirin.  Continue Lovenox therapeutic.     Pain control.  Dilaudid as needed.  Percocet PRN.     Nausea/vomiting.  Pepcid.  Zofran as needed.  Carafate.     Chronic kidney disease stage III.     Diabetes.  Sliding scale.  Hemoglobin A1 C 6.6.     UTI.  On Rocephin antibiotics.     Elevated liver enzyme.     Constipation.  Rebecca-Colace.  Dulcolax suppository as needed.     Nutrition.  Soft diet.  Nutrition supplement.     Deconditioning.  PT consult.     Blood culture- negative for 5 days.     Discharge Plannin-2 days.  Waiting for rehab placement. Patient's agreeable with going to [place] (he lives in [place]).    Mamadou Dodd MD   20   08:13

## 2020-04-12 NOTE — THERAPY EVALUATION
Acute Care - Occupational Therapy Initial Evaluation  [facility]     Patient Name: Jesus Smith  : 1935  MRN: [id number]  Today's Date: 2020  Onset of Illness/Injury or Date of Surgery: 20  Date of Referral to OT: 20  Referring Physician: Dr. Dodd    Admit Date: 4/3/2020       ICD-10-CM ICD-9-CM   1. Acute UTI (urinary tract infection) N39.0 599.0   2. Systemic inflammatory response syndrome (CMS/[facility]) R65.10 995.90   3. NOLAN (acute kidney injury) (CMS/[facility]) N17.9 584.9   4. Transaminitis R74.0 790.4   5. Acute cholecystitis K81.0 575.0   6. Impaired functional mobility and activity tolerance Z74.09 V49.89   7. Decreased activities of daily living (ADL) Z78.9 V49.89     Patient Active Problem List   Diagnosis   • Acute UTI (urinary tract infection)   • Bacteremia   • Abnormal LFTs   • Acute cholecystitis   • Benign essential HTN   • CKD (chronic kidney disease) stage 3, GFR 30-59 ml/min (CMS/[facility])   • History of prosthetic aortic valve     Past Medical History:   Diagnosis Date   • Bradycardia    • Coronary artery disease    • GERD (gastroesophageal reflux disease)    • Hypertension    • Injury of back    • TIA (transient ischemic attack)      Past Surgical History:   Procedure Laterality Date   • APPENDECTOMY     • BACK SURGERY      Fusion   • CARDIAC SURGERY      Open Heart   • EXPLORATORY LAPAROTOMY N/A 2020    Procedure: open cholecystostomy tube placement ;  Surgeon: Agustina Saleem MD;  Location: [facility];  Service: General;  Laterality: N/A;   • HERNIA REPAIR     • JOINT REPLACEMENT Left     s/p L hip replacement   • PACEMAKER IMPLANTATION     • STOMACH SURGERY            OT ASSESSMENT FLOWSHEET (last 12 hours)      Occupational Therapy Evaluation     Row Name 20 0955                   OT Evaluation Time/Intention    Subjective Information  complains of;weakness;swelling swelling in B LE  -AC        Document Type  evaluation  -AC        Mode of Treatment  occupational  therapy  -AC        Patient Effort  good  -           General Information    Patient Profile Reviewed?  yes  -        Onset of Illness/Injury or Date of Surgery  04/03/20  -        Referring Physician  Dr. Dodd  -        Patient Observations  alert;cooperative;agree to therapy  -        General Observations of Patient  up in chair, reclined, BLE swelling present  -        Prior Level of Function  independent:;all household mobility;community mobility;gait;transfer;ADL's;home management;cooking;cleaning;driving  -        Pertinent History of Current Functional Problem  chills, nausea, vomiting, fall; Dx: bacteremia, acute UTI, abnormal LFTs, acute cholecystitis  -        Existing Precautions/Restrictions  fall  -AC        Risks Reviewed  patient:;LOB;nausea/vomiting;dizziness;increased discomfort;change in vital signs;increased drainage  -        Benefits Reviewed  patient:;improve function;increase independence;increase strength;increase balance;decrease pain;decrease risk of DVT;improve skin integrity;increase knowledge  -        Barriers to Rehab  none identified  -           Relationship/Environment    Lives With  alone  -           Resource/Environmental Concerns    Current Living Arrangements  home/apartment/condo  -           Cognitive Assessment/Interventions    Additional Documentation  Cognitive Assessment/Intervention (Group)  -           Cognitive Assessment/Intervention- PT/OT    Orientation Status (Cognition)  oriented x 4  -AC        Follows Commands (Cognition)  WNL  -        Personal Safety Interventions  fall prevention program maintained;gait belt;muscle strengthening facilitated;nonskid shoes/slippers when out of bed;supervised activity  -           Safety Issues, Functional Mobility    Impairments Affecting Function (Mobility)  balance;endurance/activity tolerance;strength  -           Bed Mobility Assessment/Treatment    Comment (Bed Mobility)  up in chair   -AC           Functional Mobility    Functional Mobility- Ind. Level  contact guard assist;standby assist  -        Functional Mobility- Device  rolling walker  -        Functional Mobility- Comment  to BR, back to chair  -           Transfer Assessment/Treatment    Transfer Assessment/Treatment  sit-stand transfer;stand-sit transfer;toilet transfer  -           Sit-Stand Transfer    Sit-Stand [place] (Transfers)  contact guard  -        Assistive Device (Sit-Stand Transfers)  walker, front-wheeled  -           Stand-Sit Transfer    Stand-Sit [place] (Transfers)  contact guard  -        Assistive Device (Stand-Sit Transfers)  walker, front-wheeled  -           Toilet Transfer    Type (Toilet Transfer)  sit-stand;stand-sit  -        [place] Level (Toilet Transfer)  stand by assist  -        Assistive Device (Toilet Transfer)  commode;grab bars/safety frame;walker, front-wheeled  -           ADL Assessment/Intervention    BADL Assessment/Intervention  lower body dressing  -           Lower Body Dressing Assessment/Training    Lower Body Dressing [place] Level  don;doff;socks;maximum assist (25% patient effort)  -        Lower Body Dressing Position  supported sitting  -           BADL Safety/Performance    Impairments, BADL Safety/Performance  balance;endurance/activity tolerance;strength  -AC           General ROM    GENERAL ROM COMMENTS  WFL AROM BUE  -           MMT (Manual Muscle Testing)    General MMT Comments  5/5 BUE  -           Motor Assessment/Interventions    Additional Documentation  Balance (Group)  -           Balance    Balance  static sitting balance;static standing balance;dynamic sitting balance;dynamic standing balance  -           Static Sitting Balance    Level of [place] (Unsupported Sitting, Static Balance)  independent  -        Sitting Position (Unsupported Sitting, Static Balance)  sitting in chair  -           Dynamic Sitting  Balance    Level of [place], Reaches Outside Midline (Sitting, Dynamic Balance)  supervision  -AC        Sitting Position, Reaches Outside Midline (Sitting, Dynamic Balance)  sitting in chair  -AC           Static Standing Balance    Level of [place] (Supported Standing, Static Balance)  standby assist  -AC        Assistive Device Utilized (Supported Standing, Static Balance)  walker, rolling  -AC           Sensory Assessment/Intervention    Sensory General Assessment  no sensation deficits identified  -AC           Positioning and Restraints    Pre-Treatment Position  sitting in chair/recliner  -AC        Post Treatment Position  chair  -AC        In Chair  reclined;call light within reach;encouraged to call for assist;legs elevated  -AC           Pain Assessment    Additional Documentation  Pain Scale: Numbers Pre/Post-Treatment (Group)  -AC           Pain Scale: Numbers Pre/Post-Treatment    Pain Scale: Numbers, Pretreatment  0/10 - no pain  -AC        Pain Scale: Numbers, Post-Treatment  0/10 - no pain  -AC           Wound 04/03/20 2115 Left coccyx    Wound - Properties Group Date first assessed: 04/03/20  -DF Time first assessed: 2115  -DF Present on Hospital Admission: Y  -FT Side: Left  -FT Location: coccyx  -DF       Wound 04/05/20 0901 Right upper abdomen Incision    Wound - Properties Group Date first assessed: 04/05/20  -JH Time first assessed: 0901  - Side: Right  - Orientation: upper  - Location: abdomen  - Primary Wound Type: Incision  -       Clinical Impression (OT)    Date of Referral to OT  04/11/20  -AC        OT Diagnosis  decreased adl  -AC        Prognosis (OT Eval)  good  -        Criteria for Skilled Therapeutic Interventions Met (OT Eval)  yes;treatment indicated  -        Rehab Potential (OT Eval)  good, to achieve stated therapy goals  -AC        Therapy Frequency (OT Eval)  daily  -        Predicted Duration of Therapy Intervention (Therapy Eval)  10 days  -AC         Care Plan Review (OT)  evaluation/treatment results reviewed;care plan/treatment goals reviewed;risks/benefits reviewed;current/potential barriers reviewed;patient/other agree to care plan  -AC        Anticipated Discharge Disposition (OT)  skilled nursing facility  -AC           Planned OT Interventions    Planned Therapy Interventions (OT Eval)  activity tolerance training;BADL retraining;edema control/reduction;functional balance retraining;occupation/activity based interventions;patient/caregiver education/training;strengthening exercise;transfer/mobility retraining  -AC           OT Goals    Transfer Goal Selection (OT)  transfer, OT goal 1  -AC        Bathing Goal Selection (OT)  bathing, OT goal 1  -AC        Dressing Goal Selection (OT)  dressing, OT goal 1  -AC           Transfer Goal 1 (OT)    Activity/Assistive Device (Transfer Goal 1, OT)  sit-to-stand/stand-to-sit;bed-to-chair/chair-to-bed;toilet;walker, rolling  -AC        [place] Level/Cues Needed (Transfer Goal 1, OT)  conditional independence  -AC        Time Frame (Transfer Goal 1, OT)  long term goal (LTG);10 days  -AC        Progress/Outcome (Transfer Goal 1, OT)  goal ongoing  -AC           Bathing Goal 1 (OT)    Activity/Assistive Device (Bathing Goal 1, OT)  bathing skills, all;shower chair  -AC        [place] Level/Cues Needed (Bathing Goal 1, OT)  conditional independence  -AC        Time Frame (Bathing Goal 1, OT)  long term goal (LTG);10 days  -AC        Progress/Outcomes (Bathing Goal 1, OT)  goal ongoing  -AC           Dressing Goal 1 (OT)    Activity/Assistive Device (Dressing Goal 1, OT)  dressing skills, all  -AC        [place]/Cues Needed (Dressing Goal 1, OT)  conditional independence  -AC        Time Frame (Dressing Goal 1, OT)  long term goal (LTG);10 days  -AC        Progress/Outcome (Dressing Goal 1, OT)  goal ongoing  -AC          User Key  (r) = Recorded By, (t) = Taken By, (c) = Cosigned By    Initials Name  Effective Dates    AC Roc Jameson, OTR/L, CNT 04/09/19 -     Casi Ortega RN 08/02/16 -     Letty Chiang RN 08/02/16 -     Alejandrina Jj RN 06/07/17 -                OT Recommendation and Plan  Outcome Summary/Treatment Plan (OT)  Anticipated Discharge Disposition (OT): skilled nursing facility  Planned Therapy Interventions (OT Eval): activity tolerance training, BADL retraining, edema control/reduction, functional balance retraining, occupation/activity based interventions, patient/caregiver education/training, strengthening exercise, transfer/mobility retraining  Therapy Frequency (OT Eval): daily  Plan of Care Review  Plan of Care Reviewed With: patient  Plan of Care Reviewed With: patient  Outcome Summary: OT eval completed.  Pt is alert and oriented x4.  Up in chair with chronic BLE swelling present.  Pt transfers from chair and commode with SBA to CGA.  Depends on grab bars for commode transfer.  Verbal cues needed for safe transfer technique.  Ambulated to BR and back to chair with CGA and rw.  MaxA needed for LB dressing d/t abdominal tenderness at site of drain.  Pt would benefit from skilled OT to increase his overall endurance and safety with ADL prior to returning home.  He is planning to continue rehab in SNF setting.  OT in agreement.    Outcome Measures     Row Name 04/12/20 0955 04/11/20 1018          How much help from another person do you currently need...    Turning from your back to your side while in flat bed without using bedrails?  --  3  -EDUARD     Moving from lying on back to sitting on the side of a flat bed without bedrails?  --  3  -EDUARD     Moving to and from a bed to a chair (including a wheelchair)?  --  3  -EDUARD     Standing up from a chair using your arms (e.g., wheelchair, bedside chair)?  --  3  -EDUARD     Climbing 3-5 steps with a railing?  --  3  -EDUARD     To walk in hospital room?  --  3  -EDUARD     AM-PAC 6 Clicks Score (PT)  --  18  -EDUARD        How much help  from another is currently needed...    Putting on and taking off regular lower body clothing?  2  -AC  --     Bathing (including washing, rinsing, and drying)  2  -AC  --     Toileting (which includes using toilet bed pan or urinal)  4  -AC  --     Putting on and taking off regular upper body clothing  4  -AC  --     Taking care of personal grooming (such as brushing teeth)  4  -AC  --     Eating meals  4  -AC  --     AM-PAC 6 Clicks Score (OT)  20  -AC  --        Functional Assessment    Outcome Measure Options  AM-PAC 6 Clicks Daily Activity (OT)  -AC  AM-PAC 6 Clicks Basic Mobility (PT)  -       User Key  (r) = Recorded By, (t) = Taken By, (c) = Cosigned By    Initials Name Provider Type    Roc Johns OTR/L, CNT Occupational Therapist    Randy Ledezma, PTA Physical Therapy Assistant          Time Calculation:   Time Calculation- OT     Row Name 04/12/20 1041             Time Calculation- OT    OT Start Time  0955  -      OT Stop Time  1041  -      OT Time Calculation (min)  46 min  -      OT Received On  04/12/20  -      OT Goal Re-Cert Due Date  04/22/20  -        User Key  (r) = Recorded By, (t) = Taken By, (c) = Cosigned By    Initials Name Provider Type    Roc Johns OTR/L, CNT Occupational Therapist        Therapy Charges for Today     Code Description Service Date Service Provider Modifiers Qty    32441304989 HC OT EVAL LOW COMPLEXITY 3 4/12/2020 Roc Jameson OTR/L, CNT GO 1               Roc N. Gisell, OTR/L, CNT  4/12/2020

## 2020-04-12 NOTE — PLAN OF CARE
Problem: Patient Care Overview  Goal: Plan of Care Review  Outcome: Ongoing (interventions implemented as appropriate)  Flowsheets (Taken 4/12/2020 1041)  Progress: no change  Plan of Care Reviewed With: patient  Outcome Summary: OT samuelal completed.  Pt is alert and oriented x4.  Up in chair with chronic BLE swelling present.  Pt transfers from chair and commode with SBA to CGA.  Depends on grab bars for commode transfer.  Verbal cues needed for safe transfer technique.  Ambulated to BR and back to chair with CGA and rw.  MaxA needed for LB dressing d/t abdominal tenderness at site of drain.  Pt would benefit from skilled OT to increase his overall endurance and safety with ADL prior to returning home.  He is planning to continue rehab in SNF setting.  OT in agreement.

## 2020-04-12 NOTE — PLAN OF CARE
Problem: Patient Care Overview  Goal: Plan of Care Review  Outcome: Ongoing (interventions implemented as appropriate)  Flowsheets (Taken 4/12/2020 1520)  Progress: improving  Plan of Care Reviewed With: patient  Note:   Pt. Agreed to ue exs to increase his act. Mary with adl tasks, pt. Required 2 rests of 30sec. 1 min. Ea. To complete tasks!

## 2020-04-13 VITALS
DIASTOLIC BLOOD PRESSURE: 83 MMHG | WEIGHT: 236.4 LBS | HEART RATE: 51 BPM | BODY MASS INDEX: 33.1 KG/M2 | SYSTOLIC BLOOD PRESSURE: 153 MMHG | TEMPERATURE: 98.3 F | HEIGHT: 71 IN | RESPIRATION RATE: 16 BRPM | OXYGEN SATURATION: 100 %

## 2020-04-13 LAB
ALBUMIN SERPL-MCNC: 2.6 G/DL (ref 3.5–5.2)
ALBUMIN/GLOB SERPL: 0.8 G/DL
ALP SERPL-CCNC: 138 U/L (ref 39–117)
ALT SERPL W P-5'-P-CCNC: 39 U/L (ref 1–41)
ANION GAP SERPL CALCULATED.3IONS-SCNC: 10 MMOL/L (ref 5–15)
AST SERPL-CCNC: 36 U/L (ref 1–40)
BASOPHILS # BLD AUTO: 0.04 10*3/MM3 (ref 0–0.2)
BASOPHILS NFR BLD AUTO: 0.4 % (ref 0–1.5)
BILIRUB SERPL-MCNC: 1 MG/DL (ref 0.2–1.2)
BUN BLD-MCNC: 13 MG/DL (ref 8–23)
BUN/CREAT SERPL: 17.8 (ref 7–25)
CALCIUM SPEC-SCNC: 8.7 MG/DL (ref 8.6–10.5)
CHLORIDE SERPL-SCNC: 99 MMOL/L (ref 98–107)
CO2 SERPL-SCNC: 29 MMOL/L (ref 22–29)
CREAT BLD-MCNC: 0.73 MG/DL (ref 0.76–1.27)
DEPRECATED RDW RBC AUTO: 51 FL (ref 37–54)
EOSINOPHIL # BLD AUTO: 0.18 10*3/MM3 (ref 0–0.4)
EOSINOPHIL NFR BLD AUTO: 1.7 % (ref 0.3–6.2)
ERYTHROCYTE [DISTWIDTH] IN BLOOD BY AUTOMATED COUNT: 15 % (ref 12.3–15.4)
GFR SERPL CREATININE-BSD FRML MDRD: 103 ML/MIN/1.73
GLOBULIN UR ELPH-MCNC: 3.4 GM/DL
GLUCOSE BLD-MCNC: 114 MG/DL (ref 65–99)
GLUCOSE BLDC GLUCOMTR-MCNC: 134 MG/DL (ref 70–130)
GLUCOSE BLDC GLUCOMTR-MCNC: 154 MG/DL (ref 70–130)
HCT VFR BLD AUTO: 36.5 % (ref 37.5–51)
HGB BLD-MCNC: 11.7 G/DL (ref 13–17.7)
IMM GRANULOCYTES # BLD AUTO: 0.2 10*3/MM3 (ref 0–0.05)
IMM GRANULOCYTES NFR BLD AUTO: 1.9 % (ref 0–0.5)
LYMPHOCYTES # BLD AUTO: 1.82 10*3/MM3 (ref 0.7–3.1)
LYMPHOCYTES NFR BLD AUTO: 17.7 % (ref 19.6–45.3)
MCH RBC QN AUTO: 30.2 PG (ref 26.6–33)
MCHC RBC AUTO-ENTMCNC: 32.1 G/DL (ref 31.5–35.7)
MCV RBC AUTO: 94.1 FL (ref 79–97)
MONOCYTES # BLD AUTO: 1.12 10*3/MM3 (ref 0.1–0.9)
MONOCYTES NFR BLD AUTO: 10.9 % (ref 5–12)
NEUTROPHILS # BLD AUTO: 6.94 10*3/MM3 (ref 1.7–7)
NEUTROPHILS NFR BLD AUTO: 67.4 % (ref 42.7–76)
NRBC BLD AUTO-RTO: 0 /100 WBC (ref 0–0.2)
PLATELET # BLD AUTO: 208 10*3/MM3 (ref 140–450)
PMV BLD AUTO: 11.4 FL (ref 6–12)
POTASSIUM BLD-SCNC: 4.5 MMOL/L (ref 3.5–5.2)
PROT SERPL-MCNC: 6 G/DL (ref 6–8.5)
RBC # BLD AUTO: 3.88 10*6/MM3 (ref 4.14–5.8)
SODIUM BLD-SCNC: 138 MMOL/L (ref 136–145)
WBC NRBC COR # BLD: 10.3 10*3/MM3 (ref 3.4–10.8)

## 2020-04-13 PROCEDURE — 82962 GLUCOSE BLOOD TEST: CPT

## 2020-04-13 PROCEDURE — 80053 COMPREHEN METABOLIC PANEL: CPT | Performed by: FAMILY MEDICINE

## 2020-04-13 PROCEDURE — 85025 COMPLETE CBC W/AUTO DIFF WBC: CPT | Performed by: FAMILY MEDICINE

## 2020-04-13 PROCEDURE — 97535 SELF CARE MNGMENT TRAINING: CPT

## 2020-04-13 PROCEDURE — 25010000002 ENOXAPARIN PER 10 MG: Performed by: FAMILY MEDICINE

## 2020-04-13 PROCEDURE — 63710000001 INSULIN LISPRO (HUMAN) PER 5 UNITS: Performed by: INTERNAL MEDICINE

## 2020-04-13 RX ORDER — ONDANSETRON 4 MG/1
4 TABLET, ORALLY DISINTEGRATING ORAL EVERY 8 HOURS PRN
Status: ON HOLD
Start: 2020-04-13 | End: 2020-05-27

## 2020-04-13 RX ORDER — CEFDINIR 300 MG/1
300 CAPSULE ORAL 2 TIMES DAILY
Qty: 10 CAPSULE | Refills: 0
Start: 2020-04-13 | End: 2020-04-20 | Stop reason: HOSPADM

## 2020-04-13 RX ORDER — ACETAMINOPHEN 160 MG
2000 TABLET,DISINTEGRATING ORAL DAILY
Qty: 30 CAPSULE | Refills: 11 | Status: SHIPPED | OUTPATIENT
Start: 2020-04-13 | End: 2021-04-13

## 2020-04-13 RX ORDER — BISACODYL 10 MG
10 SUPPOSITORY, RECTAL RECTAL DAILY PRN
Start: 2020-04-13

## 2020-04-13 RX ORDER — BISOPROLOL FUMARATE 10 MG/1
5 TABLET, FILM COATED ORAL DAILY
Status: ON HOLD
Start: 2020-04-13 | End: 2020-05-27

## 2020-04-13 RX ORDER — CHOLECALCIFEROL (VITAMIN D3) 50 MCG
1000 TABLET ORAL DAILY
Status: ON HOLD
Start: 2020-04-13 | End: 2020-05-27

## 2020-04-13 RX ORDER — OXYCODONE HYDROCHLORIDE AND ACETAMINOPHEN 5; 325 MG/1; MG/1
1 TABLET ORAL EVERY 4 HOURS PRN
Qty: 20 TABLET | Refills: 0 | Status: ON HOLD | OUTPATIENT
Start: 2020-04-13 | End: 2020-04-20 | Stop reason: SDUPTHER

## 2020-04-13 RX ORDER — SUCRALFATE ORAL 1 G/10ML
1 SUSPENSION ORAL EVERY 6 HOURS SCHEDULED
Status: ON HOLD
Start: 2020-04-13 | End: 2020-05-27

## 2020-04-13 RX ORDER — FAMOTIDINE 20 MG/1
20 TABLET, FILM COATED ORAL 2 TIMES DAILY
Start: 2020-04-13

## 2020-04-13 RX ORDER — AMOXICILLIN 250 MG
1 CAPSULE ORAL DAILY
Status: ON HOLD
Start: 2020-04-13 | End: 2022-05-10

## 2020-04-13 RX ADMIN — SUCRALFATE 1 G: 1 SUSPENSION ORAL at 12:05

## 2020-04-13 RX ADMIN — BISOPROLOL FUMARATE 10 MG: 5 TABLET ORAL at 08:28

## 2020-04-13 RX ADMIN — OXYCODONE HYDROCHLORIDE AND ACETAMINOPHEN 1 TABLET: 5; 325 TABLET ORAL at 11:51

## 2020-04-13 RX ADMIN — INSULIN LISPRO 2 UNITS: 100 INJECTION, SOLUTION INTRAVENOUS; SUBCUTANEOUS at 12:05

## 2020-04-13 RX ADMIN — ENOXAPARIN SODIUM 110 MG: 120 INJECTION SUBCUTANEOUS at 08:28

## 2020-04-13 RX ADMIN — FAMOTIDINE 20 MG: 20 TABLET, FILM COATED ORAL at 08:28

## 2020-04-13 RX ADMIN — SUCRALFATE 1 G: 1 SUSPENSION ORAL at 06:04

## 2020-04-13 RX ADMIN — ASPIRIN 81 MG: 81 TABLET ORAL at 08:28

## 2020-04-13 RX ADMIN — SUCRALFATE 1 G: 1 SUSPENSION ORAL at 00:00

## 2020-04-13 NOTE — PROGRESS NOTES
Agustina Saleem MD FACS  Progress Note     LOS: 10 days   Patient Care Team:  Matheus Olivera PA as PCP - General (Physician Assistant)      Subjective     Interval History:      collin po  +flatus  +bm  Pain controlled     Objective     Vital Signs  Temp:  [98.1 °F (36.7 °C)-99 °F (37.2 °C)] 98.3 °F (36.8 °C)  Heart Rate:  [50-57] 51  Resp:  [14-16] 16  BP: (140-158)/(54-83) 153/83    Physical Exam:  General appearance - alert, well appearing, and in no distress  Abdomen - soft, clean, NAM serous/bilious      Results Review:    Lab Results (last 24 hours)     Procedure Component Value Units Date/Time    POC Glucose Once [[id number]]  (Abnormal) Collected:  04/13/20 0733    Specimen:  Blood Updated:  04/13/20 0746     Glucose 134 mg/dL      Comment: : [id number] Eddy (Vázquez) JessicaMeter ID: [id number]       Comprehensive Metabolic Panel [[id number]]  (Abnormal) Collected:  04/13/20 0443    Specimen:  Blood Updated:  04/13/20 0640     Glucose 114 mg/dL      BUN 13 mg/dL      Creatinine 0.73 mg/dL      Sodium 138 mmol/L      Potassium 4.5 mmol/L      Comment: Specimen hemolyzed.  Results may be affected.        Chloride 99 mmol/L      CO2 29.0 mmol/L      Calcium 8.7 mg/dL      Total Protein 6.0 g/dL      Albumin 2.60 g/dL      ALT (SGPT) 39 U/L      Comment: Specimen hemolyzed.  Results may be affected.        AST (SGOT) 36 U/L      Comment: Specimen hemolyzed.  Results may be affected.        Alkaline Phosphatase 138 U/L      Total Bilirubin 1.0 mg/dL      eGFR Non African Amer 103 mL/min/1.73      Globulin 3.4 gm/dL      A/G Ratio 0.8 g/dL      BUN/Creatinine Ratio 17.8     Anion Gap 10.0 mmol/L     Narrative:       GFR Normal >60  Chronic Kidney Disease <60  Kidney Failure <15      CBC & Differential [[id number]] Collected:  04/13/20 0443    Specimen:  Blood Updated:  04/13/20 0541    Narrative:       The following orders were created for panel order CBC & Differential.  Procedure                                Abnormality         Status                     ---------                               -----------         ------                     CBC Auto Differential[[id number]]        Abnormal            Final result                 Please view results for these tests on the individual orders.    CBC Auto Differential [[id number]]  (Abnormal) Collected:  04/13/20 0443    Specimen:  Blood Updated:  04/13/20 0541     WBC 10.30 10*3/mm3      RBC 3.88 10*6/mm3      Hemoglobin 11.7 g/dL      Hematocrit 36.5 %      MCV 94.1 fL      MCH 30.2 pg      MCHC 32.1 g/dL      RDW 15.0 %      RDW-SD 51.0 fl      MPV 11.4 fL      Platelets 208 10*3/mm3      Neutrophil % 67.4 %      Lymphocyte % 17.7 %      Monocyte % 10.9 %      Eosinophil % 1.7 %      Basophil % 0.4 %      Immature Grans % 1.9 %      Neutrophils, Absolute 6.94 10*3/mm3      Lymphocytes, Absolute 1.82 10*3/mm3      Monocytes, Absolute 1.12 10*3/mm3      Eosinophils, Absolute 0.18 10*3/mm3      Basophils, Absolute 0.04 10*3/mm3      Immature Grans, Absolute 0.20 10*3/mm3      nRBC 0.0 /100 WBC     POC Glucose Once [[id number]]  (Normal) Collected:  04/12/20 1709    Specimen:  Blood Updated:  04/12/20 1720     Glucose 128 mg/dL      Comment: : [id number] Naresh MontoyataneyMeter ID: [id number]       POC Glucose Once [[id number]]  (Normal) Collected:  04/12/20 1140    Specimen:  Blood Updated:  04/12/20 1152     Glucose 121 mg/dL      Comment: : [id number] Ace JuanjosejulianMeter ID: [id number]           Imaging Results (Last 24 Hours)     ** No results found for the last 24 hours. **            Assessment/Plan       S/p open cholecystostomy tube placement.  Diet as tolerated.  Ok to transfer to rehab from surgical standpoint.  Will keep drains until 4-6 weeks at time of cholecystectomy.  Follow up with me 2-3 weeks.      Agustina Saleem MD  04/13/20  08:43

## 2020-04-13 NOTE — NURSING NOTE
Patient has no complaints of pain or nausea; up with assist and walker; Voiding per urinal; IID; IV ABX; NAM x1 small bile output; Plan to go to [facility] at discharge; Resting between care; will continue to monitor

## 2020-04-13 NOTE — THERAPY DISCHARGE NOTE
Acute Care - Physical Therapy Discharge Summary  [facility]       Patient Name: Jesus Smith  : 1935  MRN: [id number]    Today's Date: 2020  Onset of Illness/Injury or Date of Surgery: 20       Referring Physician: Dr. Dodd      Admit Date: 4/3/2020      PT Recommendation and Plan    Visit Dx:    ICD-10-CM ICD-9-CM   1. Acute UTI (urinary tract infection) N39.0 599.0   2. Systemic inflammatory response syndrome (CMS/[facility]) R65.10 995.90   3. NOLAN (acute kidney injury) (CMS/[facility]) N17.9 584.9   4. Transaminitis R74.0 790.4   5. Acute cholecystitis K81.0 575.0   6. Impaired functional mobility and activity tolerance Z74.09 V49.89   7. Decreased activities of daily living (ADL) Z78.9 V49.89   8. Bacteremia R78.81 790.7   9. Abnormal LFTs R94.5 790.6   10. Benign essential HTN I10 401.1   11. CKD (chronic kidney disease) stage 3, GFR 30-59 ml/min (CMS/[facility]) N18.3 585.3   12. History of prosthetic aortic valve Z95.2 V43.3       Outcome Measures     Row Name 20 0930 20 1502 20 0955       How much help from another is currently needed...    Putting on and taking off regular lower body clothing?  2  -CJ  2  -CJ  2  -AC    Bathing (including washing, rinsing, and drying)  3  -CJ  2  -CJ  2  -AC    Toileting (which includes using toilet bed pan or urinal)  4  -CJ  4  -CJ  4  -AC    Putting on and taking off regular upper body clothing  4  -CJ  4  -CJ  4  -AC    Taking care of personal grooming (such as brushing teeth)  4  -CJ  4  -CJ  4  -AC    Eating meals  4  -CJ  4  -CJ  4  -AC    AM-PAC 6 Clicks Score (OT)  21  -CJ  20  -CJ  20  -AC       Functional Assessment    Outcome Measure Options  AM-PAC 6 Clicks Daily Activity (OT)  -CJ  AM-PAC 6 Clicks Daily Activity (OT)  -CJ  AM-PAC 6 Clicks Daily Activity (OT)  -AC    Row Name 20 1018             How much help from another person do you currently need...    Turning from your back to your side while in flat bed without using bedrails?   3  -EDUARD      Moving from lying on back to sitting on the side of a flat bed without bedrails?  3  -EDUARD      Moving to and from a bed to a chair (including a wheelchair)?  3  -EDUARD      Standing up from a chair using your arms (e.g., wheelchair, bedside chair)?  3  -EDUARD      Climbing 3-5 steps with a railing?  3  -EDUARD      To walk in hospital room?  3  -EDUARD      AM-PAC 6 Clicks Score (PT)  18  -EDUARD         Functional Assessment    Outcome Measure Options  AM-PAC 6 Clicks Basic Mobility (PT)  -EDUARD        User Key  (r) = Recorded By, (t) = Taken By, (c) = Cosigned By    Initials Name Provider Type    AC Roc Jameson, OTR/L, CNT Occupational Therapist    Cesar Joseph COTA/L Occupational Therapy Assistant    Randy Ledezma, PTA Physical Therapy Assistant              Rehab Goal Summary     Row Name 04/13/20 1531 04/13/20 1500          Bed Mobility Goal 1 (PT)    Activity/Assistive Device (Bed Mobility Goal 1, PT)  bed mobility activities, all  -AB  --     [place] Level/Cues Needed (Bed Mobility Goal 1, PT)  independent  -AB  --     Time Frame (Bed Mobility Goal 1, PT)  long term goal (LTG);by discharge  -AB  --     Progress/Outcomes (Bed Mobility Goal 1, PT)  goal not met  -AB  --        Transfer Goal 1 (PT)    Activity/Assistive Device (Transfer Goal 1, PT)  sit-to-stand/stand-to-sit;walker, rolling  -AB  --     [place] Level/Cues Needed (Transfer Goal 1, PT)  conditional independence  -AB  --     Time Frame (Transfer Goal 1, PT)  long term goal (LTG);by discharge  -AB  --     Progress/Outcome (Transfer Goal 1, PT)  goal not met  -AB  --        Gait Training Goal 1 (PT)    Activity/Assistive Device (Gait Training Goal 1, PT)  gait (walking locomotion);assistive device use;improve balance and speed;increase endurance/gait distance;walker, rolling  -AB  --     [place] Level (Gait Training Goal 1, PT)  supervision required  -AB  --     Distance (Gait Goal 1, PT)  100ft  -AB  --     Time Frame  (Gait Training Goal 1, PT)  long term goal (LTG);by discharge  -AB  --     Progress/Outcome (Gait Training Goal 1, PT)  goal not met  -AB  --        Transfer Goal 1 (OT)    Activity/Assistive Device (Transfer Goal 1, OT)  --  sit-to-stand/stand-to-sit;bed-to-chair/chair-to-bed;toilet;walker, rolling  -AC     [place] Level/Cues Needed (Transfer Goal 1, OT)  --  conditional independence  -AC     Time Frame (Transfer Goal 1, OT)  --  long term goal (LTG);10 days  -AC     Progress/Outcome (Transfer Goal 1, OT)  --  goal not met  -AC        Bathing Goal 1 (OT)    Activity/Assistive Device (Bathing Goal 1, OT)  --  bathing skills, all;shower chair  -AC     [place] Level/Cues Needed (Bathing Goal 1, OT)  --  conditional independence  -AC     Time Frame (Bathing Goal 1, OT)  --  long term goal (LTG);10 days  -AC     Progress/Outcomes (Bathing Goal 1, OT)  --  goal not met  -AC        Dressing Goal 1 (OT)    Activity/Assistive Device (Dressing Goal 1, OT)  --  dressing skills, all  -AC     [place]/Cues Needed (Dressing Goal 1, OT)  --  conditional independence  -AC     Time Frame (Dressing Goal 1, OT)  --  long term goal (LTG);10 days  -AC     Progress/Outcome (Dressing Goal 1, OT)  --  goal not met  -AC       User Key  (r) = Recorded By, (t) = Taken By, (c) = Cosigned By    Initials Name Provider Type Discipline    AB Sahra Arnold PTA Physical Therapy Assistant PT    AC Roc Jameson, OTR/L, CNT Occupational Therapist OT              PT Discharge Summary  Anticipated Discharge Disposition (PT): skilled nursing facility  Reason for Discharge: Discharge from facility  Outcomes Achieved: Refer to plan of care for updates on goals achieved  Discharge Destination: SNF      Sahra Arnold PTA   4/13/2020

## 2020-04-13 NOTE — THERAPY TREATMENT NOTE
Acute Care - Occupational Therapy Treatment Note  [facility]     Patient Name: Jesus Smith  : 1935  MRN: [id number]  Today's Date: 2020  Onset of Illness/Injury or Date of Surgery: 20  Date of Referral to OT: 20  Referring Physician: Dr. Dodd    Admit Date: 4/3/2020       ICD-10-CM ICD-9-CM   1. Acute UTI (urinary tract infection) N39.0 599.0   2. Systemic inflammatory response syndrome (CMS/[facility]) R65.10 995.90   3. NOLAN (acute kidney injury) (CMS/[facility]) N17.9 584.9   4. Transaminitis R74.0 790.4   5. Acute cholecystitis K81.0 575.0   6. Impaired functional mobility and activity tolerance Z74.09 V49.89   7. Decreased activities of daily living (ADL) Z78.9 V49.89   8. Bacteremia R78.81 790.7   9. Abnormal LFTs R94.5 790.6   10. Benign essential HTN I10 401.1   11. CKD (chronic kidney disease) stage 3, GFR 30-59 ml/min (CMS/[facility]) N18.3 585.3   12. History of prosthetic aortic valve Z95.2 V43.3     Patient Active Problem List   Diagnosis   • Acute UTI (urinary tract infection)   • Bacteremia   • Abnormal LFTs   • Acute cholecystitis   • Benign essential HTN   • CKD (chronic kidney disease) stage 3, GFR 30-59 ml/min (CMS/[facility])   • History of prosthetic aortic valve     Past Medical History:   Diagnosis Date   • Bradycardia    • Coronary artery disease    • GERD (gastroesophageal reflux disease)    • Hypertension    • Injury of back    • TIA (transient ischemic attack)      Past Surgical History:   Procedure Laterality Date   • APPENDECTOMY     • BACK SURGERY      Fusion   • CARDIAC SURGERY      Open Heart   • EXPLORATORY LAPAROTOMY N/A 2020    Procedure: open cholecystostomy tube placement ;  Surgeon: Agustina Saleem MD;  [facility];  Service: General;  Laterality: N/A;   • HERNIA REPAIR     • JOINT REPLACEMENT Left     s/p L hip replacement   • PACEMAKER IMPLANTATION     • STOMACH SURGERY         Therapy Treatment    Rehabilitation Treatment Summary     Row Name 20 1139  04/13/20 0930 04/13/20 0848       Treatment Time/Intention    Discipline  physical therapy assistant  -  occupational therapy assistant  -  physical therapy assistant  -    Document Type  --  therapy note (daily note)  -  therapy note (daily note)  -    Subjective Information  --  complains of;weakness;fatigue  -  --    Mode of Treatment  --  occupational therapy  -CJ  physical therapy  -    Patient Effort  --  good  -  --    Comment  patient states his hip is hurting too much. notified nsg  -2  --  eating breakfast  -2    Reason Treatment Not Performed  patient/family declined treatment, not feeling well  -2  --  --    Existing Precautions/Restrictions  --  fall NAM drain R. side of abdomen!  -  --    Recorded by [] Melissa Bains, PTA 04/13/20 1139  [LC2] Melissa Bains, [facility] 04/13/20 1146 [CJ] Cesar Dupree COTA/L 04/13/20 1148 [LC] Melissa Bains, PTA 04/13/20 0848  [LC2] Melissa Bains, [facility] 04/13/20 0850    Row Name 04/13/20 0930             Bed Mobility Assessment/Treatment    Comment (Bed Mobility)  up in chair!  -CJ      Recorded by [CJ] Cesar Dupree COTA/L 04/13/20 1148      Row Name 04/13/20 0930             ADL Assessment/Intervention    14164 - OT Self Care/Mgmt Minutes  40  -      BADL Assessment/Intervention  bathing;upper body dressing;lower body dressing  -CJ      Recorded by [CJ] Cesar Dupree COTA/L 04/13/20 1148      Row Name 04/13/20 0930             Bathing Assessment/Intervention    Bathing [place] Level  bathing skills;set up;verbal cues;contact guard assist  -      Assistive Devices (Bathing)  bath mitt;grab bars/tub rail;hand-held shower spray hose;shower chair  -      Bathing Position  supported sitting;supported standing  -CJ      Recorded by [CJ] Cesar Dupree COTA/L 04/13/20 1148      Row Name 04/13/20 0930             Upper Body Dressing Assessment/Training    Upper Body Dressing [place] Level  doff;don;front opening  garment;set up;verbal cues;contact guard assist  -CJ      Upper Body Dressing Position  unsupported sitting  -CJ      Recorded by [CJ] Cesar Dupree COTA/L 04/13/20 1148      Row Name 04/13/20 0930             Lower Body Dressing Assessment/Training    Lower Body Dressing [place] Level  doff;don;socks;set up;verbal cues;moderate assist (50% patient effort)  -CJ      Lower Body Dressing Position  unsupported sitting  -CJ      Recorded by [CJ] Cesar Dupree COTA/L 04/13/20 1148      Row Name 04/13/20 0930             Positioning and Restraints    Pre-Treatment Position  sitting in chair/recliner  -CJ      Post Treatment Position  chair  -CJ      In Chair  sitting;call light within reach;encouraged to call for assist  -CJ      Recorded by [CJ] Cesar Dupree COTA/L 04/13/20 1148      Row Name 04/13/20 0930             Pain Assessment    Additional Documentation  Pain Scale 2: Word Pre/Post-Treatment (Group)  -CJ      Recorded by [CJ] Cesar Dupree COTA/L 04/13/20 1148      Row Name 04/13/20 0930             Pain Scale: Numbers Pre/Post-Treatment    Pain Scale: Numbers, Pretreatment  0/10 - no pain  -CJ      Pain Scale: Numbers, Post-Treatment  0/10 - no pain  -CJ      Recorded by [CJ] Cesar Dupree COTA/L 04/13/20 1148      Row Name                Wound 04/03/20 2115 Left coccyx    Wound - Properties Group Date first assessed: 04/03/20 [DF] Time first assessed: 2115 [DF] Present on Hospital Admission: Y [FT] Side: Left [FT] Location: coccyx [DF] Recorded by:  [DF] Casi Ayala RN 04/04/20 0101 [FT] Letty Quiroz RN 04/04/20 0115    Row Name                Wound 04/05/20 0901 Right upper abdomen Incision    Wound - Properties Group Date first assessed: 04/05/20 [JH] Time first assessed: 0901 [JH] Side: Right [JH2] Orientation: upper [JH2] Location: abdomen [JH] Primary Wound Type: Incision [JH] Recorded by:  [JH] Alejandrina Maravilla RN 04/05/20 0901 [JH2] Alejandrina Maravilla RN  04/05/20 0906    Row Name 04/13/20 0930             Outcome Summary/Treatment Plan (OT)    Daily Summary of Progress (OT)  progress toward functional goals is good  -      Barriers to Overall Progress (OT)  Fall/NAM drain!  -      Plan for Continued Treatment (OT)  Plan d/c!  -      Recorded by [CJ] Cesar Dupree, KESSLER/L 04/13/20 1148        User Key  (r) = Recorded By, (t) = Taken By, (c) = Cosigned By    Initials Name Effective Dates Discipline     Cesar Dupree, KESSLER/L 08/02/16 -  OT    DF Casi Ayala RN 08/02/16 -  Nurse    Letty Chiang RN 08/02/16 -  Nurse    Alejandrina jJ RN 06/07/17 -  Nurse    Melissa Najera PTA 10/18/19 -  PT        Wound 04/03/20 2115 Left coccyx (Active)   Dressing Appearance open to air 4/13/2020  7:36 AM   Closure Open to air 4/13/2020  7:36 AM   Base red;blanchable 4/13/2020  7:36 AM   Periwound dry;intact 4/12/2020  8:25 PM   Drainage Amount none 4/13/2020  7:36 AM   Dressing Care, Wound open to air 4/13/2020  7:36 AM       Wound 04/05/20 0901 Right upper abdomen Incision (Active)   Dressing Appearance dry;intact;no drainage 4/13/2020  7:36 AM   Closure [place] 4/13/2020 10:30 AM   Base dressing in place, unable to visualize 4/12/2020  8:25 PM   Drainage Amount none 4/13/2020  7:36 AM   Dressing Care, Wound dressing changed;gauze 4/13/2020 10:30 AM           OT Recommendation and Plan  Outcome Summary/Treatment Plan (OT)  Daily Summary of Progress (OT): progress toward functional goals is good  Barriers to Overall Progress (OT): Fall/NAM drain!  Plan for Continued Treatment (OT): Plan d/c!  Daily Summary of Progress (OT): progress toward functional goals is good  Plan of Care Review  Plan of Care Reviewed With: patient  Plan of Care Reviewed With: patient  Outcome Measures     Row Name 04/13/20 0930 04/12/20 1502 04/12/20 0955       How much help from another is currently needed...    Putting on and taking off regular lower body clothing?   2  -CJ  2  -CJ  2  -AC    Bathing (including washing, rinsing, and drying)  3  -CJ  2  -CJ  2  -AC    Toileting (which includes using toilet bed pan or urinal)  4  -CJ  4  -CJ  4  -AC    Putting on and taking off regular upper body clothing  4  -CJ  4  -CJ  4  -AC    Taking care of personal grooming (such as brushing teeth)  4  -CJ  4  -CJ  4  -AC    Eating meals  4  -CJ  4  -CJ  4  -AC    AM-PAC 6 Clicks Score (OT)  21  -CJ  20  -CJ  20  -AC       Functional Assessment    Outcome Measure Options  AM-PAC 6 Clicks Daily Activity (OT)  -  AM-PAC 6 Clicks Daily Activity (OT)  -  AM-PAC 6 Clicks Daily Activity (OT)  -AC    Row Name 04/11/20 1018             How much help from another person do you currently need...    Turning from your back to your side while in flat bed without using bedrails?  3  -EDUARD      Moving from lying on back to sitting on the side of a flat bed without bedrails?  3  -EDUARD      Moving to and from a bed to a chair (including a wheelchair)?  3  -EDUARD      Standing up from a chair using your arms (e.g., wheelchair, bedside chair)?  3  -EDUARD      Climbing 3-5 steps with a railing?  3  -EDUARD      To walk in hospital room?  3  -EDUARD      AM-PAC 6 Clicks Score (PT)  18  -EDUARD         Functional Assessment    Outcome Measure Options  AM-PAC 6 Clicks Basic Mobility (PT)  -EDUARD        User Key  (r) = Recorded By, (t) = Taken By, (c) = Cosigned By    Initials Name Provider Type    AC Roc Jameson, OTR/L, CNT Occupational Therapist    CJ Cesar Dupree KESSLER/L Occupational Therapy Assistant    Randy Ledezma, PTA Physical Therapy Assistant           Time Calculation:   Time Calculation- OT     Row Name 04/13/20 0930             Time Calculation- OT    OT Start Time  0930  -      OT Stop Time  1010  -      OT Time Calculation (min)  40 min  -      Total Timed Code Minutes- OT  40 minute(s)  -      TCU Minutes- OT  40 min  -      OT Received On  04/13/20  -      OT Goal Re-Cert Due Date   04/22/20  -         Timed Charges    09362 - OT Self Care/Mgmt Minutes  40  -        User Key  (r) = Recorded By, (t) = Taken By, (c) = Cosigned By    Initials Name Provider Type     Cesar Dupree COTA/L Occupational Therapy Assistant        Therapy Charges for Today     Code Description Service Date Service Provider Modifiers Qty    20188121781 HC OT THER PROC EA 15 MIN 4/12/2020 Cesar Dupree COTA/L GO 1    86308766908 HC OT SELF CARE/MGMT/TRAIN EA 15 MIN 4/13/2020 Cesar Dupree COTA/L GO 3               BENJI Estrada  4/13/2020

## 2020-04-13 NOTE — PLAN OF CARE
Problem: Patient Care Overview  Goal: Plan of Care Review  Outcome: Ongoing (interventions implemented as appropriate)  Flowsheets (Taken 4/13/2020 1144)  Progress: improving  Plan of Care Reviewed With: patient  Note:   Pt. Sitting in chair able to ambto-from chair to shower with cga from this cox/l, performed adl bathing with cga from this cox/l/dressing cga UE's/mod. assist LE's!

## 2020-04-13 NOTE — PAYOR COMM NOTE
"Jesus Smith (84 y.o. Male) [id number]     D/C  Notification    [facility]   jeffry phone   Fax         Date of Birth Social Security Number Address Home Phone MRN    [id number]  [address] [place] [id number] [phone] [id number]    [employer] Marital Status          Hoahaoism        Admission Date Admission Type Admitting Provider Attending Provider Department, Room/Bed    4/3/20 Emergency Mamadou Dodd MD  [facility] 3A, 331/1    Discharge Date Discharge Disposition Discharge Destination        4/13/2020 Skilled Nursing Facility (DC - External)              Attending Provider:  (none)   Allergies:  No Known Allergies    Isolation:  None   Infection:  None   Code Status:  CPR    Ht:  180.3 cm (70.98\")   Wt:  107 kg (236 lb 6.4 oz)    Admission Cmt:  None   Principal Problem:  Bacteremia [R78.81]                 Active Insurance as of 4/3/2020     Primary Coverage     Payor Plan Insurance Group Employer/Plan Group    [employer] [id number]      Payor Plan Address Payor Plan Phone Number Payor Plan Fax Number Effective Dates    MISHA SERVICE 04 [phone]  4/2/2020 - None Entered    [address] [facility] [id number]       Subscriber Name Subscriber Birth Date Member ID       JESUS SMITH 1935 [id number]                 Emergency Contacts      (Rel.) Home Phone Work Phone Mobile Phone    HEAVEN RAYMOND (Relative) -- -- [phone]    IGNACIO ANGEL (Relative) -- -- [phone]               Discharge Summary      Mamadou Dodd MD at 04/13/20 0959              [facility] Medicine Services  DISCHARGE SUMMARY       Date of Admission: 4/3/2020  Date of Discharge:  4/13/2020  Primary Care Physician: Matheus Olivera PA    Presenting Problem/History of Present Illness:  Acute UTI (urinary tract infection) [N39.0]     Final Discharge Diagnoses:  Active " Hospital Problems    Diagnosis   • **Bacteremia   • History of prosthetic aortic valve   • Abnormal LFTs   • Acute cholecystitis   • Benign essential HTN   • CKD (chronic kidney disease) stage 3, GFR 30-59 ml/min (CMS/HCC)   • Acute UTI (urinary tract infection)       Consults: General surgery.    Pertinent Test Results:  Impression: Ultrasound the kidneys  No evidence of hydronephrosis or renal atrophy.    IMPRESSION: CT scan abdomen pelvic.  1. Findings supporting acute cholecystitis as described above. No  biliary dilatation identified. Trace perihepatic and pelvic ascites.  Wall thickening of the hepatic flexure of the colon may be reactive  given the inflammation of the right upper quadrant.  2. No bowel obstruction. Moderately distended fluid-filled stomach. No  bowel dilatation. Sigmoid colonic diverticulosis.  3. Diffuse vascular calcification. Prior mediastinal surgery with  prosthetic coronary valves. Cardiac pacer device.    Impression: Chest x-ray  1.  Borderline size of the cardiac mediastinal silhouette. No convincing  evidence of fluid overload.  2.  Lower lung volumes.    Interpretation Summary echocardiogram.    · Left ventricular systolic function is normal. Estimated EF appears to be in the range of 56 - 60%.  · Left ventricular wall thickness is consistent with moderate concentric hypertrophy.  · There is a prosthetic aortic valve present. The aortic valve peak and mean gradients are within normal limits.  · Mild tricuspid valve regurgitation is present. Estimated right ventricular systolic pressure from tricuspid regurgitation is normal (<35 mmHg).       Chief Complaint on Day of Discharge: none    History of Present Illness on Day of Discharge: *  I am asked to admit the patient for acute urinary tract infection, systemic inflammatory response syndrome, acute kidney injury and transaminitis.     Patient claims to have some urinary frequency as well as change in his other.  Patient has been  having chills, nausea and vomiting.  He denies any coughing spell.      On evaluation he has leukocytosis with bandemia; creatinine is elevated at 1.57.  He has moderate amount of blood in his urine although been found only with 3-5 per high-power field of RBC.  His procalcitonin is elevated.  He has positive nitrite and trace leukocyte esterase as well as trace bacteriuria. chest x-ray showed no consolidation  Blood gas showed normal pH at 7.46, PCO2 35 and PO2 of 81 taken on room air  Respiratory panel PCR is negative  proBNP is elevated at 4334.  He has a pacemaker in situ and noted to have prior valvuloplasty.  Transaminases are slightly elevated at AST and ALT are 61 and 93 respectively.  We do not have any echocardiogram of record but review of record dating back in 2016 at [facility], he has been found with aortic bioprosthetic valve and normal EF of 55 to 60%.  He had grade 2 diastolic dysfunction.     His main complaint is dry heave culminating to vomiting since Monday.  He complains of abdominal discomfort as he points to his right upper quadrant that goes down to his hypogastric area.  He said that he has no control over his bladder habits and has decreased urinary stream but denies any dysuria.     He also mentioned to have paroxysmal nocturnal dyspnea.  He has been swollen for the past 3 years.  He claimed to have increasing weight.  Positive shortness of breath but denies any fever or chills.  He denies any exposure to someone who is sick.  He denies coughing spells     He claimed to have fallen yesterday and must have hit his upper abdomen.  He could not tell me further details pertaining to this    Hospital Course:  The patient is a 84 y.o. male who presented to [facility] with cholecystitis/chronic renal failure/prosthetic aortic valve.      Cholecystitis/abdomen pain.  Consult general surgery.  Status post cholecystectomy tube placement day 6.   Patient had Zosyn 4/4 to 4/7.  Patient  "is currently on Rocephin, day 9 of antibiotics today.    General surgery- plan for cholecystectomy in 4 to 6 weeks.  Follow with Dr. Saleem 2 to 3 weeks.     CAD/history of prosthetic aortic valve/hypertension.  Continue aspirin.  Patient to continue Lovenox therapeutic.     Pain control.  Dilaudid as needed.  Percocet PRN.  Patient go to nursing with Percocet as needed.     Nausea/vomiting.  Pepcid.  Zofran as needed.  Carafate.     Chronic kidney disease stage III.     Diabetes.  Sliding scale.  Hemoglobin A1 C 6.6.     UTI.  On Rocephin antibiotics.  Blood culture- negative for 5 days.     Elevated liver enzyme.  Resolved.     Constipation.  Rebecca-Colace.  Dulcolax suppository as needed.     Vital signs stable, afebrile.  Patient was transferred to nursing home for rehab via ambulance.  Follow-up with nursing home physician as soon as able.  Follow-up with Dr. Saleem in 2 to 3 weeks.      Condition on Discharge: stable    Physical Exam on Discharge:  /83 (BP Location: Right arm, Patient Position: Lying)   Pulse 51   Temp 98.3 °F (36.8 °C) (Oral)   Resp 16   Ht 180.3 cm (70.98\")   Wt 107 kg (236 lb 6.4 oz)   SpO2 100%   BMI 32.99 kg/m²    Physical Exam  Constitutional: He appears well-developed.   HENT:   Head: Normocephalic.   Eyes: Pupils are equal, round, and reactive to light. Conjunctivae are normal.   Neck: Neck supple. No JVD present.   Cardiovascular: Normal rate, regular rhythm, normal heart sounds and intact distal pulses. Exam reveals no gallop and no friction rub.   No murmur heard.  Pulmonary/Chest: No respiratory distress. He has no wheezes. He has no rales. He exhibits no tenderness.   Diminished breath sound bilateral, clear.   Abdominal: He exhibits no distension. There is tenderness. There is guarding. There is no rebound.   Right upper quadrant discomfort/NAM drainage tubes in place..   Musculoskeletal: He exhibits edema. He exhibits no tenderness or deformity.   Neurological: He " is alert. He displays normal reflexes. No cranial nerve deficit. He exhibits abnormal muscle tone. Coordination abnormal.   Skin: Skin is warm and dry. Capillary refill takes 2 to 3 seconds. No rash noted.   Psychiatric: He has a normal mood and affect. His behavior is normal.   Nursing note and vitals reviewed.    Discharge Disposition: Discharged to nursing home via ambulance.  Skilled Nursing Facility ([facility] - External)    Discharge Medications:     Discharge Medications      New Medications      Instructions Start Date   bisacodyl 10 MG suppository  Commonly known as:  DULCOLAX   10 mg, Rectal, Daily PRN      cefdinir 300 MG capsule  Commonly known as:  OMNICEF   300 mg, Oral, 2 Times Daily      enoxaparin 120 MG/0.8ML solution syringe  Commonly known as:  LOVENOX   1 mg/kg (110 mg), Subcutaneous, Every 12 Hours      famotidine 20 MG tablet  Commonly known as:  PEPCID   20 mg, Oral, 2 Times Daily      Glycerin-Hypromellose- 0.2-0.2-1 % solution ophthalmic solution  Commonly known as:  ARTIFICIAL TEARS   1 drop, Both Eyes, Every 1 Hour PRN      insulin lispro 100 UNIT/ML injection  Commonly known as:  humaLOG   2-7 Units, Subcutaneous, 4 Times Daily With Meals & Nightly      ondansetron ODT 4 MG disintegrating tablet  Commonly known as:  Zofran ODT   4 mg, Translingual, Every 8 Hours PRN      oxyCODONE-acetaminophen 5-325 MG per tablet  Commonly known as:  PERCOCET   1 tablet, Oral, Every 4 Hours PRN      polyethylene glycol pack packet  Commonly known as:  MIRALAX   17 g, Oral, Daily   Start Date:  April 14, 2020     sennosides-docusate 8.6-50 MG per tablet  Commonly known as:  PERICOLACE   1 tablet, Oral, Daily      sucralfate 1 GM/10ML suspension  Commonly known as:  CARAFATE   1 g, Oral, Every 6 Hours Scheduled      Vitamin D 50 MCG (2000 UT) tablet  Replaces:  vitamin D3 125 MCG (5000 UT) capsule capsule   1,000 Units, Oral, Daily      Vitamin D3 50 MCG (2000 UT) capsule   2,000 Units, Oral, Daily          Changes to Medications      Instructions Start Date   bisoprolol 10 MG tablet  Commonly known as:  ZEBeta  What changed:  how much to take   5 mg, Oral, Daily         Continue These Medications      Instructions Start Date   aspirin 81 MG chewable tablet   81 mg, Oral, Daily      losartan 100 MG tablet  Commonly known as:  COZAAR   50 mg, Oral, Daily      vitamin B-12 1000 MCG tablet  Commonly known as:  CYANOCOBALAMIN   1,000 mcg, Oral, Daily         Stop These Medications    multivitamin with minerals tablet tablet     vitamin D3 125 MCG (5000 UT) capsule capsule  Replaced by:  Vitamin D 50 MCG (2000 UT) tablet     warfarin 1 MG tablet  Commonly known as:  COUMADIN            Discharge Diet:   Diet Instructions     Advance Diet As Tolerated            Activity at Discharge:   Activity Instructions     Activity as Tolerated            Discharge Care Plan/Instructions: Patient be going to nursing home via ambulance.    Follow-up Appointments:   Follow with Dr. Saleem in 2 to 3 weeks.  Follow with nursing home physician as soon as able.    Jefry Squires MD  04/13/20  10:48    Time: Greater than 30 minutes.        Electronically signed by Jefry Squires MD at 04/13/20 1048       Discharge Order (From admission, onward)     Start     Ordered    04/13/20 1015  Discharge patient  Once     Expected Discharge Date:  04/13/20    Discharge Disposition:  Skilled Nursing Facility ([facility] - External)    Physician of Record for Attribution - Please select from Treatment Team:  JEFRY SQUIRES [[id number]]    Review needed by CMO to determine Physician of Record:  No       Question Answer Comment   Physician of Record for Attribution - Please select from Treatment Team JEFRY SQUIRES    Review needed by CMO to determine Physician of Record No        04/13/20 1025

## 2020-04-13 NOTE — DISCHARGE SUMMARY
[facility] Medicine Services  DISCHARGE SUMMARY       Date of Admission: 4/3/2020  Date of Discharge:  4/13/2020  Primary Care Physician: Matheus Olivera PA    Presenting Problem/History of Present Illness:  Acute UTI (urinary tract infection) [N39.0]     Final Discharge Diagnoses:  Active Hospital Problems    Diagnosis   • **Bacteremia   • History of prosthetic aortic valve   • Abnormal LFTs   • Acute cholecystitis   • Benign essential HTN   • CKD (chronic kidney disease) stage 3, GFR 30-59 ml/min (CMS/[facility])   • Acute UTI (urinary tract infection)       Consults: General surgery.    Pertinent Test Results:  Impression: Ultrasound the kidneys  No evidence of hydronephrosis or renal atrophy.    IMPRESSION: CT scan abdomen pelvic.  1. Findings supporting acute cholecystitis as described above. No  biliary dilatation identified. Trace perihepatic and pelvic ascites.  Wall thickening of the hepatic flexure of the colon may be reactive  given the inflammation of the right upper quadrant.  2. No bowel obstruction. Moderately distended fluid-filled stomach. No  bowel dilatation. Sigmoid colonic diverticulosis.  3. Diffuse vascular calcification. Prior mediastinal surgery with  prosthetic coronary valves. Cardiac pacer device.    Impression: Chest x-ray  1.  Borderline size of the cardiac mediastinal silhouette. No convincing  evidence of fluid overload.  2.  Lower lung volumes.    Interpretation Summary echocardiogram.    · Left ventricular systolic function is normal. Estimated EF appears to be in the range of 56 - 60%.  · Left ventricular wall thickness is consistent with moderate concentric hypertrophy.  · There is a prosthetic aortic valve present. The aortic valve peak and mean gradients are within normal limits.  · Mild tricuspid valve regurgitation is present. Estimated right ventricular systolic pressure from tricuspid regurgitation is normal (<35 mmHg).       Chief  Complaint on Day of Discharge: none    History of Present Illness on Day of Discharge: *  I am asked to admit the patient for acute urinary tract infection, systemic inflammatory response syndrome, acute kidney injury and transaminitis.     Patient claims to have some urinary frequency as well as change in his other.  Patient has been having chills, nausea and vomiting.  He denies any coughing spell.      On evaluation he has leukocytosis with bandemia; creatinine is elevated at 1.57.  He has moderate amount of blood in his urine although been found only with 3-5 per high-power field of RBC.  His procalcitonin is elevated.  He has positive nitrite and trace leukocyte esterase as well as trace bacteriuria. chest x-ray showed no consolidation  Blood gas showed normal pH at 7.46, PCO2 35 and PO2 of 81 taken on room air  Respiratory panel PCR is negative  proBNP is elevated at 4334.  He has a pacemaker in situ and noted to have prior valvuloplasty.  Transaminases are slightly elevated at AST and ALT are 61 and 93 respectively.  We do not have any echocardiogram of record but review of record dating back in 2016 at [facility], he has been found with aortic bioprosthetic valve and normal EF of 55 to 60%.  He had grade 2 diastolic dysfunction.     His main complaint is dry heave culminating to vomiting since Monday.  He complains of abdominal discomfort as he points to his right upper quadrant that goes down to his hypogastric area.  He said that he has no control over his bladder habits and has decreased urinary stream but denies any dysuria.     He also mentioned to have paroxysmal nocturnal dyspnea.  He has been swollen for the past 3 years.  He claimed to have increasing weight.  Positive shortness of breath but denies any fever or chills.  He denies any exposure to someone who is sick.  He denies coughing spells     He claimed to have fallen yesterday and must have hit his upper abdomen.  He could not tell me  "further details pertaining to this    Hospital Course:  The patient is a 84 y.o. male who presented to [facility] with cholecystitis/chronic renal failure/prosthetic aortic valve.      Cholecystitis/abdomen pain.  Consult general surgery.  Status post cholecystectomy tube placement day 6.   Patient had Zosyn 4/4 to 4/7.  Patient is currently on Rocephin, day 9 of antibiotics today.    General surgery- plan for cholecystectomy in 4 to 6 weeks.  Follow with Dr. Saleem 2 to 3 weeks.     CAD/history of prosthetic aortic valve/hypertension.  Continue aspirin.  Patient to continue Lovenox therapeutic.     Pain control.  Dilaudid as needed.  Percocet PRN.  Patient go to nursing with Percocet as needed.     Nausea/vomiting.  Pepcid.  Zofran as needed.  Carafate.     Chronic kidney disease stage III.     Diabetes.  Sliding scale.  Hemoglobin A1 C 6.6.     UTI.  On Rocephin antibiotics.  Blood culture- negative for 5 days.     Elevated liver enzyme.  Resolved.     Constipation.  Rebecca-Colace.  Dulcolax suppository as needed.     Vital signs stable, afebrile.  Patient was transferred to nursing home for rehab via ambulance.  Follow-up with nursing home physician as soon as able.  Follow-up with Dr. Saleem in 2 to 3 weeks.      Condition on Discharge: stable    Physical Exam on Discharge:  /83 (BP Location: Right arm, Patient Position: Lying)   Pulse 51   Temp 98.3 °F (36.8 °C) (Oral)   Resp 16   Ht 180.3 cm (70.98\")   Wt 107 kg (236 lb 6.4 oz)   SpO2 100%   BMI 32.99 kg/m²   Physical Exam  Constitutional: He appears well-developed.   HENT:   Head: Normocephalic.   Eyes: Pupils are equal, round, and reactive to light. Conjunctivae are normal.   Neck: Neck supple. No JVD present.   Cardiovascular: Normal rate, regular rhythm, normal heart sounds and intact distal pulses. Exam reveals no gallop and no friction rub.   No murmur heard.  Pulmonary/Chest: No respiratory distress. He has no wheezes. He has no " rales. He exhibits no tenderness.   Diminished breath sound bilateral, clear.   Abdominal: He exhibits no distension. There is tenderness. There is guarding. There is no rebound.   Right upper quadrant discomfort/NAM drainage tubes in place..   Musculoskeletal: He exhibits edema. He exhibits no tenderness or deformity.   Neurological: He is alert. He displays normal reflexes. No cranial nerve deficit. He exhibits abnormal muscle tone. Coordination abnormal.   Skin: Skin is warm and dry. Capillary refill takes 2 to 3 seconds. No rash noted.   Psychiatric: He has a normal mood and affect. His behavior is normal.   Nursing note and vitals reviewed.    Discharge Disposition: Discharged to nursing home via ambulance.  Skilled Nursing Facility ([facility] - External)    Discharge Medications:     Discharge Medications      New Medications      Instructions Start Date   bisacodyl 10 MG suppository  Commonly known as:  DULCOLAX   10 mg, Rectal, Daily PRN      cefdinir 300 MG capsule  Commonly known as:  OMNICEF   300 mg, Oral, 2 Times Daily      enoxaparin 120 MG/0.8ML solution syringe  Commonly known as:  LOVENOX   1 mg/kg (110 mg), Subcutaneous, Every 12 Hours      famotidine 20 MG tablet  Commonly known as:  PEPCID   20 mg, Oral, 2 Times Daily      Glycerin-Hypromellose- 0.2-0.2-1 % solution ophthalmic solution  Commonly known as:  ARTIFICIAL TEARS   1 drop, Both Eyes, Every 1 Hour PRN      insulin lispro 100 UNIT/ML injection  Commonly known as:  humaLOG   2-7 Units, Subcutaneous, 4 Times Daily With Meals & Nightly      ondansetron ODT 4 MG disintegrating tablet  Commonly known as:  Zofran ODT   4 mg, Translingual, Every 8 Hours PRN      oxyCODONE-acetaminophen 5-325 MG per tablet  Commonly known as:  PERCOCET   1 tablet, Oral, Every 4 Hours PRN      polyethylene glycol pack packet  Commonly known as:  MIRALAX   17 g, Oral, Daily   Start Date:  April 14, 2020     sennosides-docusate 8.6-50 MG per tablet  Commonly known  as:  PERICOLACE   1 tablet, Oral, Daily      sucralfate 1 GM/10ML suspension  Commonly known as:  CARAFATE   1 g, Oral, Every 6 Hours Scheduled      Vitamin D 50 MCG (2000 UT) tablet  Replaces:  vitamin D3 125 MCG (5000 UT) capsule capsule   1,000 Units, Oral, Daily      Vitamin D3 50 MCG (2000 UT) capsule   2,000 Units, Oral, Daily         Changes to Medications      Instructions Start Date   bisoprolol 10 MG tablet  Commonly known as:  ZEBeta  What changed:  how much to take   5 mg, Oral, Daily         Continue These Medications      Instructions Start Date   aspirin 81 MG chewable tablet   81 mg, Oral, Daily      losartan 100 MG tablet  Commonly known as:  COZAAR   50 mg, Oral, Daily      vitamin B-12 1000 MCG tablet  Commonly known as:  CYANOCOBALAMIN   1,000 mcg, Oral, Daily         Stop These Medications    multivitamin with minerals tablet tablet     vitamin D3 125 MCG (5000 UT) capsule capsule  Replaced by:  Vitamin D 50 MCG (2000 UT) tablet     warfarin 1 MG tablet  Commonly known as:  COUMADIN            Discharge Diet:   Diet Instructions     Advance Diet As Tolerated            Activity at Discharge:   Activity Instructions     Activity as Tolerated            Discharge Care Plan/Instructions: Patient be going to nursing home via ambulance.    Follow-up Appointments:   Follow with Dr. Saleem in 2 to 3 weeks.  Follow with nursing home physician as soon as able.    Mamadou Dodd MD  04/13/20  10:48    Time: Greater than 30 minutes.

## 2020-04-13 NOTE — THERAPY DISCHARGE NOTE
Acute Care - Occupational Therapy Discharge Summary  [facility]     Patient Name: Jesus Smith  : 1935  MRN: [id number]    Today's Date: 2020  Onset of Illness/Injury or Date of Surgery: 20    Date of Referral to OT: 20  Referring Physician: Dr. Dodd      Admit Date: 4/3/2020        OT Recommendation and Plan    Visit Dx:    ICD-10-CM ICD-9-CM   1. Acute UTI (urinary tract infection) N39.0 599.0   2. Systemic inflammatory response syndrome (CMS/Formerly [facility]) R65.10 995.90   3. NOLAN (acute kidney injury) (CMS/Formerly [facility]) N17.9 584.9   4. Transaminitis R74.0 790.4   5. Acute cholecystitis K81.0 575.0   6. Impaired functional mobility and activity tolerance Z74.09 V49.89   7. Decreased activities of daily living (ADL) Z78.9 V49.89   8. Bacteremia R78.81 790.7   9. Abnormal LFTs R94.5 790.6   10. Benign essential HTN I10 401.1   11. CKD (chronic kidney disease) stage 3, GFR 30-59 ml/min (CMS/Formerly [facility]) N18.3 585.3   12. History of prosthetic aortic valve Z95.2 V43.3         Time Calculation- OT     Row Name 20 0930             Time Calculation- OT    OT Start Time  0930  -      OT Stop Time  1010  -      OT Time Calculation (min)  40 min  -      Total Timed Code Minutes- OT  40 minute(s)  -      TCU Minutes- OT  40 min  -      OT Received On  20  -      OT Goal Re-Cert Due Date  20  -         Timed Charges    20396 - OT Self Care/Mgmt Minutes  40  -        User Key  (r) = Recorded By, (t) = Taken By, (c) = Cosigned By    Initials Name Provider Type    Cesar Joseph COTA/L Occupational Therapy Assistant            Rehab Goal Summary     Row Name 20 1500             Transfer Goal 1 (OT)    Activity/Assistive Device (Transfer Goal 1, OT)  sit-to-stand/stand-to-sit;bed-to-chair/chair-to-bed;toilet;walker, rolling  -AC      [place] Level/Cues Needed (Transfer Goal 1, OT)  conditional independence  -AC      Time Frame (Transfer Goal 1, OT)  long term goal (LTG);10  days  -AC      Progress/Outcome (Transfer Goal 1, OT)  goal not met  -AC         Bathing Goal 1 (OT)    Activity/Assistive Device (Bathing Goal 1, OT)  bathing skills, all;shower chair  -AC      [place] Level/Cues Needed (Bathing Goal 1, OT)  conditional independence  -AC      Time Frame (Bathing Goal 1, OT)  long term goal (LTG);10 days  -AC      Progress/Outcomes (Bathing Goal 1, OT)  goal not met  -AC         Dressing Goal 1 (OT)    Activity/Assistive Device (Dressing Goal 1, OT)  dressing skills, all  -AC      [place]/Cues Needed (Dressing Goal 1, OT)  conditional independence  -AC      Time Frame (Dressing Goal 1, OT)  long term goal (LTG);10 days  -AC      Progress/Outcome (Dressing Goal 1, OT)  goal not met  -AC        User Key  (r) = Recorded By, (t) = Taken By, (c) = Cosigned By    Initials Name Provider Type Discipline    AC Roc Jameson, OTR/L, CNT Occupational Therapist OT          Outcome Measures     Row Name 04/13/20 0930 04/12/20 1502 04/12/20 0955       How much help from another is currently needed...    Putting on and taking off regular lower body clothing?  2  -CJ  2  -CJ  2  -AC    Bathing (including washing, rinsing, and drying)  3  -CJ  2  -CJ  2  -AC    Toileting (which includes using toilet bed pan or urinal)  4  -CJ  4  -CJ  4  -AC    Putting on and taking off regular upper body clothing  4  -CJ  4  -CJ  4  -AC    Taking care of personal grooming (such as brushing teeth)  4  -CJ  4  -CJ  4  -AC    Eating meals  4  -CJ  4  -CJ  4  -AC    AM-PAC 6 Clicks Score (OT)  21  -CJ  20  -CJ  20  -AC       Functional Assessment    Outcome Measure Options  AM-PAC 6 Clicks Daily Activity (OT)  -CJ  AM-PAC 6 Clicks Daily Activity (OT)  -CJ  AM-PAC 6 Clicks Daily Activity (OT)  -AC    Row Name 04/11/20 1018             How much help from another person do you currently need...    Turning from your back to your side while in flat bed without using bedrails?  3  -EDUARD      Moving from lying on  back to sitting on the side of a flat bed without bedrails?  3  -EDUARD      Moving to and from a bed to a chair (including a wheelchair)?  3  -EDUARD      Standing up from a chair using your arms (e.g., wheelchair, bedside chair)?  3  -EDUARD      Climbing 3-5 steps with a railing?  3  -EDUARD      To walk in hospital room?  3  -EDUARD      AM-PAC 6 Clicks Score (PT)  18  -EDUARD         Functional Assessment    Outcome Measure Options  AM-PAC 6 Clicks Basic Mobility (PT)  -EDUARD        User Key  (r) = Recorded By, (t) = Taken By, (c) = Cosigned By    Initials Name Provider Type    AC Roc Jameson, OTR/L, CNT Occupational Therapist    CJ Cesar Dupree, VICTORIA/L Occupational Therapy Assistant    EDUARD Randy Edmond, PTA Physical Therapy Assistant          Therapy Suggested Charges     Code   Minutes Charges    50049 (CPT®) Hc Ot Neuromusc Re Education Ea 15 Min      20220 (CPT®) Hc Ot Ther Proc Ea 15 Min      56614 (CPT®) Hc Ot Therapeutic Act Ea 15 Min      44629 (CPT®) Hc Ot Manual Therapy Ea 15 Min      53167 (CPT®) Hc Ot Iontophoresis Ea 15 Min      22252 (CPT®) Hc Ot Elec Stim Ea-Per 15 Min      66179 (CPT®) Hc Ot Ultrasound Ea 15 Min      98781 (CPT®) Hc Ot Self Care/Mgmt/Train Ea 15 Min 40 3    Total  40 3          Therapy Charges for Today     Code Description Service Date Service Provider Modifiers Qty    21560985933 HC OT EVAL LOW COMPLEXITY 3 4/12/2020 Roc Jameson OTR/L, CNT GO 1          OT Discharge Summary  Anticipated Discharge Disposition (OT): skilled nursing facility  Reason for Discharge: Discharge from facility  Outcomes Achieved: Refer to plan of care for updates on goals achieved  Discharge Destination: SNF      SHADY Rose/L, NICOLE  4/13/2020

## 2020-04-14 ENCOUNTER — HOSPITAL ENCOUNTER (OUTPATIENT)
Facility: HOSPITAL | Age: 85
Setting detail: OBSERVATION
Discharge: NURSING, REHAB OR LONG-TERM CARE | End: 2020-04-20
Attending: INTERNAL MEDICINE | Admitting: FAMILY MEDICINE

## 2020-04-14 ENCOUNTER — APPOINTMENT (OUTPATIENT)
Dept: CT IMAGING | Facility: HOSPITAL | Age: 85
End: 2020-04-14

## 2020-04-14 DIAGNOSIS — N17.9 AKI (ACUTE KIDNEY INJURY) (HCC): ICD-10-CM

## 2020-04-14 DIAGNOSIS — R79.89 ABNORMAL LFTS: ICD-10-CM

## 2020-04-14 DIAGNOSIS — R74.01 TRANSAMINITIS: ICD-10-CM

## 2020-04-14 DIAGNOSIS — R65.10 SYSTEMIC INFLAMMATORY RESPONSE SYNDROME (HCC): ICD-10-CM

## 2020-04-14 DIAGNOSIS — K81.9 CHOLECYSTITIS: ICD-10-CM

## 2020-04-14 DIAGNOSIS — N39.0 ACUTE UTI (URINARY TRACT INFECTION): ICD-10-CM

## 2020-04-14 DIAGNOSIS — Z74.09 IMPAIRED MOBILITY: ICD-10-CM

## 2020-04-14 DIAGNOSIS — I10 BENIGN ESSENTIAL HTN: ICD-10-CM

## 2020-04-14 DIAGNOSIS — N18.30 CKD (CHRONIC KIDNEY DISEASE) STAGE 3, GFR 30-59 ML/MIN (HCC): ICD-10-CM

## 2020-04-14 DIAGNOSIS — K81.0 ACUTE CHOLECYSTITIS: ICD-10-CM

## 2020-04-14 DIAGNOSIS — Z78.9 DECREASED ACTIVITIES OF DAILY LIVING (ADL): ICD-10-CM

## 2020-04-14 DIAGNOSIS — S70.12XA HEMATOMA OF LEFT ILIOPSOAS MUSCLE, INITIAL ENCOUNTER: Primary | ICD-10-CM

## 2020-04-14 DIAGNOSIS — Z74.09 IMPAIRED FUNCTIONAL MOBILITY AND ACTIVITY TOLERANCE: ICD-10-CM

## 2020-04-14 DIAGNOSIS — R78.81 BACTEREMIA: ICD-10-CM

## 2020-04-14 DIAGNOSIS — Z95.2 HISTORY OF PROSTHETIC AORTIC VALVE: ICD-10-CM

## 2020-04-14 LAB
ALBUMIN SERPL-MCNC: 3.2 G/DL (ref 3.5–5.2)
ALBUMIN/GLOB SERPL: 1 G/DL
ALP SERPL-CCNC: 126 U/L (ref 39–117)
ALT SERPL W P-5'-P-CCNC: 39 U/L (ref 1–41)
AMYLASE SERPL-CCNC: 56 U/L (ref 28–100)
ANION GAP SERPL CALCULATED.3IONS-SCNC: 10 MMOL/L (ref 5–15)
APTT PPP: 47.2 SECONDS (ref 24.1–35)
AST SERPL-CCNC: 48 U/L (ref 1–40)
BASOPHILS # BLD AUTO: 0.05 10*3/MM3 (ref 0–0.2)
BASOPHILS NFR BLD AUTO: 0.3 % (ref 0–1.5)
BILIRUB SERPL-MCNC: 1.1 MG/DL (ref 0.2–1.2)
BUN BLD-MCNC: 18 MG/DL (ref 8–23)
BUN/CREAT SERPL: 23.1 (ref 7–25)
CALCIUM SPEC-SCNC: 8.5 MG/DL (ref 8.6–10.5)
CHLORIDE SERPL-SCNC: 94 MMOL/L (ref 98–107)
CO2 SERPL-SCNC: 27 MMOL/L (ref 22–29)
CREAT BLD-MCNC: 0.78 MG/DL (ref 0.76–1.27)
D-LACTATE SERPL-SCNC: 1.5 MMOL/L (ref 0.5–2)
DEPRECATED RDW RBC AUTO: 49.4 FL (ref 37–54)
EOSINOPHIL # BLD AUTO: 0.02 10*3/MM3 (ref 0–0.4)
EOSINOPHIL NFR BLD AUTO: 0.1 % (ref 0.3–6.2)
ERYTHROCYTE [DISTWIDTH] IN BLOOD BY AUTOMATED COUNT: 14.6 % (ref 12.3–15.4)
GFR SERPL CREATININE-BSD FRML MDRD: 95 ML/MIN/1.73
GLOBULIN UR ELPH-MCNC: 3.2 GM/DL
GLUCOSE BLD-MCNC: 130 MG/DL (ref 65–99)
HCT VFR BLD AUTO: 31.2 % (ref 37.5–51)
HGB BLD-MCNC: 10 G/DL (ref 13–17.7)
HOLD SPECIMEN: NORMAL
HOLD SPECIMEN: NORMAL
IMM GRANULOCYTES # BLD AUTO: 0.14 10*3/MM3 (ref 0–0.05)
IMM GRANULOCYTES NFR BLD AUTO: 0.9 % (ref 0–0.5)
INR PPP: 1.18 (ref 0.91–1.09)
LIPASE SERPL-CCNC: 28 U/L (ref 13–60)
LYMPHOCYTES # BLD AUTO: 1.43 10*3/MM3 (ref 0.7–3.1)
LYMPHOCYTES NFR BLD AUTO: 8.9 % (ref 19.6–45.3)
MCH RBC QN AUTO: 29.7 PG (ref 26.6–33)
MCHC RBC AUTO-ENTMCNC: 32.1 G/DL (ref 31.5–35.7)
MCV RBC AUTO: 92.6 FL (ref 79–97)
MONOCYTES # BLD AUTO: 1.76 10*3/MM3 (ref 0.1–0.9)
MONOCYTES NFR BLD AUTO: 11 % (ref 5–12)
NEUTROPHILS # BLD AUTO: 12.67 10*3/MM3 (ref 1.7–7)
NEUTROPHILS NFR BLD AUTO: 78.8 % (ref 42.7–76)
NRBC BLD AUTO-RTO: 0 /100 WBC (ref 0–0.2)
PLATELET # BLD AUTO: 248 10*3/MM3 (ref 140–450)
PMV BLD AUTO: 10.2 FL (ref 6–12)
POTASSIUM BLD-SCNC: 4.6 MMOL/L (ref 3.5–5.2)
PROT SERPL-MCNC: 6.4 G/DL (ref 6–8.5)
PROTHROMBIN TIME: 14.9 SECONDS (ref 11.9–14.6)
RBC # BLD AUTO: 3.37 10*6/MM3 (ref 4.14–5.8)
SODIUM BLD-SCNC: 131 MMOL/L (ref 136–145)
TROPONIN T SERPL-MCNC: 0.03 NG/ML (ref 0–0.03)
WBC NRBC COR # BLD: 16.07 10*3/MM3 (ref 3.4–10.8)
WHOLE BLOOD HOLD SPECIMEN: NORMAL
WHOLE BLOOD HOLD SPECIMEN: NORMAL

## 2020-04-14 PROCEDURE — 93010 ELECTROCARDIOGRAM REPORT: CPT | Performed by: INTERNAL MEDICINE

## 2020-04-14 PROCEDURE — 86900 BLOOD TYPING SEROLOGIC ABO: CPT | Performed by: EMERGENCY MEDICINE

## 2020-04-14 PROCEDURE — 25010000002 IOPAMIDOL 61 % SOLUTION: Performed by: NURSE PRACTITIONER

## 2020-04-14 PROCEDURE — 84484 ASSAY OF TROPONIN QUANT: CPT | Performed by: NURSE PRACTITIONER

## 2020-04-14 PROCEDURE — G0378 HOSPITAL OBSERVATION PER HR: HCPCS

## 2020-04-14 PROCEDURE — 85610 PROTHROMBIN TIME: CPT | Performed by: EMERGENCY MEDICINE

## 2020-04-14 PROCEDURE — 25010000002 PIPERACILLIN SOD-TAZOBACTAM PER 1 G: Performed by: EMERGENCY MEDICINE

## 2020-04-14 PROCEDURE — 96375 TX/PRO/DX INJ NEW DRUG ADDON: CPT

## 2020-04-14 PROCEDURE — 83690 ASSAY OF LIPASE: CPT | Performed by: NURSE PRACTITIONER

## 2020-04-14 PROCEDURE — 82150 ASSAY OF AMYLASE: CPT | Performed by: NURSE PRACTITIONER

## 2020-04-14 PROCEDURE — 87040 BLOOD CULTURE FOR BACTERIA: CPT | Performed by: EMERGENCY MEDICINE

## 2020-04-14 PROCEDURE — 86850 RBC ANTIBODY SCREEN: CPT | Performed by: EMERGENCY MEDICINE

## 2020-04-14 PROCEDURE — 86901 BLOOD TYPING SEROLOGIC RH(D): CPT | Performed by: EMERGENCY MEDICINE

## 2020-04-14 PROCEDURE — 80053 COMPREHEN METABOLIC PANEL: CPT | Performed by: NURSE PRACTITIONER

## 2020-04-14 PROCEDURE — 83605 ASSAY OF LACTIC ACID: CPT | Performed by: EMERGENCY MEDICINE

## 2020-04-14 PROCEDURE — 99284 EMERGENCY DEPT VISIT MOD MDM: CPT

## 2020-04-14 PROCEDURE — 25010000002 HYDROMORPHONE PER 4 MG: Performed by: NURSE PRACTITIONER

## 2020-04-14 PROCEDURE — 74177 CT ABD & PELVIS W/CONTRAST: CPT

## 2020-04-14 PROCEDURE — 85025 COMPLETE CBC W/AUTO DIFF WBC: CPT | Performed by: NURSE PRACTITIONER

## 2020-04-14 PROCEDURE — 85730 THROMBOPLASTIN TIME PARTIAL: CPT | Performed by: EMERGENCY MEDICINE

## 2020-04-14 PROCEDURE — 25010000002 ONDANSETRON PER 1 MG: Performed by: NURSE PRACTITIONER

## 2020-04-14 PROCEDURE — 93005 ELECTROCARDIOGRAM TRACING: CPT | Performed by: NURSE PRACTITIONER

## 2020-04-14 PROCEDURE — 96376 TX/PRO/DX INJ SAME DRUG ADON: CPT

## 2020-04-14 RX ORDER — ONDANSETRON 4 MG/1
4 TABLET, FILM COATED ORAL EVERY 6 HOURS PRN
Status: DISCONTINUED | OUTPATIENT
Start: 2020-04-14 | End: 2020-04-20 | Stop reason: HOSPADM

## 2020-04-14 RX ORDER — HYDROMORPHONE HYDROCHLORIDE 1 MG/ML
0.5 INJECTION, SOLUTION INTRAMUSCULAR; INTRAVENOUS; SUBCUTANEOUS ONCE
Status: COMPLETED | OUTPATIENT
Start: 2020-04-14 | End: 2020-04-14

## 2020-04-14 RX ORDER — ONDANSETRON 2 MG/ML
4 INJECTION INTRAMUSCULAR; INTRAVENOUS ONCE
Status: COMPLETED | OUTPATIENT
Start: 2020-04-14 | End: 2020-04-14

## 2020-04-14 RX ORDER — FAMOTIDINE 10 MG/ML
20 INJECTION, SOLUTION INTRAVENOUS ONCE
Status: COMPLETED | OUTPATIENT
Start: 2020-04-14 | End: 2020-04-14

## 2020-04-14 RX ORDER — NICOTINE POLACRILEX 4 MG
15 LOZENGE BUCCAL
Status: DISCONTINUED | OUTPATIENT
Start: 2020-04-14 | End: 2020-04-20 | Stop reason: HOSPADM

## 2020-04-14 RX ORDER — ONDANSETRON 2 MG/ML
4 INJECTION INTRAMUSCULAR; INTRAVENOUS EVERY 6 HOURS PRN
Status: DISCONTINUED | OUTPATIENT
Start: 2020-04-14 | End: 2020-04-20 | Stop reason: HOSPADM

## 2020-04-14 RX ORDER — SODIUM CHLORIDE 0.9 % (FLUSH) 0.9 %
10 SYRINGE (ML) INJECTION EVERY 12 HOURS SCHEDULED
Status: DISCONTINUED | OUTPATIENT
Start: 2020-04-15 | End: 2020-04-20 | Stop reason: HOSPADM

## 2020-04-14 RX ORDER — DEXTROSE MONOHYDRATE 25 G/50ML
25 INJECTION, SOLUTION INTRAVENOUS
Status: DISCONTINUED | OUTPATIENT
Start: 2020-04-14 | End: 2020-04-20 | Stop reason: HOSPADM

## 2020-04-14 RX ORDER — SODIUM CHLORIDE 0.9 % (FLUSH) 0.9 %
10 SYRINGE (ML) INJECTION AS NEEDED
Status: DISCONTINUED | OUTPATIENT
Start: 2020-04-14 | End: 2020-04-20 | Stop reason: HOSPADM

## 2020-04-14 RX ADMIN — SODIUM CHLORIDE 500 ML: 9 INJECTION, SOLUTION INTRAVENOUS at 20:09

## 2020-04-14 RX ADMIN — ONDANSETRON HYDROCHLORIDE 4 MG: 2 SOLUTION INTRAMUSCULAR; INTRAVENOUS at 20:10

## 2020-04-14 RX ADMIN — ONDANSETRON HYDROCHLORIDE 4 MG: 2 SOLUTION INTRAMUSCULAR; INTRAVENOUS at 21:39

## 2020-04-14 RX ADMIN — PIPERACILLIN AND TAZOBACTAM 3.38 G: 3; .375 INJECTION, POWDER, FOR SOLUTION INTRAVENOUS at 22:59

## 2020-04-14 RX ADMIN — FAMOTIDINE 20 MG: 10 INJECTION INTRAVENOUS at 20:11

## 2020-04-14 RX ADMIN — HYDROMORPHONE HYDROCHLORIDE 0.5 MG: 1 INJECTION, SOLUTION INTRAMUSCULAR; INTRAVENOUS; SUBCUTANEOUS at 21:39

## 2020-04-14 RX ADMIN — IOPAMIDOL 100 ML: 612 INJECTION, SOLUTION INTRAVENOUS at 22:04

## 2020-04-15 PROBLEM — E87.1 HYPONATREMIA: Status: ACTIVE | Noted: 2020-04-15

## 2020-04-15 PROBLEM — R74.01 TRANSAMINITIS: Status: ACTIVE | Noted: 2020-04-04

## 2020-04-15 PROBLEM — Z79.01 CHRONIC ANTICOAGULATION: Status: ACTIVE | Noted: 2020-04-15

## 2020-04-15 LAB
ABO GROUP BLD: NORMAL
ALBUMIN SERPL-MCNC: 2.7 G/DL (ref 3.5–5.2)
ALBUMIN/GLOB SERPL: 0.8 G/DL
ALP SERPL-CCNC: 101 U/L (ref 39–117)
ALT SERPL W P-5'-P-CCNC: 37 U/L (ref 1–41)
ANION GAP SERPL CALCULATED.3IONS-SCNC: 11 MMOL/L (ref 5–15)
AST SERPL-CCNC: 55 U/L (ref 1–40)
BASOPHILS # BLD AUTO: 0.07 10*3/MM3 (ref 0–0.2)
BASOPHILS NFR BLD AUTO: 0.5 % (ref 0–1.5)
BILIRUB SERPL-MCNC: 0.8 MG/DL (ref 0.2–1.2)
BLD GP AB SCN SERPL QL: NEGATIVE
BUN BLD-MCNC: 18 MG/DL (ref 8–23)
BUN/CREAT SERPL: 22 (ref 7–25)
CALCIUM SPEC-SCNC: 7.9 MG/DL (ref 8.6–10.5)
CHLORIDE SERPL-SCNC: 97 MMOL/L (ref 98–107)
CO2 SERPL-SCNC: 25 MMOL/L (ref 22–29)
CREAT BLD-MCNC: 0.82 MG/DL (ref 0.76–1.27)
DEPRECATED RDW RBC AUTO: 49.2 FL (ref 37–54)
EOSINOPHIL # BLD AUTO: 0.06 10*3/MM3 (ref 0–0.4)
EOSINOPHIL NFR BLD AUTO: 0.4 % (ref 0.3–6.2)
ERYTHROCYTE [DISTWIDTH] IN BLOOD BY AUTOMATED COUNT: 14.6 % (ref 12.3–15.4)
GFR SERPL CREATININE-BSD FRML MDRD: 90 ML/MIN/1.73
GLOBULIN UR ELPH-MCNC: 3.2 GM/DL
GLUCOSE BLD-MCNC: 128 MG/DL (ref 65–99)
GLUCOSE BLDC GLUCOMTR-MCNC: 124 MG/DL (ref 70–130)
GLUCOSE BLDC GLUCOMTR-MCNC: 131 MG/DL (ref 70–130)
GLUCOSE BLDC GLUCOMTR-MCNC: 132 MG/DL (ref 70–130)
HCT VFR BLD AUTO: 27.2 % (ref 37.5–51)
HGB BLD-MCNC: 8.6 G/DL (ref 13–17.7)
IMM GRANULOCYTES # BLD AUTO: 0.11 10*3/MM3 (ref 0–0.05)
IMM GRANULOCYTES NFR BLD AUTO: 0.8 % (ref 0–0.5)
LYMPHOCYTES # BLD AUTO: 1.62 10*3/MM3 (ref 0.7–3.1)
LYMPHOCYTES NFR BLD AUTO: 11.8 % (ref 19.6–45.3)
MCH RBC QN AUTO: 29.8 PG (ref 26.6–33)
MCHC RBC AUTO-ENTMCNC: 31.6 G/DL (ref 31.5–35.7)
MCV RBC AUTO: 94.1 FL (ref 79–97)
MONOCYTES # BLD AUTO: 1.83 10*3/MM3 (ref 0.1–0.9)
MONOCYTES NFR BLD AUTO: 13.3 % (ref 5–12)
NEUTROPHILS # BLD AUTO: 10.05 10*3/MM3 (ref 1.7–7)
NEUTROPHILS NFR BLD AUTO: 73.2 % (ref 42.7–76)
NRBC BLD AUTO-RTO: 0 /100 WBC (ref 0–0.2)
PLATELET # BLD AUTO: 216 10*3/MM3 (ref 140–450)
PMV BLD AUTO: 10.3 FL (ref 6–12)
POTASSIUM BLD-SCNC: 4.9 MMOL/L (ref 3.5–5.2)
PROT SERPL-MCNC: 5.9 G/DL (ref 6–8.5)
RBC # BLD AUTO: 2.89 10*6/MM3 (ref 4.14–5.8)
RH BLD: POSITIVE
SODIUM BLD-SCNC: 133 MMOL/L (ref 136–145)
T&S EXPIRATION DATE: NORMAL
WBC NRBC COR # BLD: 13.74 10*3/MM3 (ref 3.4–10.8)

## 2020-04-15 PROCEDURE — 25010000002 ONDANSETRON PER 1 MG: Performed by: NURSE PRACTITIONER

## 2020-04-15 PROCEDURE — 25010000002 ENOXAPARIN PER 10 MG: Performed by: INTERNAL MEDICINE

## 2020-04-15 PROCEDURE — 80053 COMPREHEN METABOLIC PANEL: CPT | Performed by: NURSE PRACTITIONER

## 2020-04-15 PROCEDURE — 85025 COMPLETE CBC W/AUTO DIFF WBC: CPT | Performed by: NURSE PRACTITIONER

## 2020-04-15 PROCEDURE — 82962 GLUCOSE BLOOD TEST: CPT

## 2020-04-15 PROCEDURE — 94799 UNLISTED PULMONARY SVC/PX: CPT

## 2020-04-15 PROCEDURE — 96366 THER/PROPH/DIAG IV INF ADDON: CPT

## 2020-04-15 PROCEDURE — G0378 HOSPITAL OBSERVATION PER HR: HCPCS

## 2020-04-15 PROCEDURE — 96372 THER/PROPH/DIAG INJ SC/IM: CPT

## 2020-04-15 PROCEDURE — 25010000002 PIPERACILLIN SOD-TAZOBACTAM PER 1 G: Performed by: INTERNAL MEDICINE

## 2020-04-15 PROCEDURE — 96365 THER/PROPH/DIAG IV INF INIT: CPT

## 2020-04-15 PROCEDURE — 25010000002 VANCOMYCIN: Performed by: EMERGENCY MEDICINE

## 2020-04-15 PROCEDURE — 96376 TX/PRO/DX INJ SAME DRUG ADON: CPT

## 2020-04-15 RX ORDER — CYANOCOBALAMIN (VITAMIN B-12) 500 MCG
1000 TABLET ORAL DAILY
Status: DISCONTINUED | OUTPATIENT
Start: 2020-04-15 | End: 2020-04-20 | Stop reason: HOSPADM

## 2020-04-15 RX ORDER — ONDANSETRON 2 MG/ML
4 INJECTION INTRAMUSCULAR; INTRAVENOUS ONCE
Status: COMPLETED | OUTPATIENT
Start: 2020-04-15 | End: 2020-04-15

## 2020-04-15 RX ORDER — SUCRALFATE ORAL 1 G/10ML
1 SUSPENSION ORAL EVERY 6 HOURS SCHEDULED
Status: DISCONTINUED | OUTPATIENT
Start: 2020-04-15 | End: 2020-04-20 | Stop reason: HOSPADM

## 2020-04-15 RX ORDER — LOSARTAN POTASSIUM 50 MG/1
50 TABLET ORAL DAILY
Status: DISCONTINUED | OUTPATIENT
Start: 2020-04-15 | End: 2020-04-20 | Stop reason: HOSPADM

## 2020-04-15 RX ORDER — BISOPROLOL FUMARATE 5 MG/1
5 TABLET, FILM COATED ORAL DAILY
Status: DISCONTINUED | OUTPATIENT
Start: 2020-04-15 | End: 2020-04-15

## 2020-04-15 RX ORDER — ASPIRIN 81 MG/1
81 TABLET, CHEWABLE ORAL DAILY
Status: DISCONTINUED | OUTPATIENT
Start: 2020-04-15 | End: 2020-04-20 | Stop reason: HOSPADM

## 2020-04-15 RX ORDER — BISACODYL 10 MG
10 SUPPOSITORY, RECTAL RECTAL DAILY PRN
Status: DISCONTINUED | OUTPATIENT
Start: 2020-04-15 | End: 2020-04-20 | Stop reason: HOSPADM

## 2020-04-15 RX ORDER — FAMOTIDINE 20 MG/1
20 TABLET, FILM COATED ORAL 2 TIMES DAILY
Status: DISCONTINUED | OUTPATIENT
Start: 2020-04-15 | End: 2020-04-16

## 2020-04-15 RX ORDER — AMOXICILLIN 250 MG
1 CAPSULE ORAL DAILY
Status: DISCONTINUED | OUTPATIENT
Start: 2020-04-15 | End: 2020-04-20 | Stop reason: HOSPADM

## 2020-04-15 RX ORDER — BISOPROLOL FUMARATE 5 MG/1
2.5 TABLET, FILM COATED ORAL DAILY
Status: DISCONTINUED | OUTPATIENT
Start: 2020-04-15 | End: 2020-04-20 | Stop reason: HOSPADM

## 2020-04-15 RX ORDER — SODIUM CHLORIDE 9 MG/ML
75 INJECTION, SOLUTION INTRAVENOUS CONTINUOUS
Status: DISCONTINUED | OUTPATIENT
Start: 2020-04-15 | End: 2020-04-16

## 2020-04-15 RX ORDER — OXYCODONE HYDROCHLORIDE AND ACETAMINOPHEN 5; 325 MG/1; MG/1
1 TABLET ORAL EVERY 4 HOURS PRN
Status: DISCONTINUED | OUTPATIENT
Start: 2020-04-15 | End: 2020-04-20 | Stop reason: HOSPADM

## 2020-04-15 RX ADMIN — TAZOBACTAM SODIUM AND PIPERACILLIN SODIUM 3.38 G: 375; 3 INJECTION, SOLUTION INTRAVENOUS at 15:30

## 2020-04-15 RX ADMIN — TAZOBACTAM SODIUM AND PIPERACILLIN SODIUM 3.38 G: 375; 3 INJECTION, SOLUTION INTRAVENOUS at 23:09

## 2020-04-15 RX ADMIN — FAMOTIDINE 20 MG: 20 TABLET, FILM COATED ORAL at 09:59

## 2020-04-15 RX ADMIN — OXYCODONE HYDROCHLORIDE AND ACETAMINOPHEN 1 TABLET: 5; 325 TABLET ORAL at 20:51

## 2020-04-15 RX ADMIN — ONDANSETRON HYDROCHLORIDE 4 MG: 2 SOLUTION INTRAMUSCULAR; INTRAVENOUS at 03:18

## 2020-04-15 RX ADMIN — SUCRALFATE 1 G: 1 SUSPENSION ORAL at 11:49

## 2020-04-15 RX ADMIN — TAZOBACTAM SODIUM AND PIPERACILLIN SODIUM 3.38 G: 375; 3 INJECTION, SOLUTION INTRAVENOUS at 05:49

## 2020-04-15 RX ADMIN — VANCOMYCIN HYDROCHLORIDE 2000 MG: 1 INJECTION, POWDER, LYOPHILIZED, FOR SOLUTION INTRAVENOUS at 00:07

## 2020-04-15 RX ADMIN — SUCRALFATE 1 G: 1 SUSPENSION ORAL at 17:11

## 2020-04-15 RX ADMIN — OXYCODONE HYDROCHLORIDE AND ACETAMINOPHEN 1 TABLET: 5; 325 TABLET ORAL at 06:39

## 2020-04-15 RX ADMIN — SUCRALFATE 1 G: 1 SUSPENSION ORAL at 05:49

## 2020-04-15 RX ADMIN — OXYCODONE HYDROCHLORIDE AND ACETAMINOPHEN 1 TABLET: 5; 325 TABLET ORAL at 15:46

## 2020-04-15 RX ADMIN — LOSARTAN POTASSIUM 50 MG: 50 TABLET, FILM COATED ORAL at 17:11

## 2020-04-15 RX ADMIN — POLYETHYLENE GLYCOL 3350 17 G: 17 POWDER, FOR SOLUTION ORAL at 09:59

## 2020-04-15 RX ADMIN — FAMOTIDINE 20 MG: 20 TABLET, FILM COATED ORAL at 20:52

## 2020-04-15 RX ADMIN — ASPIRIN 81 MG: 81 TABLET, CHEWABLE ORAL at 09:59

## 2020-04-15 RX ADMIN — DOCUSATE SODIUM 50 MG AND SENNOSIDES 8.6 MG 1 TABLET: 8.6; 5 TABLET, FILM COATED ORAL at 09:59

## 2020-04-15 RX ADMIN — SODIUM CHLORIDE 75 ML/HR: 9 INJECTION, SOLUTION INTRAVENOUS at 17:48

## 2020-04-15 RX ADMIN — OXYCODONE HYDROCHLORIDE AND ACETAMINOPHEN 1 TABLET: 5; 325 TABLET ORAL at 11:42

## 2020-04-15 RX ADMIN — SODIUM CHLORIDE 75 ML/HR: 9 INJECTION, SOLUTION INTRAVENOUS at 01:07

## 2020-04-15 RX ADMIN — Medication 1000 MCG: at 14:46

## 2020-04-15 RX ADMIN — SUCRALFATE 1 G: 1 SUSPENSION ORAL at 03:13

## 2020-04-15 RX ADMIN — OXYCODONE HYDROCHLORIDE AND ACETAMINOPHEN 1 TABLET: 5; 325 TABLET ORAL at 02:19

## 2020-04-15 RX ADMIN — ENOXAPARIN SODIUM 110 MG: 120 INJECTION SUBCUTANEOUS at 03:18

## 2020-04-15 NOTE — PROGRESS NOTES
"Pharmacy Dosing Service  Antimicrobials  Zosyn    Assessment/Action/Plan:  Initiated Zosyn 3.375 gm IVPB (extended infusion protocol) Q8h as a result of a \"Pharmacy to Dose Zosyn\" consult. Pharmacy will continue to follow and make adjustments as needed. Thank you for the consult. Current end date: 4/22/20     Subjective:  Jesus Smith is a 84 y.o. male currently being treated for muscle infection.    Objective:  Estimated Creatinine Clearance: 85.7 mL/min (by C-G formula based on SCr of 0.78 mg/dL).   Lab Results   Component Value Date    CREATININE 0.78 04/14/2020    CREATININE 0.73 (L) 04/13/2020    CREATININE 0.76 04/12/2020     Lab Results   Component Value Date    WBC 16.07 (H) 04/14/2020     Temp Readings from Last 1 Encounters:   04/14/20 98.2 °F (36.8 °C) (Oral)       Culture Results:  Microbiology Results (last 10 days)       Procedure Component Value - Date/Time    Blood Culture - Blood, Arm, Right [[id number]] Collected:  04/05/20 0405    Lab Status:  Final result Specimen:  Blood from Arm, Right Updated:  04/10/20 0515     Blood Culture No growth at 5 days    Blood Culture - Blood, Hand, Right [[id number]] Collected:  04/05/20 0405    Lab Status:  Final result Specimen:  Blood from Hand, Right Updated:  04/10/20 0515     Blood Culture No growth at 5 days          Current Antimicrobials:   Anti-Infectives (From admission, onward)      Ordered     Dose/Rate Route Frequency Start Stop    04/15/20 0150  piperacillin-tazobactam (ZOSYN) 3.375 g in iso-osmotic dextrose 50 ml (premix)     Ordering Provider:  Anson June,     3.375 g  over 4 Hours Intravenous Every 8 Hours 04/15/20 0630 04/22/20 0629    04/15/20 0142  Pharmacy to Dose Zosyn     Ordering Provider:  Izabella Michelle APRN     Does not apply Continuous PRN 04/15/20 0142 04/22/20 0141    04/14/20 2237  piperacillin-tazobactam (ZOSYN) 3.375 g/100 mL 0.9% NS IVPB (mbp)     Ordering Provider:  Hernandez Nowak MD    3.375 g  over 30 " Minutes Intravenous Once 04/14/20 2239 04/14/20 2329 04/14/20 2237  vancomycin 2000 mg/500 mL 0.9% NS IVPB ([facility])     Ordering Provider:  Hernandez Nowak MD    2,000 mg Intravenous Once 04/14/20 2239 04/15/20 0007            Chantell Cardona PharmD  04/15/20 01:50

## 2020-04-15 NOTE — PLAN OF CARE
Problem: Patient Care Overview  Goal: Plan of Care Review  Outcome: Ongoing (interventions implemented as appropriate)  Flowsheets (Taken 4/15/2020 6675)  Progress: no change  Plan of Care Reviewed With: patient  Outcome Summary: Patient admitted to  via [facility] ED. C/o pain. Medicated with PRN PO pain medication. IVF and IV abx infusing per orders. Tolerating diet. VSS. Safety maintained. Will continue to monitor.

## 2020-04-15 NOTE — PLAN OF CARE
Patient c/o generalized pain. PRN PO pain meds given. Resting comfortably. NAM drain to Right abdomen with dark green output. IVF, voiding, regular diet. Bradycardia today - HR in 50s. MD aware - b/p meds adjusted. Hgb 8.6. Continue to monitor         Problem: Patient Care Overview  Goal: Plan of Care Review  Outcome: Ongoing (interventions implemented as appropriate)

## 2020-04-15 NOTE — PROGRESS NOTES
[facility] Medicine Services  INPATIENT PROGRESS NOTE    Length of Stay: 0  Date of Admission: 4/14/2020  Primary Care Physician: Matheus Olivera PA    Subjective   Chief Complaint: Back pain/abdomen pain    HPI   Patient see back pain is better.  Patient main complaint is right upper quadrant pain.  Patient denies any fever night sweats chills.  Patient is afebrile.  Patient denies any chest pain or shortness of breath.    Review of Systems   Constitutional: Positive for appetite change and fatigue. Negative for chills and fever.   HENT: Negative for hearing loss, nosebleeds, tinnitus and trouble swallowing.    Eyes: Negative for visual disturbance.   Respiratory: Negative for cough, chest tightness, shortness of breath and wheezing.    Cardiovascular: Negative for chest pain, palpitations and leg swelling.   Gastrointestinal: Positive for abdominal pain. Negative for abdominal distention, blood in stool, constipation, diarrhea, nausea and vomiting.   Endocrine: Negative for cold intolerance, heat intolerance, polydipsia, polyphagia and polyuria.   Genitourinary: Negative for decreased urine volume, difficulty urinating, dysuria, flank pain, frequency and hematuria.   Musculoskeletal: Positive for arthralgias, back pain, gait problem and myalgias. Negative for joint swelling.   Skin: Negative for rash.   Allergic/Immunologic: Negative for immunocompromised state.   Neurological: Positive for weakness. Negative for dizziness, syncope, light-headedness and headaches.   Hematological: Negative for adenopathy. Does not bruise/bleed easily.   Psychiatric/Behavioral: Positive for confusion. Negative for sleep disturbance. The patient is not nervous/anxious.           All pertinent negatives and positives are as above. All other systems have been reviewed and are negative unless otherwise stated.     Objective    Temp:  [97.5 °F (36.4 °C)-98.4 °F (36.9 °C)] 98 °F (36.7 °C)  Heart  Rate:  [50-95] 50  Resp:  [15-18] 16  BP: (111-135)/(48-75) 131/48    Intake/Output Summary (Last 24 hours) at 4/15/2020 1457  Last data filed at 4/15/2020 1330  Gross per 24 hour   Intake 860 ml   Output 690 ml   Net 170 ml     Physical Exam   Constitutional: He appears well-developed.   HENT:   Head: Normocephalic.   Eyes: Pupils are equal, round, and reactive to light. Conjunctivae are normal.   Neck: Neck supple. No JVD present.   Cardiovascular: Normal rate, regular rhythm, normal heart sounds and intact distal pulses. Exam reveals no gallop and no friction rub.   No murmur heard.  Pulmonary/Chest: No respiratory distress. He has no wheezes. He has no rales. He exhibits no tenderness.   Decrease in breath sound bilateral, clear.   Abdominal: Bowel sounds are normal. He exhibits no distension. There is no tenderness. There is no rebound and no guarding.   Protuberant. NAM to right upper quadurant; bilious drainage dark green/brown.   Musculoskeletal: Normal range of motion. He exhibits edema. He exhibits no tenderness or deformity.   2+ pitting edema.   Neurological: He is alert. He displays normal reflexes. No cranial nerve deficit. He exhibits abnormal muscle tone. Coordination abnormal.   Skin: Skin is warm and dry. Capillary refill takes 2 to 3 seconds. No rash noted.   Psychiatric: He has a normal mood and affect. His behavior is normal.   Nursing note and vitals reviewed.       Results Review:  Lab Results (last 24 hours)     Procedure Component Value Units Date/Time    POC Glucose Once [[id number]]  (Abnormal) Collected:  04/15/20 1143    Specimen:  Blood Updated:  04/15/20 1155     Glucose 132 mg/dL      Comment: : [id number] Jeremías Chaudhry) MalloryMeter ID: [id number]       POC Glucose Once [[id number]]  (Normal) Collected:  04/15/20 0833    Specimen:  Blood Updated:  04/15/20 0844     Glucose 124 mg/dL      Comment: : [id number] Jeremías Chaudhry) MalloryMeter ID: [id number]       Comprehensive  Metabolic Panel [[id number]]  (Abnormal) Collected:  04/15/20 0518    Specimen:  Blood Updated:  04/15/20 0545     Glucose 128 mg/dL      BUN 18 mg/dL      Creatinine 0.82 mg/dL      Sodium 133 mmol/L      Potassium 4.9 mmol/L      Chloride 97 mmol/L      CO2 25.0 mmol/L      Calcium 7.9 mg/dL      Total Protein 5.9 g/dL      Albumin 2.70 g/dL      ALT (SGPT) 37 U/L      AST (SGOT) 55 U/L      Comment: Specimen hemolyzed.  Results may be affected.        Alkaline Phosphatase 101 U/L      Total Bilirubin 0.8 mg/dL      eGFR Non African Amer 90 mL/min/1.73      Globulin 3.2 gm/dL      A/G Ratio 0.8 g/dL      BUN/Creatinine Ratio 22.0     Anion Gap 11.0 mmol/L     Narrative:       GFR Normal >60  Chronic Kidney Disease <60  Kidney Failure <15      CBC Auto Differential [[id number]]  (Abnormal) Collected:  04/15/20 0518    Specimen:  Blood Updated:  04/15/20 0528     WBC 13.74 10*3/mm3      RBC 2.89 10*6/mm3      Hemoglobin 8.6 g/dL      Hematocrit 27.2 %      MCV 94.1 fL      MCH 29.8 pg      MCHC 31.6 g/dL      RDW 14.6 %      RDW-SD 49.2 fl      MPV 10.3 fL      Platelets 216 10*3/mm3      Neutrophil % 73.2 %      Lymphocyte % 11.8 %      Monocyte % 13.3 %      Eosinophil % 0.4 %      Basophil % 0.5 %      Immature Grans % 0.8 %      Neutrophils, Absolute 10.05 10*3/mm3      Lymphocytes, Absolute 1.62 10*3/mm3      Monocytes, Absolute 1.83 10*3/mm3      Eosinophils, Absolute 0.06 10*3/mm3      Basophils, Absolute 0.07 10*3/mm3      Immature Grans, Absolute 0.11 10*3/mm3      nRBC 0.0 /100 WBC     Lactic Acid, Plasma [[id number]]  (Normal) Collected:  04/14/20 2249    Specimen:  Blood Updated:  04/14/20 2311     Lactate 1.5 mmol/L     Blood Culture - Blood, Arm, Right [[id number]] Collected:  04/14/20 2255    Specimen:  Blood from Arm, Right Updated:  04/14/20 2306    Blood Culture - Blood, Arm, Right [[id number]] Collected:  04/14/20 2249    Specimen:  Blood from Arm, Right Updated:  04/14/20 2253    Protime-INR  [[id number]]  (Abnormal) Collected:  04/14/20 2008    Specimen:  Blood Updated:  04/14/20 2244     Protime 14.9 Seconds      INR 1.18    aPTT [[id number]]  (Abnormal) Collected:  04/14/20 2008    Specimen:  Blood Updated:  04/14/20 2244     PTT 47.2 seconds     [place] Draw [[id number]] Collected:  04/14/20 2008    Specimen:  Blood Updated:  04/14/20 2115    Narrative:       The following orders were created for panel order [place] Draw.  Procedure                               Abnormality         Status                     ---------                               -----------         ------                     Light Blue Top[[id number]]                                   Final result               Green Top (Gel)[[id number]]                                  Final result               Lavender Top[[id number]]                                     Final result               Red Top[[id number]]                                          Final result                 Please view results for these tests on the individual orders.    Light Blue Top [[id number]] Collected:  04/14/20 2008    Specimen:  Blood Updated:  04/14/20 2115     Extra Tube hold for add-on     Comment: Auto resulted       Green Top (Gel) [[id number]] Collected:  04/14/20 2008    Specimen:  Blood Updated:  04/14/20 2115     Extra Tube Hold for add-ons.     Comment: Auto resulted.       Lavender Top [[id number]] Collected:  04/14/20 2008    Specimen:  Blood Updated:  04/14/20 2115     Extra Tube hold for add-on     Comment: Auto resulted       Red Top [[id number]] Collected:  04/14/20 2008    Specimen:  Blood Updated:  04/14/20 2115     Extra Tube Hold for add-ons.     Comment: Auto resulted.       Comprehensive Metabolic Panel [[id number]]  (Abnormal) Collected:  04/14/20 2008    Specimen:  Blood Updated:  04/14/20 2032     Glucose 130 mg/dL      BUN 18 mg/dL      Creatinine 0.78 mg/dL      Sodium 131 mmol/L      Potassium 4.6 mmol/L      Chloride 94 mmol/L      CO2 27.0  mmol/L      Calcium 8.5 mg/dL      Total Protein 6.4 g/dL      Albumin 3.20 g/dL      ALT (SGPT) 39 U/L      AST (SGOT) 48 U/L      Alkaline Phosphatase 126 U/L      Total Bilirubin 1.1 mg/dL      eGFR Non African Amer 95 mL/min/1.73      Globulin 3.2 gm/dL      A/G Ratio 1.0 g/dL      BUN/Creatinine Ratio 23.1     Anion Gap 10.0 mmol/L     Narrative:       GFR Normal >60  Chronic Kidney Disease <60  Kidney Failure <15      Troponin [[id number]]  (Normal) Collected:  04/14/20 2008    Specimen:  Blood Updated:  04/14/20 2030     Troponin T 0.030 ng/mL     Narrative:       Troponin T Reference Range:  <= 0.03 ng/mL-   Negative for AMI  >0.03 ng/mL-     Abnormal for myocardial necrosis.  Clinicians would have to utilize clinical acumen, EKG, Troponin and serial changes to determine if it is an Acute Myocardial Infarction or myocardial injury due to an underlying chronic condition.       Results may be falsely decreased if patient taking Biotin.      Amylase [[id number]]  (Normal) Collected:  04/14/20 2008    Specimen:  Blood Updated:  04/14/20 2029     Amylase 56 U/L     CBC & Differential [[id number]] Collected:  04/14/20 2008    Specimen:  Blood Updated:  04/14/20 2027    Narrative:       The following orders were created for panel order CBC & Differential.  Procedure                               Abnormality         Status                     ---------                               -----------         ------                     CBC Auto Differential[[id number]]        Abnormal            Final result                 Please view results for these tests on the individual orders.    CBC Auto Differential [[id number]]  (Abnormal) Collected:  04/14/20 2008    Specimen:  Blood Updated:  04/14/20 2027     WBC 16.07 10*3/mm3      RBC 3.37 10*6/mm3      Hemoglobin 10.0 g/dL      Hematocrit 31.2 %      MCV 92.6 fL      MCH 29.7 pg      MCHC 32.1 g/dL      RDW 14.6 %      RDW-SD 49.4 fl      MPV 10.2 fL      Platelets 248 10*3/mm3       Neutrophil % 78.8 %      Lymphocyte % 8.9 %      Monocyte % 11.0 %      Eosinophil % 0.1 %      Basophil % 0.3 %      Immature Grans % 0.9 %      Neutrophils, Absolute 12.67 10*3/mm3      Lymphocytes, Absolute 1.43 10*3/mm3      Monocytes, Absolute 1.76 10*3/mm3      Eosinophils, Absolute 0.02 10*3/mm3      Basophils, Absolute 0.05 10*3/mm3      Immature Grans, Absolute 0.14 10*3/mm3      nRBC 0.0 /100 WBC     Lipase [[id number]]  (Normal) Collected:  04/14/20 2008    Specimen:  Blood Updated:  04/14/20 2027     Lipase 28 U/L            Cultures:  No results found for: BLOODCX, URINECX, WOUNDCX, MRSACX, RESPCX, STOOLCX    Radiology Data:    Imaging Results (Last 24 Hours)     Procedure Component Value Units Date/Time    CT Abdomen Pelvis With Contrast [[id number]] Collected:  04/15/20 0710     Updated:  04/15/20 0723    Narrative:       EXAMINATION:   CT ABDOMEN PELVIS W CONTRAST-  4/15/2020 7:10 AM CDT     HISTORY: CT ABDOMEN AND PELVIS WITH CONTRAST 4/14/2020 10:02 PM CDT     HISTORY: Abdominal pain and vomiting     COMPARISON: 04/03/2020.      DLP: 586 mGy cm     TECHNIQUE: Following the intravenous administration of contrast, helical  CT tomographic images of the abdomen and pelvis were acquired. Coronal  reformatted images were also provided for review.      FINDINGS:   22 the lung bases are clear. A small left pleural effusion is present..      LIVER: No focal liver lesion. The hepatic vasculature is patent.      BILIARY SYSTEM: Cholelithiasis ostomy tube is present in the  gallbladder. Trace amount of fluid and air is noted in the abdominal  wall around the cholecystostomy drain..      PANCREAS: No focal pancreatic lesion.      SPLEEN: Unremarkable.      KIDNEYS AND ADRENALS: Adrenal glands are visualized. There some fullness  in the left adrenal gland. The renal contours demonstrate uniform and  symmetric excretion of contrast.. The ureters are decompressed and  normal in appearance.     A 3.1 x 5.6 x 6 cm  intramuscular fluid collections noted associated with  the left iliopsoas. Most likely this is a hematoma. This is a change  from 04/03/2020     GI TRACT: No evidence of obstruction or bowel wall thickening. The  appendix is surgically absent.     OTHER: There is no mesenteric mass, lymphadenopathy or fluid collection.  Vascular calcifications noted.. Degenerative spondylosis postsurgical  changes noted in the lumbar spine.. Instrumented fusion between L2 and  L5 is noted. Postsurgical changes noted left iliac wing.     PELVIS: Artifact is present from left hip arthroplasty. This obscures  the base the bladder. Air is present in the bladder most likely from  recent instrumentation..       Impression:       1. Cholecystostomy tube is present in the gallbladder.  2. Moderate-sized left iliopsoas intramuscular fluid collection. Most  likely this is a hematoma. This is a change from April 3  3. 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     These images are initially reviewed by stat rad 04/14/2020 at 2230 hours        This report was finalized on 04/15/2020 07:20 by Dr. Leeroy Sal MD.          No Known Allergies    Scheduled meds:     aspirin 81 mg Oral Daily   bisoprolol 5 mg Oral Daily   vitamin B-12 1,000 mcg Oral Daily   enoxaparin 1 mg/kg Subcutaneous Q12H   famotidine 20 mg Oral BID   insulin lispro 2-7 Units Subcutaneous TID AC   losartan 50 mg Oral Daily   piperacillin-tazobactam 3.375 g Intravenous Q8H   polyethylene glycol 17 g Oral Daily   sennosides-docusate 1 tablet Oral Daily   sodium chloride 10 mL Intravenous Q12H   sucralfate 1 g Oral Q6H       PRN meds:  bisacodyl  •  dextrose  •  dextrose  •  glucagon (human recombinant)  •  Glycerin-Hypromellose-  •  ondansetron **OR** ondansetron  •  oxyCODONE-acetaminophen  •  Pharmacy to Dose Zosyn  •  sodium chloride    Assessment/Plan       Transaminitis    Acute cholecystitis    History of prosthetic aortic valve    Hematoma of  left iliopsoas muscle    Chronic anticoagulation    Hyponatremia      Plan:  Hematoma of the left iliopsoas muscle.  Repair infectious disease.  Continue Zosyn and vancomycin for now.  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    Cholecystitis/abdomen pain.  Consult general surgery.  Status post cholecystectomy tube in place.  Continues Zosyn and vancomycin.  Recommendation from general surgery-plan to perform cholecystectomy in approximately 1 month.     CAD/history of prosthetic aortic valve/hypertension.  Continue aspirin.  Discussed with Dr. Saleem, hold Lovenox due to hemoglobin decreasing.  Decrease Zebeta due to bradycardia.  Continue Cozaar.     Pain control.  Dilaudid as needed.  Percocet PRN.  Patient go to nursing with Percocet as needed.    Anemia.  Decrease in hemoglobin.  Will follow.     Nausea/vomiting.  Pepcid.  Zofran as needed.  Carafate.     Chronic kidney disease stage III.  Continue slow IV hydration.     Diabetes.  Sliding scale.  Hemoglobin A1 C 6.6.    Chronic pain.  Percocet as needed.     Constipation.   Dulcolax suppository as needed.    Nutrition. Cardiac/carbohydrate/bland diet.    Deconditioning.  PT consult.    Blood cultures pending.     Discharge Plannin-4 days.     Mamadou Dodd MD   04/15/20   14:57

## 2020-04-15 NOTE — PROGRESS NOTES
Agustina Saleem MD FACS  Progress Note     LOS: 0 days   Patient Care Team:  Matheus Olivera PA as PCP - General (Physician Assistant)      Subjective     Interval History:      patient returns complaining of acute onset left lower back pain.  He denies any fevers, chills, nausea, vomiting, dysuria, or paresthesias.  He also denies fall other than the fall prior to last admission.  Pain much improved now.     Objective     Vital Signs  Temp:  [97.5 °F (36.4 °C)-98.4 °F (36.9 °C)] 97.5 °F (36.4 °C)  Heart Rate:  [50-95] 95  Resp:  [15-18] 16  BP: (111-135)/(50-75) 122/53    Physical Exam:  General appearance - alert, well appearing, and in no distress  Abdomen - soft, clean, NAM bilious      Results Review:    Lab Results (last 24 hours)     Procedure Component Value Units Date/Time    POC Glucose Once [[id number]]  (Normal) Collected:  04/15/20 0833    Specimen:  Blood Updated:  04/15/20 0844     Glucose 124 mg/dL      Comment: : [id number] Jeremías (Keller) MalloryMeter ID: [id number]       Comprehensive Metabolic Panel [[id number]]  (Abnormal) Collected:  04/15/20 0518    Specimen:  Blood Updated:  04/15/20 0545     Glucose 128 mg/dL      BUN 18 mg/dL      Creatinine 0.82 mg/dL      Sodium 133 mmol/L      Potassium 4.9 mmol/L      Chloride 97 mmol/L      CO2 25.0 mmol/L      Calcium 7.9 mg/dL      Total Protein 5.9 g/dL      Albumin 2.70 g/dL      ALT (SGPT) 37 U/L      AST (SGOT) 55 U/L      Comment: Specimen hemolyzed.  Results may be affected.        Alkaline Phosphatase 101 U/L      Total Bilirubin 0.8 mg/dL      eGFR Non African Amer 90 mL/min/1.73      Globulin 3.2 gm/dL      A/G Ratio 0.8 g/dL      BUN/Creatinine Ratio 22.0     Anion Gap 11.0 mmol/L     Narrative:       GFR Normal >60  Chronic Kidney Disease <60  Kidney Failure <15      CBC Auto Differential [[id number]]  (Abnormal) Collected:  04/15/20 0518    Specimen:  Blood Updated:  04/15/20 0528     WBC 13.74 10*3/mm3      RBC 2.89 10*6/mm3       Hemoglobin 8.6 g/dL      Hematocrit 27.2 %      MCV 94.1 fL      MCH 29.8 pg      MCHC 31.6 g/dL      RDW 14.6 %      RDW-SD 49.2 fl      MPV 10.3 fL      Platelets 216 10*3/mm3      Neutrophil % 73.2 %      Lymphocyte % 11.8 %      Monocyte % 13.3 %      Eosinophil % 0.4 %      Basophil % 0.5 %      Immature Grans % 0.8 %      Neutrophils, Absolute 10.05 10*3/mm3      Lymphocytes, Absolute 1.62 10*3/mm3      Monocytes, Absolute 1.83 10*3/mm3      Eosinophils, Absolute 0.06 10*3/mm3      Basophils, Absolute 0.07 10*3/mm3      Immature Grans, Absolute 0.11 10*3/mm3      nRBC 0.0 /100 WBC     Lactic Acid, Plasma [[id number]]  (Normal) Collected:  04/14/20 2249    Specimen:  Blood Updated:  04/14/20 2311     Lactate 1.5 mmol/L     Blood Culture - Blood, Arm, Right [[id number]] Collected:  04/14/20 2255    Specimen:  Blood from Arm, Right Updated:  04/14/20 2306    Blood Culture - Blood, Arm, Right [[id number]] Collected:  04/14/20 2249    Specimen:  Blood from Arm, Right Updated:  04/14/20 2253    Protime-INR [[id number]]  (Abnormal) Collected:  04/14/20 2008    Specimen:  Blood Updated:  04/14/20 2244     Protime 14.9 Seconds      INR 1.18    aPTT [[id number]]  (Abnormal) Collected:  04/14/20 2008    Specimen:  Blood Updated:  04/14/20 2244     PTT 47.2 seconds     [place] Draw [[id number]] Collected:  04/14/20 2008    Specimen:  Blood Updated:  04/14/20 2115    Narrative:       The following orders were created for panel order [place] Draw.  Procedure                               Abnormality         Status                     ---------                               -----------         ------                     Light Blue Top[[id number]]                                   Final result               Green Top (Gel)[[id number]]                                  Final result               Lavender Top[[id number]]                                     Final result               Red Top[[id number]]                                           Final result                 Please view results for these tests on the individual orders.    Light Blue Top [[id number]] Collected:  04/14/20 2008    Specimen:  Blood Updated:  04/14/20 2115     Extra Tube hold for add-on     Comment: Auto resulted       Green Top (Gel) [[id number]] Collected:  04/14/20 2008    Specimen:  Blood Updated:  04/14/20 2115     Extra Tube Hold for add-ons.     Comment: Auto resulted.       Lavender Top [[id number]] Collected:  04/14/20 2008    Specimen:  Blood Updated:  04/14/20 2115     Extra Tube hold for add-on     Comment: Auto resulted       Red Top [[id number]] Collected:  04/14/20 2008    Specimen:  Blood Updated:  04/14/20 2115     Extra Tube Hold for add-ons.     Comment: Auto resulted.       Comprehensive Metabolic Panel [[id number]]  (Abnormal) Collected:  04/14/20 2008    Specimen:  Blood Updated:  04/14/20 2032     Glucose 130 mg/dL      BUN 18 mg/dL      Creatinine 0.78 mg/dL      Sodium 131 mmol/L      Potassium 4.6 mmol/L      Chloride 94 mmol/L      CO2 27.0 mmol/L      Calcium 8.5 mg/dL      Total Protein 6.4 g/dL      Albumin 3.20 g/dL      ALT (SGPT) 39 U/L      AST (SGOT) 48 U/L      Alkaline Phosphatase 126 U/L      Total Bilirubin 1.1 mg/dL      eGFR Non African Amer 95 mL/min/1.73      Globulin 3.2 gm/dL      A/G Ratio 1.0 g/dL      BUN/Creatinine Ratio 23.1     Anion Gap 10.0 mmol/L     Narrative:       GFR Normal >60  Chronic Kidney Disease <60  Kidney Failure <15      Troponin [[id number]]  (Normal) Collected:  04/14/20 2008    Specimen:  Blood Updated:  04/14/20 2030     Troponin T 0.030 ng/mL     Narrative:       Troponin T Reference Range:  <= 0.03 ng/mL-   Negative for AMI  >0.03 ng/mL-     Abnormal for myocardial necrosis.  Clinicians would have to utilize clinical acumen, EKG, Troponin and serial changes to determine if it is an Acute Myocardial Infarction or myocardial injury due to an underlying chronic condition.       Results may be falsely  decreased if patient taking Biotin.      Amylase [[id number]]  (Normal) Collected:  04/14/20 2008    Specimen:  Blood Updated:  04/14/20 2029     Amylase 56 U/L     CBC & Differential [[id number]] Collected:  04/14/20 2008    Specimen:  Blood Updated:  04/14/20 2027    Narrative:       The following orders were created for panel order CBC & Differential.  Procedure                               Abnormality         Status                     ---------                               -----------         ------                     CBC Auto Differential[[id number]]        Abnormal            Final result                 Please view results for these tests on the individual orders.    CBC Auto Differential [[id number]]  (Abnormal) Collected:  04/14/20 2008    Specimen:  Blood Updated:  04/14/20 2027     WBC 16.07 10*3/mm3      RBC 3.37 10*6/mm3      Hemoglobin 10.0 g/dL      Hematocrit 31.2 %      MCV 92.6 fL      MCH 29.7 pg      MCHC 32.1 g/dL      RDW 14.6 %      RDW-SD 49.4 fl      MPV 10.2 fL      Platelets 248 10*3/mm3      Neutrophil % 78.8 %      Lymphocyte % 8.9 %      Monocyte % 11.0 %      Eosinophil % 0.1 %      Basophil % 0.3 %      Immature Grans % 0.9 %      Neutrophils, Absolute 12.67 10*3/mm3      Lymphocytes, Absolute 1.43 10*3/mm3      Monocytes, Absolute 1.76 10*3/mm3      Eosinophils, Absolute 0.02 10*3/mm3      Basophils, Absolute 0.05 10*3/mm3      Immature Grans, Absolute 0.14 10*3/mm3      nRBC 0.0 /100 WBC     Lipase [[id number]]  (Normal) Collected:  04/14/20 2008    Specimen:  Blood Updated:  04/14/20 2027     Lipase 28 U/L         Imaging Results (Last 24 Hours)     Procedure Component Value Units Date/Time    CT Abdomen Pelvis With Contrast [[id number]] Collected:  04/15/20 0710     Updated:  04/15/20 0723    Narrative:       EXAMINATION:   CT ABDOMEN PELVIS W CONTRAST-  4/15/2020 7:10 AM CDT     HISTORY: CT ABDOMEN AND PELVIS WITH CONTRAST 4/14/2020 10:02 PM CDT     HISTORY: Abdominal pain and  vomiting     COMPARISON: 04/03/2020.      DLP: 586 mGy cm     TECHNIQUE: Following the intravenous administration of contrast, helical  CT tomographic images of the abdomen and pelvis were acquired. Coronal  reformatted images were also provided for review.      FINDINGS:   22 the lung bases are clear. A small left pleural effusion is present..      LIVER: No focal liver lesion. The hepatic vasculature is patent.      BILIARY SYSTEM: Cholelithiasis ostomy tube is present in the  gallbladder. Trace amount of fluid and air is noted in the abdominal  wall around the cholecystostomy drain..      PANCREAS: No focal pancreatic lesion.      SPLEEN: Unremarkable.      KIDNEYS AND ADRENALS: Adrenal glands are visualized. There some fullness  in the left adrenal gland. The renal contours demonstrate uniform and  symmetric excretion of contrast.. The ureters are decompressed and  normal in appearance.     A 3.1 x 5.6 x 6 cm intramuscular fluid collections noted associated with  the left iliopsoas. Most likely this is a hematoma. This is a change  from 04/03/2020     GI TRACT: No evidence of obstruction or bowel wall thickening. The  appendix is surgically absent.     OTHER: There is no mesenteric mass, lymphadenopathy or fluid collection.  Vascular calcifications noted.. Degenerative spondylosis postsurgical  changes noted in the lumbar spine.. Instrumented fusion between L2 and  L5 is noted. Postsurgical changes noted left iliac wing.     PELVIS: Artifact is present from left hip arthroplasty. This obscures  the base the bladder. Air is present in the bladder most likely from  recent instrumentation..       Impression:       1. Cholecystostomy tube is present in the gallbladder.  2. Moderate-sized left iliopsoas intramuscular fluid collection. Most  likely this is a hematoma. This is a change from April 3  3. 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     These images are initially reviewed by stat rad  04/14/2020 at 2230 hours        This report was finalized on 04/15/2020 07:20 by Dr. Leeroy Sal MD.            Assessment/Plan       Diet as tolerated.  Appears as though the patient has developed a hematoma of the left iliopsoas muscle.  Continue to monitor H/H.  Plan to perform cholecystectomy in approximately one month.      Agustina Saleem MD  04/15/20  10:27

## 2020-04-15 NOTE — H&P
[facility] Medicine Services  HISTORY AND PHYSICAL    Date of Admission: 4/14/2020  Primary Care Physician: Matheus Olivera PA    Subjective     Chief Complaint: Severe mid epigastric abdominal pain and nausea with vomiting    History of Present Illness  Jesus Smith is a 89-year-old male discharge from this facility yesterday after a 10-day admission for bacteremia secondary to UTI, acute kidney injury and transaminitis.  Patient was found to have acute cholecystitis however due to anticoagulation with warfarin for prosthetic aortic valve, and severity of illness drain was placed per Dr. Agustina Saleem and IV therapy started.  Plan for cholecystectomy in 4 to 6 weeks.  Patient was discharged on Omnicef.  Also on twice daily Lovenox until surgery scheduled.  Patient discharged to rehab center.  Today patient ate a high caloric/fatty meal at lunch with subsequent nausea and vomiting x2 with associated pain.  He was transferred back to [facility] for evaluation.  General surgery, Dr. Yessi Mccann notified of patient's readmission.  Imaging revealed hematoma of left iliopsoas muscle.  Apparently patient had a fall prior to previous admission.  Dr. Mccann concerned for infection and commended continued antibiotic therapy with general surgery consult in a.m.  Currently patient states he continues to have mild nausea however pain is better after being treated.  Zosyn and vancomycin given in the emergency department.  Patient has chronic bilateral lower extremity edema.  He has no complaints of left lower extremity pain however edema is worse in that extremity.  Echocardiogram completed on 4/4/2020, please see below for adult.  White count today 16 as compared to 10 yesterday.  Patient is admitted for further evaluation treatment.    Review of Systems   A 10 point review of systems was completed, all negative except for those discussed in HPI    Past Medical History:    Past Medical History:   Diagnosis Date   • Bradycardia    • Coronary artery disease    • GERD (gastroesophageal reflux disease)    • Hypertension    • Injury of back    • TIA (transient ischemic attack)        Past Surgical History:   Past Surgical History:   Procedure Laterality Date   • APPENDECTOMY     • BACK SURGERY      Fusion   • CARDIAC SURGERY      Open Heart   • EXPLORATORY LAPAROTOMY N/A 4/5/2020    Procedure: open cholecystostomy tube placement ;  Surgeon: Agustina Saleem MD;  Location: [facility];  Service: General;  Laterality: N/A;   • HERNIA REPAIR     • JOINT REPLACEMENT Left     s/p L hip replacement   • PACEMAKER IMPLANTATION     • STOMACH SURGERY         Family History: family history includes Heart disease in his mother; Stroke in his father.    Social History:  reports that he has never smoked. He has never used smokeless tobacco. He reports that he does not drink alcohol or use drugs.    Code Status: Limited, if unable to speak for himself his son Jesus Lopez will speak for him      Allergies:  No Known Allergies    Medications:      Instructions   bisacodyl 10 MG suppository  Commonly known as:  DULCOLAX    10 mg, Rectal, Daily PRN      cefdinir 300 MG capsule  Commonly known as:  OMNICEF    300 mg, Oral, 2 Times Daily      enoxaparin 120 MG/0.8ML solution syringe  Commonly known as:  LOVENOX    1 mg/kg (110 mg), Subcutaneous, Every 12 Hours      famotidine 20 MG tablet  Commonly known as:  PEPCID    20 mg, Oral, 2 Times Daily      Glycerin-Hypromellose- 0.2-0.2-1 % solution ophthalmic solution  Commonly known as:  ARTIFICIAL TEARS    1 drop, Both Eyes, Every 1 Hour PRN      insulin lispro 100 UNIT/ML injection  Commonly known as:  humaLOG    2-7 Units, Subcutaneous, 4 Times Daily With Meals & Nightly      ondansetron ODT 4 MG disintegrating tablet  Commonly known as:  Zofran ODT    4 mg, Translingual, Every 8 Hours PRN      oxyCODONE-acetaminophen 5-325 MG per tablet  Commonly  "known as:  PERCOCET    1 tablet, Oral, Every 4 Hours PRN      polyethylene glycol pack packet  Commonly known as:  MIRALAX    17 g, Oral, Daily      sennosides-docusate 8.6-50 MG per tablet  Commonly known as:  PERICOLACE    1 tablet, Oral, Daily      sucralfate 1 GM/10ML suspension  Commonly known as:  CARAFATE    1 g, Oral, Every 6 Hours Scheduled      Vitamin D3 50 MCG (2000 UT) capsule    2,000 Units, Oral, Daily      bisoprolol 10 MG tablet  Commonly known as:  ZEBeta  5 mg, Oral, Daily      aspirin 81 MG chewable tablet    81 mg, Oral, Daily      losartan 100 MG tablet  Commonly known as:  COZAAR    50 mg, Oral, Daily      vitamin B-12 1000 MCG tablet  Commonly known as:  CYANOCOBALAMIN    1,000 mcg, Oral, Daily            Objective     /53 (BP Location: Left arm, Patient Position: Lying)   Pulse 52   Temp 98.2 °F (36.8 °C) (Oral)   Resp 18   Ht 177.8 cm (70\")   Wt 107 kg (236 lb 9.6 oz)   SpO2 100%   BMI 33.95 kg/m²   Physical Exam   Constitutional: He is oriented to person, place, and time. He appears well-developed and well-nourished. No distress.   HENT:   Head: Normocephalic and atraumatic.   Eyes: Pupils are equal, round, and reactive to light. Conjunctivae and EOM are normal. No scleral icterus.   Neck: Normal range of motion. Neck supple. No JVD present. No tracheal deviation present.   Cardiovascular: Normal rate, regular rhythm, normal heart sounds and intact distal pulses. Exam reveals no gallop.   No murmur heard.  Pulmonary/Chest: Effort normal and breath sounds normal. No respiratory distress. He has no wheezes. He has no rales.   Abdominal: Soft. Bowel sounds are normal. He exhibits no distension. There is no tenderness. There is no guarding.   Protuberant. NAM to right upper quadurant; dark red/brown drainage    Musculoskeletal: He exhibits edema (significant bilateral lower extremities, worse on left).   Generalized deconditioned state   Neurological: He is alert and oriented to " person, place, and time.   Skin: Skin is warm and dry. No rash noted. He is not diaphoretic. No erythema. No pallor.   Psychiatric: He has a normal mood and affect. His behavior is normal.   Vitals reviewed.      Pertinent Data:   Lab Results (last 72 hours)     Procedure Component Value Units Date/Time    Lactic Acid, Plasma [[id number]]  (Normal) Collected:  04/14/20 2249    Specimen:  Blood Updated:  04/14/20 2311     Lactate 1.5 mmol/L     Blood Culture - Blood, Arm, Right [[id number]] Collected:  04/14/20 2255    Specimen:  Blood from Arm, Right Updated:  04/14/20 2306    Blood Culture - Blood, Arm, Right [[id number]] Collected:  04/14/20 2249    Specimen:  Blood from Arm, Right Updated:  04/14/20 2253    Protime-INR [[id number]]  (Abnormal) Collected:  04/14/20 2008    Specimen:  Blood Updated:  04/14/20 2244     Protime 14.9 Seconds      INR 1.18    aPTT [[id number]]  (Abnormal) Collected:  04/14/20 2008    Specimen:  Blood Updated:  04/14/20 2244     PTT 47.2 seconds     Comprehensive Metabolic Panel [[id number]]  (Abnormal) Collected:  04/14/20 2008    Specimen:  Blood Updated:  04/14/20 2032     Glucose 130 mg/dL      BUN 18 mg/dL      Creatinine 0.78 mg/dL      Sodium 131 mmol/L      Potassium 4.6 mmol/L      Chloride 94 mmol/L      CO2 27.0 mmol/L      Calcium 8.5 mg/dL      Total Protein 6.4 g/dL      Albumin 3.20 g/dL      ALT (SGPT) 39 U/L      AST (SGOT) 48 U/L      Alkaline Phosphatase 126 U/L      Total Bilirubin 1.1 mg/dL      eGFR Non African Amer 95 mL/min/1.73      Globulin 3.2 gm/dL      A/G Ratio 1.0 g/dL      BUN/Creatinine Ratio 23.1     Anion Gap 10.0 mmol/L     Troponin [[id number]]  (Normal) Collected:  04/14/20 2008    Specimen:  Blood Updated:  04/14/20 2030     Troponin T 0.030 ng/mL     Amylase [[id number]]  (Normal) Collected:  04/14/20 2008    Specimen:  Blood Updated:  04/14/20 2029     Amylase 56 U/L     CBC Auto Differential [[id number]]  (Abnormal) Collected:  04/14/20 2008    Specimen:   Blood Updated:  04/14/20 2027     WBC 16.07 10*3/mm3      RBC 3.37 10*6/mm3      Hemoglobin 10.0 g/dL      Hematocrit 31.2 %      MCV 92.6 fL      MCH 29.7 pg      MCHC 32.1 g/dL      RDW 14.6 %      RDW-SD 49.4 fl      MPV 10.2 fL      Platelets 248 10*3/mm3      Neutrophil % 78.8 %      Lymphocyte % 8.9 %      Monocyte % 11.0 %      Eosinophil % 0.1 %      Basophil % 0.3 %      Immature Grans % 0.9 %      Neutrophils, Absolute 12.67 10*3/mm3      Lymphocytes, Absolute 1.43 10*3/mm3      Monocytes, Absolute 1.76 10*3/mm3      Eosinophils, Absolute 0.02 10*3/mm3      Basophils, Absolute 0.05 10*3/mm3      Immature Grans, Absolute 0.14 10*3/mm3      nRBC 0.0 /100 WBC     Lipase [[id number]]  (Normal) Collected:  04/14/20 2008    Specimen:  Blood Updated:  04/14/20 2027     Lipase 28 U/L         Imaging Results (Last 24 Hours)     Procedure Component Value Units Date/Time    CT Abdomen Pelvis With Contrast [[id number]] Resulted:  04/14/20 2202     Updated:  04/14/20 2211          I have personally reviewed and interpreted the radiology studies and ECG obtained at time of admission.     Assessment / Plan     Assessment:   Active Hospital Problems    Diagnosis   • Chronic anticoagulation   • Hyponatremia   • Hematoma of left iliopsoas muscle   • History of prosthetic aortic valve   • Acute cholecystitis   • Transaminitis       Plan:   1.  Admit as inpatient  2.  Continue Zosyn  3.  Home medications reviewed and restarted as appropriate to include therapeutic Lovenox for DVT prophylaxis  4.  Pain and nausea control  5.  Gentle hydration normal saline 75 mL/hour  6.  Consult general surgery  7.  Labs in a.m.    I discussed the patient's findings and my recommendations with: Anson June DO  Time spent: 40 minutes    Plan discussed with NP and agree.     Patient seen and examined by me on 4/14/2020 at 10:55 PM.    Izabella Michelle, GÓMEZ  04/15/20   00:21

## 2020-04-15 NOTE — ED PROVIDER NOTES
Subjective   Patient is a 84-year-old white male presents emergency department per EMS from nursing home with complaints of midepigastric abdominal pain and vomiting.  Patient was discharged from the hospital yesterday after being diagnosed with acute cholecystitis and is scheduled to have cholecystectomy in 4 to 6 weeks.  Evidently the patient was pain-free and feeling better when he was discharged back to the nursing home yesterday.  He states he started vomiting and having increased pain today around 2:00 this afternoon.  Evidently the patient was fed Yousif and beans, chocolate cake, milk, and salad around lunch this afternoon. He states that he vomited shortly after that and started having increased abd pain as well. He states that he has vomited x2. While hospitalized pt also received an open cholecystectomy tube placement as well       History provided by:  Patient   used: No        Review of Systems   Constitutional: Negative.    HENT: Negative.    Eyes: Negative.    Respiratory: Negative.    Cardiovascular: Negative.    Gastrointestinal:        Patient is a 84-year-old white male presents emergency department per EMS from nursing home with complaints of midepigastric abdominal pain and vomiting.  Patient was discharged from the hospital yesterday after being diagnosed with acute cholecystitis and is scheduled to have cholecystectomy in 4 to 6 weeks.  Evidently the patient was pain-free and feeling better when he was discharged back to the nursing home yesterday.  He states he started vomiting and having increased pain today around 2:00 this afternoon.  Evidently the patient was fed Yousif and beans, chocolate cake, milk, and salad around lunch this afternoon. He states that he vomited shortly after that and started having increased abd pain as well. He states that he has vomited x2. While hospitalized pt also received an open cholecystectomy tube placement as well      Endocrine:  Negative.    Genitourinary: Negative.    Musculoskeletal: Negative.    Skin: Negative.    Allergic/Immunologic: Negative.    Neurological: Negative.    Hematological: Negative.    Psychiatric/Behavioral: Negative.    All other systems reviewed and are negative.      Past Medical History:   Diagnosis Date   • Bradycardia    • Coronary artery disease    • GERD (gastroesophageal reflux disease)    • Hypertension    • Injury of back    • TIA (transient ischemic attack)        No Known Allergies    Past Surgical History:   Procedure Laterality Date   • APPENDECTOMY     • BACK SURGERY      Fusion   • CARDIAC SURGERY      Open Heart   • EXPLORATORY LAPAROTOMY N/A 4/5/2020    Procedure: open cholecystostomy tube placement ;  Surgeon: Agustina Saleem MD;  Location: [facility];  Service: General;  Laterality: N/A;   • HERNIA REPAIR     • JOINT REPLACEMENT Left     s/p L hip replacement   • PACEMAKER IMPLANTATION     • STOMACH SURGERY         No family history on file.    Social History     Socioeconomic History   • Marital status:      Spouse name: Not on file   • Number of children: Not on file   • Years of education: Not on file   • Highest education level: Not on file   Tobacco Use   • Smoking status: Never Smoker   • Smokeless tobacco: Never Used   Substance and Sexual Activity   • Alcohol use: No   • Drug use: No   • Sexual activity: Defer       Prior to Admission medications    Medication Sig Start Date End Date Taking? Authorizing Provider   aspirin 81 MG chewable tablet Chew 81 mg Daily.    Provider, MD Abdirizak   bisacodyl (DULCOLAX) 10 MG suppository Insert 1 suppository into the rectum Daily As Needed for Constipation. 4/13/20   Mamadou Dodd MD   bisoprolol (ZEBeta) 10 MG tablet Take 0.5 tablets by mouth Daily. 4/13/20   Mamadou Dodd MD   cefdinir (OMNICEF) 300 MG capsule Take 1 capsule by mouth 2 (Two) Times a Day for 5 days. 4/13/20 4/18/20  Mamadou Dodd MD   Cholecalciferol (VITAMIN D) 50 MCG  "(2000 UT) tablet Take 1,000 Units by mouth Daily. 4/13/20 4/13/21  Mamadou Dodd MD   Cholecalciferol (VITAMIN D3) 50 MCG (2000 UT) capsule Take 1 capsule by mouth Daily. 4/13/20 4/13/21  Mamadou Dodd MD   enoxaparin (LOVENOX) 120 MG/0.8ML solution syringe Inject 0.71 mL under the skin into the appropriate area as directed Every 12 (Twelve) Hours. Indications: Other - full anticoagulation, prosthetic AVR 4/13/20   Mamadou Dodd MD   famotidine (PEPCID) 20 MG tablet Take 1 tablet by mouth 2 (Two) Times a Day. 4/13/20   Mamadou Dodd MD   Glycerin-Hypromellose- (ARTIFICIAL TEARS) 0.2-0.2-1 % solution ophthalmic solution Administer 1 drop to both eyes Every 1 (One) Hour As Needed for Dry Eyes. 4/13/20   Mamadou Dodd MD   insulin lispro (humaLOG) 100 UNIT/ML injection Inject 2-7 Units under the skin into the appropriate area as directed 4 (Four) Times a Day With Meals & at Bedtime. 4/13/20   Mamadou Dodd MD   losartan (COZAAR) 100 MG tablet Take 50 mg by mouth Daily.    Abdirizak Rico MD   ondansetron ODT (Zofran ODT) 4 MG disintegrating tablet Place 1 tablet on the tongue Every 8 (Eight) Hours As Needed for Nausea or Vomiting. 4/13/20   Mamadou Dodd MD   oxyCODONE-acetaminophen (PERCOCET) 5-325 MG per tablet Take 1 tablet by mouth Every 4 (Four) Hours As Needed for Severe Pain  for up to 7 days. 4/13/20 4/20/20  Mamadou Dodd MD   polyethylene glycol (MIRALAX) pack packet Take 17 g by mouth Daily. 4/14/20   Mamadou Dodd MD   sennosides-docusate (senna-docusate sodium) 8.6-50 MG per tablet Take 1 tablet by mouth Daily. 4/13/20   Mamadou Dodd MD   sucralfate (CARAFATE) 1 GM/10ML suspension Take 10 mL by mouth Every 6 (Six) Hours. 4/13/20   Mamadou Dodd MD   vitamin B-12 (CYANOCOBALAMIN) 1000 MCG tablet Take 1,000 mcg by mouth Daily.    Provider, MD Abdirizak       /60   Pulse 51   Temp 98.2 °F (36.8 °C) (Oral)   Resp 15   Ht 177.8 cm (70\")   Wt 108 kg (237 lb)   " SpO2 98%   BMI 34.01 kg/m²     Objective   Physical Exam   Constitutional: He is oriented to person, place, and time. He appears well-developed and well-nourished.   Nontoxic appearing    HENT:   Head: Normocephalic and atraumatic.   Eyes: Pupils are equal, round, and reactive to light. Conjunctivae and EOM are normal.   Neck: Normal range of motion. Neck supple.   Cardiovascular: Normal rate, regular rhythm, normal heart sounds and intact distal pulses.   Pulmonary/Chest: Effort normal and breath sounds normal.   Abdominal: Soft. Bowel sounds are normal.   abd soft, non distended. Moderate tenderness noted to midepigastric area. No guarding or rebound noted. NAM drain noted   Musculoskeletal: Normal range of motion.   Neurological: He is alert and oriented to person, place, and time. He has normal reflexes.   Skin: Skin is warm and dry.   Psychiatric: He has a normal mood and affect. His behavior is normal. Judgment and thought content normal.   Nursing note and vitals reviewed.      Procedures         Lab Results (last 24 hours)     Procedure Component Value Units Date/Time    CBC & Differential [[id number]] Collected:  04/14/20 2008    Specimen:  Blood Updated:  04/14/20 2027    Narrative:       The following orders were created for panel order CBC & Differential.  Procedure                               Abnormality         Status                     ---------                               -----------         ------                     CBC Auto Differential[[id number]]        Abnormal            Final result                 Please view results for these tests on the individual orders.    Comprehensive Metabolic Panel [[id number]]  (Abnormal) Collected:  04/14/20 2008    Specimen:  Blood Updated:  04/14/20 2032     Glucose 130 mg/dL      BUN 18 mg/dL      Creatinine 0.78 mg/dL      Sodium 131 mmol/L      Potassium 4.6 mmol/L      Chloride 94 mmol/L      CO2 27.0 mmol/L      Calcium 8.5 mg/dL      Total Protein 6.4  g/dL      Albumin 3.20 g/dL      ALT (SGPT) 39 U/L      AST (SGOT) 48 U/L      Alkaline Phosphatase 126 U/L      Total Bilirubin 1.1 mg/dL      eGFR Non African Amer 95 mL/min/1.73      Globulin 3.2 gm/dL      A/G Ratio 1.0 g/dL      BUN/Creatinine Ratio 23.1     Anion Gap 10.0 mmol/L     Narrative:       GFR Normal >60  Chronic Kidney Disease <60  Kidney Failure <15      Amylase [[id number]]  (Normal) Collected:  04/14/20 2008    Specimen:  Blood Updated:  04/14/20 2029     Amylase 56 U/L     Lipase [[id number]]  (Normal) Collected:  04/14/20 2008    Specimen:  Blood Updated:  04/14/20 2027     Lipase 28 U/L     Troponin [[id number]]  (Normal) Collected:  04/14/20 2008    Specimen:  Blood Updated:  04/14/20 2030     Troponin T 0.030 ng/mL     Narrative:       Troponin T Reference Range:  <= 0.03 ng/mL-   Negative for AMI  >0.03 ng/mL-     Abnormal for myocardial necrosis.  Clinicians would have to utilize clinical acumen, EKG, Troponin and serial changes to determine if it is an Acute Myocardial Infarction or myocardial injury due to an underlying chronic condition.       Results may be falsely decreased if patient taking Biotin.      CBC Auto Differential [[id number]]  (Abnormal) Collected:  04/14/20 2008    Specimen:  Blood Updated:  04/14/20 2027     WBC 16.07 10*3/mm3      RBC 3.37 10*6/mm3      Hemoglobin 10.0 g/dL      Hematocrit 31.2 %      MCV 92.6 fL      MCH 29.7 pg      MCHC 32.1 g/dL      RDW 14.6 %      RDW-SD 49.4 fl      MPV 10.2 fL      Platelets 248 10*3/mm3      Neutrophil % 78.8 %      Lymphocyte % 8.9 %      Monocyte % 11.0 %      Eosinophil % 0.1 %      Basophil % 0.3 %      Immature Grans % 0.9 %      Neutrophils, Absolute 12.67 10*3/mm3      Lymphocytes, Absolute 1.43 10*3/mm3      Monocytes, Absolute 1.76 10*3/mm3      Eosinophils, Absolute 0.02 10*3/mm3      Basophils, Absolute 0.05 10*3/mm3      Immature Grans, Absolute 0.14 10*3/mm3      nRBC 0.0 /100 WBC     Protime-INR [[id number]]   (Abnormal) Collected:  04/14/20 2008    Specimen:  Blood Updated:  04/14/20 2244     Protime 14.9 Seconds      INR 1.18    aPTT [[id number]]  (Abnormal) Collected:  04/14/20 2008    Specimen:  Blood Updated:  04/14/20 2244     PTT 47.2 seconds     Blood Culture - Blood, Arm, Right [[id number]] Collected:  04/14/20 2249    Specimen:  Blood from Arm, Right Updated:  04/14/20 2253    Lactic Acid, Plasma [[id number]]  (Normal) Collected:  04/14/20 2249    Specimen:  Blood Updated:  04/14/20 2311     Lactate 1.5 mmol/L     Blood Culture - Blood, Arm, Right [[id number]] Collected:  04/14/20 2255    Specimen:  Blood from Arm, Right Updated:  04/14/20 2306          CT Abdomen Pelvis With Contrast    (Results Pending)       ED Course  ED Course as of Apr 14 2331   Tue Apr 14, 2020 2237 CT shows acute diaz with tube in good place. There is now a left iliopsoas muscle hematoma.     I did run this by Dr. Mccann, still no plans for surgery but would recommend admission to medicine to rule out infected hematoma. Will place on abx.     []   2243 When asked he states he did fall but was unsure of when he fell stating it was the first day he was admitted. However,  Per nurses notes he stated he fell prior to admission (was admitted on 4/3).     [JH]      ED Course User Index  [JH] Hernandez Nowak MD          MDM  Number of Diagnoses or Management Options  Cholecystitis: established and worsening  Hematoma of left iliopsoas muscle, initial encounter: established and worsening     Amount and/or Complexity of Data Reviewed  Clinical lab tests: ordered and reviewed  Tests in the radiology section of CPT®: ordered and reviewed  Decide to obtain previous medical records or to obtain history from someone other than the patient: yes    Patient Progress  Patient progress: stable      Final diagnoses:   Hematoma of left iliopsoas muscle, initial encounter   Cholecystitis          Frida Hylton, APRN  04/14/20 2331

## 2020-04-15 NOTE — PAYOR COMM NOTE
"Jesus Smith (84 y.o. Male) 3137   OBS    [facility] phone    Fax        Date of Birth Social Security Number Address Home Phone MRN    [id number]  [id number] [address] [phone] [id number]    [employer] Marital Status          Restorationist        Admission Date Admission Type Admitting Provider Attending Provider Department, Room/Bed    4/14/20 Emergency Anson June DO Truong, Khai C, MD [facility] 3A, 341/1    Discharge Date Discharge Disposition Discharge Destination                       Attending Provider:  Mamadou Dodd MD    Allergies:  No Known Allergies    Isolation:  None   Infection:  None   Code Status:  No CPR    Ht:  177.8 cm (70\")   Wt:  107 kg (236 lb 9.6 oz)    Admission Cmt:  None   Principal Problem:  None                Active Insurance as of 4/14/2020     Primary Coverage     Payor Plan Insurance Group Employer/Plan Group    [facility] DEPT [id number]      Payor Plan Address Payor Plan Phone Number Payor Plan Fax Number Effective Dates    [employer] 04 [phone]  4/2/2020 - None Entered    [address] [facility] [address]       Subscriber Name Subscriber Birth Date Member ID       JESUS SMITH 1935 [id number]                 Emergency Contacts      (Rel.) Home Phone Work Phone Mobile Phone    HEAVEN RAYMOND (Relative) -- -- [phone]    IGNACIO ANGEL (Relative) -- -- [phone]               History & Physical      Anson June DO at 04/14/20 2249              [facility] Medicine Services  HISTORY AND PHYSICAL    Date of Admission: 4/14/2020  Primary Care Physician: Matheus Olivera PA    Subjective     Chief Complaint: Severe mid epigastric abdominal pain and nausea with vomiting    History of Present Illness  Jesus Smith is a 89-year-old male discharge from this facility yesterday after a " 10-day admission for bacteremia secondary to UTI, acute kidney injury and transaminitis.  Patient was found to have acute cholecystitis however due to anticoagulation with warfarin for prosthetic aortic valve, and severity of illness drain was placed per Dr. Agustina Saleem and IV therapy started.  Plan for cholecystectomy in 4 to 6 weeks.  Patient was discharged on Omnicef.  Also on twice daily Lovenox until surgery scheduled.  Patient discharged to rehab center.  Today patient ate a high caloric/fatty meal at lunch with subsequent nausea and vomiting x2 with associated pain.  He was transferred back to [facility] for evaluation.  General surgery, Dr. Yessi Mccann notified of patient's readmission.  Imaging revealed hematoma of left iliopsoas muscle.  Apparently patient had a fall prior to previous admission.  Dr. Mccann concerned for infection and commended continued antibiotic therapy with general surgery consult in a.m.  Currently patient states he continues to have mild nausea however pain is better after being treated.  Zosyn and vancomycin given in the emergency department.  Patient has chronic bilateral lower extremity edema.  He has no complaints of left lower extremity pain however edema is worse in that extremity.  Echocardiogram completed on 4/4/2020, please see below for adult.  White count today 16 as compared to 10 yesterday.  Patient is admitted for further evaluation treatment.    Review of Systems   A 10 point review of systems was completed, all negative except for those discussed in HPI    Past Medical History:   Past Medical History:   Diagnosis Date   • Bradycardia    • Coronary artery disease    • GERD (gastroesophageal reflux disease)    • Hypertension    • Injury of back    • TIA (transient ischemic attack)        Past Surgical History:   Past Surgical History:   Procedure Laterality Date   • APPENDECTOMY     • BACK SURGERY      Fusion   • CARDIAC SURGERY      Open Heart   •  EXPLORATORY LAPAROTOMY N/A 4/5/2020    Procedure: open cholecystostomy tube placement ;  Surgeon: Agustina Saleem MD;  Location: [facility];  Service: General;  Laterality: N/A;   • HERNIA REPAIR     • JOINT REPLACEMENT Left     s/p L hip replacement   • PACEMAKER IMPLANTATION     • STOMACH SURGERY         Family History: family history includes Heart disease in his mother; Stroke in his father.    Social History:  reports that he has never smoked. He has never used smokeless tobacco. He reports that he does not drink alcohol or use drugs.    Code Status: Limited, if unable to speak for himself his son Jesus Lopez will speak for him      Allergies:  No Known Allergies    Medications:      Instructions   bisacodyl 10 MG suppository  Commonly known as:  DULCOLAX    10 mg, Rectal, Daily PRN      cefdinir 300 MG capsule  Commonly known as:  OMNICEF    300 mg, Oral, 2 Times Daily      enoxaparin 120 MG/0.8ML solution syringe  Commonly known as:  LOVENOX    1 mg/kg (110 mg), Subcutaneous, Every 12 Hours      famotidine 20 MG tablet  Commonly known as:  PEPCID    20 mg, Oral, 2 Times Daily      Glycerin-Hypromellose- 0.2-0.2-1 % solution ophthalmic solution  Commonly known as:  ARTIFICIAL TEARS    1 drop, Both Eyes, Every 1 Hour PRN      insulin lispro 100 UNIT/ML injection  Commonly known as:  humaLOG    2-7 Units, Subcutaneous, 4 Times Daily With Meals & Nightly      ondansetron ODT 4 MG disintegrating tablet  Commonly known as:  Zofran ODT    4 mg, Translingual, Every 8 Hours PRN      oxyCODONE-acetaminophen 5-325 MG per tablet  Commonly known as:  PERCOCET    1 tablet, Oral, Every 4 Hours PRN      polyethylene glycol pack packet  Commonly known as:  MIRALAX    17 g, Oral, Daily      sennosides-docusate 8.6-50 MG per tablet  Commonly known as:  PERICOLACE    1 tablet, Oral, Daily      sucralfate 1 GM/10ML suspension  Commonly known as:  CARAFATE    1 g, Oral, Every 6 Hours Scheduled      Vitamin D3 50 MCG  "(2000 UT) capsule    2,000 Units, Oral, Daily      bisoprolol 10 MG tablet  Commonly known as:  ZEBeta  5 mg, Oral, Daily      aspirin 81 MG chewable tablet    81 mg, Oral, Daily      losartan 100 MG tablet  Commonly known as:  COZAAR    50 mg, Oral, Daily      vitamin B-12 1000 MCG tablet  Commonly known as:  CYANOCOBALAMIN    1,000 mcg, Oral, Daily            Objective     /53 (BP Location: Left arm, Patient Position: Lying)   Pulse 52   Temp 98.2 °F (36.8 °C) (Oral)   Resp 18   Ht 177.8 cm (70\")   Wt 107 kg (236 lb 9.6 oz)   SpO2 100%   BMI 33.95 kg/m²    Physical Exam   Constitutional: He is oriented to person, place, and time. He appears well-developed and well-nourished. No distress.   HENT:   Head: Normocephalic and atraumatic.   Eyes: Pupils are equal, round, and reactive to light. Conjunctivae and EOM are normal. No scleral icterus.   Neck: Normal range of motion. Neck supple. No JVD present. No tracheal deviation present.   Cardiovascular: Normal rate, regular rhythm, normal heart sounds and intact distal pulses. Exam reveals no gallop.   No murmur heard.  Pulmonary/Chest: Effort normal and breath sounds normal. No respiratory distress. He has no wheezes. He has no rales.   Abdominal: Soft. Bowel sounds are normal. He exhibits no distension. There is no tenderness. There is no guarding.   Protuberant. NAM to right upper quadurant; dark red/brown drainage    Musculoskeletal: He exhibits edema (significant bilateral lower extremities, worse on left).   Generalized deconditioned state   Neurological: He is alert and oriented to person, place, and time.   Skin: Skin is warm and dry. No rash noted. He is not diaphoretic. No erythema. No pallor.   Psychiatric: He has a normal mood and affect. His behavior is normal.   Vitals reviewed.      Pertinent Data:   Lab Results (last 72 hours)     Procedure Component Value Units Date/Time    Lactic Acid, Plasma [[id number]]  (Normal) Collected:  04/14/20 9749 "    Specimen:  Blood Updated:  04/14/20 2311     Lactate 1.5 mmol/L     Blood Culture - Blood, Arm, Right [[id number]] Collected:  04/14/20 2255    Specimen:  Blood from Arm, Right Updated:  04/14/20 2306    Blood Culture - Blood, Arm, Right [[id number]] Collected:  04/14/20 2249    Specimen:  Blood from Arm, Right Updated:  04/14/20 2253    Protime-INR [[id number]]  (Abnormal) Collected:  04/14/20 2008    Specimen:  Blood Updated:  04/14/20 2244     Protime 14.9 Seconds      INR 1.18    aPTT [[id number]]  (Abnormal) Collected:  04/14/20 2008    Specimen:  Blood Updated:  04/14/20 2244     PTT 47.2 seconds     Comprehensive Metabolic Panel [[id number]]  (Abnormal) Collected:  04/14/20 2008    Specimen:  Blood Updated:  04/14/20 2032     Glucose 130 mg/dL      BUN 18 mg/dL      Creatinine 0.78 mg/dL      Sodium 131 mmol/L      Potassium 4.6 mmol/L      Chloride 94 mmol/L      CO2 27.0 mmol/L      Calcium 8.5 mg/dL      Total Protein 6.4 g/dL      Albumin 3.20 g/dL      ALT (SGPT) 39 U/L      AST (SGOT) 48 U/L      Alkaline Phosphatase 126 U/L      Total Bilirubin 1.1 mg/dL      eGFR Non African Amer 95 mL/min/1.73      Globulin 3.2 gm/dL      A/G Ratio 1.0 g/dL      BUN/Creatinine Ratio 23.1     Anion Gap 10.0 mmol/L     Troponin [[id number]]  (Normal) Collected:  04/14/20 2008    Specimen:  Blood Updated:  04/14/20 2030     Troponin T 0.030 ng/mL     Amylase [[id number]]  (Normal) Collected:  04/14/20 2008    Specimen:  Blood Updated:  04/14/20 2029     Amylase 56 U/L     CBC Auto Differential [[id number]]  (Abnormal) Collected:  04/14/20 2008    Specimen:  Blood Updated:  04/14/20 2027     WBC 16.07 10*3/mm3      RBC 3.37 10*6/mm3      Hemoglobin 10.0 g/dL      Hematocrit 31.2 %      MCV 92.6 fL      MCH 29.7 pg      MCHC 32.1 g/dL      RDW 14.6 %      RDW-SD 49.4 fl      MPV 10.2 fL      Platelets 248 10*3/mm3      Neutrophil % 78.8 %      Lymphocyte % 8.9 %      Monocyte % 11.0 %      Eosinophil % 0.1 %      Basophil %  0.3 %      Immature Grans % 0.9 %      Neutrophils, Absolute 12.67 10*3/mm3      Lymphocytes, Absolute 1.43 10*3/mm3      Monocytes, Absolute 1.76 10*3/mm3      Eosinophils, Absolute 0.02 10*3/mm3      Basophils, Absolute 0.05 10*3/mm3      Immature Grans, Absolute 0.14 10*3/mm3      nRBC 0.0 /100 WBC     Lipase [[id number]]  (Normal) Collected:  04/14/20 2008    Specimen:  Blood Updated:  04/14/20 2027     Lipase 28 U/L         Imaging Results (Last 24 Hours)     Procedure Component Value Units Date/Time    CT Abdomen Pelvis With Contrast [[id number]] Resulted:  04/14/20 2202     Updated:  04/14/20 2211          I have personally reviewed and interpreted the radiology studies and ECG obtained at time of admission.     Assessment / Plan     Assessment:   Active Hospital Problems    Diagnosis   • Chronic anticoagulation   • Hyponatremia   • Hematoma of left iliopsoas muscle   • History of prosthetic aortic valve   • Acute cholecystitis   • Transaminitis       Plan:   1.  Admit as inpatient  2.  Continue Zosyn  3.  Home medications reviewed and restarted as appropriate to include therapeutic Lovenox for DVT prophylaxis  4.  Pain and nausea control  5.  Gentle hydration normal saline 75 mL/hour  6.  Consult general surgery  7.  Labs in a.m.    I discussed the patient's findings and my recommendations with: Anson June DO  Time spent: 40 minutes    Plan discussed with NP and agree.     Patient seen and examined by me on 4/14/2020 at 10:55 PM.    GÓMEZ Linn  04/15/20   00:21    Electronically signed by Anson June DO at 04/15/20 0639          Emergency Department Notes      Frida Hylton APRN at 04/14/20 1940     Attestation signed by Hernandez Nowak MD at 04/15/20 0143          For this patient encounter, I reviewed the NP or PA documentation, treatment plan, and medical decision making. Hernandez Nowak MD 4/15/2020 01:43                  Subjective   Patient is a 84-year-old white  male presents emergency department per EMS from nursing home with complaints of midepigastric abdominal pain and vomiting.  Patient was discharged from the hospital yesterday after being diagnosed with acute cholecystitis and is scheduled to have cholecystectomy in 4 to 6 weeks.  Evidently the patient was pain-free and feeling better when he was discharged back to the nursing home yesterday.  He states he started vomiting and having increased pain today around 2:00 this afternoon.  Evidently the patient was fed Yousif and beans, chocolate cake, milk, and salad around lunch this afternoon. He states that he vomited shortly after that and started having increased abd pain as well. He states that he has vomited x2. While hospitalized pt also received an open cholecystectomy tube placement as well       History provided by:  Patient   used: No        Review of Systems   Constitutional: Negative.    HENT: Negative.    Eyes: Negative.    Respiratory: Negative.    Cardiovascular: Negative.    Gastrointestinal:        Patient is a 84-year-old white male presents emergency department per EMS from nursing home with complaints of midepigastric abdominal pain and vomiting.  Patient was discharged from the hospital yesterday after being diagnosed with acute cholecystitis and is scheduled to have cholecystectomy in 4 to 6 weeks.  Evidently the patient was pain-free and feeling better when he was discharged back to the nursing home yesterday.  He states he started vomiting and having increased pain today around 2:00 this afternoon.  Evidently the patient was fed Yousif and beans, chocolate cake, milk, and salad around lunch this afternoon. He states that he vomited shortly after that and started having increased abd pain as well. He states that he has vomited x2. While hospitalized pt also received an open cholecystectomy tube placement as well      Endocrine: Negative.    Genitourinary: Negative.     Musculoskeletal: Negative.    Skin: Negative.    Allergic/Immunologic: Negative.    Neurological: Negative.    Hematological: Negative.    Psychiatric/Behavioral: Negative.    All other systems reviewed and are negative.      Past Medical History:   Diagnosis Date   • Bradycardia    • Coronary artery disease    • GERD (gastroesophageal reflux disease)    • Hypertension    • Injury of back    • TIA (transient ischemic attack)        No Known Allergies    Past Surgical History:   Procedure Laterality Date   • APPENDECTOMY     • BACK SURGERY      Fusion   • CARDIAC SURGERY      Open Heart   • EXPLORATORY LAPAROTOMY N/A 4/5/2020    Procedure: open cholecystostomy tube placement ;  Surgeon: Agustina Saleem MD;  [facility]: [facility];  Service: General;  Laterality: N/A;   • HERNIA REPAIR     • JOINT REPLACEMENT Left     s/p L hip replacement   • PACEMAKER IMPLANTATION     • STOMACH SURGERY         No family history on file.    Social History     Socioeconomic History   • Marital status:      Spouse name: Not on file   • Number of children: Not on file   • Years of education: Not on file   • Highest education level: Not on file   Tobacco Use   • Smoking status: Never Smoker   • Smokeless tobacco: Never Used   Substance and Sexual Activity   • Alcohol use: No   • Drug use: No   • Sexual activity: Defer       Prior to Admission medications    Medication Sig Start Date End Date Taking? Authorizing Provider   aspirin 81 MG chewable tablet Chew 81 mg Daily.    Provider, MD Abdirizak   bisacodyl (DULCOLAX) 10 MG suppository Insert 1 suppository into the rectum Daily As Needed for Constipation. 4/13/20   Mamadou Dodd MD   bisoprolol (ZEBeta) 10 MG tablet Take 0.5 tablets by mouth Daily. 4/13/20   Mamadou Dodd MD   cefdinir (OMNICEF) 300 MG capsule Take 1 capsule by mouth 2 (Two) Times a Day for 5 days. 4/13/20 4/18/20  Mamadou Dodd MD   Cholecalciferol (VITAMIN D) 50 MCG (2000 UT) tablet Take 1,000 Units by  "mouth Daily. 4/13/20 4/13/21  Mamadou Dodd MD   Cholecalciferol (VITAMIN D3) 50 MCG (2000 UT) capsule Take 1 capsule by mouth Daily. 4/13/20 4/13/21  Mamadou Dodd MD   enoxaparin (LOVENOX) 120 MG/0.8ML solution syringe Inject 0.71 mL under the skin into the appropriate area as directed Every 12 (Twelve) Hours. Indications: Other - full anticoagulation, prosthetic AVR 4/13/20   Mamadou Dodd MD   famotidine (PEPCID) 20 MG tablet Take 1 tablet by mouth 2 (Two) Times a Day. 4/13/20   Mamadou Dodd MD   Glycerin-Hypromellose- (ARTIFICIAL TEARS) 0.2-0.2-1 % solution ophthalmic solution Administer 1 drop to both eyes Every 1 (One) Hour As Needed for Dry Eyes. 4/13/20   Mamadou Dodd MD   insulin lispro (humaLOG) 100 UNIT/ML injection Inject 2-7 Units under the skin into the appropriate area as directed 4 (Four) Times a Day With Meals & at Bedtime. 4/13/20   Mamadou Dodd MD   losartan (COZAAR) 100 MG tablet Take 50 mg by mouth Daily.    ProviderAbdirizak MD   ondansetron ODT (Zofran ODT) 4 MG disintegrating tablet Place 1 tablet on the tongue Every 8 (Eight) Hours As Needed for Nausea or Vomiting. 4/13/20   Mamadou Dodd MD   oxyCODONE-acetaminophen (PERCOCET) 5-325 MG per tablet Take 1 tablet by mouth Every 4 (Four) Hours As Needed for Severe Pain  for up to 7 days. 4/13/20 4/20/20  Mamadou Dodd MD   polyethylene glycol (MIRALAX) pack packet Take 17 g by mouth Daily. 4/14/20   Mamadou Dodd MD   sennosides-docusate (senna-docusate sodium) 8.6-50 MG per tablet Take 1 tablet by mouth Daily. 4/13/20   Mmaadou Dodd MD   sucralfate (CARAFATE) 1 GM/10ML suspension Take 10 mL by mouth Every 6 (Six) Hours. 4/13/20   Mamadou Dodd MD   vitamin B-12 (CYANOCOBALAMIN) 1000 MCG tablet Take 1,000 mcg by mouth Daily.    Provider, MD Abdirizak       /60   Pulse 51   Temp 98.2 °F (36.8 °C) (Oral)   Resp 15   Ht 177.8 cm (70\")   Wt 108 kg (237 lb)   SpO2 98%   BMI 34.01 kg/m²  "     Objective   Physical Exam   Constitutional: He is oriented to person, place, and time. He appears well-developed and well-nourished.   Nontoxic appearing    HENT:   Head: Normocephalic and atraumatic.   Eyes: Pupils are equal, round, and reactive to light. Conjunctivae and EOM are normal.   Neck: Normal range of motion. Neck supple.   Cardiovascular: Normal rate, regular rhythm, normal heart sounds and intact distal pulses.   Pulmonary/Chest: Effort normal and breath sounds normal.   Abdominal: Soft. Bowel sounds are normal.   abd soft, non distended. Moderate tenderness noted to midepigastric area. No guarding or rebound noted. NAM drain noted   Musculoskeletal: Normal range of motion.   Neurological: He is alert and oriented to person, place, and time. He has normal reflexes.   Skin: Skin is warm and dry.   Psychiatric: He has a normal mood and affect. His behavior is normal. Judgment and thought content normal.   Nursing note and vitals reviewed.      Procedures        Lab Results (last 24 hours)     Procedure Component Value Units Date/Time    CBC & Differential [[id number]] Collected:  04/14/20 2008    Specimen:  Blood Updated:  04/14/20 2027    Narrative:       The following orders were created for panel order CBC & Differential.  Procedure                               Abnormality         Status                     ---------                               -----------         ------                     CBC Auto Differential[[id number]]        Abnormal            Final result                 Please view results for these tests on the individual orders.    Comprehensive Metabolic Panel [[id number]]  (Abnormal) Collected:  04/14/20 2008    Specimen:  Blood Updated:  04/14/20 2032     Glucose 130 mg/dL      BUN 18 mg/dL      Creatinine 0.78 mg/dL      Sodium 131 mmol/L      Potassium 4.6 mmol/L      Chloride 94 mmol/L      CO2 27.0 mmol/L      Calcium 8.5 mg/dL      Total Protein 6.4 g/dL      Albumin 3.20 g/dL       ALT (SGPT) 39 U/L      AST (SGOT) 48 U/L      Alkaline Phosphatase 126 U/L      Total Bilirubin 1.1 mg/dL      eGFR Non African Amer 95 mL/min/1.73      Globulin 3.2 gm/dL      A/G Ratio 1.0 g/dL      BUN/Creatinine Ratio 23.1     Anion Gap 10.0 mmol/L     Narrative:       GFR Normal >60  Chronic Kidney Disease <60  Kidney Failure <15      Amylase [[id number]]  (Normal) Collected:  04/14/20 2008    Specimen:  Blood Updated:  04/14/20 2029     Amylase 56 U/L     Lipase [[id number]]  (Normal) Collected:  04/14/20 2008    Specimen:  Blood Updated:  04/14/20 2027     Lipase 28 U/L     Troponin [[id number]]  (Normal) Collected:  04/14/20 2008    Specimen:  Blood Updated:  04/14/20 2030     Troponin T 0.030 ng/mL     Narrative:       Troponin T Reference Range:  <= 0.03 ng/mL-   Negative for AMI  >0.03 ng/mL-     Abnormal for myocardial necrosis.  Clinicians would have to utilize clinical acumen, EKG, Troponin and serial changes to determine if it is an Acute Myocardial Infarction or myocardial injury due to an underlying chronic condition.       Results may be falsely decreased if patient taking Biotin.      CBC Auto Differential [[id number]]  (Abnormal) Collected:  04/14/20 2008    Specimen:  Blood Updated:  04/14/20 2027     WBC 16.07 10*3/mm3      RBC 3.37 10*6/mm3      Hemoglobin 10.0 g/dL      Hematocrit 31.2 %      MCV 92.6 fL      MCH 29.7 pg      MCHC 32.1 g/dL      RDW 14.6 %      RDW-SD 49.4 fl      MPV 10.2 fL      Platelets 248 10*3/mm3      Neutrophil % 78.8 %      Lymphocyte % 8.9 %      Monocyte % 11.0 %      Eosinophil % 0.1 %      Basophil % 0.3 %      Immature Grans % 0.9 %      Neutrophils, Absolute 12.67 10*3/mm3      Lymphocytes, Absolute 1.43 10*3/mm3      Monocytes, Absolute 1.76 10*3/mm3      Eosinophils, Absolute 0.02 10*3/mm3      Basophils, Absolute 0.05 10*3/mm3      Immature Grans, Absolute 0.14 10*3/mm3      nRBC 0.0 /100 WBC     Protime-INR [[id number]]  (Abnormal) Collected:  04/14/20 2008     Specimen:  Blood Updated:  04/14/20 2244     Protime 14.9 Seconds      INR 1.18    aPTT [[id number]]  (Abnormal) Collected:  04/14/20 2008    Specimen:  Blood Updated:  04/14/20 2244     PTT 47.2 seconds     Blood Culture - Blood, Arm, Right [[id number]] Collected:  04/14/20 2249    Specimen:  Blood from Arm, Right Updated:  04/14/20 2253    Lactic Acid, Plasma [[id number]]  (Normal) Collected:  04/14/20 2249    Specimen:  Blood Updated:  04/14/20 2311     Lactate 1.5 mmol/L     Blood Culture - Blood, Arm, Right [[id number]] Collected:  04/14/20 2255    Specimen:  Blood from Arm, Right Updated:  04/14/20 2306          CT Abdomen Pelvis With Contrast    (Results Pending)       ED Course  ED Course as of Apr 14 2331   Tue Apr 14, 2020 2237 CT shows acute diaz with tube in good place. There is now a left iliopsoas muscle hematoma.     I did run this by Dr. Mccann, still no plans for surgery but would recommend admission to medicine to rule out infected hematoma. Will place on abx.     []   2243 When asked he states he did fall but was unsure of when he fell stating it was the first day he was admitted. However,  Per nurses notes he stated he fell prior to admission (was admitted on 4/3).     [JH]      ED Course User Index  [JH] Hernandez Nowak MD          MDM  Number of Diagnoses or Management Options  Cholecystitis: established and worsening  Hematoma of left iliopsoas muscle, initial encounter: established and worsening     Amount and/or Complexity of Data Reviewed  Clinical lab tests: ordered and reviewed  Tests in the radiology section of CPT®:  ordered and reviewed  Decide to obtain previous medical records or to obtain history from someone other than the patient: yes    Patient Progress  Patient progress: stable      Final diagnoses:   Hematoma of left iliopsoas muscle, initial encounter   Cholecystitis          Frida Hylton, APRN  04/14/20 2331      Electronically signed by Hernandez Nowak  MD at 04/15/20 0143         Current Facility-Administered Medications   Medication Dose Route Frequency Provider Last Rate Last Dose   • aspirin chewable tablet 81 mg  81 mg Oral Daily Izabella Michelle APRN       • bisacodyl (DULCOLAX) suppository 10 mg  10 mg Rectal Daily PRN zIabella Michelle, APRN       • bisoprolol (ZEBeta) tablet 5 mg  5 mg Oral Daily Izabella Michelle, APRN       • cyanocobalamin (VITAMIN B-12) tablet 1,000 mcg  1,000 mcg Oral Daily Izabella Michelle, GÓMEZ       • dextrose (D50W) 25 g/ 50mL Intravenous Solution 25 g  25 g Intravenous Q15 Min PRN Izabella Michelle APRN       • dextrose (GLUTOSE) oral gel 15 g  15 g Oral Q15 Min PRN Izabella Michelle APRN       • enoxaparin (LOVENOX) syringe 110 mg  1 mg/kg Subcutaneous Q12H Izabella Michelle, APRN       • famotidine (PEPCID) tablet 20 mg  20 mg Oral BID Izabella Michelle APRN       • glucagon (human recombinant) (GLUCAGEN DIAGNOSTIC) injection 1 mg  1 mg Subcutaneous Q15 Min PRN Izabella Michelle APRN       • Glycerin-Hypromellose- (ARTIFICIAL TEARS) 0.2-0.2-1 % ophthalmic solution solution 1 drop  1 drop Both Eyes Q1H PRN Izabella Michelle APRN       • insulin lispro (humaLOG) injection 2-7 Units  2-7 Units Subcutaneous TID AC Izabella Michelle APRJUANJO       • losartan (COZAAR) tablet 50 mg  50 mg Oral Daily Izabella Michelle APRN       • ondansetron (ZOFRAN) tablet 4 mg  4 mg Oral Q6H PRN Izabella Michelle APRN        Or   • ondansetron (ZOFRAN) injection 4 mg  4 mg Intravenous Q6H PRN Izabella Michelle, APRJUANJO       • oxyCODONE-acetaminophen (PERCOCET) 5-325 MG per tablet 1 tablet  1 tablet Oral Q4H PRN Izabella Michelle APRN   1 tablet at 04/15/20 0639   • Pharmacy to Dose Zosyn   Does not apply Continuous PRN Izabella Michelle APRN       • piperacillin-tazobactam (ZOSYN) 3.375 g in iso-osmotic dextrose 50 ml (premix)  3.375 g Intravenous Q8H Anson June DO   3.375 g at 04/15/20 0549   • polyethylene glycol 3350 powder  (packet)  17 g Oral Daily Izabella Michelle APRN       • sennosides-docusate (PERICOLACE) 8.6-50 MG per tablet 1 tablet  1 tablet Oral Daily Izabella Michelle APRN       • sodium chloride 0.9 % flush 10 mL  10 mL Intravenous Q12H Izabella Michelle APRN       • sodium chloride 0.9 % flush 10 mL  10 mL Intravenous PRN Izabella Michelle APRN       • sodium chloride 0.9 % infusion  75 mL/hr Intravenous Continuous Izabella Michelle APRN 75 mL/hr at 04/15/20 0107 75 mL/hr at 04/15/20 0107   • sucralfate (CARAFATE) 1 GM/10ML suspension 1 g  1 g Oral Q6H Izabella Michelle APRN   1 g at 04/15/20 0549

## 2020-04-15 NOTE — PROGRESS NOTES
Discharge Planning Assessment  [facility]     Patient Name: Jesus Smith  MRN: [id number]  Today's Date: 4/15/2020    Admit Date: 4/14/2020    Discharge Needs Assessment     Row Name 04/15/20 0942       Living Environment    Lives With  facility resident  (Pended)     Name(s) of Who Lives With Patient  Pt is a resident at [facility]   (Pended)     Current Living Arrangements  extended care facility  (Pended)     Provides Primary Care For  no one, unable/limited ability to care for self  (Pended)     Quality of Family Relationships  helpful;involved;supportive  (Pended)        Resource/Environmental Concerns    Resource/Environmental Concerns  none  (Pended)     Transportation Concerns  car, none  (Pended)        Transition Planning    Patient/Family Anticipates Transition to  long term care facility  (Pended)     Patient/Family Anticipated Services at Transition  skilled nursing  (Pended)     Transportation Anticipated  family or friend will provide  (Pended)        Discharge Needs Assessment    Readmission Within the Last 30 Days  previous discharge plan unsuccessful  (Pended)     Concerns to be Addressed  discharge planning  (Pended)     Equipment Currently Used at Home  walker, rolling  (Pended)     Equipment Needed After Discharge  none  (Pended)     Outpatient/Agency/Support Group Needs  skilled nursing facility  (Pended)     Discharge Facility/Level of Care Needs  nursing facility, skilled  (Pended)     Discharge Coordination/Progress  Pt has rx coverage and a PCP. Pt was recently admitted to [facility] and plans to return at d/c. SW spoke w/sister-in-law, Debi [phone]. Family denies any current DME or service needs at this time and plans for him to return to NH facility. [facility] will follow and assist with any needs that arise prior to d/c.  (Pended)         Discharge Plan    No documentation.       Destination      Coordination has not been started for this encounter.       Durable Medical Equipment      Coordination has not been started for this encounter.      Dialysis/Infusion      Coordination has not been started for this encounter.      Home Medical Care      Coordination has not been started for this encounter.      Therapy      Coordination has not been started for this encounter.      Community Resources      Coordination has not been started for this encounter.          Demographic Summary    No documentation.       Functional Status    No documentation.       Psychosocial    No documentation.       Abuse/Neglect    No documentation.       Legal    No documentation.       Substance Abuse    No documentation.       Patient Forms    No documentation.           Simran Peguero

## 2020-04-16 LAB
ALBUMIN SERPL-MCNC: 2.6 G/DL (ref 3.5–5.2)
ALBUMIN/GLOB SERPL: 0.8 G/DL
ALP SERPL-CCNC: 104 U/L (ref 39–117)
ALT SERPL W P-5'-P-CCNC: 46 U/L (ref 1–41)
ANION GAP SERPL CALCULATED.3IONS-SCNC: 9 MMOL/L (ref 5–15)
AST SERPL-CCNC: 96 U/L (ref 1–40)
BILIRUB SERPL-MCNC: 0.9 MG/DL (ref 0.2–1.2)
BUN BLD-MCNC: 16 MG/DL (ref 8–23)
BUN/CREAT SERPL: 17.2 (ref 7–25)
CALCIUM SPEC-SCNC: 8 MG/DL (ref 8.6–10.5)
CHLORIDE SERPL-SCNC: 98 MMOL/L (ref 98–107)
CO2 SERPL-SCNC: 26 MMOL/L (ref 22–29)
CREAT BLD-MCNC: 0.93 MG/DL (ref 0.76–1.27)
DEPRECATED RDW RBC AUTO: 50 FL (ref 37–54)
ERYTHROCYTE [DISTWIDTH] IN BLOOD BY AUTOMATED COUNT: 14.6 % (ref 12.3–15.4)
GFR SERPL CREATININE-BSD FRML MDRD: 78 ML/MIN/1.73
GLOBULIN UR ELPH-MCNC: 3.2 GM/DL
GLUCOSE BLD-MCNC: 132 MG/DL (ref 65–99)
GLUCOSE BLDC GLUCOMTR-MCNC: 117 MG/DL (ref 70–130)
GLUCOSE BLDC GLUCOMTR-MCNC: 122 MG/DL (ref 70–130)
GLUCOSE BLDC GLUCOMTR-MCNC: 138 MG/DL (ref 70–130)
HCT VFR BLD AUTO: 26.7 % (ref 37.5–51)
HGB BLD-MCNC: 8.6 G/DL (ref 13–17.7)
MCH RBC QN AUTO: 30.3 PG (ref 26.6–33)
MCHC RBC AUTO-ENTMCNC: 32.2 G/DL (ref 31.5–35.7)
MCV RBC AUTO: 94 FL (ref 79–97)
PLATELET # BLD AUTO: 201 10*3/MM3 (ref 140–450)
PMV BLD AUTO: 10.9 FL (ref 6–12)
POTASSIUM BLD-SCNC: 4.5 MMOL/L (ref 3.5–5.2)
PROT SERPL-MCNC: 5.8 G/DL (ref 6–8.5)
RBC # BLD AUTO: 2.84 10*6/MM3 (ref 4.14–5.8)
SODIUM BLD-SCNC: 133 MMOL/L (ref 136–145)
WBC NRBC COR # BLD: 12 10*3/MM3 (ref 3.4–10.8)

## 2020-04-16 PROCEDURE — G0378 HOSPITAL OBSERVATION PER HR: HCPCS

## 2020-04-16 PROCEDURE — 97530 THERAPEUTIC ACTIVITIES: CPT

## 2020-04-16 PROCEDURE — 80053 COMPREHEN METABOLIC PANEL: CPT | Performed by: FAMILY MEDICINE

## 2020-04-16 PROCEDURE — 25010000002 PIPERACILLIN SOD-TAZOBACTAM PER 1 G: Performed by: INTERNAL MEDICINE

## 2020-04-16 PROCEDURE — 97162 PT EVAL MOD COMPLEX 30 MIN: CPT

## 2020-04-16 PROCEDURE — 96376 TX/PRO/DX INJ SAME DRUG ADON: CPT

## 2020-04-16 PROCEDURE — 85027 COMPLETE CBC AUTOMATED: CPT | Performed by: SPECIALIST

## 2020-04-16 PROCEDURE — 96366 THER/PROPH/DIAG IV INF ADDON: CPT

## 2020-04-16 PROCEDURE — 25010000002 ENOXAPARIN PER 10 MG: Performed by: FAMILY MEDICINE

## 2020-04-16 PROCEDURE — 96372 THER/PROPH/DIAG INJ SC/IM: CPT

## 2020-04-16 PROCEDURE — 25010000002 ONDANSETRON PER 1 MG: Performed by: NURSE PRACTITIONER

## 2020-04-16 PROCEDURE — 82962 GLUCOSE BLOOD TEST: CPT

## 2020-04-16 RX ORDER — FAMOTIDINE 10 MG/ML
20 INJECTION, SOLUTION INTRAVENOUS 2 TIMES DAILY
Status: DISCONTINUED | OUTPATIENT
Start: 2020-04-16 | End: 2020-04-19

## 2020-04-16 RX ADMIN — LOSARTAN POTASSIUM 50 MG: 50 TABLET, FILM COATED ORAL at 08:41

## 2020-04-16 RX ADMIN — SODIUM CHLORIDE 75 ML/HR: 9 INJECTION, SOLUTION INTRAVENOUS at 06:42

## 2020-04-16 RX ADMIN — ENOXAPARIN SODIUM 40 MG: 40 INJECTION SUBCUTANEOUS at 09:43

## 2020-04-16 RX ADMIN — BISOPROLOL FUMARATE 2.5 MG: 5 TABLET ORAL at 08:40

## 2020-04-16 RX ADMIN — SUCRALFATE 1 G: 1 SUSPENSION ORAL at 17:01

## 2020-04-16 RX ADMIN — SODIUM CHLORIDE, PRESERVATIVE FREE 10 ML: 5 INJECTION INTRAVENOUS at 20:31

## 2020-04-16 RX ADMIN — DOCUSATE SODIUM 50 MG AND SENNOSIDES 8.6 MG 1 TABLET: 8.6; 5 TABLET, FILM COATED ORAL at 09:30

## 2020-04-16 RX ADMIN — TAZOBACTAM SODIUM AND PIPERACILLIN SODIUM 3.38 G: 375; 3 INJECTION, SOLUTION INTRAVENOUS at 14:04

## 2020-04-16 RX ADMIN — TAZOBACTAM SODIUM AND PIPERACILLIN SODIUM 3.38 G: 375; 3 INJECTION, SOLUTION INTRAVENOUS at 22:25

## 2020-04-16 RX ADMIN — OXYCODONE HYDROCHLORIDE AND ACETAMINOPHEN 1 TABLET: 5; 325 TABLET ORAL at 02:17

## 2020-04-16 RX ADMIN — ONDANSETRON HYDROCHLORIDE 4 MG: 2 SOLUTION INTRAMUSCULAR; INTRAVENOUS at 04:18

## 2020-04-16 RX ADMIN — OXYCODONE HYDROCHLORIDE AND ACETAMINOPHEN 1 TABLET: 5; 325 TABLET ORAL at 20:32

## 2020-04-16 RX ADMIN — POLYETHYLENE GLYCOL 3350 17 G: 17 POWDER, FOR SOLUTION ORAL at 08:41

## 2020-04-16 RX ADMIN — SUCRALFATE 1 G: 1 SUSPENSION ORAL at 11:04

## 2020-04-16 RX ADMIN — TAZOBACTAM SODIUM AND PIPERACILLIN SODIUM 3.38 G: 375; 3 INJECTION, SOLUTION INTRAVENOUS at 06:32

## 2020-04-16 RX ADMIN — FAMOTIDINE 20 MG: 10 INJECTION INTRAVENOUS at 20:31

## 2020-04-16 RX ADMIN — OXYCODONE HYDROCHLORIDE AND ACETAMINOPHEN 1 TABLET: 5; 325 TABLET ORAL at 11:03

## 2020-04-16 RX ADMIN — ASPIRIN 81 MG: 81 TABLET, CHEWABLE ORAL at 08:40

## 2020-04-16 RX ADMIN — OXYCODONE HYDROCHLORIDE AND ACETAMINOPHEN 1 TABLET: 5; 325 TABLET ORAL at 06:31

## 2020-04-16 RX ADMIN — FAMOTIDINE 20 MG: 20 TABLET, FILM COATED ORAL at 08:40

## 2020-04-16 RX ADMIN — Medication 1000 MCG: at 08:40

## 2020-04-16 RX ADMIN — SUCRALFATE 1 G: 1 SUSPENSION ORAL at 06:31

## 2020-04-16 NOTE — PLAN OF CARE
Problem: Patient Care Overview  Goal: Plan of Care Review  Flowsheets (Taken 4/16/2020 5438)  Outcome Summary: PT eval completed. He presents alert and oriented. He demos significant weakness and edema in B LE with L LE weaker and more swollen, than R LE. He needed min assist to roll left and right and mod assist to complete bed mobility. PT will continue to progress functional mobililty and strength. I anticipate he will need SNF placement at d/c.

## 2020-04-16 NOTE — THERAPY EVALUATION
Patient Name: Jesus Smith  : 1935    MRN: [id number]                              Today's Date: 2020       Admit Date: 2020    Visit Dx:     ICD-10-CM ICD-9-CM   1. Hematoma of left iliopsoas muscle, initial encounter S70.12XA 924.00   2. Cholecystitis K81.9 575.10   3. Impaired mobility Z74.09 799.89     Patient Active Problem List   Diagnosis   • Acute UTI (urinary tract infection)   • Bacteremia   • Transaminitis   • Acute cholecystitis   • Benign essential HTN   • CKD (chronic kidney disease) stage 3, GFR 30-59 ml/min (CMS/[facility])   • History of prosthetic aortic valve   • Hematoma of left iliopsoas muscle   • Chronic anticoagulation   • Hyponatremia     Past Medical History:   Diagnosis Date   • Bradycardia    • Coronary artery disease    • GERD (gastroesophageal reflux disease)    • Hypertension    • Injury of back    • TIA (transient ischemic attack)      Past Surgical History:   Procedure Laterality Date   • APPENDECTOMY     • BACK SURGERY      Fusion   • CARDIAC SURGERY      Open Heart   • EXPLORATORY LAPAROTOMY N/A 2020    Procedure: open cholecystostomy tube placement ;  Surgeon: Agustina Saleem MD;  Location: [facility];  Service: General;  Laterality: N/A;   • HERNIA REPAIR     • JOINT REPLACEMENT Left     s/p L hip replacement   • PACEMAKER IMPLANTATION     • STOMACH SURGERY       General Information     Row Name 20 0930          PT Evaluation Time/Intention    Document Type  evaluation CC: Severe mid epigastric abdominal pain & n/v. Fall prior to previous admit, now with hematoma to L iliopsoas muscle. s/p 4/5 open cholecystostomy tube placement with plan for cholecystectomy in ~ 1 month.   -NEO     Mode of Treatment  physical therapy  -NEO     Row Name 20 0930          General Information    Patient Profile Reviewed?  yes  -NEO     Prior Level of Function  min assist:;bed mobility;transfer;gait was CGA/min assist for all activity last week during admit  -NEO      Existing Precautions/Restrictions  fall  -NEO     Barriers to Rehab  medically complex;previous functional deficit  -NEO     Row Name 04/16/20 0930          Relationship/Environment    Lives With  facility resident  -NEO     Name(s) of Who Lives With Patient  [facility]  -NEO     Row Name 04/16/20 0930          Resource/Environmental Concerns    Current Living Arrangements  extended care facility  -NEO     Row Name 04/16/20 0930          Cognitive Assessment/Intervention- PT/OT    Orientation Status (Cognition)  oriented x 4  -NEO     Row Name 04/16/20 0930          Safety Issues, Functional Mobility    Impairments Affecting Function (Mobility)  balance;endurance/activity tolerance;strength;pain  -NEO       User Key  (r) = Recorded By, (t) = Taken By, (c) = Cosigned By    Initials Name Provider Type    Prateek Martinez PT DPT Physical Therapist        Mobility     Row Name 04/16/20 0930          Bed Mobility Assessment/Treatment    Bed Mobility Assessment/Treatment  rolling left;rolling right;supine-sit;sit-supine  -NEO     Rolling Left [place] (Bed Mobility)  minimum assist (75% patient effort)  -NEO     Rolling Right [place] (Bed Mobility)  minimum assist (75% patient effort)  -NEO     Supine-Sit [place] (Bed Mobility)  moderate assist (50% patient effort)  -NEO     Sit-Supine [place] (Bed Mobility)  moderate assist (50% patient effort)  -NEO     Assistive Device (Bed Mobility)  head of bed elevated;bed rails  -NEO     Row Name 04/16/20 0930          Transfer Assessment/Treatment    Comment (Transfers)  refused OOB activity at this time, left RW in room  -NEO       User Key  (r) = Recorded By, (t) = Taken By, (c) = Cosigned By    Initials Name Provider Type    Prateek Martinez PT DPT Physical Therapist        Obj/Interventions     Row Name 04/16/20 0930          General ROM    GENERAL ROM COMMENTS  R LE AROM WFL, L hip/knee AROM impaired ~ 90% (edema, weakness)  -NEO     Row Name 04/16/20 0930           MMT (Manual Muscle Testing)    General MMT Comments  R LE funcitonally 3+/5, L hip/knee 2-/5  -NEO     Row Name 04/16/20 0930          Static Sitting Balance    Level of [place] (Unsupported Sitting, Static Balance)  minimal assist, 75% patient effort;moderate assist, 50 to 74% patient effort  -NEO     Sitting Position (Unsupported Sitting, Static Balance)  long sitting on bed  -NEO     Row Name 04/16/20 0930          Sensory Assessment/Intervention    Sensory General Assessment  no sensation deficits identified B LE intact to light touch. Significant L LE edema, worse than R LE  -NEO       User Key  (r) = Recorded By, (t) = Taken By, (c) = Cosigned By    Initials Name Provider Type    Prateek Martinez, PT DPT Physical Therapist        Goals/Plan     Row Name 04/16/20 0930          Bed Mobility Goal 1 (PT)    Activity/Assistive Device (Bed Mobility Goal 1, PT)  sit to supine/supine to sit  -NEO     [place] Level/Cues Needed (Bed Mobility Goal 1, PT)  minimum assist (75% or more patient effort)  -NEO     Time Frame (Bed Mobility Goal 1, PT)  long term goal (LTG);10 days  -NEO     Progress/Outcomes (Bed Mobility Goal 1, PT)  goal ongoing  -NEO     Row Name 04/16/20 0930          Transfer Goal 1 (PT)    Activity/Assistive Device (Transfer Goal 1, PT)  sit-to-stand/stand-to-sit;bed-to-chair/chair-to-bed;walker, rolling  -NEO     [place] Level/Cues Needed (Transfer Goal 1, PT)  minimum assist (75% or more patient effort)  -NEO     Time Frame (Transfer Goal 1, PT)  long term goal (LTG);10 days  -NEO     Progress/Outcome (Transfer Goal 1, PT)  goal ongoing  -NEO     Row Name 04/16/20 0930          Gait Training Goal 1 (PT)    Activity/Assistive Device (Gait Training Goal 1, PT)  gait (walking locomotion);assistive device use;decrease fall risk;improve balance and speed;increase endurance/gait distance  -NEO     [place] Level (Gait Training Goal 1, PT)  minimum assist (75% or more patient effort)  -NEO      Distance (Gait Goal 1, PT)  25  -NEO     Time Frame (Gait Training Goal 1, PT)  long term goal (LTG);10 days  -NEO     Progress/Outcome (Gait Training Goal 1, PT)  goal ongoing  -NEO       User Key  (r) = Recorded By, (t) = Taken By, (c) = Cosigned By    Initials Name Provider Type    Prateek Martinez, PT DPT Physical Therapist        Clinical Impression     Row Name 04/16/20 0930          Pain Assessment    Additional Documentation  Pain Scale: FACES Pre/Post-Treatment (Group)  -NEO     Row Name 04/16/20 0930          Pain Scale: Numbers Pre/Post-Treatment    Pain Location  abdomen  -NEO     Row Name 04/16/20 0930          Pain Scale: FACES Pre/Post-Treatment    Pain: FACES Scale, Pretreatment  4-->hurts little more  -NEO     Pain: FACES Scale, Post-Treatment  4-->hurts little more  -NEO     Row Name 04/16/20 0930          Plan of Care Review    Plan of Care Reviewed With  patient  -NEO     Row Name 04/16/20 0930          Physical Therapy Clinical Impression    Patient/Family Goals Statement (PT Clinical Impression)  increase mobility  -NEO     Criteria for Skilled Interventions Met (PT Clinical Impression)  yes;treatment indicated  -NEO     Rehab Potential (PT Clinical Summary)  good, to achieve stated therapy goals  -NEO     Predicted Duration of Therapy (PT)  until d/c  -NEO     Row Name 04/16/20 0930          Positioning and Restraints    Pre-Treatment Position  in bed  -NEO     Post Treatment Position  bed  -NEO     In Bed  notified nsg;fowlers;call light within reach;encouraged to call for assist  -NEO       User Key  (r) = Recorded By, (t) = Taken By, (c) = Cosigned By    Initials Name Provider Type    Prateek Martinez, PT DPT Physical Therapist        Outcome Measures     Row Name 04/16/20 0930          How much help from another person do you currently need...    Turning from your back to your side while in flat bed without using bedrails?  2  -NEO     Moving from lying on back to sitting on the side of a flat  bed without bedrails?  2  -NEO     Moving to and from a bed to a chair (including a wheelchair)?  2  -NEO     Standing up from a chair using your arms (e.g., wheelchair, bedside chair)?  2  -NEO     Climbing 3-5 steps with a railing?  1  -NEO     To walk in hospital room?  2  -NEO     AM-PAC 6 Clicks Score (PT)  11  -NEO     Row Name 04/16/20 0930          Functional Assessment    Outcome Measure Options  AM-PAC 6 Clicks Basic Mobility (PT)  -NEO       User Key  (r) = Recorded By, (t) = Taken By, (c) = Cosigned By    Initials Name Provider Type    Prateek Martinez PT DPT Physical Therapist          PT Recommendation and Plan  Planned Therapy Interventions (PT Eval): bed mobility training, transfer training, gait training, balance training, home exercise program, patient/family education, postural re-education, strengthening  Outcome Summary/Treatment Plan (PT)  Anticipated Discharge Disposition (PT): skilled nursing facility  Plan of Care Reviewed With: patient  Outcome Summary: PT eval completed. He presents alert and oriented. He demos significant weakness and edema in B LE with L LE weaker and more swollen, than R LE. He needed min assist to roll left and right and mod assist to complete bed mobility. PT will continue to progress functional mobililty and strength. I anticipate he will need SNF placement at d/c.     Time Calculation:   PT Charges     Row Name 04/16/20 1327             Time Calculation    Start Time  0930  -NEO      Stop Time  1040  -NEO      Time Calculation (min)  70 min  -NEO      PT Received On  04/16/20  -NEO      PT Goal Re-Cert Due Date  04/26/20  -NEO         Time Calculation- PT    Total Timed Code Minutes- PT  10 minute(s)  -NEO         Timed Charges    86898 - PT Therapeutic Activity Minutes  10  -NEO        User Key  (r) = Recorded By, (t) = Taken By, (c) = Cosigned By    Initials Name Provider Type    Prateek Martinez PT DPT Physical Therapist        Therapy Charges for Today     Code  Description Service Date Service Provider Modifiers Qty    55249386309 HC PT THERAPEUTIC ACT EA 15 MIN 4/16/2020 Prateek Juan, PT DPT GP 1    51204071420 HC PT EVAL MOD COMPLEXITY 4 4/16/2020 Prateek Juan, PT DPT GP 1          PT G-Codes  Outcome Measure Options: AM-PAC 6 Clicks Basic Mobility (PT)  AM-PAC 6 Clicks Score (PT): 11    Prateek Juan, PT DPT  4/16/2020

## 2020-04-16 NOTE — PLAN OF CARE
VSS, ambulated to BR with moderate assist X2, voiding, medicated for pain X2 mostly c/o back pain, severe edema to BLE, v-paced per telemetry, PM interrogated and found lower limit at 50, lovenox restated,  with small amount output this shift, dsg CDI to  site, accuchecks as ordered, A/O X3, [place] IRA, safety maintained, will continue to monitor    Problem: Patient Care Overview  Goal: Plan of Care Review  Outcome: Ongoing (interventions implemented as appropriate)  Flowsheets (Taken 4/16/2020 2617)  Progress: no change  Plan of Care Reviewed With: patient; daughter

## 2020-04-16 NOTE — PLAN OF CARE
Problem: Patient Care Overview  Goal: Plan of Care Review  Outcome: Ongoing (interventions implemented as appropriate)  Flowsheets  Taken 4/15/2020 0422 by Samy Chambers RN  Progress: no change  Taken 4/16/2020 0433 by Marialuisa Moore RNA  Outcome Summary: pt c/o pain x2, prn po pain meds given. medicated for nausea x1. Wesley output is dark green, output has totaled 70 ml thus far into the shift. safety maintained. will continue to monitor.

## 2020-04-16 NOTE — PROGRESS NOTES
[facility] Medicine Services  INPATIENT PROGRESS NOTE    Length of Stay: 0  Date of Admission: 4/14/2020  Primary Care Physician: Matheus Olivera PA    Subjective   Chief Complaint: Back pain/abdomen pain/nausea    HPI   Patient state back pain is worse today.  Hemoglobin has been stable.  Plan to put patient back on prophylactic Lovenox for now.  Patient denies any fever night sweats or chills.  Patient denies any chest pain or shortness of breath at this time.  Patient does complain nausea symptoms, unable to tolerate breakfast today.    Review of Systems   Constitutional: Positive for appetite change and fatigue. Negative for chills and fever.   HENT: Negative for hearing loss, nosebleeds, tinnitus and trouble swallowing.    Eyes: Negative for visual disturbance.   Respiratory: Negative for cough, chest tightness, shortness of breath and wheezing.    Cardiovascular: Negative for chest pain, palpitations and leg swelling.   Gastrointestinal: Positive for abdominal pain. Negative for abdominal distention, blood in stool, constipation, diarrhea,  vomiting.  Patient pain nausea today.  Endocrine: Negative for cold intolerance, heat intolerance, polydipsia, polyphagia and polyuria.   Genitourinary: Negative for decreased urine volume, difficulty urinating, dysuria, flank pain, frequency and hematuria.   Musculoskeletal: Positive for arthralgias, back pain, gait problem and myalgias. Negative for joint swelling.   Skin: Negative for rash.   Allergic/Immunologic: Negative for immunocompromised state.   Neurological: Positive for weakness. Negative for dizziness, syncope, light-headedness and headaches.   Hematological: Negative for adenopathy. Does not bruise/bleed easily.   Psychiatric/Behavioral: Positive for confusion. Negative for sleep disturbance. The patient is not nervous/anxious.      All pertinent negatives and positives are as above. All other systems have been  reviewed and are negative unless otherwise stated.     Objective    Temp:  [97.8 °F (36.6 °C)-99 °F (37.2 °C)] 98.2 °F (36.8 °C)  Heart Rate:  [50-60] 52  Resp:  [16] 16  BP: (109-133)/(47-72) 124/48    Intake/Output Summary (Last 24 hours) at 4/16/2020 0918  Last data filed at 4/16/2020 0909  Gross per 24 hour   Intake 1577 ml   Output 1200 ml   Net 377 ml     Physical Exam  Constitutional: He appears well-developed.   HENT:   Head: Normocephalic.   Eyes: Pupils are equal, round, and reactive to light. Conjunctivae are normal.   Neck: Neck supple. No JVD present.   Cardiovascular: Normal rate, regular rhythm, normal heart sounds and intact distal pulses. Exam reveals no gallop and no friction rub.   No murmur heard.  Pulmonary/Chest: No respiratory distress. He has no wheezes. He has no rales. He exhibits no tenderness.   Decrease in breath sound bilateral, clear.   Abdominal: Bowel sounds are normal. He exhibits no distension. There is no tenderness. There is no rebound and no guarding.   Protuberant. NAM to right upper quadurant; bilious drainage dark green.   Musculoskeletal: Normal range of motion. He exhibits edema. He exhibits no tenderness or deformity.   2+ pitting edema.   Neurological: He is alert. He displays normal reflexes. No cranial nerve deficit. He exhibits abnormal muscle tone. Coordination abnormal.   Skin: Skin is warm and dry. Capillary refill takes 2 to 3 seconds. No rash noted.   Psychiatric: He has a normal mood and affect. His behavior is normal.   Nursing note and vitals reviewed.  Results Review:  Lab Results (last 24 hours)     Procedure Component Value Units Date/Time    POC Glucose Once [[id number]]  (Abnormal) Collected:  04/16/20 0749    Specimen:  Blood Updated:  04/16/20 0819     Glucose 138 mg/dL      Comment: : [id number] Michelle PamelaMeter ID: [id number]       Comprehensive Metabolic Panel [[id number]]  (Abnormal) Collected:  04/16/20 0542    Specimen:  Blood Updated:  04/16/20  0640     Glucose 132 mg/dL      BUN 16 mg/dL      Creatinine 0.93 mg/dL      Sodium 133 mmol/L      Potassium 4.5 mmol/L      Chloride 98 mmol/L      CO2 26.0 mmol/L      Calcium 8.0 mg/dL      Total Protein 5.8 g/dL      Albumin 2.60 g/dL      ALT (SGPT) 46 U/L      AST (SGOT) 96 U/L      Alkaline Phosphatase 104 U/L      Total Bilirubin 0.9 mg/dL      eGFR Non African Amer 78 mL/min/1.73      Globulin 3.2 gm/dL      A/G Ratio 0.8 g/dL      BUN/Creatinine Ratio 17.2     Anion Gap 9.0 mmol/L     Narrative:       GFR Normal >60  Chronic Kidney Disease <60  Kidney Failure <15      CBC (No Diff) [[id number]]  (Abnormal) Collected:  04/16/20 0542    Specimen:  Blood Updated:  04/16/20 0621     WBC 12.00 10*3/mm3      RBC 2.84 10*6/mm3      Hemoglobin 8.6 g/dL      Hematocrit 26.7 %      MCV 94.0 fL      MCH 30.3 pg      MCHC 32.2 g/dL      RDW 14.6 %      RDW-SD 50.0 fl      MPV 10.9 fL      Platelets 201 10*3/mm3     Blood Culture - Blood, Arm, Right [[id number]] Collected:  04/14/20 2255    Specimen:  Blood from Arm, Right Updated:  04/15/20 2315     Blood Culture No growth at 24 hours    Blood Culture - Blood, Arm, Right [[id number]] Collected:  04/14/20 2249    Specimen:  Blood from Arm, Right Updated:  04/15/20 2300     Blood Culture No growth at 24 hours    POC Glucose Once [[id number]]  (Abnormal) Collected:  04/15/20 1709    Specimen:  Blood Updated:  04/15/20 1731     Glucose 131 mg/dL      Comment: : [id number] Jeremías Chaudhry) MalloryMeter ID: [id number]       POC Glucose Once [[id number]]  (Abnormal) Collected:  04/15/20 1143    Specimen:  Blood Updated:  04/15/20 1155     Glucose 132 mg/dL      Comment: : [id number] Jeremías Chaudhry) MalloryMeter ID: [id number]              Cultures:  Blood Culture   Date Value Ref Range Status   04/14/2020 No growth at 24 hours  Preliminary   04/14/2020 No growth at 24 hours  Preliminary       Radiology Data:    Imaging Results (Last 24 Hours)     ** No results found  for the last 24 hours. **          No Known Allergies    Scheduled meds:     aspirin 81 mg Oral Daily   bisoprolol 2.5 mg Oral Daily   famotidine 20 mg Oral BID   insulin lispro 2-7 Units Subcutaneous TID AC   losartan 50 mg Oral Daily   piperacillin-tazobactam 3.375 g Intravenous Q8H   polyethylene glycol 17 g Oral Daily   sennosides-docusate 1 tablet Oral Daily   sodium chloride 10 mL Intravenous Q12H   sucralfate 1 g Oral Q6H   Vitamin B 12 1,000 mcg Oral Daily       PRN meds:  bisacodyl  •  dextrose  •  dextrose  •  glucagon (human recombinant)  •  Glycerin-Hypromellose-  •  ondansetron **OR** ondansetron  •  oxyCODONE-acetaminophen  •  Pharmacy to Dose Zosyn  •  sodium chloride    Assessment/Plan       Transaminitis    Acute cholecystitis    History of prosthetic aortic valve    Hematoma of left iliopsoas muscle    Chronic anticoagulation    Hyponatremia      Plan:  Hematoma of the left iliopsoas muscle.    Concerning possible infectious.  Continue Zosyn and vancomycin for now.  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     Cholecystitis/abdomen pain/elevated liver enzymes.  Consult general surgery.  Status post cholecystectomy tube in place.  Continues Zosyn and vancomycin.  Recommendation from general surgery-plan to perform cholecystectomy in approximately 1 month.     CAD/history of prosthetic aortic valve/hypertension.  Continue aspirin.   Decrease Zebeta due to bradycardia.  Continue Cozaar.  Echocardiogram 4/4/2020- ejection fraction 56 to 60%, moderate concentric hypertrophy, prosthetic aortic valve- peak and mean gradients within normal limits, mild mitral valve regurgitation.    Hold Lovenox therapeutic yesterday due to hemoglobin decreasing.  Hemoglobin is been stable since yesterday.  We will put  patient on prophylaxis Lovenox for now.  If hemoglobin remains stable tomorrow, plan to put patient back on therapeutic Lovenox tomorrow.    Patient has a pacemaker but heart rates 40s last night.  We will put a consult to have the pacemaker interrogated.     Pain control.  Dilaudid as needed.  Percocet PRN.        Anemia.  Hemoglobin stable. Will follow.     Nausea/vomiting.  Pepcid.  Zofran as needed.  Carafate.     Chronic kidney disease stage III.  Continue slow IV hydration.     Diabetes.  Sliding scale.  Hemoglobin A1 C 6.6.     Chronic pain.  Percocet as needed.     Constipation.   Dulcolax suppository as needed.  Rebecca-Colace daily.     Nutrition. Cardiac/carbohydrate/bland diet.     Deconditioning.  PT consult.     Blood cultures-no growth in 24 hours.     Discharge Plannin-4 days.     Mamadou Dodd MD   20   09:18

## 2020-04-17 LAB
DEPRECATED RDW RBC AUTO: 49.3 FL (ref 37–54)
ERYTHROCYTE [DISTWIDTH] IN BLOOD BY AUTOMATED COUNT: 14.6 % (ref 12.3–15.4)
GLUCOSE BLDC GLUCOMTR-MCNC: 130 MG/DL (ref 70–130)
GLUCOSE BLDC GLUCOMTR-MCNC: 142 MG/DL (ref 70–130)
GLUCOSE BLDC GLUCOMTR-MCNC: 185 MG/DL (ref 70–130)
HCT VFR BLD AUTO: 25.1 % (ref 37.5–51)
HGB BLD-MCNC: 8 G/DL (ref 13–17.7)
MCH RBC QN AUTO: 29.6 PG (ref 26.6–33)
MCHC RBC AUTO-ENTMCNC: 31.9 G/DL (ref 31.5–35.7)
MCV RBC AUTO: 93 FL (ref 79–97)
PLATELET # BLD AUTO: 204 10*3/MM3 (ref 140–450)
PMV BLD AUTO: 10.6 FL (ref 6–12)
RBC # BLD AUTO: 2.7 10*6/MM3 (ref 4.14–5.8)
WBC NRBC COR # BLD: 11.84 10*3/MM3 (ref 3.4–10.8)

## 2020-04-17 PROCEDURE — 96366 THER/PROPH/DIAG IV INF ADDON: CPT

## 2020-04-17 PROCEDURE — 97530 THERAPEUTIC ACTIVITIES: CPT

## 2020-04-17 PROCEDURE — 82962 GLUCOSE BLOOD TEST: CPT

## 2020-04-17 PROCEDURE — 96376 TX/PRO/DX INJ SAME DRUG ADON: CPT

## 2020-04-17 PROCEDURE — 63710000001 INSULIN LISPRO (HUMAN) PER 5 UNITS: Performed by: NURSE PRACTITIONER

## 2020-04-17 PROCEDURE — G0378 HOSPITAL OBSERVATION PER HR: HCPCS

## 2020-04-17 PROCEDURE — 96372 THER/PROPH/DIAG INJ SC/IM: CPT

## 2020-04-17 PROCEDURE — 25010000002 ENOXAPARIN PER 10 MG: Performed by: FAMILY MEDICINE

## 2020-04-17 PROCEDURE — 25010000002 PIPERACILLIN SOD-TAZOBACTAM PER 1 G: Performed by: INTERNAL MEDICINE

## 2020-04-17 PROCEDURE — 94799 UNLISTED PULMONARY SVC/PX: CPT

## 2020-04-17 PROCEDURE — 85027 COMPLETE CBC AUTOMATED: CPT | Performed by: SPECIALIST

## 2020-04-17 PROCEDURE — 97110 THERAPEUTIC EXERCISES: CPT

## 2020-04-17 RX ORDER — VANCOMYCIN HYDROCHLORIDE 1 G/200ML
1000 INJECTION, SOLUTION INTRAVENOUS EVERY 12 HOURS
Status: DISCONTINUED | OUTPATIENT
Start: 2020-04-17 | End: 2020-04-17

## 2020-04-17 RX ADMIN — TAZOBACTAM SODIUM AND PIPERACILLIN SODIUM 3.38 G: 375; 3 INJECTION, SOLUTION INTRAVENOUS at 15:17

## 2020-04-17 RX ADMIN — ENOXAPARIN SODIUM 110 MG: 120 INJECTION SUBCUTANEOUS at 20:28

## 2020-04-17 RX ADMIN — LOSARTAN POTASSIUM 50 MG: 50 TABLET, FILM COATED ORAL at 09:07

## 2020-04-17 RX ADMIN — ENOXAPARIN SODIUM 30 MG: 30 INJECTION SUBCUTANEOUS at 11:50

## 2020-04-17 RX ADMIN — DOCUSATE SODIUM 50 MG AND SENNOSIDES 8.6 MG 1 TABLET: 8.6; 5 TABLET, FILM COATED ORAL at 09:07

## 2020-04-17 RX ADMIN — INSULIN LISPRO 2 UNITS: 100 INJECTION, SOLUTION INTRAVENOUS; SUBCUTANEOUS at 18:05

## 2020-04-17 RX ADMIN — SUCRALFATE 1 G: 1 SUSPENSION ORAL at 18:05

## 2020-04-17 RX ADMIN — BISOPROLOL FUMARATE 2.5 MG: 5 TABLET ORAL at 09:07

## 2020-04-17 RX ADMIN — TAZOBACTAM SODIUM AND PIPERACILLIN SODIUM 3.38 G: 375; 3 INJECTION, SOLUTION INTRAVENOUS at 22:47

## 2020-04-17 RX ADMIN — POLYETHYLENE GLYCOL 3350 17 G: 17 POWDER, FOR SOLUTION ORAL at 09:07

## 2020-04-17 RX ADMIN — Medication 1000 MCG: at 09:06

## 2020-04-17 RX ADMIN — ASPIRIN 81 MG: 81 TABLET, CHEWABLE ORAL at 09:06

## 2020-04-17 RX ADMIN — SODIUM CHLORIDE, PRESERVATIVE FREE 10 ML: 5 INJECTION INTRAVENOUS at 20:28

## 2020-04-17 RX ADMIN — FAMOTIDINE 20 MG: 10 INJECTION INTRAVENOUS at 20:28

## 2020-04-17 RX ADMIN — TAZOBACTAM SODIUM AND PIPERACILLIN SODIUM 3.38 G: 375; 3 INJECTION, SOLUTION INTRAVENOUS at 05:33

## 2020-04-17 RX ADMIN — SUCRALFATE 1 G: 1 SUSPENSION ORAL at 11:49

## 2020-04-17 RX ADMIN — FAMOTIDINE 20 MG: 10 INJECTION INTRAVENOUS at 09:07

## 2020-04-17 RX ADMIN — SODIUM CHLORIDE, PRESERVATIVE FREE 10 ML: 5 INJECTION INTRAVENOUS at 09:09

## 2020-04-17 RX ADMIN — OXYCODONE HYDROCHLORIDE AND ACETAMINOPHEN 1 TABLET: 5; 325 TABLET ORAL at 21:19

## 2020-04-17 NOTE — PROGRESS NOTES
Continued Stay Note   [facility]     Patient Name: Jesus Smith  MRN: [id number]  Today's Date: 4/17/2020    Admit Date: 4/14/2020    Discharge Plan     Row Name 04/17/20 0808       Plan    Plan Comments  Chart reviewed-- per documentation potential discharge date is 2-4 days. Pt is able to return to [facility] at discharge. No needs identified at this time. SW will follow and assist with any discharge needs that may arise. Phone: [phone]        Discharge Codes    No documentation.             Melissa Willis

## 2020-04-17 NOTE — DISCHARGE PLACEMENT REQUEST
"Jesus Smith (84 y.o. Male)     Date of Birth Social Security Number Address Home Phone MRN    [id number]  [id number] [address] [phone] [id number]    Shinto Marital Status          Synagogue        Admission Date Admission Type Admitting Provider Attending Provider Department, Room/Bed    4/14/20 Emergency Mamadou Dodd MD Truong, Khai C, MD [facility] 3A, 341/1    Discharge Date Discharge Disposition Discharge Destination                       Attending Provider:  Mamadou Dodd MD    Allergies:  No Known Allergies    Isolation:  None   Infection:  None   Code Status:  No CPR    Ht:  177.8 cm (70\")   Wt:  113 kg (248 lb 9.6 oz)    Admission Cmt:  None   Principal Problem:  None                Active Insurance as of 4/14/2020     Primary Coverage     Payor Plan Insurance Group Employer/Plan Group    [facility] DEPT [id number]      Payor Plan Address Payor Plan Phone Number Payor Plan Fax Number Effective Dates    MISHA SERVICE 04 [phone]  4/2/2020 - None Entered    [address] [facility] [address]       Subscriber Name Subscriber Birth Date Member ID       JESUS SMITH 1935 [id number]                 Emergency Contacts      (Rel.) Home Phone Work Phone Mobile Phone    GILJESUHEAVEN (Relative) -- -- [phone]    IGNACIO ANGEL (Relative) -- -- [phone]               History & Physical      Anson June DO at 04/14/20 [facility]9              [facility]  HISTORY AND PHYSICAL    Date of Admission: 4/14/2020  Primary Care Physician: Matheus Olivera PA    Subjective     Chief Complaint: Severe mid epigastric abdominal pain and nausea with vomiting    History of Present Illness  Jesus Smith is a 89-year-old male discharge from this facility yesterday after a 10-day admission for bacteremia secondary to UTI, acute kidney injury and transaminitis.  Patient " was found to have acute cholecystitis however due to anticoagulation with warfarin for prosthetic aortic valve, and severity of illness drain was placed per Dr. Agustina Saleem and IV therapy started.  Plan for cholecystectomy in 4 to 6 weeks.  Patient was discharged on Omnicef.  Also on twice daily Lovenox until surgery scheduled.  Patient discharged to rehab center.  Today patient ate a high caloric/fatty meal at lunch with subsequent nausea and vomiting x2 with associated pain.  He was transferred back to [facility] for evaluation.  General surgery, Dr. Yessi Mccann notified of patient's readmission.  Imaging revealed hematoma of left iliopsoas muscle.  Apparently patient had a fall prior to previous admission.  Dr. Mccann concerned for infection and commended continued antibiotic therapy with general surgery consult in a.m.  Currently patient states he continues to have mild nausea however pain is better after being treated.  Zosyn and vancomycin given in the emergency department.  Patient has chronic bilateral lower extremity edema.  He has no complaints of left lower extremity pain however edema is worse in that extremity.  Echocardiogram completed on 4/4/2020, please see below for adult.  White count today 16 as compared to 10 yesterday.  Patient is admitted for further evaluation treatment.    Review of Systems   A 10 point review of systems was completed, all negative except for those discussed in HPI    Past Medical History:   Past Medical History:   Diagnosis Date   • Bradycardia    • Coronary artery disease    • GERD (gastroesophageal reflux disease)    • Hypertension    • Injury of back    • TIA (transient ischemic attack)        Past Surgical History:   Past Surgical History:   Procedure Laterality Date   • APPENDECTOMY     • BACK SURGERY      Fusion   • CARDIAC SURGERY      Open Heart   • EXPLORATORY LAPAROTOMY N/A 4/5/2020    Procedure: open cholecystostomy tube placement ;  Surgeon:  Agustina Saleem MD;  Location: [facility] OR;  Service: General;  Laterality: N/A;   • HERNIA REPAIR     • JOINT REPLACEMENT Left     s/p L hip replacement   • PACEMAKER IMPLANTATION     • STOMACH SURGERY         Family History: family history includes Heart disease in his mother; Stroke in his father.    Social History:  reports that he has never smoked. He has never used smokeless tobacco. He reports that he does not drink alcohol or use drugs.    Code Status: Limited, if unable to speak for himself his son Jesus Lopez will speak for him      Allergies:  No Known Allergies    Medications:      Instructions   bisacodyl 10 MG suppository  Commonly known as:  DULCOLAX    10 mg, Rectal, Daily PRN      cefdinir 300 MG capsule  Commonly known as:  OMNICEF    300 mg, Oral, 2 Times Daily      enoxaparin 120 MG/0.8ML solution syringe  Commonly known as:  LOVENOX    1 mg/kg (110 mg), Subcutaneous, Every 12 Hours      famotidine 20 MG tablet  Commonly known as:  PEPCID    20 mg, Oral, 2 Times Daily      Glycerin-Hypromellose- 0.2-0.2-1 % solution ophthalmic solution  Commonly known as:  ARTIFICIAL TEARS    1 drop, Both Eyes, Every 1 Hour PRN      insulin lispro 100 UNIT/ML injection  Commonly known as:  humaLOG    2-7 Units, Subcutaneous, 4 Times Daily With Meals & Nightly      ondansetron ODT 4 MG disintegrating tablet  Commonly known as:  Zofran ODT    4 mg, Translingual, Every 8 Hours PRN      oxyCODONE-acetaminophen 5-325 MG per tablet  Commonly known as:  PERCOCET    1 tablet, Oral, Every 4 Hours PRN      polyethylene glycol pack packet  Commonly known as:  MIRALAX    17 g, Oral, Daily      sennosides-docusate 8.6-50 MG per tablet  Commonly known as:  PERICOLACE    1 tablet, Oral, Daily      sucralfate 1 GM/10ML suspension  Commonly known as:  CARAFATE    1 g, Oral, Every 6 Hours Scheduled      Vitamin D3 50 MCG (2000 UT) capsule    2,000 Units, Oral, Daily      bisoprolol 10 MG tablet  Commonly known as:   "ZEBeta  5 mg, Oral, Daily      aspirin 81 MG chewable tablet    81 mg, Oral, Daily      losartan 100 MG tablet  Commonly known as:  COZAAR    50 mg, Oral, Daily      vitamin B-12 1000 MCG tablet  Commonly known as:  CYANOCOBALAMIN    1,000 mcg, Oral, Daily            Objective     /53 (BP Location: Left arm, Patient Position: Lying)   Pulse 52   Temp 98.2 °F (36.8 °C) (Oral)   Resp 18   Ht 177.8 cm (70\")   Wt 107 kg (236 lb 9.6 oz)   SpO2 100%   BMI 33.95 kg/m²    Physical Exam   Constitutional: He is oriented to person, place, and time. He appears well-developed and well-nourished. No distress.   HENT:   Head: Normocephalic and atraumatic.   Eyes: Pupils are equal, round, and reactive to light. Conjunctivae and EOM are normal. No scleral icterus.   Neck: Normal range of motion. Neck supple. No JVD present. No tracheal deviation present.   Cardiovascular: Normal rate, regular rhythm, normal heart sounds and intact distal pulses. Exam reveals no gallop.   No murmur heard.  Pulmonary/Chest: Effort normal and breath sounds normal. No respiratory distress. He has no wheezes. He has no rales.   Abdominal: Soft. Bowel sounds are normal. He exhibits no distension. There is no tenderness. There is no guarding.   Protuberant. NAM to right upper quadurant; dark red/brown drainage    Musculoskeletal: He exhibits edema (significant bilateral lower extremities, worse on left).   Generalized deconditioned state   Neurological: He is alert and oriented to person, place, and time.   Skin: Skin is warm and dry. No rash noted. He is not diaphoretic. No erythema. No pallor.   Psychiatric: He has a normal mood and affect. His behavior is normal.   Vitals reviewed.      Pertinent Data:   Lab Results (last 72 hours)     Procedure Component Value Units Date/Time    Lactic Acid, Plasma [[id number]]  (Normal) Collected:  04/14/20 2249    Specimen:  Blood Updated:  04/14/20 2311     Lactate 1.5 mmol/L     Blood Culture - Blood, " Arm, Right [[id number]] Collected:  04/14/20 2255    Specimen:  Blood from Arm, Right Updated:  04/14/20 2306    Blood Culture - Blood, Arm, Right [[id number]] Collected:  04/14/20 2249    Specimen:  Blood from Arm, Right Updated:  04/14/20 2253    Protime-INR [[id number]]  (Abnormal) Collected:  04/14/20 2008    Specimen:  Blood Updated:  04/14/20 2244     Protime 14.9 Seconds      INR 1.18    aPTT [[id number]]  (Abnormal) Collected:  04/14/20 2008    Specimen:  Blood Updated:  04/14/20 2244     PTT 47.2 seconds     Comprehensive Metabolic Panel [[id number]]  (Abnormal) Collected:  04/14/20 2008    Specimen:  Blood Updated:  04/14/20 2032     Glucose 130 mg/dL      BUN 18 mg/dL      Creatinine 0.78 mg/dL      Sodium 131 mmol/L      Potassium 4.6 mmol/L      Chloride 94 mmol/L      CO2 27.0 mmol/L      Calcium 8.5 mg/dL      Total Protein 6.4 g/dL      Albumin 3.20 g/dL      ALT (SGPT) 39 U/L      AST (SGOT) 48 U/L      Alkaline Phosphatase 126 U/L      Total Bilirubin 1.1 mg/dL      eGFR Non African Amer 95 mL/min/1.73      Globulin 3.2 gm/dL      A/G Ratio 1.0 g/dL      BUN/Creatinine Ratio 23.1     Anion Gap 10.0 mmol/L     Troponin [[id number]]  (Normal) Collected:  04/14/20 2008    Specimen:  Blood Updated:  04/14/20 2030     Troponin T 0.030 ng/mL     Amylase [[id number]]  (Normal) Collected:  04/14/20 2008    Specimen:  Blood Updated:  04/14/20 2029     Amylase 56 U/L     CBC Auto Differential [[id number]]  (Abnormal) Collected:  04/14/20 2008    Specimen:  Blood Updated:  04/14/20 2027     WBC 16.07 10*3/mm3      RBC 3.37 10*6/mm3      Hemoglobin 10.0 g/dL      Hematocrit 31.2 %      MCV 92.6 fL      MCH 29.7 pg      MCHC 32.1 g/dL      RDW 14.6 %      RDW-SD 49.4 fl      MPV 10.2 fL      Platelets 248 10*3/mm3      Neutrophil % 78.8 %      Lymphocyte % 8.9 %      Monocyte % 11.0 %      Eosinophil % 0.1 %      Basophil % 0.3 %      Immature Grans % 0.9 %      Neutrophils, Absolute 12.67 10*3/mm3      Lymphocytes,  Absolute 1.43 10*3/mm3      Monocytes, Absolute 1.76 10*3/mm3      Eosinophils, Absolute 0.02 10*3/mm3      Basophils, Absolute 0.05 10*3/mm3      Immature Grans, Absolute 0.14 10*3/mm3      nRBC 0.0 /100 WBC     Lipase [[id number]]  (Normal) Collected:  04/14/20 2008    Specimen:  Blood Updated:  04/14/20 2027     Lipase 28 U/L         Imaging Results (Last 24 Hours)     Procedure Component Value Units Date/Time    CT Abdomen Pelvis With Contrast [[id number]] Resulted:  04/14/20 2202     Updated:  04/14/20 2211          I have personally reviewed and interpreted the radiology studies and ECG obtained at time of admission.     Assessment / Plan     Assessment:   Active Hospital Problems    Diagnosis   • Chronic anticoagulation   • Hyponatremia   • Hematoma of left iliopsoas muscle   • History of prosthetic aortic valve   • Acute cholecystitis   • Transaminitis       Plan:   1.  Admit as inpatient  2.  Continue Zosyn  3.  Home medications reviewed and restarted as appropriate to include therapeutic Lovenox for DVT prophylaxis  4.  Pain and nausea control  5.  Gentle hydration normal saline 75 mL/hour  6.  Consult general surgery  7.  Labs in a.m.    I discussed the patient's findings and my recommendations with: Anson June DO  Time spent: 40 minutes    Plan discussed with NP and agree.     Patient seen and examined by me on 4/14/2020 at 10:55 PM.    Izabella Michelle, GÓMEZ  04/15/20   00:21    Electronically signed by Anson June DO at 04/15/20 0639          Physician Progress Notes (most recent note)      Agustina Saleem MD at 04/17/20 0951          Agustina Saleem MD FACS  Progress Note     LOS: 0 days   Patient Care Team:  Matheus Olivera PA as PCP - General (Physician Assistant)      Subjective     Interval History:      collin po.  +bm this am.  Continues to complain of left posterior hip/back pain     Objective     Vital Signs  Temp:  [97.9 °F (36.6 °C)-99.3 °F (37.4 °C)] 97.9 °F (36.6 °C)  Heart  Rate:  [50-63] 53  Resp:  [16-18] 16  BP: (123-142)/(40-57) 133/40    Physical Exam:  General appearance - alert, well appearing, and in no distress  Abdomen - soft, NAM bilious      Results Review:    Lab Results (last 24 hours)     Procedure Component Value Units Date/Time    POC Glucose Once [[id number]]  (Normal) Collected:  04/17/20 0814    Specimen:  Blood Updated:  04/17/20 0829     Glucose 130 mg/dL      Comment: : [id number] Naresh GainesenMeter ID: [id number]       CBC (No Diff) [[id number]]  (Abnormal) Collected:  04/17/20 0550    Specimen:  Blood Updated:  04/17/20 0628     WBC 11.84 10*3/mm3      RBC 2.70 10*6/mm3      Hemoglobin 8.0 g/dL      Hematocrit 25.1 %      MCV 93.0 fL      MCH 29.6 pg      MCHC 31.9 g/dL      RDW 14.6 %      RDW-SD 49.3 fl      MPV 10.6 fL      Platelets 204 10*3/mm3     Blood Culture - Blood, Arm, Right [[id number]] Collected:  04/14/20 2255    Specimen:  Blood from Arm, Right Updated:  04/16/20 2315     Blood Culture No growth at 2 days    Blood Culture - Blood, Arm, Right [[id number]] Collected:  04/14/20 2249    Specimen:  Blood from Arm, Right Updated:  04/16/20 2300     Blood Culture No growth at 2 days    POC Glucose Once [[id number]]  (Normal) Collected:  04/16/20 1659    Specimen:  Blood Updated:  04/16/20 1719     Glucose 122 mg/dL      Comment: : [id number] Miguel Angel TaveraelaMeter ID: [id number]       POC Glucose Once [[id number]]  (Normal) Collected:  04/16/20 1124    Specimen:  Blood Updated:  04/16/20 1151     Glucose 117 mg/dL      Comment: : [id number] Kvng Woods ID: [id number]           Imaging Results (Last 24 Hours)     ** No results found for the last 24 hours. **            Assessment/Plan       S/p cholecystostomy tube placement - continue drain.   Diet as tolerated  Stool softeners  Would suggest SCD's and restarting lovenox due to valve.  Consult PT  Patient states he did not like [facility] facility and would like transfer elsewhere at time of  discharge.      Agustina Saleem MD  20  09:51        Electronically signed by Agustina Saleem MD at 20 0953       Consult Notes (most recent note)    No notes of this type exist for this encounter.            Physical Therapy Notes (most recent note)      Randy Edmond, PTA at 20 1158  Version 1 of 1         Acute Care - Physical Therapy Treatment Note   [place]     Patient Name: Jesus Smith  : 1935  MRN: [id number]  Today's Date: 2020             Admit Date: 2020    Visit Dx:    ICD-10-CM ICD-9-CM   1. Hematoma of left iliopsoas muscle, initial encounter S70.12XA 924.00   2. Cholecystitis K81.9 575.10   3. Impaired mobility Z74.09 799.89     Patient Active Problem List   Diagnosis   • Acute UTI (urinary tract infection)   • Bacteremia   • Transaminitis   • Acute cholecystitis   • Benign essential HTN   • CKD (chronic kidney disease) stage 3, GFR 30-59 ml/min (CMS/[facility])   • History of prosthetic aortic valve   • Hematoma of left iliopsoas muscle   • Chronic anticoagulation   • Hyponatremia       Therapy Treatment    Rehabilitation Treatment Summary     Row Name 20 1113 20 0853          Treatment Time/Intention    Discipline  physical therapy assistant  -EDUARD  physical therapy assistant  -EDUARD     Document Type  therapy note (daily note)  -EDUARD  therapy note (daily note)  -EDUARD     Subjective Information  complains of;weakness;fatigue;pain  -JB2  --     Comment  --  pt just got back to bed from using the BSC, asked to check back later  -JB2     Existing Precautions/Restrictions  fall  -JB2  --     Treatment Considerations/Comments  LLE weakness with increase swelling  -JB2  --     Recorded by [EDUARD] Randy Edmond, PTA 20 1113  [JB2] Randy Edmond, PTA 20 1154 [EDUARD] Randy Edmond, PTA 20 0853  [JB2] Randy Edmond, PTA 20 0856     Row Name 20 1113             Bed Mobility Assessment/Treatment    Supine-Sit [place] (Bed  Mobility)  verbal cues;minimum assist (75% patient effort)  -EDUARD      Sit-Supine [place] (Bed Mobility)  verbal cues;moderate assist (50% patient effort)  -EDUARD      Assistive Device (Bed Mobility)  head of bed elevated;bed rails  -EDUARD      Recorded by [EDUARD] Randy Edmond, PTA 04/17/20 1154      Row Name 04/17/20 1113             Transfer Assessment/Treatment    Transfer Assessment/Treatment  sit-stand transfer;stand-sit transfer  -EDUARD      Comment (Transfers)  pt took several attempts before being able to stand  -EDUARD      Recorded by [EDUARD] Randy Edmond, PTA 04/17/20 1154      Row Name 04/17/20 1113             Sit-Stand Transfer    Sit-Stand [place] (Transfers)  verbal cues;minimum assist (75% patient effort);moderate assist (50% patient effort)  -EDUARD      Assistive Device (Sit-Stand Transfers)  walker, front-wheeled  -EDUARD      Recorded by [EDUARD] Randy Edmond, PTA 04/17/20 1154      Row Name 04/17/20 1113             Stand-Sit Transfer    Stand-Sit [place] (Transfers)  verbal cues;minimum assist (75% patient effort)  -EDUARD      Assistive Device (Stand-Sit Transfers)  walker, front-wheeled  -EDUARD      Recorded by [EDUARD] Randy Edmond, PTA 04/17/20 1154      Row Name 04/17/20 1113             Motor Skills Assessment/Interventions    Additional Documentation  Therapeutic Exercise (Group)  -EDUARD      Recorded by [EDUARD] Randy Edmond, PTA 04/17/20 1154      Row Name 04/17/20 1113             Therapeutic Exercise    84244 - PT Therapeutic Exercise Minutes  15  -EDUARD      20141 - PT Therapeutic Activity Minutes  10  -EDUARD      Recorded by [EDUARD] Randy Edmond, PTA 04/17/20 1154      Row Name 04/17/20 1113             Therapeutic Exercise    Comment (Therapeutic Exercise)  EOB, AROM RLE X 20.  PROM/AAROM LLE X 10   -EDUARD      Recorded by [EDUARD] Randy Edmond, PTA 04/17/20 1154      Row Name 04/17/20 1113             Static Sitting Balance    Level of [place] (Unsupported Sitting, Static Balance)  contact  guard assist  -EDUARD      Sitting Position (Unsupported Sitting, Static Balance)  sitting on edge of bed  -EDUARD      Recorded by [EDUARD] Randy Edmond, PTA 04/17/20 1154      Row Name 04/17/20 1113             Positioning and Restraints    Pre-Treatment Position  in bed  -EDUARD      Post Treatment Position  bed  -EDUARD      In Bed  fowlers;call light within reach;encouraged to call for assist;side rails up x2  -EDUARD      Recorded by [EDUARD] Randy Edmond, PTA 04/17/20 1154      Row Name 04/17/20 1113             Pain Assessment    Additional Documentation  Pain Scale: FACES Pre/Post-Treatment (Group)  -EDUARD      Recorded by [EDUARD] Randy Edmond, PTA 04/17/20 1154      Row Name 04/17/20 1113             Pain Scale: Numbers Pre/Post-Treatment    Pain Location - Side  Left  -EDUARD      Pain Location - Orientation  lower  -EDUARD      Pain Location  extremity  -EDUARD      Pain Intervention(s)  Repositioned  -EDUARD      Recorded by [EDUARD] Randy Edmond, PTA 04/17/20 1154        User Key  (r) = Recorded By, (t) = Taken By, (c) = Cosigned By    Initials Name Effective Dates Discipline    EDUARD Randy Edmond, PTA 12/08/16 -  PT                       PT Recommendation and Plan     Plan of Care Reviewed With: patient  Progress: no change  Outcome Summary: Pt was able to transfer suping to sit with min assist with HOB elevated and using the bed rail.  Sit to stand require mod assist and multiple attempts.  Pt was unable to take any steps.  Sit to supine required mod assist.  Pt is stil weak in LLE with increase swelling and pain.  Outcome Measures     Row Name 04/17/20 1113             How much help from another person do you currently need...    Turning from your back to your side while in flat bed without using bedrails?  2  -EDUARD      Moving from lying on back to sitting on the side of a flat bed without bedrails?  2  -EDUARD      Moving to and from a bed to a chair (including a wheelchair)?  2  -EDUARD      Standing up from a chair using your arms  (e.g., wheelchair, bedside chair)?  2  -EDUARD      Climbing 3-5 steps with a railing?  1  -EDUARD      To walk in hospital room?  1  -EDUARD      AM-PAC 6 Clicks Score (PT)  10  -EDUARD         Functional Assessment    Outcome Measure Options  AM-PAC 6 Clicks Basic Mobility (PT)  -EDUARD        User Key  (r) = Recorded By, (t) = Taken By, (c) = Cosigned By    Initials Name Provider Type    Randy Ledezma PTA Physical Therapy Assistant         Time Calculation:   PT Charges     Row Name 04/17/20 1113             Time Calculation    Start Time  1113  -EDUARD      Stop Time  1138  -EDUARD      Time Calculation (min)  25 min  -EDUARD      PT Received On  04/17/20  -EDUARD         Time Calculation- PT    Total Timed Code Minutes- PT  25 minute(s)  -EDUARD         Timed Charges    70548 - PT Therapeutic Exercise Minutes  15  -EDUARD      71744 - PT Therapeutic Activity Minutes  10  -EDUARD        User Key  (r) = Recorded By, (t) = Taken By, (c) = Cosigned By    Initials Name Provider Type    Randy Ledezma PTA Physical Therapy Assistant        Therapy Charges for Today     Code Description Service Date Service Provider Modifiers Qty    43444763112 HC PT THER PROC EA 15 MIN 4/17/2020 Randy Edmond PTA GP 1    76719243836 HC PT THERAPEUTIC ACT EA 15 MIN 4/17/2020 Randy Edmond PTA GP 1          PT G-Codes  Outcome Measure Options: AM-PAC 6 Clicks Basic Mobility (PT)  AM-PAC 6 Clicks Score (PT): 10    Randy Edmond PTA  4/17/2020         Electronically signed by Randy Edmond PTA at 04/17/20 1159       Occupational Therapy Notes (most recent note)    No notes exist for this encounter.         Discharge Summary    No notes of this type exist for this encounter.

## 2020-04-17 NOTE — PROGRESS NOTES
Continued Stay Note   Ayanna     Patient Name: Jesus Smith  MRN: [id number]  Today's Date: 4/17/2020    Admit Date: 4/14/2020    Discharge Plan     Row Name 04/17/20 1523       Plan    Plan Comments  Per Deepti at [facility], pt is able to be discharged to [facility] on Monday instead of returning to [facility]. Pt is aware of this plan. SW will follow and assist with any other discharge needs that may arise.   Phone: [phone]         Fax: [phone]               Melissa Willis

## 2020-04-17 NOTE — PLAN OF CARE
Problem: Patient Care Overview  Goal: Plan of Care Review  Outcome: Ongoing (interventions implemented as appropriate)  Flowsheets (Taken 4/17/2020 1630)  Progress: improving  Plan of Care Reviewed With: patient  Outcome Summary: Pt has been resting well today. Worked with PT today. Tolerating diet, accucheck TID AC no SSI needed. IID except for extended zosyn. Wesley x1 with bile output. VSS, cont to monitor.

## 2020-04-17 NOTE — THERAPY TREATMENT NOTE
Acute Care - Physical Therapy Treatment Note  [facility]     Patient Name: Jseus Smith  : 1935  MRN: [id number]  Today's Date: 2020             Admit Date: 2020    Visit Dx:    ICD-10-CM ICD-9-CM   1. Hematoma of left iliopsoas muscle, initial encounter S70.12XA 924.00   2. Cholecystitis K81.9 575.10   3. Impaired mobility Z74.09 799.89     Patient Active Problem List   Diagnosis   • Acute UTI (urinary tract infection)   • Bacteremia   • Transaminitis   • Acute cholecystitis   • Benign essential HTN   • CKD (chronic kidney disease) stage 3, GFR 30-59 ml/min (CMS/[facility])   • History of prosthetic aortic valve   • Hematoma of left iliopsoas muscle   • Chronic anticoagulation   • Hyponatremia       Therapy Treatment    Rehabilitation Treatment Summary     Row Name 20 1113 20 0853          Treatment Time/Intention    Discipline  physical therapy assistant  -EDUARD  physical therapy assistant  -EDUARD     Document Type  therapy note (daily note)  -EDUARD  therapy note (daily note)  -EDUARD     Subjective Information  complains of;weakness;fatigue;pain  -JB2  --     Comment  --  pt just got back to bed from using the BSC, asked to check back later  -JB2     Existing Precautions/Restrictions  fall  -JB2  --     Treatment Considerations/Comments  LLE weakness with increase swelling  -JB2  --     Recorded by [EDUARD] Randy Edmond, PTA 20 1113  [JB2] Randy Edmond, PTA 20 1154 [EDUARD] Randy Edmond, PTA 20 0853  [JB2] Randy Edmond, PTA 20 0856     Row Name 20 1113             Bed Mobility Assessment/Treatment    Supine-Sit [place] (Bed Mobility)  verbal cues;minimum assist (75% patient effort)  -EDUARD      Sit-Supine [place] (Bed Mobility)  verbal cues;moderate assist (50% patient effort)  -EDUARD      Assistive Device (Bed Mobility)  head of bed elevated;bed rails  -EDUARD      Recorded by [EDUARD] Randy Edmond, PTA 20 1154      Row Name 20 1113              Transfer Assessment/Treatment    Transfer Assessment/Treatment  sit-stand transfer;stand-sit transfer  -EDUARD      Comment (Transfers)  pt took several attempts before being able to stand  -EDUARD      Recorded by [EDUARD] Randy Edmond, PTA 04/17/20 1154      Row Name 04/17/20 1113             Sit-Stand Transfer    Sit-Stand [place] (Transfers)  verbal cues;minimum assist (75% patient effort);moderate assist (50% patient effort)  -EDUARD      Assistive Device (Sit-Stand Transfers)  walker, front-wheeled  -EDUARD      Recorded by [EDUARD] Randy Edmond, PTA 04/17/20 1154      Row Name 04/17/20 1113             Stand-Sit Transfer    Stand-Sit [place] (Transfers)  verbal cues;minimum assist (75% patient effort)  -EDUARD      Assistive Device (Stand-Sit Transfers)  walker, front-wheeled  -EDUARD      Recorded by [EDUARD] Randy Edmond, PTA 04/17/20 1154      Row Name 04/17/20 1113             Motor Skills Assessment/Interventions    Additional Documentation  Therapeutic Exercise (Group)  -EDUARD      Recorded by [EDUARD] Randy Edmond, PTA 04/17/20 1154      Row Name 04/17/20 1113             Therapeutic Exercise    05824 - PT Therapeutic Exercise Minutes  15  -EDUARD      13226 - PT Therapeutic Activity Minutes  10  -EDUARD      Recorded by [EDUARD] Randy Edmond, PTA 04/17/20 1154      Row Name 04/17/20 1113             Therapeutic Exercise    Comment (Therapeutic Exercise)  EOB, AROM RLE X 20.  PROM/AAROM LLE X 10   -EDUARD      Recorded by [EDUARD] Randy Edmond, PTA 04/17/20 1154      Row Name 04/17/20 1113             Static Sitting Balance    Level of [place] (Unsupported Sitting, Static Balance)  contact guard assist  -EDUARD      Sitting Position (Unsupported Sitting, Static Balance)  sitting on edge of bed  -EDUARD      Recorded by [EDUARD] Randy Edmond, PTA 04/17/20 1154      Row Name 04/17/20 1113             Positioning and Restraints    Pre-Treatment Position  in bed  -EDUARD      Post Treatment Position  bed  -EDUARD      In Bed  fowlers;call  light within reach;encouraged to call for assist;side rails up x2  -EDUARD      Recorded by [EDUARD] Randy Edmond, PTA 04/17/20 1154      Row Name 04/17/20 1113             Pain Assessment    Additional Documentation  Pain Scale: FACES Pre/Post-Treatment (Group)  -EDUARD      Recorded by [EDUARD] Randy Edmond, PTA 04/17/20 1154      Row Name 04/17/20 1113             Pain Scale: Numbers Pre/Post-Treatment    Pain Location - Side  Left  -EDUARD      Pain Location - Orientation  lower  -EDUARD      Pain Location  extremity  -EDUARD      Pain Intervention(s)  Repositioned  -EDUARD      Recorded by [EDUARD] Randy Edmond, PTA 04/17/20 1154        User Key  (r) = Recorded By, (t) = Taken By, (c) = Cosigned By    Initials Name Effective Dates Discipline    EDUARD Randy Edmond, PTA 12/08/16 -  PT                       PT Recommendation and Plan     Plan of Care Reviewed With: patient  Progress: no change  Outcome Summary: Pt was able to transfer suping to sit with min assist with HOB elevated and using the bed rail.  Sit to stand require mod assist and multiple attempts.  Pt was unable to take any steps.  Sit to supine required mod assist.  Pt is stil weak in LLE with increase swelling and pain.  Outcome Measures     Row Name 04/17/20 1113             How much help from another person do you currently need...    Turning from your back to your side while in flat bed without using bedrails?  2  -EDUARD      Moving from lying on back to sitting on the side of a flat bed without bedrails?  2  -EDUARD      Moving to and from a bed to a chair (including a wheelchair)?  2  -EDUARD      Standing up from a chair using your arms (e.g., wheelchair, bedside chair)?  2  -EDUARD      Climbing 3-5 steps with a railing?  1  -EDUARD      To walk in hospital room?  1  -EDUARD      AM-PAC 6 Clicks Score (PT)  10  -EDUARD         Functional Assessment    Outcome Measure Options  AM-PAC 6 Clicks Basic Mobility (PT)  -EDUARD        User Key  (r) = Recorded By, (t) = Taken By, (c) = Cosigned By     Initials Name Provider Type    Randy Ledezma PTA Physical Therapy Assistant         Time Calculation:   PT Charges     Row Name 04/17/20 1113             Time Calculation    Start Time  1113  -EDUARD      Stop Time  1138  -EDUARD      Time Calculation (min)  25 min  -EDUARD      PT Received On  04/17/20  -EDUARD         Time Calculation- PT    Total Timed Code Minutes- PT  25 minute(s)  -EDUARD         Timed Charges    29955 - PT Therapeutic Exercise Minutes  15  -EDUARD      89111 - PT Therapeutic Activity Minutes  10  -EDUARD        User Key  (r) = Recorded By, (t) = Taken By, (c) = Cosigned By    Initials Name Provider Type    Randy Ledezma PTA Physical Therapy Assistant        Therapy Charges for Today     Code Description Service Date Service Provider Modifiers Qty    12483854366 HC PT THER PROC EA 15 MIN 4/17/2020 Randy Edmond PTA GP 1    59164104217 HC PT THERAPEUTIC ACT EA 15 MIN 4/17/2020 Randy Edmond PTA GP 1          PT G-Codes  Outcome Measure Options: AM-PAC 6 Clicks Basic Mobility (PT)  AM-PAC 6 Clicks Score (PT): 10    Randy Edmond PTA  4/17/2020

## 2020-04-17 NOTE — PLAN OF CARE
Problem: Patient Care Overview  Goal: Plan of Care Review  Outcome: Ongoing (interventions implemented as appropriate)  Flowsheets (Taken 4/17/2020 0044)  Progress: no change  Plan of Care Reviewed With: patient  Outcome Summary: Patient c/o pain x1 so far this shift; noted edema; voiding small amounts; IID except IV abx; no other issues, will monitor.

## 2020-04-17 NOTE — PROGRESS NOTES
Agustina Saleem MD FACS  Progress Note     LOS: 0 days   Patient Care Team:  Matheus Olivera PA as PCP - General (Physician Assistant)      Subjective     Interval History:      collin po.  +bm this am.  Continues to complain of left posterior hip/back pain     Objective     Vital Signs  Temp:  [97.9 °F (36.6 °C)-99.3 °F (37.4 °C)] 97.9 °F (36.6 °C)  Heart Rate:  [50-63] 53  Resp:  [16-18] 16  BP: (123-142)/(40-57) 133/40    Physical Exam:  General appearance - alert, well appearing, and in no distress  Abdomen - soft, NAM bilious      Results Review:    Lab Results (last 24 hours)     Procedure Component Value Units Date/Time    POC Glucose Once [[id number]]  (Normal) Collected:  04/17/20 0814    Specimen:  Blood Updated:  04/17/20 0829     Glucose 130 mg/dL      Comment: : [id number] Naresh LaurenMeter ID: [id number]       CBC (No Diff) [[id number]]  (Abnormal) Collected:  04/17/20 0550    Specimen:  Blood Updated:  04/17/20 0628     WBC 11.84 10*3/mm3      RBC 2.70 10*6/mm3      Hemoglobin 8.0 g/dL      Hematocrit 25.1 %      MCV 93.0 fL      MCH 29.6 pg      MCHC 31.9 g/dL      RDW 14.6 %      RDW-SD 49.3 fl      MPV 10.6 fL      Platelets 204 10*3/mm3     Blood Culture - Blood, Arm, Right [[id number]] Collected:  04/14/20 2255    Specimen:  Blood from Arm, Right Updated:  04/16/20 2315     Blood Culture No growth at 2 days    Blood Culture - Blood, Arm, Right [[id number]] Collected:  04/14/20 2249    Specimen:  Blood from Arm, Right Updated:  04/16/20 2300     Blood Culture No growth at 2 days    POC Glucose Once [[id number]]  (Normal) Collected:  04/16/20 1659    Specimen:  Blood Updated:  04/16/20 1719     Glucose 122 mg/dL      Comment: : [id number] Miguel Angel PamelaMeter ID: [id number]       POC Glucose Once [[id number]]  (Normal) Collected:  04/16/20 1124    Specimen:  Blood Updated:  04/16/20 1151     Glucose 117 mg/dL      Comment: : [id number] Kvng Woods ID: [id number]           Imaging  Results (Last 24 Hours)     ** No results found for the last 24 hours. **            Assessment/Plan       S/p cholecystostomy tube placement - continue drain.   Diet as tolerated  Stool softeners  Would suggest SCD's and restarting lovenox due to valve.  Consult PT  Patient states he did not like [facility] and would like transfer elsewhere at time of discharge.      Agustina Saleem MD  04/17/20  09:51

## 2020-04-17 NOTE — PLAN OF CARE
Problem: Patient Care Overview  Goal: Plan of Care Review  Outcome: Ongoing (interventions implemented as appropriate)  Flowsheets (Taken 4/17/2020 1113)  Progress: no change  Plan of Care Reviewed With: patient  Outcome Summary: Pt was able to transfer suping to sit with min assist with HOB elevated and using the bed rail.  Sit to stand require mod assist and multiple attempts.  Pt was unable to take any steps.  Sit to supine required mod assist.  Pt is stil weak in LLE with increase swelling and pain.

## 2020-04-17 NOTE — PROGRESS NOTES
Continued Stay Note  [facility]     Patient Name: Jesus Smith  MRN: [id number]  Today's Date: 4/17/2020    Admit Date: 4/14/2020    Discharge Plan     Row Name 04/17/20 1304       Plan    Plan Comments  Pt is now requesting to move facilities and is requesting referrals to be sent to [facility]. SW has faxed referrals and Bobbi, raj, is aware. SW will follow and await for possible bed offer.         Discharge Codes    No documentation.             Melissa Willis

## 2020-04-17 NOTE — PROGRESS NOTES
[facility] Medicine Services  INPATIENT PROGRESS NOTE    Length of Stay: 0  Date of Admission: 4/14/2020  Primary Care Physician: Matheus Olivera PA    Subjective   Chief Complaint: Back pain/abdomen pain/nausea    HPI   Patient back pain somewhat improved.  Patient still hurting though.  Hemoglobin is been stable.  Change prophylaxis Lovenox to therapeutic today.  Patient continue STD.  Patient denies any fever night sweats or chills.  Patient denies any shortness of breath.  Good bowel movement today.    Review of Systems   Constitutional: Positive for appetite change and fatigue. Negative for chills and fever.   HENT: Negative for hearing loss, nosebleeds, tinnitus and trouble swallowing.    Eyes: Negative for visual disturbance.   Respiratory: Negative for cough, chest tightness, shortness of breath and wheezing.    Cardiovascular: Negative for chest pain, palpitations and leg swelling.   Gastrointestinal: Positive for abdominal pain. Negative for abdominal distention, blood in stool, constipation, diarrhea,  vomiting.  Patient pain nausea today.  Endocrine: Negative for cold intolerance, heat intolerance, polydipsia, polyphagia and polyuria.   Genitourinary: Negative for decreased urine volume, difficulty urinating, dysuria, flank pain, frequency and hematuria.   Musculoskeletal: Positive for arthralgias, back pain, gait problem and myalgias. Negative for joint swelling.   Skin: Negative for rash.   Allergic/Immunologic: Negative for immunocompromised state.   Neurological: Positive for weakness. Negative for dizziness, syncope, light-headedness and headaches.   Hematological: Negative for adenopathy. Does not bruise/bleed easily.   Psychiatric/Behavioral: Positive for confusion. Negative for sleep disturbance. The patient is not nervous/anxious.      All pertinent negatives and positives are as above. All other systems have been reviewed and are negative unless  otherwise stated.     Objective    Temp:  [97.1 °F (36.2 °C)-99.3 °F (37.4 °C)] 97.1 °F (36.2 °C)  Heart Rate:  [50-63] 51  Resp:  [16-18] 16  BP: (123-144)/(40-57) 144/54    Intake/Output Summary (Last 24 hours) at 4/17/2020 1436  Last data filed at 4/17/2020 1116  Gross per 24 hour   Intake 510 ml   Output 985 ml   Net -475 ml     Physical Exam  Constitutional: He appears well-developed.   HENT:   Head: Normocephalic.   Eyes: Pupils are equal, round, and reactive to light. Conjunctivae are normal.   Neck: Neck supple. No JVD present.   Cardiovascular: Normal rate, regular rhythm, normal heart sounds and intact distal pulses. Exam reveals no gallop and no friction rub.   No murmur heard.  Pulmonary/Chest: No respiratory distress. He has no wheezes. He has no rales. He exhibits no tenderness.   Decrease in breath sound bilateral, clear.   Abdominal: Bowel sounds are normal. He exhibits no distension. There is no tenderness. There is no rebound and no guarding.   Protuberant. NAM to right upper quadurant; bilious drainage dark green.   Musculoskeletal: Normal range of motion. He exhibits edema. He exhibits no tenderness or deformity.   2+ pitting edema.   Neurological: He is alert. He displays normal reflexes. No cranial nerve deficit. He exhibits abnormal muscle tone. Coordination abnormal.   Skin: Skin is warm and dry. Capillary refill takes 2 to 3 seconds. No rash noted.   Psychiatric: He has a normal mood and affect. His behavior is normal.   Nursing note and vitals reviewed.  Results Review:  Lab Results (last 24 hours)     Procedure Component Value Units Date/Time    POC Glucose Once [[id number]]  (Abnormal) Collected:  04/17/20 1149    Specimen:  Blood Updated:  04/17/20 1205     Glucose 142 mg/dL      Comment: : [id number] CormierMeter ID: [id number]       POC Glucose Once [[id number]]  (Normal) Collected:  04/17/20 0814    Specimen:  Blood Updated:  04/17/20 0829     Glucose 130 mg/dL      Comment:  : [id number] Naresh LaurenMeter ID: [id number]       CBC (No Diff) [[id number]]  (Abnormal) Collected:  04/17/20 0550    Specimen:  Blood Updated:  04/17/20 0628     WBC 11.84 10*3/mm3      RBC 2.70 10*6/mm3      Hemoglobin 8.0 g/dL      Hematocrit 25.1 %      MCV 93.0 fL      MCH 29.6 pg      MCHC 31.9 g/dL      RDW 14.6 %      RDW-SD 49.3 fl      MPV 10.6 fL      Platelets 204 10*3/mm3     Blood Culture - Blood, Arm, Right [[id number]] Collected:  04/14/20 2255    Specimen:  Blood from Arm, Right Updated:  04/16/20 2315     Blood Culture No growth at 2 days    Blood Culture - Blood, Arm, Right [[id number]] Collected:  04/14/20 2249    Specimen:  Blood from Arm, Right Updated:  04/16/20 2300     Blood Culture No growth at 2 days    POC Glucose Once [[id number]]  (Normal) Collected:  04/16/20 1659    Specimen:  Blood Updated:  04/16/20 1719     Glucose 122 mg/dL      Comment: : [id number] Miguel Angel PamelaMeter ID: [id number]              Cultures:  Blood Culture   Date Value Ref Range Status   04/14/2020 No growth at 2 days  Preliminary   04/14/2020 No growth at 2 days  Preliminary       Radiology Data:    Imaging Results (Last 24 Hours)     ** No results found for the last 24 hours. **          No Known Allergies    Scheduled meds:     aspirin 81 mg Oral Daily   bisoprolol 2.5 mg Oral Daily   enoxaparin 30 mg Subcutaneous Q24H   famotidine 20 mg Intravenous BID   insulin lispro 2-7 Units Subcutaneous TID AC   losartan 50 mg Oral Daily   piperacillin-tazobactam 3.375 g Intravenous Q8H   polyethylene glycol 17 g Oral Daily   sennosides-docusate 1 tablet Oral Daily   sodium chloride 10 mL Intravenous Q12H   sucralfate 1 g Oral Q6H   Vitamin B 12 1,000 mcg Oral Daily       PRN meds:  bisacodyl  •  dextrose  •  dextrose  •  glucagon (human recombinant)  •  Glycerin-Hypromellose-  •  magnesium hydroxide  •  ondansetron **OR** ondansetron  •  oxyCODONE-acetaminophen  •  Pharmacy to Dose Zosyn  •  sodium  chloride    Assessment/Plan       Transaminitis    Acute cholecystitis    History of prosthetic aortic valve    Hematoma of left iliopsoas muscle    Chronic anticoagulation    Hyponatremia      Plan:  Hematoma of the left iliopsoas muscle.    Concerning possible infectious.  Continue Zosyn and vancomycin for now.  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     Cholecystitis/abdomen pain/elevated liver enzymes.  Consult general surgery.  Status post cholecystectomy tube in place.  Continues Zosyn and vancomycin.  Recommendation from general surgery-plan to perform cholecystectomy in approximately 1 month.     CAD/history of prosthetic aortic valve/hypertension.  Continue aspirin.   Decrease Zebeta due to bradycardia.  Continue Cozaar.  SCD.  Start patient back on therapeutic Lovenox.  Echocardiogram 2020- ejection fraction 56 to 60%, moderate concentric hypertrophy, prosthetic aortic valve- peak and mean gradients within normal limits, mild mitral valve regurgitation.    Patient has a pacemaker  was interrogated on 4/15/2020 was fine per nursing staff.     Pain control.  Dilaudid as needed.  Percocet PRN.        Anemia.  Hemoglobin stable. Will follow.     Nausea/vomiting.  Pepcid.  Zofran as needed.  Carafate.     Chronic kidney disease stage III.  Continue slow IV hydration.     Diabetes.  Sliding scale.  Hemoglobin A1 C 6.6.     Chronic pain.  Percocet as needed.     Constipation.   Dulcolax suppository as needed.  Rebecca-Colace daily.     Nutrition. Cardiac/carbohydrate/bland diet.     Deconditioning.  PT consult.     Blood cultures-no growth in 2 days.     Discharge Plannin-4 days. Patient states he did not like [facility] and would like transfer elsewhere at time of discharge.  Consult  to  help.    Mamadou Dodd MD   04/17/20   14:36

## 2020-04-18 LAB
ALBUMIN SERPL-MCNC: 2.4 G/DL (ref 3.5–5.2)
ALBUMIN/GLOB SERPL: 0.8 G/DL
ALP SERPL-CCNC: 102 U/L (ref 39–117)
ALT SERPL W P-5'-P-CCNC: 45 U/L (ref 1–41)
ANION GAP SERPL CALCULATED.3IONS-SCNC: 9 MMOL/L (ref 5–15)
APTT PPP: 43.6 SECONDS (ref 24.1–35)
AST SERPL-CCNC: 68 U/L (ref 1–40)
BILIRUB SERPL-MCNC: 1.1 MG/DL (ref 0.2–1.2)
BUN BLD-MCNC: 18 MG/DL (ref 8–23)
BUN/CREAT SERPL: 18 (ref 7–25)
CALCIUM SPEC-SCNC: 7.8 MG/DL (ref 8.6–10.5)
CHLORIDE SERPL-SCNC: 98 MMOL/L (ref 98–107)
CO2 SERPL-SCNC: 25 MMOL/L (ref 22–29)
CREAT BLD-MCNC: 1 MG/DL (ref 0.76–1.27)
DEPRECATED RDW RBC AUTO: 48 FL (ref 37–54)
ERYTHROCYTE [DISTWIDTH] IN BLOOD BY AUTOMATED COUNT: 14.4 % (ref 12.3–15.4)
GFR SERPL CREATININE-BSD FRML MDRD: 71 ML/MIN/1.73
GLOBULIN UR ELPH-MCNC: 3 GM/DL
GLUCOSE BLD-MCNC: 138 MG/DL (ref 65–99)
GLUCOSE BLDC GLUCOMTR-MCNC: 129 MG/DL (ref 70–130)
GLUCOSE BLDC GLUCOMTR-MCNC: 130 MG/DL (ref 70–130)
GLUCOSE BLDC GLUCOMTR-MCNC: 149 MG/DL (ref 70–130)
HCT VFR BLD AUTO: 23 % (ref 37.5–51)
HGB BLD-MCNC: 7.5 G/DL (ref 13–17.7)
HOLD SPECIMEN: NORMAL
INR PPP: 1.32 (ref 0.91–1.09)
MCH RBC QN AUTO: 30 PG (ref 26.6–33)
MCHC RBC AUTO-ENTMCNC: 32.6 G/DL (ref 31.5–35.7)
MCV RBC AUTO: 92 FL (ref 79–97)
PLATELET # BLD AUTO: 197 10*3/MM3 (ref 140–450)
PMV BLD AUTO: 10.8 FL (ref 6–12)
POTASSIUM BLD-SCNC: 4.2 MMOL/L (ref 3.5–5.2)
PROT SERPL-MCNC: 5.4 G/DL (ref 6–8.5)
PROTHROMBIN TIME: 16.3 SECONDS (ref 11.9–14.6)
RBC # BLD AUTO: 2.5 10*6/MM3 (ref 4.14–5.8)
SODIUM BLD-SCNC: 132 MMOL/L (ref 136–145)
WBC NRBC COR # BLD: 9.24 10*3/MM3 (ref 3.4–10.8)
WHOLE BLOOD HOLD SPECIMEN: NORMAL

## 2020-04-18 PROCEDURE — 82962 GLUCOSE BLOOD TEST: CPT

## 2020-04-18 PROCEDURE — 96372 THER/PROPH/DIAG INJ SC/IM: CPT

## 2020-04-18 PROCEDURE — G0378 HOSPITAL OBSERVATION PER HR: HCPCS

## 2020-04-18 PROCEDURE — 96366 THER/PROPH/DIAG IV INF ADDON: CPT

## 2020-04-18 PROCEDURE — 25010000002 PIPERACILLIN SOD-TAZOBACTAM PER 1 G: Performed by: INTERNAL MEDICINE

## 2020-04-18 PROCEDURE — 80053 COMPREHEN METABOLIC PANEL: CPT | Performed by: FAMILY MEDICINE

## 2020-04-18 PROCEDURE — 96375 TX/PRO/DX INJ NEW DRUG ADDON: CPT

## 2020-04-18 PROCEDURE — 85610 PROTHROMBIN TIME: CPT | Performed by: FAMILY MEDICINE

## 2020-04-18 PROCEDURE — 96376 TX/PRO/DX INJ SAME DRUG ADON: CPT

## 2020-04-18 PROCEDURE — 25010000002 ENOXAPARIN PER 10 MG: Performed by: FAMILY MEDICINE

## 2020-04-18 PROCEDURE — 97110 THERAPEUTIC EXERCISES: CPT

## 2020-04-18 PROCEDURE — 25010000002 FUROSEMIDE PER 20 MG: Performed by: SPECIALIST

## 2020-04-18 PROCEDURE — 85027 COMPLETE CBC AUTOMATED: CPT | Performed by: SPECIALIST

## 2020-04-18 PROCEDURE — 85730 THROMBOPLASTIN TIME PARTIAL: CPT | Performed by: FAMILY MEDICINE

## 2020-04-18 PROCEDURE — 97530 THERAPEUTIC ACTIVITIES: CPT

## 2020-04-18 RX ORDER — FUROSEMIDE 10 MG/ML
20 INJECTION INTRAMUSCULAR; INTRAVENOUS EVERY 12 HOURS
Status: COMPLETED | OUTPATIENT
Start: 2020-04-18 | End: 2020-04-18

## 2020-04-18 RX ADMIN — ENOXAPARIN SODIUM 110 MG: 120 INJECTION SUBCUTANEOUS at 08:34

## 2020-04-18 RX ADMIN — TAZOBACTAM SODIUM AND PIPERACILLIN SODIUM 3.38 G: 375; 3 INJECTION, SOLUTION INTRAVENOUS at 06:13

## 2020-04-18 RX ADMIN — ASPIRIN 81 MG: 81 TABLET, CHEWABLE ORAL at 08:32

## 2020-04-18 RX ADMIN — SUCRALFATE 1 G: 1 SUSPENSION ORAL at 23:55

## 2020-04-18 RX ADMIN — OXYCODONE HYDROCHLORIDE AND ACETAMINOPHEN 1 TABLET: 5; 325 TABLET ORAL at 20:46

## 2020-04-18 RX ADMIN — SUCRALFATE 1 G: 1 SUSPENSION ORAL at 11:33

## 2020-04-18 RX ADMIN — FAMOTIDINE 20 MG: 10 INJECTION INTRAVENOUS at 08:32

## 2020-04-18 RX ADMIN — FAMOTIDINE 20 MG: 10 INJECTION INTRAVENOUS at 20:47

## 2020-04-18 RX ADMIN — SUCRALFATE 1 G: 1 SUSPENSION ORAL at 17:28

## 2020-04-18 RX ADMIN — SUCRALFATE 1 G: 1 SUSPENSION ORAL at 06:13

## 2020-04-18 RX ADMIN — TAZOBACTAM SODIUM AND PIPERACILLIN SODIUM 3.38 G: 375; 3 INJECTION, SOLUTION INTRAVENOUS at 14:51

## 2020-04-18 RX ADMIN — FUROSEMIDE 20 MG: 10 INJECTION, SOLUTION INTRAMUSCULAR; INTRAVENOUS at 23:55

## 2020-04-18 RX ADMIN — SODIUM CHLORIDE, PRESERVATIVE FREE 10 ML: 5 INJECTION INTRAVENOUS at 20:46

## 2020-04-18 RX ADMIN — ENOXAPARIN SODIUM 110 MG: 120 INJECTION SUBCUTANEOUS at 20:47

## 2020-04-18 RX ADMIN — TAZOBACTAM SODIUM AND PIPERACILLIN SODIUM 3.38 G: 375; 3 INJECTION, SOLUTION INTRAVENOUS at 22:07

## 2020-04-18 RX ADMIN — FUROSEMIDE 20 MG: 10 INJECTION, SOLUTION INTRAMUSCULAR; INTRAVENOUS at 13:08

## 2020-04-18 RX ADMIN — Medication 1000 MCG: at 08:32

## 2020-04-18 NOTE — PROGRESS NOTES
LOS: 0 days   Patient Care Team:  Matheus Olivera PA as PCP - General (Physician Assistant)    Chief Complaint: Central abdominal pain  Subjective     Subjective     13 days out from cholecystostomy tube, he also has a iliopsoas hematoma on his left, no real change he has aches and pains as expected.  No particular problems noted,  Objective      Objective     Vital Signs  Temp:  [97.5 °F (36.4 °C)-99.3 °F (37.4 °C)] 97.5 °F (36.4 °C)  Heart Rate:  [50-52] 52  Resp:  [16] 16  BP: (123-137)/(43-50) 134/50    Intake & Output (last 3 days)       04/15 0701 - 04/16 0700 04/16 0701 - 04/17 0700 04/17 0701 - 04/18 0700 04/18 0701 - 04/19 0700    P.O. 360 660 120     I.V. (mL/kg) 1037 (9.3) 208 (1.8)      IV Piggyback  200 100 50    Total Intake(mL/kg) 1397 (12.6) 1068 (9.5) 220 (1.9) 50 (0.4)    Urine (mL/kg/hr) 1025 (0.4) 850 (0.3) 625 (0.2) 100 (0.2)    Drains 140 145 150     Stool   0 0    Total Output 1165 995 775 100    Net +232 +73 -555 -50            Stool Unmeasured Occurrence   2 x 1 x          Physical Exam:     General Appearance:    Alert, cooperative, in no acute distress   Lungs:     Cl sounds are shallow, there are rales noted in both bases, I believe he is somewhat overloaded,    Heart:    Regular rhythm and normal rate, normal S1 and S2, no            murmur, no gallop, no rub, no click   Chest Wall:    No abnormalities observed   Abdomen:     Normal bowel sounds, no masses, no organomegaly, soft        non-tender, non-distended,wound clean and dry, no   erythema, no discharge         Results Review:     I reviewed the patient's new clinical results.  I reviewed the patient's new imaging results and agree with the interpretation.    Results from last 7 days   Lab Units 04/18/20  0440 04/17/20  0550 04/16/20  0542   WBC 10*3/mm3 9.24 11.84* 12.00*   HEMOGLOBIN g/dL 7.5* 8.0* 8.6*   HEMATOCRIT % 23.0* 25.1* 26.7*   PLATELETS 10*3/mm3 197 204 201        Results from last 7 days   Lab Units  04/18/20  0440 04/16/20  0542 04/15/20  0518   SODIUM mmol/L 132* 133* 133*   POTASSIUM mmol/L 4.2 4.5 4.9   CHLORIDE mmol/L 98 98 97*   CO2 mmol/L 25.0 26.0 25.0   BUN mg/dL 18 16 18   CREATININE mg/dL 1.00 0.93 0.82   CALCIUM mg/dL 7.8* 8.0* 7.9*   BILIRUBIN mg/dL 1.1 0.9 0.8   ALK PHOS U/L 102 104 101   ALT (SGPT) U/L 45* 46* 37   AST (SGOT) U/L 68* 96* 55*   GLUCOSE mg/dL 138* 132* 128*       Assessment/Plan     Assessment/Plan       Transaminitis    Acute cholecystitis    History of prosthetic aortic valve    Hematoma of left iliopsoas muscle    Chronic anticoagulation    Hyponatremia      Currently continue his treatment, will make sure his fluids are reduced, we will give him 20 mg of Lasix this morning and also afternoon.  Check labs in the morning.      Mk Emery MD  04/18/20  11:35      Time: time spent with patient 15 minutes     EMR Dragon/Transcription disclaimer: Much of this encounter note is an electronic transcription/translation of spoken language to printed text. The electronic translation of spoken language may permit erroneous, or at times, nonsensical words or phrases to be inadvertently transcribed; although I have reviewed the note for such errors, some may still exist.

## 2020-04-18 NOTE — PROGRESS NOTES
[facility] Medicine Services  INPATIENT PROGRESS NOTE    Length of Stay: 0  Date of Admission: 4/14/2020  Primary Care Physician: Matheus Olivera PA    Subjective   Chief Complaint: Back pain/abdomen pain/nausea    HPI   Back pain somewhat improved.  Patient complains worsening edema.  Patient denies any chest pain.  Patient denies any shortness of breath.    Review of Systems   Constitutional: Positive for appetite change and fatigue. Negative for chills and fever.   HENT: Negative for hearing loss, nosebleeds, tinnitus and trouble swallowing.    Eyes: Negative for visual disturbance.   Respiratory: Negative for cough, chest tightness, shortness of breath and wheezing.    Cardiovascular: Negative for chest pain, palpitations and leg swelling.   Gastrointestinal: Positive for abdominal pain. Negative for abdominal distention, blood in stool, constipation, diarrhea,  vomiting.  Patient pain nausea today.  Endocrine: Negative for cold intolerance, heat intolerance, polydipsia, polyphagia and polyuria.   Genitourinary: Negative for decreased urine volume, difficulty urinating, dysuria, flank pain, frequency and hematuria.   Musculoskeletal: Positive for arthralgias, back pain, gait problem and myalgias. Negative for joint swelling.   Skin: Negative for rash.   Allergic/Immunologic: Negative for immunocompromised state.   Neurological: Positive for weakness. Negative for dizziness, syncope, light-headedness and headaches.   Hematological: Negative for adenopathy. Does not bruise/bleed easily.   Psychiatric/Behavioral: Positive for confusion. Negative for sleep disturbance. The patient is not nervous/anxious.    All pertinent negatives and positives are as above. All other systems have been reviewed and are negative unless otherwise stated.     Objective    Temp:  [97.5 °F (36.4 °C)-99.3 °F (37.4 °C)] 97.5 °F (36.4 °C)  Heart Rate:  [50-52] 52  Resp:  [16] 16  BP:  (123-137)/(43-50) 134/50    Intake/Output Summary (Last 24 hours) at 4/18/2020 1243  Last data filed at 4/18/2020 1216  Gross per 24 hour   Intake 100 ml   Output 760 ml   Net -660 ml     Physical Exam  Constitutional: He appears well-developed.   HENT:   Head: Normocephalic.   Eyes: Pupils are equal, round, and reactive to light. Conjunctivae are normal.   Neck: Neck supple. No JVD present.   Cardiovascular: Normal rate, regular rhythm, normal heart sounds and intact distal pulses. Exam reveals no gallop and no friction rub.   No murmur heard.  Pulmonary/Chest: No respiratory distress. He has no wheezes. He has no rales. He exhibits no tenderness.   Decrease in breath sound bilateral, clear.   Abdominal: Bowel sounds are normal. He exhibits no distension. There is no tenderness. There is no rebound and no guarding.   Protuberant. NAM to right upper quadurant; bilious drainage dark green.   Musculoskeletal: Normal range of motion. He exhibits edema. He exhibits no tenderness or deformity.     3-4+  pitting edema.   Neurological: He is alert. He displays normal reflexes. No cranial nerve deficit. He exhibits abnormal muscle tone. Coordination abnormal.   Skin: Skin is warm and dry. Capillary refill takes 2 to 3 seconds. No rash noted.   Psychiatric: He has a normal mood and affect. His behavior is normal.   Nursing note and vitals reviewed.  Results Review:  Lab Results (last 24 hours)     Procedure Component Value Units Date/Time    POC Glucose Once [[id number]]  (Abnormal) Collected:  04/18/20 1125    Specimen:  Blood Updated:  04/18/20 1137     Glucose 149 mg/dL      Comment: : [id number] Eddy (Vázquez) JessicaMeter ID: [id number]       Extra Tubes [[id number]] Collected:  04/18/20 0814    Specimen:  Blood, Venous Line Updated:  04/18/20 1130    Narrative:       The following orders were created for panel order Extra Tubes.  Procedure                               Abnormality         Status                      ---------                               -----------         ------                     Lavender Top[[id number]]                                     Final result               Green Top (Gel)[[id number]]                                  Final result                 Please view results for these tests on the individual orders.    Lavender Top [[id number]] Collected:  04/18/20 0814    Specimen:  Blood Updated:  04/18/20 1130     Extra Tube hold for add-on     Comment: Auto resulted       Green Top (Gel) [[id number]] Collected:  04/18/20 0814    Specimen:  Blood Updated:  04/18/20 1130     Extra Tube Hold for add-ons.     Comment: Auto resulted.       Protime-INR [[id number]]  (Abnormal) Collected:  04/18/20 0814    Specimen:  Blood Updated:  04/18/20 0840     Protime 16.3 Seconds      INR 1.32    aPTT [[id number]]  (Abnormal) Collected:  04/18/20 0814    Specimen:  Blood Updated:  04/18/20 0840     PTT 43.6 seconds     POC Glucose Once [[id number]]  (Normal) Collected:  04/18/20 0751    Specimen:  Blood Updated:  04/18/20 0819     Glucose 129 mg/dL      Comment: : [id number] Eddy (Vázquez) JessicaMeter ID: [id number]       Comprehensive Metabolic Panel [[id number]]  (Abnormal) Collected:  04/18/20 0440    Specimen:  Blood Updated:  04/18/20 0554     Glucose 138 mg/dL      BUN 18 mg/dL      Creatinine 1.00 mg/dL      Sodium 132 mmol/L      Potassium 4.2 mmol/L      Chloride 98 mmol/L      CO2 25.0 mmol/L      Calcium 7.8 mg/dL      Total Protein 5.4 g/dL      Albumin 2.40 g/dL      ALT (SGPT) 45 U/L      AST (SGOT) 68 U/L      Alkaline Phosphatase 102 U/L      Total Bilirubin 1.1 mg/dL      eGFR Non African Amer 71 mL/min/1.73      Globulin 3.0 gm/dL      A/G Ratio 0.8 g/dL      BUN/Creatinine Ratio 18.0     Anion Gap 9.0 mmol/L     Narrative:       GFR Normal >60  Chronic Kidney Disease <60  Kidney Failure <15      CBC (No Diff) [[id number]]  (Abnormal) Collected:  04/18/20 0440    Specimen:  Blood Updated:  04/18/20 0533      WBC 9.24 10*3/mm3      RBC 2.50 10*6/mm3      Hemoglobin 7.5 g/dL      Hematocrit 23.0 %      MCV 92.0 fL      MCH 30.0 pg      MCHC 32.6 g/dL      RDW 14.4 %      RDW-SD 48.0 fl      MPV 10.8 fL      Platelets 197 10*3/mm3     Blood Culture - Blood, Arm, Right [[id number]] Collected:  04/14/20 2255    Specimen:  Blood from Arm, Right Updated:  04/17/20 2315     Blood Culture No growth at 3 days    Blood Culture - Blood, Arm, Right [[id number]] Collected:  04/14/20 2249    Specimen:  Blood from Arm, Right Updated:  04/17/20 2300     Blood Culture No growth at 3 days    POC Glucose Once [[id number]]  (Abnormal) Collected:  04/17/20 1728    Specimen:  Blood Updated:  04/17/20 1741     Glucose 185 mg/dL      Comment: : [id number] Naresh LaurenMeter ID: [id number]              Cultures:  Blood Culture   Date Value Ref Range Status   04/14/2020 No growth at 3 days  Preliminary   04/14/2020 No growth at 3 days  Preliminary       Radiology Data:    Imaging Results (Last 24 Hours)     ** No results found for the last 24 hours. **          No Known Allergies    Scheduled meds:     aspirin 81 mg Oral Daily   bisoprolol 2.5 mg Oral Daily   enoxaparin 1 mg/kg Subcutaneous Q12H   famotidine 20 mg Intravenous BID   furosemide 20 mg Intravenous Q12H   insulin lispro 2-7 Units Subcutaneous TID AC   losartan 50 mg Oral Daily   piperacillin-tazobactam 3.375 g Intravenous Q8H   polyethylene glycol 17 g Oral Daily   sennosides-docusate 1 tablet Oral Daily   sodium chloride 10 mL Intravenous Q12H   sucralfate 1 g Oral Q6H   Vitamin B 12 1,000 mcg Oral Daily       PRN meds:  bisacodyl  •  dextrose  •  dextrose  •  glucagon (human recombinant)  •  Glycerin-Hypromellose-  •  magnesium hydroxide  •  ondansetron **OR** ondansetron  •  oxyCODONE-acetaminophen  •  Pharmacy to Dose Zosyn  •  sodium chloride    Assessment/Plan       Transaminitis    Acute cholecystitis    History of prosthetic aortic valve    Hematoma of left iliopsoas  muscle    Chronic anticoagulation    Hyponatremia      Plan:  Hematoma of the left iliopsoas muscle.    Concerning possible infectious.  Continue Zosyn and vancomycin for now.  CT scan abdomen pelvis- cholecystectomy tube present in the gallbladder, moderate size left iliopsoas intramuscular fluid collection-likely hematoma this is changed from April 3, postsurgical and degenerative spondylosis of the lumbar spine.    Anemia.  Decrease in hemoglobin.  Will follow.     Cholecystitis/abdomen pain/elevated liver enzymes.  Consult general surgery.  Status post cholecystectomy tube in place.  Continues Zosyn and vancomycin.  Recommendation from general surgery-plan to perform cholecystectomy in approximately 1 month.     CAD/history of prosthetic aortic valve/hypertension.  Continue aspirin.  Lasix was started by general surgery  day.  Decrease Zebeta due to bradycardia.  Continue Cozaar.  SCD.  Start patient back on therapeutic Lovenox.  Echocardiogram 2020- ejection fraction 56 to 60%, moderate concentric hypertrophy, prosthetic aortic valve- peak and mean gradients within normal limits, mild mitral valve regurgitation.     Patient has a pacemaker  was interrogated on 4/15/2020 was fine per nursing staff.     Pain control.  Dilaudid as needed.  Percocet PRN.        Nausea/vomiting.  Pepcid.  Zofran as needed.  Carafate.     Chronic kidney disease stage III.  Continue slow IV hydration.     Diabetes.  Sliding scale.  Hemoglobin A1 C 6.6.     Chronic pain.  Percocet as needed.     Constipation.   Dulcolax suppository as needed.  Rebecca-Colace daily.     Nutrition. Cardiac/carbohydrate/bland diet.     Deconditioning.  PT consult.     Blood cultures-no growth in 3 days.     Discharge Plannin-4 days. Patient states he did not like [facility] and would like transfer elsewhere at time of discharge.  Patient to go to [facility] rehab.    Mamadou Dodd MD   20   12:43

## 2020-04-18 NOTE — PLAN OF CARE
Problem: Patient Care Overview  Goal: Plan of Care Review  Outcome: Ongoing (interventions implemented as appropriate)  Flowsheets (Taken 4/18/2020 1405)  Progress: no change  Plan of Care Reviewed With: patient  Note:   Pt complained of back pain. IV abx continues. Up with 2 and walker to BSC. IV lasix given. Turn pt every 2 hours. Continue to monitor labs.

## 2020-04-18 NOTE — THERAPY TREATMENT NOTE
Patient Name: Jesus Smith  : 1935    MRN: [id number]                              Today's Date: 2020       Admit Date: 2020    Visit Dx:     ICD-10-CM ICD-9-CM   1. Hematoma of left iliopsoas muscle, initial encounter S70.12XA 924.00   2. Cholecystitis K81.9 575.10   3. Impaired mobility Z74.09 799.89     Patient Active Problem List   Diagnosis   • Acute UTI (urinary tract infection)   • Bacteremia   • Transaminitis   • Acute cholecystitis   • Benign essential HTN   • CKD (chronic kidney disease) stage 3, GFR 30-59 ml/min (CMS/[facility])   • History of prosthetic aortic valve   • Hematoma of left iliopsoas muscle   • Chronic anticoagulation   • Hyponatremia     Past Medical History:   Diagnosis Date   • Bradycardia    • Coronary artery disease    • GERD (gastroesophageal reflux disease)    • Hypertension    • Injury of back    • TIA (transient ischemic attack)      Past Surgical History:   Procedure Laterality Date   • APPENDECTOMY     • BACK SURGERY      Fusion   • CARDIAC SURGERY      Open Heart   • EXPLORATORY LAPAROTOMY N/A 2020    Procedure: open cholecystostomy tube placement ;  Surgeon: Agustina Saleem MD;  Location: [facility];  Service: General;  Laterality: N/A;   • HERNIA REPAIR     • JOINT REPLACEMENT Left     s/p L hip replacement   • PACEMAKER IMPLANTATION     • STOMACH SURGERY       General Information     Row Name 20          PT Evaluation Time/Intention    Document Type  therapy note (daily note)  -     Mode of Treatment  physical therapy  -     Row Name 20          Cognitive Assessment/Intervention- PT/OT    Orientation Status (Cognition)  oriented x 4  -       User Key  (r) = Recorded By, (t) = Taken By, (c) = Cosigned By    Initials Name Provider Type     Krzysztof Wong, PT Physical Therapist        Mobility     Row Name 20          Bed Mobility Assessment/Treatment    Supine-Sit [place] (Bed Mobility)  verbal cues;minimum assist  (75% patient effort)  -     Sit-Supine [place] (Bed Mobility)  verbal cues;moderate assist (50% patient effort)  -     Assistive Device (Bed Mobility)  head of bed elevated  -[facility] Name 04/18/20 0913          Bed-Chair Transfer    Bed-Chair [place] (Transfers)  verbal cues;minimum assist (75% patient effort) bed<>BSC  -     Row Name 04/18/20 0913          Sit-Stand Transfer    Sit-Stand [place] (Transfers)  verbal cues;moderate assist (50% patient effort) x3  -[facility] Name 04/18/20 0913          Gait/Stairs Assessment/Training    Comment (Gait/Stairs)  pt declined offer to ambulate due to L hip pain  -       User Key  (r) = Recorded By, (t) = Taken By, (c) = Cosigned By    Initials Name Provider Type     Krzysztof Wong, PT Physical Therapist        Obj/Interventions     [facility] [place] Name 04/18/20 0951          Therapeutic Exercise    Lower Extremity (Therapeutic Exercise)  heel slides, right;SAQ (short arc quad), right;quad sets, right;SLR (straight leg raise), right  -LH     Comment (Therapeutic Exercise)  EOB and fowlers positions for therex x20  -       User Key  (r) = Recorded By, (t) = Taken By, (c) = Cosigned By    Initials Name Provider Type     Krzysztof Wong, PT Physical Therapist        Goals/Plan    No documentation.       Clinical Impression     [facility] [place] Name 04/18/20 0913          Pain Assessment    Additional Documentation  Pain Scale: Numbers Pre/Post-Treatment (Group)  -LH     Row Name 04/18/20 0913          Pain Scale: Numbers Pre/Post-Treatment    Pain Scale: Numbers, Pretreatment  9/10  -     Pain Scale: Numbers, Post-Treatment  9/10  -     Pain Location - Side  Left  -LH     Pain Location  hip  -LH     Pain Intervention(s)  Medication (See MAR);Repositioned;Ambulation/increased activity  -     Row Name 04/18/20 0913          Plan of Care Review    Progress  no change  -     Outcome Summary  Pt required min-mod assist for bed mobility and t/f's w/ RW.  Pt declined  offer to ambulate due to L hip pain.  Performed exercises on EOB and in bed x20. Sit<>stand x3.  PT to continue w/ strengthening.  -     Row Name 04/18/20 0913          Positioning and Restraints    Pre-Treatment Position  in bed  -     Post Treatment Position  bed  -LH     In Bed  notified nsg;fowlers;call light within reach;encouraged to call for assist;LLE elevated  -       User Key  (r) = Recorded By, (t) = Taken By, (c) = Cosigned By    Initials Name Provider Type     Krzysztof Wong, PT Physical Therapist        Outcome Measures     Row Name 04/18/20 0956          How much help from another person do you currently need...    Turning from your back to your side while in flat bed without using bedrails?  2  -LH     Moving from lying on back to sitting on the side of a flat bed without bedrails?  2  -LH     Moving to and from a bed to a chair (including a wheelchair)?  2  -LH     Standing up from a chair using your arms (e.g., wheelchair, bedside chair)?  2  -LH     Climbing 3-5 steps with a railing?  1  -LH     To walk in hospital room?  1  -     AM-PAC 6 Clicks Score (PT)  10  -     Row Name 04/18/20 0956          Functional Assessment    Outcome Measure Options  AM-PAC 6 Clicks Basic Mobility (PT)  -       User Key  (r) = Recorded By, (t) = Taken By, (c) = Cosigned By    Initials Name Provider Type     Krzysztof Wong, PT Physical Therapist          PT Recommendation and Plan     Progress: no change  Outcome Summary: Pt required min-mod assist for bed mobility and t/f's w/ RW.  Pt declined offer to ambulate due to L hip pain.  Performed exercises on EOB and in bed x20. Sit<>stand x3.  PT to continue w/ strengthening.     Time Calculation:   PT Charges     Row Name 04/18/20 0957             Time Calculation    Start Time  0913  -      Stop Time  0938  -      Time Calculation (min)  25 min  -      PT Received On  04/18/20  -         Time Calculation- PT    Total Timed Code Minutes- PT  25  minute(s)  -        User Key  (r) = Recorded By, (t) = Taken By, (c) = Cosigned By    Initials Name Provider Type     Krzysztof Wong, PT Physical Therapist        Therapy Charges for Today     Code Description Service Date Service Provider Modifiers Qty    18477843921  PT THER PROC EA 15 MIN 4/18/2020 Krzysztof Wong, PT GP 1    [id number]  PT THERAPEUTIC ACT EA 15 MIN 4/18/2020 Krzysztof Wong, PT GP 1          PT G-Codes  Outcome Measure Options: AM-PAC 6 Clicks Basic Mobility (PT)  AM-PAC 6 Clicks Score (PT): 10    Krzysztof Wong, PT  4/18/2020

## 2020-04-18 NOTE — PLAN OF CARE
Problem: Patient Care Overview  Goal: Plan of Care Review  Outcome: Ongoing (interventions implemented as appropriate)  Flowsheets  Taken 4/18/2020 0119 by Casi Ayala, RN  Progress: no change  Plan of Care Reviewed With: patient  Taken 4/17/2020 1630 by Genia Infante, RN  Outcome Summary: Pt has been resting well today. Worked with PT today. Tolerating diet, accucheck TID AC no SSI needed. IID except for extended zosyn. Wesley x1 with bile output. VSS, cont to monitor.  Note:   Pt. C/o pain x1 so far this shift; IV abx continue per order; resting well so far; will monitor.

## 2020-04-18 NOTE — PLAN OF CARE
Problem: Patient Care Overview  Goal: Plan of Care Review  Outcome: Ongoing (interventions implemented as appropriate)  Flowsheets  Taken 4/18/2020 0119 by Casi Ayala RN  Plan of Care Reviewed With: patient  Taken 4/18/2020 0913 by Krzysztof Wong, PT  Outcome Summary: Pt required min-mod assist for bed mobility and t/f's w/ RW.  Pt declined offer to ambulate due to L hip pain.  Performed exercises on EOB and in bed x20. Sit<>stand x3.  PT to continue w/ strengthening.

## 2020-04-19 LAB
ALBUMIN SERPL-MCNC: 2.4 G/DL (ref 3.5–5.2)
ALBUMIN/GLOB SERPL: 0.7 G/DL
ALP SERPL-CCNC: 126 U/L (ref 39–117)
ALT SERPL W P-5'-P-CCNC: 47 U/L (ref 1–41)
ANION GAP SERPL CALCULATED.3IONS-SCNC: 10 MMOL/L (ref 5–15)
AST SERPL-CCNC: 59 U/L (ref 1–40)
BACTERIA SPEC AEROBE CULT: NORMAL
BACTERIA SPEC AEROBE CULT: NORMAL
BILIRUB SERPL-MCNC: 1.3 MG/DL (ref 0.2–1.2)
BUN BLD-MCNC: 16 MG/DL (ref 8–23)
BUN/CREAT SERPL: 15.8 (ref 7–25)
CALCIUM SPEC-SCNC: 8.1 MG/DL (ref 8.6–10.5)
CHLORIDE SERPL-SCNC: 98 MMOL/L (ref 98–107)
CO2 SERPL-SCNC: 26 MMOL/L (ref 22–29)
CREAT BLD-MCNC: 1.01 MG/DL (ref 0.76–1.27)
DEPRECATED RDW RBC AUTO: 47.5 FL (ref 37–54)
ERYTHROCYTE [DISTWIDTH] IN BLOOD BY AUTOMATED COUNT: 14.4 % (ref 12.3–15.4)
GFR SERPL CREATININE-BSD FRML MDRD: 71 ML/MIN/1.73
GLOBULIN UR ELPH-MCNC: 3.6 GM/DL
GLUCOSE BLD-MCNC: 115 MG/DL (ref 65–99)
GLUCOSE BLDC GLUCOMTR-MCNC: 118 MG/DL (ref 70–130)
GLUCOSE BLDC GLUCOMTR-MCNC: 142 MG/DL (ref 70–130)
GLUCOSE BLDC GLUCOMTR-MCNC: 146 MG/DL (ref 70–130)
HCT VFR BLD AUTO: 25.1 % (ref 37.5–51)
HGB BLD-MCNC: 8.1 G/DL (ref 13–17.7)
MCH RBC QN AUTO: 29.6 PG (ref 26.6–33)
MCHC RBC AUTO-ENTMCNC: 32.3 G/DL (ref 31.5–35.7)
MCV RBC AUTO: 91.6 FL (ref 79–97)
PLATELET # BLD AUTO: 225 10*3/MM3 (ref 140–450)
PMV BLD AUTO: 10.2 FL (ref 6–12)
POTASSIUM BLD-SCNC: 4.2 MMOL/L (ref 3.5–5.2)
PROT SERPL-MCNC: 6 G/DL (ref 6–8.5)
RBC # BLD AUTO: 2.74 10*6/MM3 (ref 4.14–5.8)
SODIUM BLD-SCNC: 134 MMOL/L (ref 136–145)
WBC NRBC COR # BLD: 8.55 10*3/MM3 (ref 3.4–10.8)

## 2020-04-19 PROCEDURE — 96376 TX/PRO/DX INJ SAME DRUG ADON: CPT

## 2020-04-19 PROCEDURE — 25010000002 ENOXAPARIN PER 10 MG: Performed by: FAMILY MEDICINE

## 2020-04-19 PROCEDURE — 80053 COMPREHEN METABOLIC PANEL: CPT | Performed by: FAMILY MEDICINE

## 2020-04-19 PROCEDURE — 97530 THERAPEUTIC ACTIVITIES: CPT

## 2020-04-19 PROCEDURE — 96366 THER/PROPH/DIAG IV INF ADDON: CPT

## 2020-04-19 PROCEDURE — 82962 GLUCOSE BLOOD TEST: CPT

## 2020-04-19 PROCEDURE — 25010000002 PIPERACILLIN SOD-TAZOBACTAM PER 1 G: Performed by: INTERNAL MEDICINE

## 2020-04-19 PROCEDURE — G0378 HOSPITAL OBSERVATION PER HR: HCPCS

## 2020-04-19 PROCEDURE — 96372 THER/PROPH/DIAG INJ SC/IM: CPT

## 2020-04-19 PROCEDURE — 85027 COMPLETE CBC AUTOMATED: CPT | Performed by: SPECIALIST

## 2020-04-19 PROCEDURE — 25010000002 FUROSEMIDE PER 20 MG: Performed by: SPECIALIST

## 2020-04-19 RX ORDER — FUROSEMIDE 10 MG/ML
20 INJECTION INTRAMUSCULAR; INTRAVENOUS EVERY 12 HOURS
Status: COMPLETED | OUTPATIENT
Start: 2020-04-19 | End: 2020-04-19

## 2020-04-19 RX ORDER — FAMOTIDINE 20 MG/1
20 TABLET, FILM COATED ORAL
Status: DISCONTINUED | OUTPATIENT
Start: 2020-04-19 | End: 2020-04-20 | Stop reason: HOSPADM

## 2020-04-19 RX ADMIN — TAZOBACTAM SODIUM AND PIPERACILLIN SODIUM 3.38 G: 375; 3 INJECTION, SOLUTION INTRAVENOUS at 15:18

## 2020-04-19 RX ADMIN — OXYCODONE HYDROCHLORIDE AND ACETAMINOPHEN 1 TABLET: 5; 325 TABLET ORAL at 10:59

## 2020-04-19 RX ADMIN — FUROSEMIDE 20 MG: 10 INJECTION, SOLUTION INTRAVENOUS at 21:51

## 2020-04-19 RX ADMIN — FAMOTIDINE 20 MG: 20 TABLET, FILM COATED ORAL at 17:24

## 2020-04-19 RX ADMIN — FAMOTIDINE 20 MG: 10 INJECTION INTRAVENOUS at 09:59

## 2020-04-19 RX ADMIN — TAZOBACTAM SODIUM AND PIPERACILLIN SODIUM 3.38 G: 375; 3 INJECTION, SOLUTION INTRAVENOUS at 21:51

## 2020-04-19 RX ADMIN — ENOXAPARIN SODIUM 110 MG: 120 INJECTION SUBCUTANEOUS at 21:52

## 2020-04-19 RX ADMIN — TAZOBACTAM SODIUM AND PIPERACILLIN SODIUM 3.38 G: 375; 3 INJECTION, SOLUTION INTRAVENOUS at 06:15

## 2020-04-19 RX ADMIN — Medication 1000 MCG: at 09:59

## 2020-04-19 RX ADMIN — FUROSEMIDE 20 MG: 10 INJECTION, SOLUTION INTRAVENOUS at 10:59

## 2020-04-19 RX ADMIN — SODIUM CHLORIDE, PRESERVATIVE FREE 10 ML: 5 INJECTION INTRAVENOUS at 09:58

## 2020-04-19 RX ADMIN — SUCRALFATE 1 G: 1 SUSPENSION ORAL at 06:15

## 2020-04-19 RX ADMIN — SUCRALFATE 1 G: 1 SUSPENSION ORAL at 23:54

## 2020-04-19 RX ADMIN — SUCRALFATE 1 G: 1 SUSPENSION ORAL at 11:02

## 2020-04-19 RX ADMIN — OXYCODONE HYDROCHLORIDE AND ACETAMINOPHEN 1 TABLET: 5; 325 TABLET ORAL at 06:20

## 2020-04-19 RX ADMIN — ASPIRIN 81 MG: 81 TABLET, CHEWABLE ORAL at 09:59

## 2020-04-19 RX ADMIN — SODIUM CHLORIDE, PRESERVATIVE FREE 10 ML: 5 INJECTION INTRAVENOUS at 21:52

## 2020-04-19 RX ADMIN — SUCRALFATE 1 G: 1 SUSPENSION ORAL at 17:24

## 2020-04-19 RX ADMIN — OXYCODONE HYDROCHLORIDE AND ACETAMINOPHEN 1 TABLET: 5; 325 TABLET ORAL at 20:35

## 2020-04-19 RX ADMIN — ENOXAPARIN SODIUM 110 MG: 120 INJECTION SUBCUTANEOUS at 09:59

## 2020-04-19 NOTE — PROGRESS NOTES
LOS: 0 days   Patient Care Team:  Matheus Olivera PA as PCP - General (Physician Assistant)    Chief Complaint: 14 days out from cholecystostomy tube  Subjective     Subjective     14 days out from cholecystostomy tube he looks much improved, he had good diuresis yesterday, over 3000 cc out, breathing much better, no particular problems.  Objective      Objective     Vital Signs  Temp:  [98 °F (36.7 °C)-98.1 °F (36.7 °C)] 98 °F (36.7 °C)  Heart Rate:  [50-57] 57  Resp:  [16-20] 20  BP: (138-142)/(44-64) 138/44    Intake & Output (last 3 days)       04/16 0701 - 04/17 0700 04/17 0701 - 04/18 0700 04/18 0701 - 04/19 0700 04/19 0701 - 04/20 0700    P.O. 660 120      I.V. (mL/kg) 208 (1.8)       IV Piggyback 200 100 100 50    Total Intake(mL/kg) 1068 (9.5) 220 (1.9) 100 (0.9) 50 (0.4)    Urine (mL/kg/hr) 850 (0.3) 625 (0.2) 2750 (1)     Drains 145 150 140     Stool  0 0     Total Output      Net +73 -555 -2790 +50            Stool Unmeasured Occurrence  2 x 2 x           Physical Exam:     General Appearance:    Alert, cooperative, in no acute distress   Lungs:     Clear to auscultation,respirations regular, even and                  unlabored    Heart:    Regular rhythm and normal rate, normal S1 and S2, no            murmur, no gallop, no rub, no click   Chest Wall:    No abnormalities observed   Abdomen:     Normal bowel sounds, no masses, no organomegaly, soft        non-tender, non-distended,wound clean and dry, no   erythema, no discharge white count is down to 8, electrolytes within normal limits hematocrit stable at 25.        Results Review:     I reviewed the patient's new clinical results.  I reviewed the patient's new imaging results and agree with the interpretation.    Results from last 7 days   Lab Units 04/19/20  0512 04/18/20  0440 04/17/20  0550   WBC 10*3/mm3 8.55 9.24 11.84*   HEMOGLOBIN g/dL 8.1* 7.5* 8.0*   HEMATOCRIT % 25.1* 23.0* 25.1*   PLATELETS 10*3/mm3 225 197 204         Results from last 7 days   Lab Units 04/19/20  0512 04/18/20  0440 04/16/20  0542   SODIUM mmol/L 134* 132* 133*   POTASSIUM mmol/L 4.2 4.2 4.5   CHLORIDE mmol/L 98 98 98   CO2 mmol/L 26.0 25.0 26.0   BUN mg/dL 16 18 16   CREATININE mg/dL 1.01 1.00 0.93   CALCIUM mg/dL 8.1* 7.8* 8.0*   BILIRUBIN mg/dL 1.3* 1.1 0.9   ALK PHOS U/L 126* 102 104   ALT (SGPT) U/L 47* 45* 46*   AST (SGOT) U/L 59* 68* 96*   GLUCOSE mg/dL 115* 138* 132*       Assessment/Plan     Assessment/Plan       Transaminitis    Acute cholecystitis    History of prosthetic aortic valve    Hematoma of left iliopsoas muscle    Chronic anticoagulation    Hyponatremia      We will treat again with Lasix for further diuresis, plan to discharge and transfer to [place] on Monday.      Mk Emery MD  04/19/20  09:49      Time: time spent with patient 15 minutes     EMR Dragon/Transcription disclaimer: Much of this encounter note is an electronic transcription/translation of spoken language to printed text. The electronic translation of spoken language may permit erroneous, or at times, nonsensical words or phrases to be inadvertently transcribed; although I have reviewed the note for such errors, some may still exist.

## 2020-04-19 NOTE — PROGRESS NOTES
[facility] Medicine Services  INPATIENT PROGRESS NOTE    Length of Stay: 0  Date of Admission: 4/14/2020  Primary Care Physician: Matheus Olivera PA    Subjective   Chief Complaint: Abdomen pain pain/back pain.    HPI   Hemoglobin stable.  Edema is also improved.  Patient denies any chest pain.  Patient denies any shortness of breath.    Review of Systems   Constitutional: Positive for appetite change and fatigue. Negative for chills and fever.   HENT: Negative for hearing loss, nosebleeds, tinnitus and trouble swallowing.    Eyes: Negative for visual disturbance.   Respiratory: Negative for cough, chest tightness, shortness of breath and wheezing.    Cardiovascular: Negative for chest pain, palpitations and leg swelling.   Gastrointestinal: Positive for abdominal pain. Negative for abdominal distention, blood in stool, constipation, diarrhea,  vomiting.  Patient pain nausea today.  Endocrine: Negative for cold intolerance, heat intolerance, polydipsia, polyphagia and polyuria.   Genitourinary: Negative for decreased urine volume, difficulty urinating, dysuria, flank pain, frequency and hematuria.   Musculoskeletal: Positive for arthralgias, back pain, gait problem and myalgias. Negative for joint swelling.   Skin: Negative for rash.   Allergic/Immunologic: Negative for immunocompromised state.   Neurological: Positive for weakness. Negative for dizziness, syncope, light-headedness and headaches.   Hematological: Negative for adenopathy. Does not bruise/bleed easily.   Psychiatric/Behavioral: Positive for confusion. Negative for sleep disturbance. The patient is not nervous/anxious.    All pertinent negatives and positives are as above. All other systems have been reviewed and are negative unless otherwise stated.     Objective    Temp:  [98 °F (36.7 °C)-98.1 °F (36.7 °C)] 98 °F (36.7 °C)  Heart Rate:  [50-57] 57  Resp:  [16-20] 20  BP: (138-142)/(44-64)  138/44    Intake/Output Summary (Last 24 hours) at 4/19/2020 1001  Last data filed at 4/19/2020 0949  Gross per 24 hour   Intake 150 ml   Output 2915 ml   Net -2765 ml     Physical Exam  Constitutional: He appears well-developed.   HENT:   Head: Normocephalic.   Eyes: Pupils are equal, round, and reactive to light. Conjunctivae are normal.   Neck: Neck supple. No JVD present.   Cardiovascular: Normal rate, regular rhythm, normal heart sounds and intact distal pulses. Exam reveals no gallop and no friction rub.   No murmur heard.  Pulmonary/Chest: No respiratory distress. He has no wheezes. He has no rales. He exhibits no tenderness.   Decrease in breath sound bilateral, clear.   Abdominal: Bowel sounds are normal. He exhibits no distension. There is no tenderness. There is no rebound and no guarding.   Protuberant. NAM to right upper quadurant; bilious drainage dark green.   Musculoskeletal: Normal range of motion. He exhibits edema. He exhibits no tenderness or deformity.   2-3+  pitting edema.   Neurological: He is alert. He displays normal reflexes. No cranial nerve deficit. He exhibits abnormal muscle tone. Coordination abnormal.   Skin: Skin is warm and dry. Capillary refill takes 2 to 3 seconds. No rash noted.   Psychiatric: He has a normal mood and affect. His behavior is normal.   Nursing note and vitals reviewed.  Results Review:  Lab Results (last 24 hours)     Procedure Component Value Units Date/Time    POC Glucose Once [[id number]]  (Normal) Collected:  04/19/20 0734    Specimen:  Blood Updated:  04/19/20 0746     Glucose 118 mg/dL      Comment: : [id number] Surjit (Vázquez) JessicaMeter ID: [id number]       Comprehensive Metabolic Panel [[id number]]  (Abnormal) Collected:  04/19/20 0512    Specimen:  Blood Updated:  04/19/20 0548     Glucose 115 mg/dL      BUN 16 mg/dL      Creatinine 1.01 mg/dL      Sodium 134 mmol/L      Potassium 4.2 mmol/L      Chloride 98 mmol/L      CO2 26.0 mmol/L      Calcium  8.1 mg/dL      Total Protein 6.0 g/dL      Albumin 2.40 g/dL      ALT (SGPT) 47 U/L      AST (SGOT) 59 U/L      Alkaline Phosphatase 126 U/L      Total Bilirubin 1.3 mg/dL      eGFR Non African Amer 71 mL/min/1.73      Globulin 3.6 gm/dL      A/G Ratio 0.7 g/dL      BUN/Creatinine Ratio 15.8     Anion Gap 10.0 mmol/L     Narrative:       GFR Normal >60  Chronic Kidney Disease <60  Kidney Failure <15      CBC (No Diff) [[id number]]  (Abnormal) Collected:  04/19/20 0512    Specimen:  Blood Updated:  04/19/20 0523     WBC 8.55 10*3/mm3      RBC 2.74 10*6/mm3      Hemoglobin 8.1 g/dL      Hematocrit 25.1 %      MCV 91.6 fL      MCH 29.6 pg      MCHC 32.3 g/dL      RDW 14.4 %      RDW-SD 47.5 fl      MPV 10.2 fL      Platelets 225 10*3/mm3     Blood Culture - Blood, Arm, Right [[id number]] Collected:  04/14/20 2255    Specimen:  Blood from Arm, Right Updated:  04/18/20 2315     Blood Culture No growth at 4 days    Blood Culture - Blood, Arm, Right [[id number]] Collected:  04/14/20 2249    Specimen:  Blood from Arm, Right Updated:  04/18/20 2300     Blood Culture No growth at 4 days    POC Glucose Once [[id number]]  (Normal) Collected:  04/18/20 1641    Specimen:  Blood Updated:  04/18/20 1653     Glucose 130 mg/dL      Comment: : [id number] Surjit NairIschemix JessicaMeter ID: [id number]       POC Glucose Once [[id number]]  (Abnormal) Collected:  04/18/20 1125    Specimen:  Blood Updated:  04/18/20 1137     Glucose 149 mg/dL      Comment: : [id number] Surjit Nair) JessicaMeter ID: [id number]       Extra Tubes [[id number]] Collected:  04/18/20 0814    Specimen:  Blood, Venous Line Updated:  04/18/20 1130    Narrative:       The following orders were created for panel order Extra Tubes.  Procedure                               Abnormality         Status                     ---------                               -----------         ------                     Lavender Top[[id number]]                                     Final  result               Green Top (Gel)[[id number]]                                  Final result                 Please view results for these tests on the individual orders.    Lavender Top [[id number]] Collected:  04/18/20 0814    Specimen:  Blood Updated:  04/18/20 1130     Extra Tube hold for add-on     Comment: Auto resulted       Green Top (Gel) [[id number]] Collected:  04/18/20 0814    Specimen:  Blood Updated:  04/18/20 1130     Extra Tube Hold for add-ons.     Comment: Auto resulted.              Cultures:  Blood Culture   Date Value Ref Range Status   04/14/2020 No growth at 4 days  Preliminary   04/14/2020 No growth at 4 days  Preliminary       Radiology Data:    Imaging Results (Last 24 Hours)     ** No results found for the last 24 hours. **          No Known Allergies    Scheduled meds:     aspirin 81 mg Oral Daily   bisoprolol 2.5 mg Oral Daily   enoxaparin 1 mg/kg Subcutaneous Q12H   famotidine 20 mg Intravenous BID   furosemide 20 mg Intravenous Q12H   insulin lispro 2-7 Units Subcutaneous TID AC   losartan 50 mg Oral Daily   piperacillin-tazobactam 3.375 g Intravenous Q8H   polyethylene glycol 17 g Oral Daily   sennosides-docusate 1 tablet Oral Daily   sodium chloride 10 mL Intravenous Q12H   sucralfate 1 g Oral Q6H   Vitamin B 12 1,000 mcg Oral Daily       PRN meds:  bisacodyl  •  dextrose  •  dextrose  •  glucagon (human recombinant)  •  Glycerin-Hypromellose-  •  magnesium hydroxide  •  ondansetron **OR** ondansetron  •  oxyCODONE-acetaminophen  •  Pharmacy to Dose Zosyn  •  sodium chloride    Assessment/Plan       Transaminitis    Acute cholecystitis    History of prosthetic aortic valve    Hematoma of left iliopsoas muscle    Chronic anticoagulation    Hyponatremia      Plan:  Hematoma of the left iliopsoas muscle.    Concerning possible infectious.  Continue Zosyn for now.  DC vancomycin the negative blood culture.  CT scan abdomen pelvis- cholecystectomy tube present in the  gallbladder, moderate size left iliopsoas intramuscular fluid collection-likely hematoma this is changed from April 3, postsurgical and degenerative spondylosis of the lumbar spine.     Anemia.  Hemoglobin stable. Will follow.     Cholecystitis/abdomen pain/elevated liver enzymes.  Consult general surgery.  Status post cholecystectomy tube in place.  Continue Zosyn.  Recommendation from general surgery-plan to perform cholecystectomy in approximately 1 month.     CAD/history of prosthetic aortic valve/hypertension.  Continue aspirin.    Patient has been on Lasix yesterday and today.  Edema is improving.  Decrease Zebeta due to bradycardia.  Continue Cozaar.  SCD.  Start patient back on therapeutic Lovenox.  Echocardiogram 2020- ejection fraction 56 to 60%, moderate concentric hypertrophy, prosthetic aortic valve- peak and mean gradients within normal limits, mild mitral valve regurgitation.     Patient has a pacemaker  was interrogated on 4/15/2020 was fine per nursing staff.     Pain control.  Dilaudid as needed.  Percocet PRN.        Nausea/vomiting.  Pepcid.  Zofran as needed.  Carafate.     Chronic kidney disease stage III.       Diabetes.  Sliding scale.  Hemoglobin A1 C 6.6.     Chronic pain.  Percocet as needed.     Constipation.   Dulcolax suppository as needed.  Rebecca-Colace daily.     Nutrition. Cardiac/carbohydrate/bland diet.     Deconditioning.  PT consult.     Blood cultures-no growth in 4 days.     Discharge Plannin-4 days. Patient states he did not like [facility] and would like transfer elsewhere at time of discharge.  Patient to go to [facility] rehab Monday.    Mamadou Dodd MD   20   10:01

## 2020-04-19 NOTE — PROGRESS NOTES
Automatic IV to PO Pharmacy Note - General    Jesus Smith is a 84 y.o. male who meets the following criteria for IV to PO therapy conversion :     Tolerating oral fluids or 40ml/hour of enteral nutrition and oral route not otherwise compromised  Receiving other oral medications on a scheduled basis    Assessment/Plan  Based on this criteria, Famotidine 20 mg IV BID has been changed to Famotidine 20 mg PO BIDac per the directives and guidelines established by [facility] and Therapeutics Committee and [facility] Medical Executive Committee .       Chan Moser, PharmD  04/19/2015:44

## 2020-04-19 NOTE — PLAN OF CARE
Problem: Patient Care Overview  Goal: Plan of Care Review  Outcome: Ongoing (interventions implemented as appropriate)  Flowsheets (Taken 4/19/2020 1230)  Progress: no change  Plan of Care Reviewed With: patient  Note:   IV abx continues. IV lasix given. Pain pill given. Pt refuses to turn except to right side. Hemoglobin was 8.1 today.

## 2020-04-19 NOTE — THERAPY TREATMENT NOTE
Patient Name: Jesus Smith  : 1935    MRN: [id number]                              Today's Date: 2020       Admit Date: 2020    Visit Dx:     ICD-10-CM ICD-9-CM   1. Hematoma of left iliopsoas muscle, initial encounter S70.12XA 924.00   2. Cholecystitis K81.9 575.10   3. Impaired mobility Z74.09 799.89     Patient Active Problem List   Diagnosis   • Acute UTI (urinary tract infection)   • Bacteremia   • Transaminitis   • Acute cholecystitis   • Benign essential HTN   • CKD (chronic kidney disease) stage 3, GFR 30-59 ml/min (CMS/[facility])   • History of prosthetic aortic valve   • Hematoma of left iliopsoas muscle   • Chronic anticoagulation   • Hyponatremia     Past Medical History:   Diagnosis Date   • Bradycardia    • Coronary artery disease    • GERD (gastroesophageal reflux disease)    • Hypertension    • Injury of back    • TIA (transient ischemic attack)      Past Surgical History:   Procedure Laterality Date   • APPENDECTOMY     • BACK SURGERY      Fusion   • CARDIAC SURGERY      Open Heart   • EXPLORATORY LAPAROTOMY N/A 2020    Procedure: open cholecystostomy tube placement ;  Surgeon: Agustina Saleem MD;  Location: [facility];  Service: General;  Laterality: N/A;   • HERNIA REPAIR     • JOINT REPLACEMENT Left     s/p L hip replacement   • PACEMAKER IMPLANTATION     • STOMACH SURGERY       General Information     Row Name 20 1115          PT Evaluation Time/Intention    Document Type  therapy note (daily note)  -     Mode of Treatment  physical therapy  -     Row Name 20 1115          General Information    Existing Precautions/Restrictions  fall  -     Row Name 20 1115          Cognitive Assessment/Intervention- PT/OT    Orientation Status (Cognition)  oriented x 4  -       User Key  (r) = Recorded By, (t) = Taken By, (c) = Cosigned By    Initials Name Provider Type     Krzysztof Wong, PT Physical Therapist        Mobility     Row Name 20 1115           "Bed Mobility Assessment/Treatment    Rolling Right [place] (Bed Mobility)  verbal cues;supervision  -     Supine-Sit [place] (Bed Mobility)  verbal cues;supervision  -     Sit-Supine [place] (Bed Mobility)  verbal cues;moderate assist (50% patient effort)  -     Assistive Device (Bed Mobility)  bed rails;head of bed elevated  -[facility] Name 04/19/20 1115          Bed-Chair Transfer    Bed-Chair [place] (Transfers)  verbal cues;contact guard;minimum assist (75% patient effort) bed<>BSC  -     Assistive Device (Bed-Chair Transfers)  walker, front-wheeled  -[facility] Name 04/19/20 1115          Sit-Stand Transfer    Sit-Stand [place] (Transfers)  verbal cues;minimum assist (75% patient effort);moderate assist (50% patient effort)  -     Assistive Device (Sit-Stand Transfers)  walker, front-wheeled  -       User Key  (r) = Recorded By, (t) = Taken By, (c) = Cosigned By    Initials Name Provider Type     Krzysztof Wong, PT Physical Therapist        Obj/Interventions     Row Name 04/19/20 1115          Static Sitting Balance    Level of [place] (Unsupported Sitting, Static Balance)  supervision  -     Sitting Position (Unsupported Sitting, Static Balance)  sitting on edge of bed  -LH     Row Name 04/19/20 1115          Static Standing Balance    Level of [place] (Supported Standing, Static Balance)  contact guard assist  -     Assistive Device Utilized (Supported Standing, Static Balance)  walker, rolling  -       User Key  (r) = Recorded By, (t) = Taken By, (c) = Cosigned By    Initials Name Provider Type     Krzysztof Wong, PT Physical Therapist        Goals/Plan    No documentation.       Clinical Impression     [facility] Name 04/19/20 1115          Pain Assessment    Additional Documentation  Pain Scale: Numbers Pre/Post-Treatment (Group)  -LH     Row Name 04/19/20 1115          Pain Scale: Numbers Pre/Post-Treatment    Pain Scale: Numbers, Pretreatment  -- \"i don't " "know\"  -     Pain Intervention(s)  Medication (See MAR);Repositioned;Ambulation/increased activity  -     Row Name 04/19/20 1115          Plan of Care Review    Plan of Care Reviewed With  patient  -     Progress  no change  -     Outcome Summary  Pt able to sit EOB stand by assist x1 w/ HOB elevated and rails.  Min assist w/ RW to t/f to BSC.  Nauseated, but no vomiting.  Unable to progress gait distance.  Mod assist for sit to supine.  PT to continue to attempt to progress activity and strength.    -     Row Name 04/19/20 1115          Positioning and Restraints    Pre-Treatment Position  in bed  -     Post Treatment Position  bed  -LH     In Bed  notified nsg;fowlers;call light within reach;encouraged to call for assist;legs elevated;side rails up x2  -       User Key  (r) = Recorded By, (t) = Taken By, (c) = Cosigned By    Initials Name Provider Type     Krzysztof Wong, PT Physical Therapist        Outcome Measures     Row Name 04/19/20 1151          How much help from another person do you currently need...    Turning from your back to your side while in flat bed without using bedrails?  3  -LH     Moving from lying on back to sitting on the side of a flat bed without bedrails?  2  -LH     Moving to and from a bed to a chair (including a wheelchair)?  3  -LH     Standing up from a chair using your arms (e.g., wheelchair, bedside chair)?  2  -LH     Climbing 3-5 steps with a railing?  1  -LH     To walk in hospital room?  2  -     AM-PAC 6 Clicks Score (PT)  13  -     Row Name 04/19/20 1151          Functional Assessment    Outcome Measure Options  AM-PAC 6 Clicks Basic Mobility (PT)  -       User Key  (r) = Recorded By, (t) = Taken By, (c) = Cosigned By    Initials Name Provider Type    Krzysztof Philippe, PT Physical Therapist          PT Recommendation and Plan     Plan of Care Reviewed With: patient  Progress: no change  Outcome Summary: Pt able to sit EOB stand by assist x1 w/ HOB " elevated and rails.  Min assist w/ RW to t/f to BSC.  Nauseated, but no vomiting.  Unable to progress gait distance.  Mod assist for sit to supine.  PT to continue to attempt to progress activity and strength.       Time Calculation:   PT Charges     Row Name 04/19/20 1152             Time Calculation    Start Time  1115  -      Stop Time  1140  -      Time Calculation (min)  25 min  -      PT Received On  04/19/20  -         Time Calculation- PT    Total Timed Code Minutes- PT  25 minute(s)  -        User Key  (r) = Recorded By, (t) = Taken By, (c) = Cosigned By    Initials Name Provider Type     Krzysztof Wong, PT Physical Therapist        Therapy Charges for Today     Code Description Service Date Service Provider Modifiers Qty    32341094400  PT THER PROC EA 15 MIN 4/18/2020 Krzysztof Wong, PT GP 1    [id number]  PT THERAPEUTIC ACT EA 15 MIN 4/18/2020 Krzysztof Wong, PT GP 1    [id number]  PT THERAPEUTIC ACT EA 15 MIN 4/19/2020 Krzysztof Wong, PT GP 2          PT G-Codes  Outcome Measure Options: AM-PAC 6 Clicks Basic Mobility (PT)  AM-PAC 6 Clicks Score (PT): 13    Krzysztof Wong PT  4/19/2020

## 2020-04-19 NOTE — PLAN OF CARE
Problem: Patient Care Overview  Goal: Plan of Care Review  Outcome: Ongoing (interventions implemented as appropriate)  Flowsheets (Taken 4/19/2020 1115)  Plan of Care Reviewed With: patient  Outcome Summary: Pt able to sit EOB stand by assist x1 w/ HOB elevated and rails.  Min assist w/ RW to t/f to BSC.  Nauseated, but no vomiting.  Unable to progress gait distance.  Mod assist for sit to supine.  PT to continue to attempt to progress activity and strength.

## 2020-04-19 NOTE — PLAN OF CARE
Problem: Patient Care Overview  Goal: Plan of Care Review  Outcome: Ongoing (interventions implemented as appropriate)  Flowsheets (Taken 4/19/2020 0422)  Progress: no change  Plan of Care Reviewed With: patient  Outcome Summary: NAM w/ small bile output. Pt has severe c/o pain to L hip radiating down left leg, PO pain med x1 w/ effective results. Pt refusing turns, bilateral lower extremities elevated w/ severe edema. Bilateral upper extremities elevated w/ moderate edema. IV abx as ordered. IV lasix w/ good results. Will monitor labs. Tele, v paced at 50. No distress at this time.

## 2020-04-20 VITALS
RESPIRATION RATE: 18 BRPM | HEIGHT: 70 IN | OXYGEN SATURATION: 96 % | WEIGHT: 249.5 LBS | BODY MASS INDEX: 35.72 KG/M2 | DIASTOLIC BLOOD PRESSURE: 64 MMHG | TEMPERATURE: 98.1 F | SYSTOLIC BLOOD PRESSURE: 147 MMHG | HEART RATE: 50 BPM

## 2020-04-20 LAB
ALBUMIN SERPL-MCNC: 2.7 G/DL (ref 3.5–5.2)
ALBUMIN/GLOB SERPL: 0.8 G/DL
ALP SERPL-CCNC: 141 U/L (ref 39–117)
ALT SERPL W P-5'-P-CCNC: 46 U/L (ref 1–41)
ANION GAP SERPL CALCULATED.3IONS-SCNC: 13 MMOL/L (ref 5–15)
AST SERPL-CCNC: 49 U/L (ref 1–40)
BILIRUB SERPL-MCNC: 1.3 MG/DL (ref 0.2–1.2)
BUN BLD-MCNC: 15 MG/DL (ref 8–23)
BUN/CREAT SERPL: 15.3 (ref 7–25)
CALCIUM SPEC-SCNC: 8.2 MG/DL (ref 8.6–10.5)
CHLORIDE SERPL-SCNC: 97 MMOL/L (ref 98–107)
CO2 SERPL-SCNC: 24 MMOL/L (ref 22–29)
CREAT BLD-MCNC: 0.98 MG/DL (ref 0.76–1.27)
DEPRECATED RDW RBC AUTO: 47.9 FL (ref 37–54)
ERYTHROCYTE [DISTWIDTH] IN BLOOD BY AUTOMATED COUNT: 14.3 % (ref 12.3–15.4)
GFR SERPL CREATININE-BSD FRML MDRD: 73 ML/MIN/1.73
GLOBULIN UR ELPH-MCNC: 3.2 GM/DL
GLUCOSE BLD-MCNC: 117 MG/DL (ref 65–99)
GLUCOSE BLDC GLUCOMTR-MCNC: 111 MG/DL (ref 70–130)
GLUCOSE BLDC GLUCOMTR-MCNC: 126 MG/DL (ref 70–130)
GLUCOSE BLDC GLUCOMTR-MCNC: 185 MG/DL (ref 70–130)
HCT VFR BLD AUTO: 25.9 % (ref 37.5–51)
HGB BLD-MCNC: 8.3 G/DL (ref 13–17.7)
MCH RBC QN AUTO: 29.5 PG (ref 26.6–33)
MCHC RBC AUTO-ENTMCNC: 32 G/DL (ref 31.5–35.7)
MCV RBC AUTO: 92.2 FL (ref 79–97)
PLATELET # BLD AUTO: 249 10*3/MM3 (ref 140–450)
PMV BLD AUTO: 10.5 FL (ref 6–12)
POTASSIUM BLD-SCNC: 4.6 MMOL/L (ref 3.5–5.2)
PROT SERPL-MCNC: 5.9 G/DL (ref 6–8.5)
RBC # BLD AUTO: 2.81 10*6/MM3 (ref 4.14–5.8)
SODIUM BLD-SCNC: 134 MMOL/L (ref 136–145)
WBC NRBC COR # BLD: 8.17 10*3/MM3 (ref 3.4–10.8)

## 2020-04-20 PROCEDURE — G0378 HOSPITAL OBSERVATION PER HR: HCPCS

## 2020-04-20 PROCEDURE — 25010000002 PIPERACILLIN SOD-TAZOBACTAM PER 1 G: Performed by: INTERNAL MEDICINE

## 2020-04-20 PROCEDURE — 25010000002 ENOXAPARIN PER 10 MG: Performed by: FAMILY MEDICINE

## 2020-04-20 PROCEDURE — 96366 THER/PROPH/DIAG IV INF ADDON: CPT

## 2020-04-20 PROCEDURE — 97530 THERAPEUTIC ACTIVITIES: CPT

## 2020-04-20 PROCEDURE — 82962 GLUCOSE BLOOD TEST: CPT

## 2020-04-20 PROCEDURE — 85027 COMPLETE CBC AUTOMATED: CPT | Performed by: SPECIALIST

## 2020-04-20 PROCEDURE — 97116 GAIT TRAINING THERAPY: CPT

## 2020-04-20 PROCEDURE — 80053 COMPREHEN METABOLIC PANEL: CPT | Performed by: SPECIALIST

## 2020-04-20 PROCEDURE — 96372 THER/PROPH/DIAG INJ SC/IM: CPT

## 2020-04-20 PROCEDURE — 63710000001 INSULIN LISPRO (HUMAN) PER 5 UNITS: Performed by: NURSE PRACTITIONER

## 2020-04-20 RX ORDER — WARFARIN SODIUM 1 MG/1
1 TABLET ORAL NIGHTLY
Qty: 1 TABLET | Refills: 1 | Status: ON HOLD
Start: 2020-04-20 | End: 2020-05-27

## 2020-04-20 RX ORDER — OXYCODONE HYDROCHLORIDE AND ACETAMINOPHEN 5; 325 MG/1; MG/1
1 TABLET ORAL EVERY 4 HOURS PRN
Qty: 18 TABLET | Refills: 0 | Status: SHIPPED | OUTPATIENT
Start: 2020-04-20 | End: 2020-04-27

## 2020-04-20 RX ADMIN — ENOXAPARIN SODIUM 110 MG: 120 INJECTION SUBCUTANEOUS at 08:36

## 2020-04-20 RX ADMIN — SUCRALFATE 1 G: 1 SUSPENSION ORAL at 11:26

## 2020-04-20 RX ADMIN — Medication 1000 MCG: at 08:35

## 2020-04-20 RX ADMIN — SODIUM CHLORIDE, PRESERVATIVE FREE 10 ML: 5 INJECTION INTRAVENOUS at 08:36

## 2020-04-20 RX ADMIN — OXYCODONE HYDROCHLORIDE AND ACETAMINOPHEN 1 TABLET: 5; 325 TABLET ORAL at 04:09

## 2020-04-20 RX ADMIN — BISOPROLOL FUMARATE 2.5 MG: 5 TABLET ORAL at 08:35

## 2020-04-20 RX ADMIN — SUCRALFATE 1 G: 1 SUSPENSION ORAL at 06:14

## 2020-04-20 RX ADMIN — LOSARTAN POTASSIUM 50 MG: 50 TABLET, FILM COATED ORAL at 08:35

## 2020-04-20 RX ADMIN — SUCRALFATE 1 G: 1 SUSPENSION ORAL at 17:11

## 2020-04-20 RX ADMIN — INSULIN LISPRO 2 UNITS: 100 INJECTION, SOLUTION INTRAVENOUS; SUBCUTANEOUS at 11:31

## 2020-04-20 RX ADMIN — FAMOTIDINE 20 MG: 20 TABLET, FILM COATED ORAL at 17:11

## 2020-04-20 RX ADMIN — ASPIRIN 81 MG: 81 TABLET, CHEWABLE ORAL at 08:35

## 2020-04-20 RX ADMIN — FAMOTIDINE 20 MG: 20 TABLET, FILM COATED ORAL at 08:35

## 2020-04-20 RX ADMIN — TAZOBACTAM SODIUM AND PIPERACILLIN SODIUM 3.38 G: 375; 3 INJECTION, SOLUTION INTRAVENOUS at 06:14

## 2020-04-20 RX ADMIN — OXYCODONE HYDROCHLORIDE AND ACETAMINOPHEN 1 TABLET: 5; 325 TABLET ORAL at 08:43

## 2020-04-20 NOTE — PROGRESS NOTES
Continued Stay Note   Ayanna     Patient Name: Jesus Smith  MRN: [id number]  Today's Date: 4/20/2020    Admit Date: 4/14/2020    Discharge Plan     Row Name 04/20/20 0948       Plan    Plan  [facility]    Patient/Family in Agreement with Plan  yes    Plan Comments  Spoke with Nadege at [facility] [phone]. They can take pt today if he is ready for d/c. Notified Dr. Rowan.         Discharge Codes    No documentation.             MEGAN Knowles

## 2020-04-20 NOTE — DISCHARGE SUMMARY
[facility]  DISCHARGE SUMMARY       Date of Admission: 4/14/2020  Date of Discharge:  4/20/2020  Primary Care Physician: Matheus Olivera PA    Presenting Problem/History of Present Illness:  Hematoma of left iliopsoas muscle, initial encounter [S70.12XA]  Hematoma of left iliopsoas muscle, initial encounter [S70.12XA]     Final Discharge Diagnoses:  Active Hospital Problems    Diagnosis   • Chronic anticoagulation   • Hyponatremia   • Hematoma of left iliopsoas muscle   • History of prosthetic aortic valve   • Acute cholecystitis   • Transaminitis   Cholecystitis  Transaminimiits    Consults: General surgery    Procedures Performed: N/A    Pertinent Test Results: CT abdomen pelvis showed:    IMPRESSION:  1. Cholecystostomy tube is present in the gallbladder.  2. Moderate-sized left iliopsoas intramuscular fluid collection. Most  likely this is a hematoma. This is a change from April 3  3. 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    Chief Complaint on Day of Discharge:   Feeling better    History of Present Illness on Day of Discharge:   Doing ok, no abdominal pain or fever    Hospital Course:  The patient is a 84 y.o. male who presented to [facility] with a fall.  He was found to have a hematoma of his iliopsoas muscle.  General surgery was consulted.  He was started on antibiotics as he had recently been here with bacteremia and still had an elevated white count.  There was also concern the hematoma could have been infected.      He has improved clinically.  He is afebrile.  He has completed over 2 weeks of antibiotics.  He has been accepted to a SNF.      His Coumadin was stopped on his last admission.  I have spoken to Dr. Saleem and she is ok with continuing it leading up to his cholecystectomy.  She will see him in clinic prior to surgery and handle changes to his anticoagulation leading up to the procedure.  I have  "restarted his Coumadin at discharge.  He will need Lovenox until his INR is therapeutic at a goal target of 2.5 (2-3).  We ask that the nursing home please monitor and adjust his Coumadin as needed.      Condition on Discharge:  Stable    Physical Exam on Discharge:  /64 (BP Location: Left arm, Patient Position: Lying)   Pulse 50   Temp 98.1 °F (36.7 °C) (Oral)   Resp 18   Ht 177.8 cm (70\")   Wt 113 kg (249 lb 8 oz)   SpO2 96%   BMI 35.80 kg/m²   Physical Exam   Constitutional: He is oriented to person, place, and time. He appears well-developed and well-nourished.   HENT:   Head: Normocephalic and atraumatic.   Right Ear: External ear normal.   Left Ear: External ear normal.   Nose: Nose normal.   Mouth/Throat: Oropharynx is clear and moist.   Eyes: Pupils are equal, round, and reactive to light. Conjunctivae and EOM are normal. Right eye exhibits no discharge. Left eye exhibits no discharge. No scleral icterus.   Neck: Normal range of motion. Neck supple. No tracheal deviation present. No thyromegaly present.   Cardiovascular: Normal rate, regular rhythm, normal heart sounds and intact distal pulses. Exam reveals no gallop and no friction rub.   No murmur heard.  Pulmonary/Chest: Effort normal and breath sounds normal. No stridor. No respiratory distress. He has no wheezes. He has no rales. He exhibits no tenderness.   Abdominal: Soft. Bowel sounds are normal. He exhibits no distension and no mass. There is no tenderness. There is no rebound and no guarding. No hernia.   Musculoskeletal: Normal range of motion. He exhibits no edema or deformity.   Lymphadenopathy:     He has no cervical adenopathy.   Neurological: He is alert and oriented to person, place, and time. He has normal reflexes. He displays normal reflexes. No cranial nerve deficit. He exhibits normal muscle tone. Coordination normal.   Skin: Skin is warm and dry. No rash noted. No erythema. No pallor.   Psychiatric: He has a normal mood " and affect. His behavior is normal. Judgment and thought content normal.   Vitals reviewed.        Discharge Disposition:  Skilled Nursing Facility (DC - External)    Discharge Medications:     Discharge Medications      New Medications      Instructions Start Date   warfarin 1 MG tablet  Commonly known as:  Coumadin   1 mg, Oral, Nightly         Changes to Medications      Instructions Start Date   oxyCODONE-acetaminophen 5-325 MG per tablet  Commonly known as:  PERCOCET  What changed:  reasons to take this   1 tablet, Oral, Every 4 Hours PRN         Continue These Medications      Instructions Start Date   aspirin 81 MG chewable tablet   81 mg, Oral, Daily      bisacodyl 10 MG suppository  Commonly known as:  DULCOLAX   10 mg, Rectal, Daily PRN      bisoprolol 10 MG tablet  Commonly known as:  ZEBeta   5 mg, Oral, Daily      enoxaparin 120 MG/0.8ML solution syringe  Commonly known as:  LOVENOX   1 mg/kg (110 mg), Subcutaneous, Every 12 Hours      famotidine 20 MG tablet  Commonly known as:  PEPCID   20 mg, Oral, 2 Times Daily      Glycerin-Hypromellose- 0.2-0.2-1 % solution ophthalmic solution  Commonly known as:  ARTIFICIAL TEARS   1 drop, Both Eyes, Every 1 Hour PRN      insulin lispro 100 UNIT/ML injection  Commonly known as:  humaLOG   2-7 Units, Subcutaneous, 4 Times Daily With Meals & Nightly      losartan 100 MG tablet  Commonly known as:  COZAAR   50 mg, Oral, Daily      ondansetron ODT 4 MG disintegrating tablet  Commonly known as:  Zofran ODT   4 mg, Translingual, Every 8 Hours PRN      polyethylene glycol pack packet  Commonly known as:  MIRALAX   17 g, Oral, Daily      sennosides-docusate 8.6-50 MG per tablet  Commonly known as:  PERICOLACE   1 tablet, Oral, Daily      sucralfate 1 GM/10ML suspension  Commonly known as:  CARAFATE   1 g, Oral, Every 6 Hours Scheduled      vitamin B-12 1000 MCG tablet  Commonly known as:  CYANOCOBALAMIN   1,000 mcg, Oral, Daily      Vitamin D 50 MCG (2000 UT)  tablet   1,000 Units, Oral, Daily      Vitamin D3 50 MCG (2000 UT) capsule   2,000 Units, Oral, Daily         Stop These Medications    cefdinir 300 MG capsule  Commonly known as:  OMNICEF            Discharge Diet: Low fat, ADA    Activity at Discharge: as tolerated    Discharge Care Plan/Instructions: go to ER for fever or SOA    Follow-up Appointments:   No future appointments.    Test Results Pending at Discharge: N/A    Edwardo Rowan MD  04/20/20  10:17    Time: 45 minutes

## 2020-04-20 NOTE — PROGRESS NOTES
Continued Stay Note  [facility]     Patient Name: Jesus Smith  MRN: [id number]  Today's Date: 4/20/2020    Admit Date: 4/14/2020    Discharge Plan     Row Name 04/20/20 1057       Plan    Plan  [facility]    Final Discharge Disposition Code  03 - skilled nursing facility (SNF)    Final Note  Pt is going to [facility] [phone] today at the skilled level. Faxed the d/c summary to them at [phone] and the rx should be faxed to the same number. Pt's sister in law will transport pt.     Update: Spoke with pt's nurse and she feels pt will qualify for ambulance because he is extremely weak and fatigued. He can go by [facility] [phone] or [facility] -[phone].    Row Name 04/20/20 0948       Plan    Plan  [facility]    Patient/Family in Agreement with Plan  yes    Plan Comments  Spoke with Nadege at [facility] [phone]. They can take pt today if he is ready for d/c. Notified Dr. Rowan.         Discharge Codes    No documentation.       Expected Discharge Date and Time     Expected Discharge Date Expected Discharge Time    Apr 20, 2020             MEGAN Knowles

## 2020-04-20 NOTE — PLAN OF CARE
Goals met, patient being discharged to NH today    Problem: Patient Care Overview  Goal: Plan of Care Review  Outcome: Outcome(s) achieved

## 2020-04-20 NOTE — THERAPY TREATMENT NOTE
Acute Care - Physical Therapy Treatment Note  [facility]     Patient Name: Jesus Smith  : 1935  MRN: [id number]  Today's Date: 2020             Admit Date: 2020    Visit Dx:    ICD-10-CM ICD-9-CM   1. Hematoma of left iliopsoas muscle, initial encounter S70.12XA 924.00   2. Cholecystitis K81.9 575.10   3. Impaired mobility Z74.09 799.89   4. Acute UTI (urinary tract infection) N39.0 599.0   5. Systemic inflammatory response syndrome (CMS/[facility]) R65.10 995.90   6. NOLAN (acute kidney injury) (CMS/[facility]) N17.9 584.9   7. Transaminitis R74.0 790.4   8. Acute cholecystitis K81.0 575.0   9. Impaired functional mobility and activity tolerance Z74.09 V49.89   10. Decreased activities of daily living (ADL) Z78.9 V49.89   11. Bacteremia R78.81 790.7   12. Abnormal LFTs R94.5 790.6   13. Benign essential HTN I10 401.1   14. CKD (chronic kidney disease) stage 3, GFR 30-59 ml/min (CMS/[facility]) N18.3 585.3   15. History of prosthetic aortic valve Z95.2 V43.3     Patient Active Problem List   Diagnosis   • Acute UTI (urinary tract infection)   • Bacteremia   • Transaminitis   • Acute cholecystitis   • Benign essential HTN   • CKD (chronic kidney disease) stage 3, GFR 30-59 ml/min (CMS/[facility])   • History of prosthetic aortic valve   • Hematoma of left iliopsoas muscle   • Chronic anticoagulation   • Hyponatremia       Therapy Treatment    Rehabilitation Treatment Summary     Row Name 20 0949             Treatment Time/Intention    Discipline  physical therapy assistant  -      Document Type  therapy note (daily note)  -LC2      Subjective Information  complains of;weakness;fatigue  -LC2      Mode of Treatment  physical therapy  -2      Existing Precautions/Restrictions  fall  -2      Recorded by [LC] Melissa Bains, PTA 20 0949  [LC2] Melissa Bains, PTA 20 1014      Row Name 20 0949             Bed Mobility Assessment/Treatment    Supine-Sit Terrell (Bed Mobility)  supervision  -       Sit-Supine Wagoner (Bed Mobility)  verbal cues;minimum assist (75% patient effort)  -LC2      Assistive Device (Bed Mobility)  bed rails;head of bed elevated  -LC      Recorded by [LC] Melissa Bains, PTA 04/20/20 1014  [LC2] Melissa Bains, PTA 04/20/20 1025      Row Name 04/20/20 0949             Sit-Stand Transfer    Sit-Stand Wagoner (Transfers)  verbal cues;contact guard  -LC      Assistive Device (Sit-Stand Transfers)  walker, front-wheeled  -LC      Recorded by [LC] Melissa Bains, PTA 04/20/20 1014      Row Name 04/20/20 0949             Stand-Sit Transfer    Stand-Sit Wagoner (Transfers)  verbal cues;contact guard  -LC      Assistive Device (Stand-Sit Transfers)  walker, front-wheeled  -LC      Recorded by [LC] Melissa Bains, [facility] 04/20/20 1014      Row Name 04/20/20 0949             Gait/Stairs Assessment/Training    Wagoner Level (Gait)  verbal cues;contact guard  -LC      Assistive Device (Gait)  walker, front-wheeled  -LC      Distance in Feet (Gait)  10' x2  -LC      Pattern (Gait)  step-to  -LC      Deviations/Abnormal Patterns (Gait)  antalgic;taihr decreased  -LC      Bilateral Gait Deviations  forward flexed posture;heel strike decreased  -LC      Comment (Gait/Stairs)  requried seated rest break between trials  -LC      Recorded by [LC] Melissa Bains, PTA 04/20/20 1025      Row Name 04/20/20 0949             Therapeutic Exercise    Lower Extremity (Therapeutic Exercise)  LAQ (long arc quad), bilateral  -LC      Exercise Type (Therapeutic Exercise)  AAROM (active assistive range of motion);AROM (active range of motion)  -LC      Position (Therapeutic Exercise)  seated  -LC      Sets/Reps (Therapeutic Exercise)  15  -LC      Comment (Therapeutic Exercise)  LLE required AAROM  -LC      Recorded by [LC] Mleissa Bains, PTA 04/20/20 1025      Row Name 04/20/20 0949             Positioning and Restraints    Pre-Treatment Position  in bed  -LC      Post Treatment Position  bed  -LC       In Bed  fowlers;call light within reach;encouraged to call for assist;legs elevated  -LC      Recorded by [] Melissa Bains, PTA 04/20/20 1025      Row Name 04/20/20 0949             Pain Scale: Numbers Pre/Post-Treatment    Pain Scale: Numbers, Pretreatment  0/10 - no pain  -LC      Pain Scale: Numbers, Post-Treatment  0/10 - no pain  -LC      Pain Location - Side  Left  -LC      Pain Location - Orientation  lower  -LC      Pain Location  hip  -LC      Pain Intervention(s)  Medication (See MAR)  -LC      Recorded by [] Melissa Bains, PTA 04/20/20 1025      Row Name 04/20/20 0949             Plan of Care Review    Plan of Care Reviewed With  patient  -LC      Progress  improving  -LC      Outcome Summary  PT tx completed. S for supine to sit with bed rail. Perormed BLE therex seated, required AAROM for LLE. Amb 10'mx2 with CGA, RWX and seated rest break due to fatigue. Will cont to progress with PT as pt tolerates.   -LC      Recorded by [] Melissa Bains, PTA 04/20/20 1025        User Key  (r) = Recorded By, (t) = Taken By, (c) = Cosigned By    Initials Name Effective Dates Discipline     Melissa Bains, PTA 10/18/19 -  PT                       PT Recommendation and Plan     Plan of Care Reviewed With: patient  Progress: improving  Outcome Summary: PT tx completed. S for supine to sit with bed rail. Perormed BLE therex seated, required AAROM for LLE. Amb 10'mx2 with CGA, RWX and seated rest break due to fatigue. Will cont to progress with PT as pt tolerates.   Outcome Measures     Row Name 04/17/20 1113             How much help from another person do you currently need...    Turning from your back to your side while in flat bed without using bedrails?  2  -EDUARD      Moving from lying on back to sitting on the side of a flat bed without bedrails?  2  -EDUARD      Moving to and from a bed to a chair (including a wheelchair)?  2  -EDUARD      Standing up from a chair using your arms (e.g., wheelchair, bedside chair)?  2   -EDUARD      Climbing 3-5 steps with a railing?  1  -EDUARD      To walk in hospital room?  1  -EDUARD      AM-PAC 6 Clicks Score (PT)  10  -EDUARD         Functional Assessment    Outcome Measure Options  AM-PAC 6 Clicks Basic Mobility (PT)  -EDUARD        User Key  (r) = Recorded By, (t) = Taken By, (c) = Cosigned By    Initials Name Provider Type    Randy Ledezma PTA Physical Therapy Assistant         Time Calculation:   PT Charges     Row Name 04/20/20 1025             Time Calculation    Start Time  0949  -      Stop Time  1019  -      Time Calculation (min)  30 min  -      PT Received On  04/20/20  -         Time Calculation- PT    Total Timed Code Minutes- PT  30 minute(s)  -        User Key  (r) = Recorded By, (t) = Taken By, (c) = Cosigned By    Initials Name Provider Type    Melissa Najera PTA Physical Therapy Assistant        Therapy Charges for Today     Code Description Service Date Service Provider Modifiers Qty    29508859381 HC GAIT TRAINING EA 15 MIN 4/20/2020 Melissa Bains PTA GP 1    28576872719 HC PT THERAPEUTIC ACT EA 15 MIN 4/20/2020 Melissa Bains PTA GP 1          PT G-Codes  Outcome Measure Options: AM-PAC 6 Clicks Basic Mobility (PT)  AM-PAC 6 Clicks Score (PT): 13    Melissa Bains PTA  4/20/2020

## 2020-04-20 NOTE — PLAN OF CARE
Problem: Patient Care Overview  Goal: Plan of Care Review  Outcome: Ongoing (interventions implemented as appropriate)  Flowsheets (Taken 4/20/2020 0318)  Progress: improving  Plan of Care Reviewed With: patient  Outcome Summary: Pt cont to c/o severe pain to left hip radiating down left leg. PO pain meds as requested. Edema cont BLE and yahaira hands. Also increased swelling in penis and scrotum. Pt mobility increased, up to bsc. Turns limited per pt refusal. IV abx as ordered, possible d/c today.

## 2020-04-20 NOTE — DISCHARGE PLACEMENT REQUEST
{Outbreak/Travel/Exposure Documentation......;  Question Available Choices Patient Response   Outbreak Screen: Do you currently have the following symptoms?        Fever/Cough/Shortness of breath/Loss of taste or smell/No/Unknown  Unknown (04/14/20 2124)   Outbreak Screen: In the last 14 days, have you had contact with anyone to have the 2019 Novel Coronavirus or anyone being tested for the 2019 Novel Coronavirus?  Yes/No/Unknown              Unknown (04/14/20 2124)   Outbreak Screen: Who was notified?    Free text  (not recorded)   Travel Screen: Have you traveled in the last month? If so, to what country have you traveled? If [place] what state? Yes/No/Unknown  List of all countries  List of all States No (04/14/20 1934)  (not recorded)  (not recorded)   Infection Risk: Do you currently have the following symptoms?  (If cough is selected, the Tuberculosis Screen is performed.) Cough/Fever/Rash/No No (04/14/20 1934)   Tuberculosis Screen: Do you have any of the following Tuberculosis Risks?  · Have you lived or spent time with anyone who had or may have TB?  · Have you lived in or visited any of the following areas for more than one month: [place], [place], [place], [place] or [place], the [place] or [place]?  · Do you have HIV/AIDS?  · Have you lived in or worked in a nursing home, homeless shelter, correctional facility, or substance abuse treatment facility?   · No    If Yes do you have any of the following symptoms? Yes responses display to the right    If Yes symptoms listed are:  Cough greater than or equal to 3 weeks/Loss of appetite/Unexplained weight loss/Night sweats/Bloody sputum or hemoptysis/Hoarseness/Fever/Fatique/Chest pain/No (not recorded)  (not recorded)   Exposure Screen: Have you been exposed to any of these contagious diseases in the last month? Measles/Chickenpox/Meningitis/Pertussis/Whooping Cough/No No (04/14/20 1934)          Physician Progress Notes (last 24 hours) (Notes from  04/19/20 0947 through 04/20/20 0947)      Mamadou Dodd MD at 04/19/20 1001              [facility] Medicine Services  INPATIENT PROGRESS NOTE    Length of Stay: 0  Date of Admission: 4/14/2020  Primary Care Physician: Matheus Olivera PA    Subjective   Chief Complaint: Abdomen pain pain/back pain.    HPI   Hemoglobin stable.  Edema is also improved.  Patient denies any chest pain.  Patient denies any shortness of breath.    Review of Systems   Constitutional: Positive for appetite change and fatigue. Negative for chills and fever.   HENT: Negative for hearing loss, nosebleeds, tinnitus and trouble swallowing.    Eyes: Negative for visual disturbance.   Respiratory: Negative for cough, chest tightness, shortness of breath and wheezing.    Cardiovascular: Negative for chest pain, palpitations and leg swelling.   Gastrointestinal: Positive for abdominal pain. Negative for abdominal distention, blood in stool, constipation, diarrhea,  vomiting.  Patient pain nausea today.  Endocrine: Negative for cold intolerance, heat intolerance, polydipsia, polyphagia and polyuria.   Genitourinary: Negative for decreased urine volume, difficulty urinating, dysuria, flank pain, frequency and hematuria.   Musculoskeletal: Positive for arthralgias, back pain, gait problem and myalgias. Negative for joint swelling.   Skin: Negative for rash.   Allergic/Immunologic: Negative for immunocompromised state.   Neurological: Positive for weakness. Negative for dizziness, syncope, light-headedness and headaches.   Hematological: Negative for adenopathy. Does not bruise/bleed easily.   Psychiatric/Behavioral: Positive for confusion. Negative for sleep disturbance. The patient is not nervous/anxious.    All pertinent negatives and positives are as above. All other systems have been reviewed and are negative unless otherwise stated.     Objective    Temp:  [98 °F (36.7 °C)-98.1 °F (36.7 °C)] 98 °F (36.7  °C)  Heart Rate:  [50-57] 57  Resp:  [16-20] 20  BP: (138-142)/(44-64) 138/44    Intake/Output Summary (Last 24 hours) at 4/19/2020 1001  Last data filed at 4/19/2020 0949  Gross per 24 hour   Intake 150 ml   Output 2915 ml   Net -2765 ml     Physical Exam  Constitutional: He appears well-developed.   HENT:   Head: Normocephalic.   Eyes: Pupils are equal, round, and reactive to light. Conjunctivae are normal.   Neck: Neck supple. No JVD present.   Cardiovascular: Normal rate, regular rhythm, normal heart sounds and intact distal pulses. Exam reveals no gallop and no friction rub.   No murmur heard.  Pulmonary/Chest: No respiratory distress. He has no wheezes. He has no rales. He exhibits no tenderness.   Decrease in breath sound bilateral, clear.   Abdominal: Bowel sounds are normal. He exhibits no distension. There is no tenderness. There is no rebound and no guarding.   Protuberant. NAM to right upper quadurant; bilious drainage dark green.   Musculoskeletal: Normal range of motion. He exhibits edema. He exhibits no tenderness or deformity.   2-3+  pitting edema.   Neurological: He is alert. He displays normal reflexes. No cranial nerve deficit. He exhibits abnormal muscle tone. Coordination abnormal.   Skin: Skin is warm and dry. Capillary refill takes 2 to 3 seconds. No rash noted.   Psychiatric: He has a normal mood and affect. His behavior is normal.   Nursing note and vitals reviewed.  Results Review:  Lab Results (last 24 hours)     Procedure Component Value Units Date/Time    POC Glucose Once [[id number]]  (Normal) Collected:  04/19/20 0734    Specimen:  Blood Updated:  04/19/20 0746     Glucose 118 mg/dL      Comment: : [id number] Eddy (Vázquez) JessicaMeter ID: [id number]       Comprehensive Metabolic Panel [[id number]]  (Abnormal) Collected:  04/19/20 0512    Specimen:  Blood Updated:  04/19/20 0548     Glucose 115 mg/dL      BUN 16 mg/dL      Creatinine 1.01 mg/dL      Sodium 134 mmol/L      Potassium  4.2 mmol/L      Chloride 98 mmol/L      CO2 26.0 mmol/L      Calcium 8.1 mg/dL      Total Protein 6.0 g/dL      Albumin 2.40 g/dL      ALT (SGPT) 47 U/L      AST (SGOT) 59 U/L      Alkaline Phosphatase 126 U/L      Total Bilirubin 1.3 mg/dL      eGFR Non African Amer 71 mL/min/1.73      Globulin 3.6 gm/dL      A/G Ratio 0.7 g/dL      BUN/Creatinine Ratio 15.8     Anion Gap 10.0 mmol/L     Narrative:       GFR Normal >60  Chronic Kidney Disease <60  Kidney Failure <15      CBC (No Diff) [[id number]]  (Abnormal) Collected:  04/19/20 0512    Specimen:  Blood Updated:  04/19/20 0523     WBC 8.55 10*3/mm3      RBC 2.74 10*6/mm3      Hemoglobin 8.1 g/dL      Hematocrit 25.1 %      MCV 91.6 fL      MCH 29.6 pg      MCHC 32.3 g/dL      RDW 14.4 %      RDW-SD 47.5 fl      MPV 10.2 fL      Platelets 225 10*3/mm3     Blood Culture - Blood, Arm, Right [[id number]] Collected:  04/14/20 2255    Specimen:  Blood from Arm, Right Updated:  04/18/20 2315     Blood Culture No growth at 4 days    Blood Culture - Blood, Arm, Right [[id number]] Collected:  04/14/20 2249    Specimen:  Blood from Arm, Right Updated:  04/18/20 2300     Blood Culture No growth at 4 days    POC Glucose Once [[id number]]  (Normal) Collected:  04/18/20 1641    Specimen:  Blood Updated:  04/18/20 1653     Glucose 130 mg/dL      Comment: : [id number] Surjit Nair) JessicaMeter ID: [id number]       POC Glucose Once [[id number]]  (Abnormal) Collected:  04/18/20 1125    Specimen:  Blood Updated:  04/18/20 1137     Glucose 149 mg/dL      Comment: : [id number] Surjit Nair) JessicaMeter ID: [id number]       Extra Tubes [[id number]] Collected:  04/18/20 0814    Specimen:  Blood, Venous Line Updated:  04/18/20 1130    Narrative:       The following orders were created for panel order Extra Tubes.  Procedure                               Abnormality         Status                     ---------                               -----------         ------                      Lavender Top[[id number]]                                     Final result               Green Top (Gel)[[id number]]                                  Final result                 Please view results for these tests on the individual orders.    Lavender Top [[id number]] Collected:  04/18/20 0814    Specimen:  Blood Updated:  04/18/20 1130     Extra Tube hold for add-on     Comment: Auto resulted       Green Top (Gel) [[id number]] Collected:  04/18/20 0814    Specimen:  Blood Updated:  04/18/20 1130     Extra Tube Hold for add-ons.     Comment: Auto resulted.              Cultures:  Blood Culture   Date Value Ref Range Status   04/14/2020 No growth at 4 days  Preliminary   04/14/2020 No growth at 4 days  Preliminary       Radiology Data:    Imaging Results (Last 24 Hours)     ** No results found for the last 24 hours. **          No Known Allergies    Scheduled meds:     aspirin 81 mg Oral Daily   bisoprolol 2.5 mg Oral Daily   enoxaparin 1 mg/kg Subcutaneous Q12H   famotidine 20 mg Intravenous BID   furosemide 20 mg Intravenous Q12H   insulin lispro 2-7 Units Subcutaneous TID AC   losartan 50 mg Oral Daily   piperacillin-tazobactam 3.375 g Intravenous Q8H   polyethylene glycol 17 g Oral Daily   sennosides-docusate 1 tablet Oral Daily   sodium chloride 10 mL Intravenous Q12H   sucralfate 1 g Oral Q6H   Vitamin B 12 1,000 mcg Oral Daily       PRN meds:  bisacodyl  •  dextrose  •  dextrose  •  glucagon (human recombinant)  •  Glycerin-Hypromellose-  •  magnesium hydroxide  •  ondansetron **OR** ondansetron  •  oxyCODONE-acetaminophen  •  Pharmacy to Dose Zosyn  •  sodium chloride    Assessment/Plan       Transaminitis    Acute cholecystitis    History of prosthetic aortic valve    Hematoma of left iliopsoas muscle    Chronic anticoagulation    Hyponatremia      Plan:  Hematoma of the left iliopsoas muscle.    Concerning possible infectious.  Continue Zosyn for now.  DC vancomycin the negative blood culture.  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     Anemia.  Hemoglobin stable. Will follow.     Cholecystitis/abdomen pain/elevated liver enzymes.  Consult general surgery.  Status post cholecystectomy tube in place.  Continue Zosyn.  Recommendation from general surgery-plan to perform cholecystectomy in approximately 1 month.     CAD/history of prosthetic aortic valve/hypertension.  Continue aspirin.     Patient has been on Lasix yesterday and today.  Edema is improving.  Decrease Zebeta due to bradycardia.  Continue Cozaar.  SCD.  Start patient back on therapeutic Lovenox.  Echocardiogram 2020- ejection fraction 56 to 60%, moderate concentric hypertrophy, prosthetic aortic valve- peak and mean gradients within normal limits, mild mitral valve regurgitation.     Patient has a pacemaker  was interrogated on 4/15/2020 was fine per nursing staff.     Pain control.  Dilaudid as needed.  Percocet PRN.        Nausea/vomiting.  Pepcid.  Zofran as needed.  Carafate.     Chronic kidney disease stage III.       Diabetes.  Sliding scale.  Hemoglobin A1 C 6.6.     Chronic pain.  Percocet as needed.     Constipation.   Dulcolax suppository as needed.  Rebecca-Colace daily.     Nutrition. Cardiac/carbohydrate/bland diet.     Deconditioning.  PT consult.     Blood cultures-no growth in  4 days.     Discharge Plannin-4 days. Patient states he did not like [facility] facility and would like transfer elsewhere at time of discharge.  Patient to go to [facility] rehab Monday.    Mamadou Dodd MD   20   10:01                    Electronically signed by Mamadou Dodd MD at 20 [phone]     Mk Emery MD at 20 0932               LOS: 0 days   Patient Care Team:  Matheus Olivera PA as PCP - General (Physician  Assistant)    Chief Complaint: 14 days out from cholecystostomy tube  Subjective     Subjective     14 days out from cholecystostomy tube he looks much improved, he had good diuresis yesterday, over 3000 cc out, breathing much better, no particular problems.  Objective      Objective     Vital Signs  Temp:  [98 °F (36.7 °C)-98.1 °F (36.7 °C)] 98 °F (36.7 °C)  Heart Rate:  [50-57] 57  Resp:  [16-20] 20  BP: (138-142)/(44-64) 138/44    Intake & Output (last 3 days)       04/16 0701 - 04/17 0700 04/17 0701 - 04/18 0700 04/18 0701 - 04/19 0700 04/19 0701 - 04/20 0700    P.O. 660 120      I.V. (mL/kg) 208 (1.8)       IV Piggyback 200 100 100 50    Total Intake(mL/kg) 1068 (9.5) 220 (1.9) 100 (0.9) 50 (0.4)    Urine (mL/kg/hr) 850 (0.3) 625 (0.2) 2750 (1)     Drains 145 150 140     Stool  0 0     Total Output      Net +73 -555 -2790 +50            Stool Unmeasured Occurrence  2 x 2 x           Physical Exam:     General Appearance:    Alert, cooperative, in no acute distress   Lungs:     Clear to auscultation,respirations regular, even and                  unlabored    Heart:    Regular rhythm and normal rate, normal S1 and S2, no            murmur, no gallop, no rub, no click   Chest Wall:    No abnormalities observed   Abdomen:     Normal bowel sounds, no masses, no organomegaly, soft        non-tender, non-distended,wound clean and dry, no   erythema, no discharge white count is down to 8, electrolytes within normal limits hematocrit stable at 25.        Results Review:     I reviewed the patient's new clinical results.  I reviewed the patient's new imaging results and agree with the interpretation.    Results from last 7 days   Lab Units 04/19/20  0512 04/18/20  0440 04/17/20  0550   WBC 10*3/mm3 8.55 9.24 11.84*   HEMOGLOBIN g/dL 8.1* 7.5* 8.0*   HEMATOCRIT % 25.1* 23.0* 25.1*   PLATELETS 10*3/mm3 225 197 204        Results from last 7 days   Lab Units 04/19/20  0512 04/18/20  0440 04/16/20  0542   SODIUM  mmol/L 134* 132* 133*   POTASSIUM mmol/L 4.2 4.2 4.5   CHLORIDE mmol/L 98 98 98   CO2 mmol/L 26.0 25.0 26.0   BUN mg/dL 16 18 16   CREATININE mg/dL 1.01 1.00 0.93   CALCIUM mg/dL 8.1* 7.8* 8.0*   BILIRUBIN mg/dL 1.3* 1.1 0.9   ALK PHOS U/L 126* 102 104   ALT (SGPT) U/L 47* 45* 46*   AST (SGOT) U/L 59* 68* 96*   GLUCOSE mg/dL 115* 138* 132*       Assessment/Plan     Assessment/Plan       Transaminitis    Acute cholecystitis    History of prosthetic aortic valve    Hematoma of left iliopsoas muscle    Chronic anticoagulation    Hyponatremia      We will treat again with Lasix for further diuresis, plan to discharge and transfer to [facility] on Monday.      Mk Emery MD  04/19/20  09:49      Time: time spent with patient 15 minutes     EMR Dragon/Transcription disclaimer: Much of this encounter note is an electronic transcription/translation of spoken language to printed text. The electronic translation of spoken language may permit erroneous, or at times, nonsensical words or phrases to be inadvertently transcribed; although I have reviewed the note for such errors, some may still exist.    Electronically signed by Mk Emery MD at 04/19/20 5391

## 2020-04-20 NOTE — PLAN OF CARE
Problem: Patient Care Overview  Goal: Plan of Care Review  Flowsheets (Taken 4/20/2020 0949)  Progress: improving  Plan of Care Reviewed With: patient  Outcome Summary: PT tx completed. S for supine to sit with bed rail. Perormed BLE therex seated, required AAROM for LLE. Amb 10' x2 with CGA, RWX and seated rest break due to fatigue. Will cont to progress with PT as pt tolerates.

## 2020-04-20 NOTE — DISCHARGE PLACEMENT REQUEST
Discharge Summary      Edwardo Rowan MD at 04/20/20 1017              [facility] Medicine Services  DISCHARGE SUMMARY       Date of Admission: 4/14/2020  Date of Discharge:  4/20/2020  Primary Care Physician: Matheus Olivera PA    Presenting Problem/History of Present Illness:  Hematoma of left iliopsoas muscle, initial encounter [S70.12XA]  Hematoma of left iliopsoas muscle, initial encounter [S70.12XA]     Final Discharge Diagnoses:  Active Hospital Problems    Diagnosis   • Chronic anticoagulation   • Hyponatremia   • Hematoma of left iliopsoas muscle   • History of prosthetic aortic valve   • Acute cholecystitis   • Transaminitis   Cholecystitis  Transaminimiits    Consults: General surgery    Procedures Performed: N/A    Pertinent Test Results: CT abdomen pelvis showed:    IMPRESSION:  1. Cholecystostomy tube is present in the gallbladder.  2. Moderate-sized left iliopsoas intramuscular fluid collection. Most  likely this is a hematoma. This is a change from April 3  3. 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    Chief Complaint on Day of Discharge:   Feeling better    History of Present Illness on Day of Discharge:   Doing ok, no abdominal pain or fever    Hospital Course:  The patient is a 84 y.o. male who [facility] to [facility] with [facility].  He was found to have a hematoma of his iliopsoas muscle.  General surgery was consulted.  He was started on antibiotics as he had recently been here with bacteremia and still had an elevated white count.  There was also concern the hematoma could have been infected.      He has improved clinically.  He is afebrile.  He has completed over 2 weeks of antibiotics.  He has been accepted to a SNF.      His Coumadin was stopped on his last admission.  I have spoken to Dr. Saleem and she is ok with continuing it leading up to his cholecystectomy.  She will see him in clinic prior to surgery  "and handle changes to his anticoagulation leading up to the procedure.  I have restarted his Coumadin at discharge.  He will need Lovenox until his INR is therapeutic at a goal target of 2.5 (2-3).  We ask that the nursing home please monitor and adjust his Coumadin as needed.      Condition on Discharge:  Stable    Physical Exam on Discharge:  /64 (BP Location: Left arm, Patient Position: Lying)   Pulse 50   Temp 98.1 °F (36.7 °C) (Oral)   Resp 18   Ht 177.8 cm (70\")   Wt 113 kg (249 lb 8 oz)   SpO2 96%   BMI 35.80 kg/m²    Physical Exam   Constitutional: He is oriented to person, place, and time. He appears well-developed and well-nourished.   HENT:   Head: Normocephalic and atraumatic.   Right Ear: External ear normal.   Left Ear: External ear normal.   Nose: Nose normal.   Mouth/Throat: Oropharynx is clear and moist.   Eyes: Pupils are equal, round, and reactive to light. Conjunctivae and EOM are normal. Right eye exhibits no discharge. Left eye exhibits no discharge. No scleral icterus.   Neck: Normal range of motion. Neck supple. No tracheal deviation present. No thyromegaly present.   Cardiovascular: Normal rate, regular rhythm, normal heart sounds and intact distal pulses. Exam reveals no gallop and no friction rub.   No murmur heard.  Pulmonary/Chest: Effort normal and breath sounds normal. No stridor. No respiratory distress. He has no wheezes. He has no rales. He exhibits no tenderness.   Abdominal: Soft. Bowel sounds are normal. He exhibits no distension and no mass. There is no tenderness. There is no rebound and no guarding. No hernia.   Musculoskeletal: Normal range of motion. He exhibits no edema or deformity.   Lymphadenopathy:     He has no cervical adenopathy.   Neurological: He is alert and oriented to person, place, and time. He has normal reflexes. He displays normal reflexes. No cranial nerve deficit. He exhibits normal muscle tone. Coordination normal.   Skin: Skin is warm and " dry. No rash noted. No erythema. No pallor.   Psychiatric: He has a normal mood and affect. His behavior is normal. Judgment and thought content normal.   Vitals reviewed.        Discharge Disposition:  Skilled Nursing Facility (DC - External)    Discharge Medications:     Discharge Medications      New Medications      Instructions Start Date   warfarin 1 MG tablet  Commonly known as:  Coumadin   1 mg, Oral, Nightly         Changes to Medications      Instructions Start Date   oxyCODONE-acetaminophen 5-325 MG per tablet  Commonly known as:  PERCOCET  What changed:  reasons to take this   1 tablet, Oral, Every 4 Hours PRN         Continue These Medications      Instructions Start Date   aspirin 81 MG chewable tablet   81 mg, Oral, Daily      bisacodyl 10 MG suppository  Commonly known as:  DULCOLAX   10 mg, Rectal, Daily PRN      bisoprolol 10 MG tablet  Commonly known as:  ZEBeta   5 mg, Oral, Daily      enoxaparin 120 MG/0.8ML solution syringe  Commonly known as:  LOVENOX   1 mg/kg (110 mg), Subcutaneous, Every 12 Hours      famotidine 20 MG tablet  Commonly known as:  PEPCID   20 mg, Oral, 2 Times Daily      Glycerin-Hypromellose- 0.2-0.2-1 % solution ophthalmic solution  Commonly known as:  ARTIFICIAL TEARS   1 drop, Both Eyes, Every 1 Hour PRN      insulin lispro 100 UNIT/ML injection  Commonly known as:  humaLOG   2-7 Units, Subcutaneous, 4 Times Daily With Meals & Nightly      losartan 100 MG tablet  Commonly known as:  COZAAR   50 mg, Oral, Daily      ondansetron ODT 4 MG disintegrating tablet  Commonly known as:  Zofran ODT   4 mg, Translingual, Every 8 Hours PRN      polyethylene glycol pack packet  Commonly known as:  MIRALAX   17 g, Oral, Daily      sennosides-docusate 8.6-50 MG per tablet  Commonly known as:  PERICOLACE   1 tablet, Oral, Daily      sucralfate 1 GM/10ML suspension  Commonly known as:  CARAFATE   1 g, Oral, Every 6 Hours Scheduled      vitamin B-12 1000 MCG tablet  Commonly known  as:  CYANOCOBALAMIN   1,000 mcg, Oral, Daily      Vitamin D 50 MCG (2000 UT) tablet   1,000 Units, Oral, Daily      Vitamin D3 50 MCG (2000 UT) capsule   2,000 Units, Oral, Daily         Stop These Medications    cefdinir 300 MG capsule  Commonly known as:  OMNICEF            Discharge Diet: Low fat, ADA    Activity at Discharge: as tolerated    Discharge Care Plan/Instructions: go to ER for fever or SOA    Follow-up Appointments:   No future appointments.    Test Results Pending at Discharge: N/A    Edwardo Rowan MD  04/20/20  10:17    Time: 45 minutes          Electronically signed by Edwardo Rowan MD at 04/20/20 1035

## 2020-04-20 NOTE — NURSING NOTE
Staples d/cd to RUQ as ordered.  Steri-strips applied, dry dressing applied.  Patient tolerated well.  Incision line clean, dry, intact, approximated.

## 2020-04-21 NOTE — THERAPY DISCHARGE NOTE
Acute Care - Physical Therapy Progress Note/Discharge  [facility]     Patient Name: Jesus Smith  : 1935  MRN: [id number]  Today's Date: 2020             Admit Date: 2020    Visit Dx:    ICD-10-CM ICD-9-CM   1. Hematoma of left iliopsoas muscle, initial encounter S70.12XA 924.00   2. Cholecystitis K81.9 575.10   3. Impaired mobility Z74.09 799.89   4. Acute UTI (urinary tract infection) N39.0 599.0   5. Systemic inflammatory response syndrome (CMS/[facility]) R65.10 995.90   6. NOLAN (acute kidney injury) (CMS/[facility]) N17.9 584.9   7. Transaminitis R74.0 790.4   8. Acute cholecystitis K81.0 575.0   9. Impaired functional mobility and activity tolerance Z74.09 V49.89   10. Decreased activities of daily living (ADL) Z78.9 V49.89   11. Bacteremia R78.81 790.7   12. Abnormal LFTs R94.5 790.6   13. Benign essential HTN I10 401.1   14. CKD (chronic kidney disease) stage 3, GFR 30-59 ml/min (CMS/[facility]) N18.3 585.3   15. History of prosthetic aortic valve Z95.2 V43.3     Patient Active Problem List   Diagnosis   • Acute UTI (urinary tract infection)   • Bacteremia   • Transaminitis   • Acute cholecystitis   • Benign essential HTN   • CKD (chronic kidney disease) stage 3, GFR 30-59 ml/min (CMS/[facility])   • History of prosthetic aortic valve   • Hematoma of left iliopsoas muscle   • Chronic anticoagulation   • Hyponatremia         Rehab Goal Summary     Row Name 20 1300             Bed Mobility Goal 1 (PT)    [place] Level/Cues Needed (Bed Mobility Goal 1, PT)  minimum assist (75% or more patient effort) Goal: MIn  -NAM      Progress/Outcomes (Bed Mobility Goal 1, PT)  goal met  -NAM         Transfer Goal 1 (PT)    [place] Level/Cues Needed (Transfer Goal 1, PT)  contact guard assist Goal: Min  -NAM      Progress/Outcome (Transfer Goal 1, PT)  goal met  -NAM         Gait Training Goal 1 (PT)    [place] Level (Gait Training Goal 1, PT)  contact guard assist Goal:Min  -NAM      Distance (Gait Goal 1, PT)   2x10 Goal:25  -NAM      Progress/Outcome (Gait Training Goal 1, PT)  goal partially met  -NAM        User Key  (r) = Recorded By, (t) = Taken By, (c) = Cosigned By    Initials Name Provider Type Discipline    Gina James PTA Physical Therapy Assistant PT        Therapy Treatment         PT Recommendation and Plan  Anticipated Discharge Disposition (PT): skilled nursing facility  Outcome Summary/Treatment Plan (PT)  Anticipated Discharge Disposition (PT): skilled nursing facility         Time Calculation:         PT G-Codes  Outcome Measure Options: AM-PAC 6 Clicks Basic Mobility (PT)  AM-PAC 6 Clicks Score (PT): 13    PT Discharge Summary  Anticipated Discharge Disposition (PT): skilled nursing facility  Reason for Discharge: Discharge from facility  Outcomes Achieved: Patient able to partially acheive established goals  Discharge Destination: SNF    Gina Resendiz PTA  4/21/2020

## 2020-04-21 NOTE — PAYOR COMM NOTE
"DC TO SNF 4-20-20      Jesus Smith (84 y.o. Male)     Date of Birth Social Security Number Address Home Phone MRN    1935  [id number] [address] [phone] [id number]    Confucianism Marital Status          Voodoo        Admission Date Admission Type Admitting Provider Attending Provider Department, Room/Bed    4/14/20 Emergency Edwardo Rowan MD  [facility] 3A, 341/1    Discharge Date Discharge Disposition Discharge Destination        4/20/2020 Skilled Nursing Facility (DC - External)              Attending Provider:  (none)   Allergies:  No Known Allergies    Isolation:  None   Infection:  None   Code Status:  Prior    Ht:  177.8 cm (70\")   Wt:  113 kg (249 lb 8 oz)    Admission Cmt:  None   Principal Problem:  None                Active Insurance as of 4/14/2020     Primary Coverage     Payor Plan Insurance Group Employer/Plan Group    [facility] CCN OPTUM      Payor Plan Address Payor Plan Phone Number Payor Plan Fax Number Effective Dates    [address] [phone]  1/1/2019 - None Entered    [facility] [address]       Subscriber Name Subscriber Birth Date Member ID       JESUS SMITH 1935 [id number]                 Emergency Contacts      (Rel.) Home Phone Work Phone Mobile Phone    HEAVEN RAYMOND (Relative) -- -- [phone]    IGNACIO ANGEL (Relative) -- -- [phone]               Discharge Summary      Edwardo Rowan MD at 04/20/20 [facility]              [facility] Medicine Services  DISCHARGE SUMMARY       Date of Admission: 4/14/2020  Date of Discharge:  4/20/2020  Primary Care Physician: Matheus Olivera PA    Presenting Problem/History of Present Illness:  Hematoma of left iliopsoas muscle, initial encounter [S70.12XA]  Hematoma of left iliopsoas muscle, initial encounter [S70.12XA]     Final Discharge Diagnoses:  Active Hospital Problems    Diagnosis   • " "Chronic anticoagulation   • Hyponatremia   • Hematoma of left iliopsoas muscle   • History of prosthetic aortic valve   • Acute cholecystitis   • Transaminitis   Cholecystitis  Transaminimiits    Consults: General surgery    Procedures Performed: N/A    Pertinent Test Results: CT abdomen pelvis showed:    IMPRESSION:  1. Cholecystostomy tube is present in the gallbladder.  2. Moderate-sized left iliopsoas intramuscular fluid collection. Most  likely this is a hematoma. This is a change from April 3  3. 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    Chief Complaint on Day of Discharge:   Feeling better    History of Present Illness on Day of Discharge:   Doing ok, no abdominal pain or fever    Hospital Course:  The patient is a 84 y.o. male who presented to [facility] with a fall.  He was found to have a hematoma of his iliopsoas muscle.  General surgery was consulted.  He was started on antibiotics as he had recently been here with bacteremia and still had an elevated white count.  There was also concern the hematoma could have been infected.      He has improved clinically.  He is afebrile.  He has completed over 2 weeks of antibiotics.  He has been accepted to a SNF.      His Coumadin was stopped on his last admission.  I have spoken to Dr. Saleem and she is ok with continuing it leading up to his cholecystectomy.  She will see him in clinic prior to surgery and handle changes to his anticoagulation leading up to the procedure.  I have restarted his Coumadin at discharge.  He will need Lovenox until his INR is therapeutic at a goal target of 2.5 (2-3).  We ask that the nursing home please monitor and adjust his Coumadin as needed.      Condition on Discharge:  Stable    Physical Exam on Discharge:  /64 (BP Location: Left arm, Patient Position: Lying)   Pulse 50   Temp 98.1 °F (36.7 °C) (Oral)   Resp 18   Ht 177.8 cm (70\")   Wt 113 kg (249 lb 8 oz)   SpO2 96%   BMI 35.80 " kg/m²    Physical Exam   Constitutional: He is oriented to person, place, and time. He appears well-developed and well-nourished.   HENT:   Head: Normocephalic and atraumatic.   Right Ear: External ear normal.   Left Ear: External ear normal.   Nose: Nose normal.   Mouth/Throat: Oropharynx is clear and moist.   Eyes: Pupils are equal, round, and reactive to light. Conjunctivae and EOM are normal. Right eye exhibits no discharge. Left eye exhibits no discharge. No scleral icterus.   Neck: Normal range of motion. Neck supple. No tracheal deviation present. No thyromegaly present.   Cardiovascular: Normal rate, regular rhythm, normal heart sounds and intact distal pulses. Exam reveals no gallop and no friction rub.   No murmur heard.  Pulmonary/Chest: Effort normal and breath sounds normal. No stridor. No respiratory distress. He has no wheezes. He has no rales. He exhibits no tenderness.   Abdominal: Soft. Bowel sounds are normal. He exhibits no distension and no mass. There is no tenderness. There is no rebound and no guarding. No hernia.   Musculoskeletal: Normal range of motion. He exhibits no edema or deformity.   Lymphadenopathy:     He has no cervical adenopathy.   Neurological: He is alert and oriented to person, place, and time. He has normal reflexes. He displays normal reflexes. No cranial nerve deficit. He exhibits normal muscle tone. Coordination normal.   Skin: Skin is warm and dry. No rash noted. No erythema. No pallor.   Psychiatric: He has a normal mood and affect. His behavior is normal. Judgment and thought content normal.   Vitals reviewed.        Discharge Disposition:  Skilled Nursing Facility (DC - External)    Discharge Medications:     Discharge Medications      New Medications      Instructions Start Date   warfarin 1 MG tablet  Commonly known as:  Coumadin   1 mg, Oral, Nightly         Changes to Medications      Instructions Start Date   oxyCODONE-acetaminophen 5-325 MG per tablet  Commonly  known as:  PERCOCET  What changed:  reasons to take this   1 tablet, Oral, Every 4 Hours PRN         Continue These Medications      Instructions Start Date   aspirin 81 MG chewable tablet   81 mg, Oral, Daily      bisacodyl 10 MG suppository  Commonly known as:  DULCOLAX   10 mg, Rectal, Daily PRN      bisoprolol 10 MG tablet  Commonly known as:  ZEBeta   5 mg, Oral, Daily      enoxaparin 120 MG/0.8ML solution syringe  Commonly known as:  LOVENOX   1 mg/kg (110 mg), Subcutaneous, Every 12 Hours      famotidine 20 MG tablet  Commonly known as:  PEPCID   20 mg, Oral, 2 Times Daily      Glycerin-Hypromellose- 0.2-0.2-1 % solution ophthalmic solution  Commonly known as:  ARTIFICIAL TEARS   1 drop, Both Eyes, Every 1 Hour PRN      insulin lispro 100 UNIT/ML injection  Commonly known as:  humaLOG   2-7 Units, Subcutaneous, 4 Times Daily With Meals & Nightly      losartan 100 MG tablet  Commonly known as:  COZAAR   50 mg, Oral, Daily      ondansetron ODT 4 MG disintegrating tablet  Commonly known as:  Zofran ODT   4 mg, Translingual, Every 8 Hours PRN      polyethylene glycol pack packet  Commonly known as:  MIRALAX   17 g, Oral, Daily      sennosides-docusate 8.6-50 MG per tablet  Commonly known as:  PERICOLACE   1 tablet, Oral, Daily      sucralfate 1 GM/10ML suspension  Commonly known as:  CARAFATE   1 g, Oral, Every 6 Hours Scheduled      vitamin B-12 1000 MCG tablet  Commonly known as:  CYANOCOBALAMIN   1,000 mcg, Oral, Daily      Vitamin D 50 MCG (2000 UT) tablet   1,000 Units, Oral, Daily      Vitamin D3 50 MCG (2000 UT) capsule   2,000 Units, Oral, Daily         Stop These Medications    cefdinir 300 MG capsule  Commonly known as:  OMNICEF            Discharge Diet: Low fat, ADA    Activity at Discharge: as tolerated    Discharge Care Plan/Instructions: go to ER for fever or SOA    Follow-up Appointments:   No future appointments.    Test Results Pending at Discharge: N/A    Edwardo Rowan  MD  04/20/20  10:17    Time: 45 minutes          Electronically signed by Edwardo Rowan MD at 04/20/20 7950

## 2020-05-26 ENCOUNTER — APPOINTMENT (OUTPATIENT)
Dept: CT IMAGING | Facility: HOSPITAL | Age: 85
End: 2020-05-26

## 2020-05-26 ENCOUNTER — HOSPITAL ENCOUNTER (INPATIENT)
Facility: HOSPITAL | Age: 85
LOS: 7 days | Discharge: NURSING, REHAB OR LONG-TERM CARE | End: 2020-06-05
Attending: SPECIALIST | Admitting: SPECIALIST

## 2020-05-26 DIAGNOSIS — T85.520A BILIARY DRAIN DISPLACEMENT, INITIAL ENCOUNTER: Primary | ICD-10-CM

## 2020-05-26 DIAGNOSIS — Z74.09 IMPAIRED MOBILITY: ICD-10-CM

## 2020-05-26 DIAGNOSIS — R74.01 TRANSAMINITIS: ICD-10-CM

## 2020-05-26 DIAGNOSIS — Z79.01 CHRONIC ANTICOAGULATION: ICD-10-CM

## 2020-05-26 LAB
ALBUMIN SERPL-MCNC: 3.3 G/DL (ref 3.5–5.2)
ALBUMIN/GLOB SERPL: 0.9 G/DL
ALP SERPL-CCNC: 224 U/L (ref 39–117)
ALT SERPL W P-5'-P-CCNC: 40 U/L (ref 1–41)
ANION GAP SERPL CALCULATED.3IONS-SCNC: 12 MMOL/L (ref 5–15)
APTT PPP: 35.1 SECONDS (ref 24.1–35)
AST SERPL-CCNC: 33 U/L (ref 1–40)
BASOPHILS # BLD AUTO: 0.05 10*3/MM3 (ref 0–0.2)
BASOPHILS NFR BLD AUTO: 0.6 % (ref 0–1.5)
BILIRUB SERPL-MCNC: 0.8 MG/DL (ref 0.2–1.2)
BUN BLD-MCNC: 14 MG/DL (ref 8–23)
BUN/CREAT SERPL: 16.1 (ref 7–25)
CALCIUM SPEC-SCNC: 9.3 MG/DL (ref 8.6–10.5)
CHLORIDE SERPL-SCNC: 97 MMOL/L (ref 98–107)
CO2 SERPL-SCNC: 28 MMOL/L (ref 22–29)
CREAT BLD-MCNC: 0.87 MG/DL (ref 0.76–1.27)
DEPRECATED RDW RBC AUTO: 50.9 FL (ref 37–54)
EOSINOPHIL # BLD AUTO: 0.23 10*3/MM3 (ref 0–0.4)
EOSINOPHIL NFR BLD AUTO: 2.8 % (ref 0.3–6.2)
ERYTHROCYTE [DISTWIDTH] IN BLOOD BY AUTOMATED COUNT: 15.3 % (ref 12.3–15.4)
GFR SERPL CREATININE-BSD FRML MDRD: 84 ML/MIN/1.73
GLOBULIN UR ELPH-MCNC: 3.8 GM/DL
GLUCOSE BLD-MCNC: 120 MG/DL (ref 65–99)
HCT VFR BLD AUTO: 36.2 % (ref 37.5–51)
HGB BLD-MCNC: 11.4 G/DL (ref 13–17.7)
IMM GRANULOCYTES # BLD AUTO: 0.07 10*3/MM3 (ref 0–0.05)
IMM GRANULOCYTES NFR BLD AUTO: 0.9 % (ref 0–0.5)
INR PPP: 1.62 (ref 0.91–1.09)
LIPASE SERPL-CCNC: 15 U/L (ref 13–60)
LYMPHOCYTES # BLD AUTO: 2.08 10*3/MM3 (ref 0.7–3.1)
LYMPHOCYTES NFR BLD AUTO: 25.4 % (ref 19.6–45.3)
MCH RBC QN AUTO: 28.5 PG (ref 26.6–33)
MCHC RBC AUTO-ENTMCNC: 31.5 G/DL (ref 31.5–35.7)
MCV RBC AUTO: 90.5 FL (ref 79–97)
MONOCYTES # BLD AUTO: 1.11 10*3/MM3 (ref 0.1–0.9)
MONOCYTES NFR BLD AUTO: 13.6 % (ref 5–12)
NEUTROPHILS # BLD AUTO: 4.64 10*3/MM3 (ref 1.7–7)
NEUTROPHILS NFR BLD AUTO: 56.7 % (ref 42.7–76)
NRBC BLD AUTO-RTO: 0 /100 WBC (ref 0–0.2)
PLATELET # BLD AUTO: 313 10*3/MM3 (ref 140–450)
PMV BLD AUTO: 9.1 FL (ref 6–12)
POTASSIUM BLD-SCNC: 4 MMOL/L (ref 3.5–5.2)
PROT SERPL-MCNC: 7.1 G/DL (ref 6–8.5)
PROTHROMBIN TIME: 18.9 SECONDS (ref 11.9–14.6)
RBC # BLD AUTO: 4 10*6/MM3 (ref 4.14–5.8)
SARS-COV-2 RNA RESP QL NAA+PROBE: NOT DETECTED
SODIUM BLD-SCNC: 137 MMOL/L (ref 136–145)
WBC NRBC COR # BLD: 8.18 10*3/MM3 (ref 3.4–10.8)

## 2020-05-26 PROCEDURE — 74177 CT ABD & PELVIS W/CONTRAST: CPT

## 2020-05-26 PROCEDURE — G0378 HOSPITAL OBSERVATION PER HR: HCPCS

## 2020-05-26 PROCEDURE — 25010000002 IOPAMIDOL 61 % SOLUTION: Performed by: SPECIALIST

## 2020-05-26 PROCEDURE — 85730 THROMBOPLASTIN TIME PARTIAL: CPT | Performed by: PHYSICIAN ASSISTANT

## 2020-05-26 PROCEDURE — 85610 PROTHROMBIN TIME: CPT | Performed by: PHYSICIAN ASSISTANT

## 2020-05-26 PROCEDURE — 80053 COMPREHEN METABOLIC PANEL: CPT | Performed by: PHYSICIAN ASSISTANT

## 2020-05-26 PROCEDURE — 99284 EMERGENCY DEPT VISIT MOD MDM: CPT

## 2020-05-26 PROCEDURE — 85025 COMPLETE CBC W/AUTO DIFF WBC: CPT | Performed by: PHYSICIAN ASSISTANT

## 2020-05-26 PROCEDURE — 87635 SARS-COV-2 COVID-19 AMP PRB: CPT | Performed by: PHYSICIAN ASSISTANT

## 2020-05-26 PROCEDURE — 83690 ASSAY OF LIPASE: CPT | Performed by: PHYSICIAN ASSISTANT

## 2020-05-26 RX ORDER — MELATONIN
1000 DAILY
Status: DISCONTINUED | OUTPATIENT
Start: 2020-05-26 | End: 2020-05-27

## 2020-05-26 RX ORDER — SUCRALFATE ORAL 1 G/10ML
1 SUSPENSION ORAL EVERY 6 HOURS SCHEDULED
Status: DISCONTINUED | OUTPATIENT
Start: 2020-05-26 | End: 2020-06-05 | Stop reason: HOSPADM

## 2020-05-26 RX ORDER — LOSARTAN POTASSIUM 50 MG/1
50 TABLET ORAL DAILY
Status: DISCONTINUED | OUTPATIENT
Start: 2020-05-26 | End: 2020-05-28

## 2020-05-26 RX ORDER — BISOPROLOL FUMARATE 5 MG/1
5 TABLET, FILM COATED ORAL DAILY
Status: DISCONTINUED | OUTPATIENT
Start: 2020-05-26 | End: 2020-05-28

## 2020-05-26 RX ORDER — CHOLECALCIFEROL (VITAMIN D3) 125 MCG
1000 CAPSULE ORAL DAILY
Status: DISCONTINUED | OUTPATIENT
Start: 2020-05-26 | End: 2020-05-28

## 2020-05-26 RX ORDER — ONDANSETRON 2 MG/ML
4 INJECTION INTRAMUSCULAR; INTRAVENOUS EVERY 4 HOURS PRN
Status: DISCONTINUED | OUTPATIENT
Start: 2020-05-26 | End: 2020-06-05 | Stop reason: HOSPADM

## 2020-05-26 RX ORDER — SODIUM CHLORIDE 0.9 % (FLUSH) 0.9 %
10 SYRINGE (ML) INJECTION AS NEEDED
Status: DISCONTINUED | OUTPATIENT
Start: 2020-05-26 | End: 2020-06-05 | Stop reason: HOSPADM

## 2020-05-26 RX ORDER — FAMOTIDINE 20 MG/1
20 TABLET, FILM COATED ORAL 2 TIMES DAILY
Status: DISCONTINUED | OUTPATIENT
Start: 2020-05-26 | End: 2020-05-28

## 2020-05-26 RX ORDER — BISACODYL 10 MG
10 SUPPOSITORY, RECTAL RECTAL DAILY PRN
Status: DISCONTINUED | OUTPATIENT
Start: 2020-05-26 | End: 2020-05-28

## 2020-05-26 RX ORDER — AMOXICILLIN 250 MG
1 CAPSULE ORAL DAILY
Status: DISCONTINUED | OUTPATIENT
Start: 2020-05-26 | End: 2020-05-28

## 2020-05-26 RX ORDER — POLYETHYLENE GLYCOL 3350 17 G/17G
17 POWDER, FOR SOLUTION ORAL DAILY
Status: DISCONTINUED | OUTPATIENT
Start: 2020-05-26 | End: 2020-05-28

## 2020-05-26 RX ORDER — MELATONIN
2000 DAILY
Status: DISCONTINUED | OUTPATIENT
Start: 2020-05-26 | End: 2020-05-27

## 2020-05-26 RX ADMIN — SUCRALFATE 1 G: 1 SUSPENSION ORAL at 23:49

## 2020-05-26 RX ADMIN — FAMOTIDINE 20 MG: 20 TABLET, FILM COATED ORAL at 21:43

## 2020-05-26 RX ADMIN — IOPAMIDOL 100 ML: 612 INJECTION, SOLUTION INTRAVENOUS at 16:44

## 2020-05-27 LAB
GLUCOSE BLDC GLUCOMTR-MCNC: 125 MG/DL (ref 70–130)
GLUCOSE BLDC GLUCOMTR-MCNC: 128 MG/DL (ref 70–130)
GLUCOSE BLDC GLUCOMTR-MCNC: 131 MG/DL (ref 70–130)
HOLD SPECIMEN: NORMAL
INR PPP: 1.56 (ref 0.91–1.09)
PROTHROMBIN TIME: 18.4 SECONDS (ref 11.9–14.6)
WHOLE BLOOD HOLD SPECIMEN: NORMAL

## 2020-05-27 PROCEDURE — 82962 GLUCOSE BLOOD TEST: CPT

## 2020-05-27 PROCEDURE — 94799 UNLISTED PULMONARY SVC/PX: CPT

## 2020-05-27 PROCEDURE — 25010000002 PIPERACILLIN SOD-TAZOBACTAM PER 1 G: Performed by: SPECIALIST

## 2020-05-27 PROCEDURE — 85610 PROTHROMBIN TIME: CPT | Performed by: SPECIALIST

## 2020-05-27 PROCEDURE — G0378 HOSPITAL OBSERVATION PER HR: HCPCS

## 2020-05-27 RX ORDER — BISACODYL 10 MG
10 SUPPOSITORY, RECTAL RECTAL DAILY
Status: DISCONTINUED | OUTPATIENT
Start: 2020-05-27 | End: 2020-05-28

## 2020-05-27 RX ORDER — ONDANSETRON 4 MG/1
4 TABLET, FILM COATED ORAL EVERY 8 HOURS PRN
COMMUNITY

## 2020-05-27 RX ORDER — OXYCODONE HYDROCHLORIDE AND ACETAMINOPHEN 5; 325 MG/1; MG/1
1 TABLET ORAL EVERY 4 HOURS PRN
COMMUNITY
End: 2022-05-09

## 2020-05-27 RX ORDER — WARFARIN SODIUM 1 MG/1
1 TABLET ORAL NIGHTLY
COMMUNITY
End: 2022-05-12 | Stop reason: HOSPADM

## 2020-05-27 RX ORDER — FUROSEMIDE 20 MG/1
20 TABLET ORAL DAILY
COMMUNITY
End: 2022-05-12 | Stop reason: HOSPADM

## 2020-05-27 RX ORDER — BISOPROLOL FUMARATE 5 MG/1
5 TABLET, FILM COATED ORAL DAILY
COMMUNITY

## 2020-05-27 RX ADMIN — Medication 1000 MCG: at 11:19

## 2020-05-27 RX ADMIN — DOCUSATE SODIUM 50 MG AND SENNOSIDES 8.6 MG 1 TABLET: 8.6; 5 TABLET, FILM COATED ORAL at 11:17

## 2020-05-27 RX ADMIN — TAZOBACTAM SODIUM AND PIPERACILLIN SODIUM 3.38 G: 375; 3 INJECTION, SOLUTION INTRAVENOUS at 17:27

## 2020-05-27 RX ADMIN — MAGNESIUM HYDROXIDE 10 ML: 2400 SUSPENSION ORAL at 11:15

## 2020-05-27 RX ADMIN — BISACODYL 10 MG: 10 SUPPOSITORY RECTAL at 11:16

## 2020-05-27 RX ADMIN — TAZOBACTAM SODIUM AND PIPERACILLIN SODIUM 3.38 G: 375; 3 INJECTION, SOLUTION INTRAVENOUS at 11:16

## 2020-05-27 RX ADMIN — SUCRALFATE 1 G: 1 SUSPENSION ORAL at 05:29

## 2020-05-27 RX ADMIN — SUCRALFATE 1 G: 1 SUSPENSION ORAL at 11:17

## 2020-05-27 RX ADMIN — FAMOTIDINE 20 MG: 20 TABLET, FILM COATED ORAL at 21:16

## 2020-05-27 RX ADMIN — TAZOBACTAM SODIUM AND PIPERACILLIN SODIUM 3.38 G: 375; 3 INJECTION, SOLUTION INTRAVENOUS at 23:31

## 2020-05-27 RX ADMIN — SUCRALFATE 1 G: 1 SUSPENSION ORAL at 17:27

## 2020-05-27 RX ADMIN — FAMOTIDINE 20 MG: 20 TABLET, FILM COATED ORAL at 11:18

## 2020-05-27 RX ADMIN — LOSARTAN POTASSIUM 50 MG: 50 TABLET, FILM COATED ORAL at 11:16

## 2020-05-27 RX ADMIN — BISOPROLOL FUMARATE 5 MG: 5 TABLET ORAL at 11:15

## 2020-05-27 RX ADMIN — POLYETHYLENE GLYCOL (3350) 17 G: 17 POWDER, FOR SOLUTION ORAL at 11:17

## 2020-05-28 ENCOUNTER — ANESTHESIA (OUTPATIENT)
Dept: PERIOP | Facility: HOSPITAL | Age: 85
End: 2020-05-28

## 2020-05-28 ENCOUNTER — ANESTHESIA EVENT (OUTPATIENT)
Dept: PERIOP | Facility: HOSPITAL | Age: 85
End: 2020-05-28

## 2020-05-28 LAB
ABO GROUP BLD: NORMAL
BLD GP AB SCN SERPL QL: NEGATIVE
GLUCOSE BLDC GLUCOMTR-MCNC: 121 MG/DL (ref 70–130)
GLUCOSE BLDC GLUCOMTR-MCNC: 141 MG/DL (ref 70–130)
GLUCOSE BLDC GLUCOMTR-MCNC: 179 MG/DL (ref 70–130)
INR PPP: 1.56 (ref 0.91–1.09)
PROTHROMBIN TIME: 18.4 SECONDS (ref 11.9–14.6)
RH BLD: POSITIVE
T&S EXPIRATION DATE: NORMAL

## 2020-05-28 PROCEDURE — 25010000002 MORPHINE (PF) 10 MG/ML SOLUTION: Performed by: SPECIALIST

## 2020-05-28 PROCEDURE — 25010000002 NEOSTIGMINE 10 MG/10ML SOLUTION: Performed by: NURSE ANESTHETIST, CERTIFIED REGISTERED

## 2020-05-28 PROCEDURE — G0378 HOSPITAL OBSERVATION PER HR: HCPCS

## 2020-05-28 PROCEDURE — 86900 BLOOD TYPING SEROLOGIC ABO: CPT

## 2020-05-28 PROCEDURE — 86923 COMPATIBILITY TEST ELECTRIC: CPT

## 2020-05-28 PROCEDURE — P9016 RBC LEUKOCYTES REDUCED: HCPCS

## 2020-05-28 PROCEDURE — 82962 GLUCOSE BLOOD TEST: CPT

## 2020-05-28 PROCEDURE — 85610 PROTHROMBIN TIME: CPT | Performed by: SPECIALIST

## 2020-05-28 PROCEDURE — 86850 RBC ANTIBODY SCREEN: CPT | Performed by: SPECIALIST

## 2020-05-28 PROCEDURE — P9017 PLASMA 1 DONOR FRZ W/IN 8 HR: HCPCS

## 2020-05-28 PROCEDURE — 25010000002 FENTANYL CITRATE (PF) 100 MCG/2ML SOLUTION: Performed by: ANESTHESIOLOGY

## 2020-05-28 PROCEDURE — 0FT40ZZ RESECTION OF GALLBLADDER, OPEN APPROACH: ICD-10-PCS | Performed by: SPECIALIST

## 2020-05-28 PROCEDURE — 25010000002 DEXAMETHASONE PER 1 MG: Performed by: ANESTHESIOLOGY

## 2020-05-28 PROCEDURE — 25010000002 PIPERACILLIN SOD-TAZOBACTAM PER 1 G: Performed by: SPECIALIST

## 2020-05-28 PROCEDURE — 88304 TISSUE EXAM BY PATHOLOGIST: CPT | Performed by: SPECIALIST

## 2020-05-28 PROCEDURE — 86900 BLOOD TYPING SEROLOGIC ABO: CPT | Performed by: SPECIALIST

## 2020-05-28 PROCEDURE — 25010000002 ONDANSETRON PER 1 MG: Performed by: SPECIALIST

## 2020-05-28 PROCEDURE — 86927 PLASMA FRESH FROZEN: CPT

## 2020-05-28 PROCEDURE — 86901 BLOOD TYPING SEROLOGIC RH(D): CPT | Performed by: SPECIALIST

## 2020-05-28 PROCEDURE — 25010000002 ONDANSETRON PER 1 MG: Performed by: NURSE ANESTHETIST, CERTIFIED REGISTERED

## 2020-05-28 PROCEDURE — 25010000002 FENTANYL CITRATE (PF) 100 MCG/2ML SOLUTION: Performed by: NURSE ANESTHETIST, CERTIFIED REGISTERED

## 2020-05-28 PROCEDURE — 36430 TRANSFUSION BLD/BLD COMPNT: CPT

## 2020-05-28 PROCEDURE — 25010000002 PROPOFOL 10 MG/ML EMULSION: Performed by: NURSE ANESTHETIST, CERTIFIED REGISTERED

## 2020-05-28 DEVICE — ENDOPATH ETS45 4.1MM RELOADS (THICK)
Type: IMPLANTABLE DEVICE | Status: FUNCTIONAL
Brand: ENDOPATH

## 2020-05-28 DEVICE — HEMOST ABS SURGICEL SNOW 4X4IN: Type: IMPLANTABLE DEVICE | Status: FUNCTIONAL

## 2020-05-28 RX ORDER — OXYCODONE HYDROCHLORIDE AND ACETAMINOPHEN 5; 325 MG/1; MG/1
1 TABLET ORAL ONCE AS NEEDED
Status: DISCONTINUED | OUTPATIENT
Start: 2020-05-28 | End: 2020-05-28 | Stop reason: HOSPADM

## 2020-05-28 RX ORDER — MAGNESIUM HYDROXIDE 1200 MG/15ML
LIQUID ORAL AS NEEDED
Status: DISCONTINUED | OUTPATIENT
Start: 2020-05-28 | End: 2020-05-28 | Stop reason: HOSPADM

## 2020-05-28 RX ORDER — ONDANSETRON 2 MG/ML
INJECTION INTRAMUSCULAR; INTRAVENOUS AS NEEDED
Status: DISCONTINUED | OUTPATIENT
Start: 2020-05-28 | End: 2020-05-28 | Stop reason: SURG

## 2020-05-28 RX ORDER — MORPHINE SULFATE 1 MG/ML
INJECTION INTRAVENOUS CONTINUOUS
Status: DISCONTINUED | OUTPATIENT
Start: 2020-05-28 | End: 2020-06-02

## 2020-05-28 RX ORDER — PROPOFOL 10 MG/ML
VIAL (ML) INTRAVENOUS AS NEEDED
Status: DISCONTINUED | OUTPATIENT
Start: 2020-05-28 | End: 2020-05-28 | Stop reason: SURG

## 2020-05-28 RX ORDER — LIDOCAINE HYDROCHLORIDE 20 MG/ML
INJECTION, SOLUTION INFILTRATION; PERINEURAL AS NEEDED
Status: DISCONTINUED | OUTPATIENT
Start: 2020-05-28 | End: 2020-05-28 | Stop reason: SURG

## 2020-05-28 RX ORDER — FLUMAZENIL 0.1 MG/ML
0.2 INJECTION INTRAVENOUS AS NEEDED
Status: DISCONTINUED | OUTPATIENT
Start: 2020-05-28 | End: 2020-05-28 | Stop reason: HOSPADM

## 2020-05-28 RX ORDER — FENTANYL CITRATE 50 UG/ML
INJECTION, SOLUTION INTRAMUSCULAR; INTRAVENOUS AS NEEDED
Status: DISCONTINUED | OUTPATIENT
Start: 2020-05-28 | End: 2020-05-28 | Stop reason: SURG

## 2020-05-28 RX ORDER — GLYCOPYRROLATE 0.2 MG/ML
INJECTION INTRAMUSCULAR; INTRAVENOUS AS NEEDED
Status: DISCONTINUED | OUTPATIENT
Start: 2020-05-28 | End: 2020-05-28 | Stop reason: SURG

## 2020-05-28 RX ORDER — LIDOCAINE HYDROCHLORIDE 10 MG/ML
0.5 INJECTION, SOLUTION EPIDURAL; INFILTRATION; INTRACAUDAL; PERINEURAL ONCE AS NEEDED
Status: DISCONTINUED | OUTPATIENT
Start: 2020-05-28 | End: 2020-05-28 | Stop reason: HOSPADM

## 2020-05-28 RX ORDER — SODIUM CHLORIDE 0.9 % (FLUSH) 0.9 %
3-10 SYRINGE (ML) INJECTION AS NEEDED
Status: DISCONTINUED | OUTPATIENT
Start: 2020-05-28 | End: 2020-05-28 | Stop reason: HOSPADM

## 2020-05-28 RX ORDER — BUPIVACAINE HYDROCHLORIDE AND EPINEPHRINE 2.5; 5 MG/ML; UG/ML
INJECTION, SOLUTION INFILTRATION; PERINEURAL AS NEEDED
Status: DISCONTINUED | OUTPATIENT
Start: 2020-05-28 | End: 2020-05-28 | Stop reason: HOSPADM

## 2020-05-28 RX ORDER — LIDOCAINE HYDROCHLORIDE 40 MG/ML
SOLUTION TOPICAL AS NEEDED
Status: DISCONTINUED | OUTPATIENT
Start: 2020-05-28 | End: 2020-05-28 | Stop reason: SURG

## 2020-05-28 RX ORDER — DEXAMETHASONE SODIUM PHOSPHATE 4 MG/ML
4 INJECTION, SOLUTION INTRA-ARTICULAR; INTRALESIONAL; INTRAMUSCULAR; INTRAVENOUS; SOFT TISSUE ONCE AS NEEDED
Status: COMPLETED | OUTPATIENT
Start: 2020-05-28 | End: 2020-05-28

## 2020-05-28 RX ORDER — NALOXONE HCL 0.4 MG/ML
0.04 VIAL (ML) INJECTION AS NEEDED
Status: DISCONTINUED | OUTPATIENT
Start: 2020-05-28 | End: 2020-05-28 | Stop reason: HOSPADM

## 2020-05-28 RX ORDER — ROCURONIUM BROMIDE 10 MG/ML
INJECTION, SOLUTION INTRAVENOUS AS NEEDED
Status: DISCONTINUED | OUTPATIENT
Start: 2020-05-28 | End: 2020-05-28 | Stop reason: SURG

## 2020-05-28 RX ORDER — FAMOTIDINE 10 MG/ML
20 INJECTION, SOLUTION INTRAVENOUS EVERY 12 HOURS SCHEDULED
Status: DISCONTINUED | OUTPATIENT
Start: 2020-05-28 | End: 2020-05-30 | Stop reason: ALTCHOICE

## 2020-05-28 RX ORDER — SODIUM CHLORIDE 0.9 % (FLUSH) 0.9 %
3 SYRINGE (ML) INJECTION EVERY 12 HOURS SCHEDULED
Status: DISCONTINUED | OUTPATIENT
Start: 2020-05-28 | End: 2020-05-28 | Stop reason: HOSPADM

## 2020-05-28 RX ORDER — ACETAMINOPHEN 500 MG
1000 TABLET ORAL ONCE
Status: COMPLETED | OUTPATIENT
Start: 2020-05-28 | End: 2020-05-28

## 2020-05-28 RX ORDER — METOPROLOL TARTRATE 5 MG/5ML
5 INJECTION INTRAVENOUS EVERY 6 HOURS
Status: DISCONTINUED | OUTPATIENT
Start: 2020-05-28 | End: 2020-05-31 | Stop reason: SDUPTHER

## 2020-05-28 RX ORDER — FENTANYL CITRATE 50 UG/ML
25 INJECTION, SOLUTION INTRAMUSCULAR; INTRAVENOUS
Status: DISCONTINUED | OUTPATIENT
Start: 2020-05-28 | End: 2020-05-28 | Stop reason: HOSPADM

## 2020-05-28 RX ORDER — OXYCODONE AND ACETAMINOPHEN 7.5; 325 MG/1; MG/1
1 TABLET ORAL ONCE AS NEEDED
Status: DISCONTINUED | OUTPATIENT
Start: 2020-05-28 | End: 2020-05-28 | Stop reason: HOSPADM

## 2020-05-28 RX ORDER — NEOSTIGMINE METHYLSULFATE 1 MG/ML
INJECTION, SOLUTION INTRAVENOUS AS NEEDED
Status: DISCONTINUED | OUTPATIENT
Start: 2020-05-28 | End: 2020-05-28 | Stop reason: SURG

## 2020-05-28 RX ORDER — SODIUM CHLORIDE, SODIUM LACTATE, POTASSIUM CHLORIDE, CALCIUM CHLORIDE 600; 310; 30; 20 MG/100ML; MG/100ML; MG/100ML; MG/100ML
100 INJECTION, SOLUTION INTRAVENOUS CONTINUOUS
Status: DISCONTINUED | OUTPATIENT
Start: 2020-05-28 | End: 2020-05-28

## 2020-05-28 RX ORDER — SODIUM CHLORIDE 9 MG/ML
INJECTION, SOLUTION INTRAVENOUS AS NEEDED
Status: DISCONTINUED | OUTPATIENT
Start: 2020-05-28 | End: 2020-05-28 | Stop reason: HOSPADM

## 2020-05-28 RX ORDER — NALOXONE HCL 0.4 MG/ML
0.1 VIAL (ML) INJECTION
Status: DISCONTINUED | OUTPATIENT
Start: 2020-05-28 | End: 2020-06-05 | Stop reason: HOSPADM

## 2020-05-28 RX ORDER — LABETALOL HYDROCHLORIDE 5 MG/ML
5 INJECTION, SOLUTION INTRAVENOUS
Status: DISCONTINUED | OUTPATIENT
Start: 2020-05-28 | End: 2020-05-28 | Stop reason: HOSPADM

## 2020-05-28 RX ORDER — MORPHINE SULFATE 2 MG/ML
2 INJECTION, SOLUTION INTRAMUSCULAR; INTRAVENOUS
Status: DISCONTINUED | OUTPATIENT
Start: 2020-05-28 | End: 2020-05-28 | Stop reason: HOSPADM

## 2020-05-28 RX ORDER — ONDANSETRON 2 MG/ML
4 INJECTION INTRAMUSCULAR; INTRAVENOUS AS NEEDED
Status: DISCONTINUED | OUTPATIENT
Start: 2020-05-28 | End: 2020-05-28 | Stop reason: HOSPADM

## 2020-05-28 RX ADMIN — MORPHINE SULFATE: 10 INJECTION, SOLUTION INTRAMUSCULAR; INTRAVENOUS at 16:44

## 2020-05-28 RX ADMIN — LIDOCAINE HYDROCHLORIDE 1 EACH: 40 SOLUTION TOPICAL at 12:43

## 2020-05-28 RX ADMIN — GLYCOPYRROLATE 0.4 MG: 0.2 INJECTION, SOLUTION INTRAMUSCULAR; INTRAVENOUS at 15:00

## 2020-05-28 RX ADMIN — VASOPRESSIN 1 ML: 20 INJECTION INTRAVENOUS at 14:17

## 2020-05-28 RX ADMIN — BISOPROLOL FUMARATE 5 MG: 5 TABLET ORAL at 08:12

## 2020-05-28 RX ADMIN — PROPOFOL 80 MG: 10 INJECTION, EMULSION INTRAVENOUS at 12:43

## 2020-05-28 RX ADMIN — TAZOBACTAM SODIUM AND PIPERACILLIN SODIUM 3.38 G: 375; 3 INJECTION, SOLUTION INTRAVENOUS at 17:26

## 2020-05-28 RX ADMIN — FAMOTIDINE 20 MG: 10 INJECTION INTRAVENOUS at 20:45

## 2020-05-28 RX ADMIN — VASOPRESSIN 1 ML: 20 INJECTION INTRAVENOUS at 14:23

## 2020-05-28 RX ADMIN — DEXAMETHASONE SODIUM PHOSPHATE 4 MG: 4 INJECTION, SOLUTION INTRAMUSCULAR; INTRAVENOUS at 11:59

## 2020-05-28 RX ADMIN — SODIUM CHLORIDE, POTASSIUM CHLORIDE, SODIUM LACTATE AND CALCIUM CHLORIDE: 600; 310; 30; 20 INJECTION, SOLUTION INTRAVENOUS at 14:11

## 2020-05-28 RX ADMIN — VASOPRESSIN 1 ML: 20 INJECTION INTRAVENOUS at 13:09

## 2020-05-28 RX ADMIN — SODIUM CHLORIDE, POTASSIUM CHLORIDE, SODIUM LACTATE AND CALCIUM CHLORIDE 100 ML/HR: 600; 310; 30; 20 INJECTION, SOLUTION INTRAVENOUS at 11:58

## 2020-05-28 RX ADMIN — FENTANYL CITRATE 100 MCG: 50 INJECTION, SOLUTION INTRAMUSCULAR; INTRAVENOUS at 13:05

## 2020-05-28 RX ADMIN — FENTANYL CITRATE 25 MCG: 50 INJECTION, SOLUTION INTRAMUSCULAR; INTRAVENOUS at 15:57

## 2020-05-28 RX ADMIN — ONDANSETRON HYDROCHLORIDE 4 MG: 2 SOLUTION INTRAMUSCULAR; INTRAVENOUS at 15:00

## 2020-05-28 RX ADMIN — ONDANSETRON HYDROCHLORIDE 4 MG: 2 SOLUTION INTRAMUSCULAR; INTRAVENOUS at 23:23

## 2020-05-28 RX ADMIN — NEOSTIGMINE METHYLSULFATE 3 MG: 1 INJECTION INTRAVENOUS at 15:00

## 2020-05-28 RX ADMIN — ROCURONIUM BROMIDE 15 MG: 10 INJECTION INTRAVENOUS at 13:07

## 2020-05-28 RX ADMIN — METOPROLOL TARTRATE 5 MG: 5 INJECTION, SOLUTION INTRAVENOUS at 18:17

## 2020-05-28 RX ADMIN — ONDANSETRON HYDROCHLORIDE 4 MG: 2 SOLUTION INTRAMUSCULAR; INTRAVENOUS at 18:19

## 2020-05-28 RX ADMIN — TAZOBACTAM SODIUM AND PIPERACILLIN SODIUM 3.38 G: 375; 3 INJECTION, SOLUTION INTRAVENOUS at 05:43

## 2020-05-28 RX ADMIN — LIDOCAINE HYDROCHLORIDE 40 MG: 20 INJECTION, SOLUTION INFILTRATION; PERINEURAL at 12:43

## 2020-05-28 RX ADMIN — FENTANYL CITRATE 25 MCG: 50 INJECTION, SOLUTION INTRAMUSCULAR; INTRAVENOUS at 15:34

## 2020-05-28 RX ADMIN — BISACODYL 10 MG: 10 SUPPOSITORY RECTAL at 11:04

## 2020-05-28 RX ADMIN — METOPROLOL TARTRATE 5 MG: 5 INJECTION, SOLUTION INTRAVENOUS at 23:56

## 2020-05-28 RX ADMIN — FENTANYL CITRATE 100 MCG: 50 INJECTION, SOLUTION INTRAMUSCULAR; INTRAVENOUS at 12:43

## 2020-05-28 RX ADMIN — LOSARTAN POTASSIUM 50 MG: 50 TABLET, FILM COATED ORAL at 08:12

## 2020-05-28 RX ADMIN — ACETAMINOPHEN 1000 MG: 500 TABLET, FILM COATED ORAL at 11:59

## 2020-05-28 RX ADMIN — TAZOBACTAM SODIUM AND PIPERACILLIN SODIUM 3.38 G: 375; 3 INJECTION, SOLUTION INTRAVENOUS at 11:04

## 2020-05-28 RX ADMIN — TAZOBACTAM SODIUM AND PIPERACILLIN SODIUM 3.38 G: 375; 3 INJECTION, SOLUTION INTRAVENOUS at 23:05

## 2020-05-28 RX ADMIN — ROCURONIUM BROMIDE 15 MG: 10 INJECTION INTRAVENOUS at 14:00

## 2020-05-28 RX ADMIN — ROCURONIUM BROMIDE 15 MG: 10 INJECTION INTRAVENOUS at 12:43

## 2020-05-28 NOTE — ANESTHESIA PREPROCEDURE EVALUATION
Anesthesia Evaluation     Patient summary reviewed   no history of anesthetic complications:  NPO Solid Status: > 8 hours  NPO Liquid Status: > 2 hours           Airway   Mallampati: I  TM distance: >3 FB  Neck ROM: full  Dental    (+) edentulous    Pulmonary - normal exam    breath sounds clear to auscultation  (-) asthma, recent URI, sleep apnea, not a smoker  Cardiovascular   Exercise tolerance: good (4-7 METS)    ECG reviewed  Patient on routine beta blocker and Beta blocker given within 24 hours of surgery  Rhythm: regular  Rate: normal    (+) pacemaker ([employer], interrogated 4/16/20 (report in media), V-Paced 92%) pacemaker, hypertension, valvular problems/murmurs (AVR 2002), CAD, CABG (2002), murmur,   (-) past MI, angina, cardiac stents    ROS comment: TTE 4/4/20 - · Left ventricular systolic function is normal. Estimated EF appears to be in the range of 56 - 60%.  ·Left ventricular wall thickness is consistent with moderate concentric hypertrophy.  ·There is a prosthetic aortic valve present. The aortic valve peak and mean gradients are within normal limits.  ·Mild tricuspid valve regurgitation is present. Estimated right ventricular systolic pressure from tricuspid regurgitation is normal (<35 mmHg).    Neuro/Psych  (+) TIA,     (-) seizures, CVA  GI/Hepatic/Renal/Endo    (+)  GERD,  renal disease CRI,   (-) liver disease, diabetes, no thyroid disorder    Musculoskeletal     Abdominal    Substance History      OB/GYN          Other                      Anesthesia Plan    ASA 3     general     intravenous induction     Anesthetic plan, all risks, benefits, and alternatives have been provided, discussed and informed consent has been obtained with: patient.

## 2020-05-28 NOTE — ANESTHESIA PROCEDURE NOTES
Airway  Urgency: elective    Date/Time: 5/28/2020 12:43 PM  Airway not difficult    General Information and Staff    Patient location during procedure: OR  CRNA: Rich Montanez CRNA    Indications and Patient Condition  Indications for airway management: airway protection    Preoxygenated: yes  MILS maintained throughout  Mask difficulty assessment: 1 - vent by mask    Final Airway Details  Final airway type: endotracheal airway      Successful airway: ETT  Cuffed: yes   Successful intubation technique: direct laryngoscopy  Facilitating devices/methods: intubating stylet  Endotracheal tube insertion site: oral  Blade: Antwon  Blade size: 4  ETT size (mm): 8.0  Cormack-Lehane Classification: grade I - full view of glottis  Placement verified by: chest auscultation and capnometry   Cuff volume (mL): 8  Measured from: lips  ETT/EBT  to lips (cm): 21  Number of attempts at approach: 1  Assessment: lips, teeth, and gum same as pre-op and atraumatic intubation

## 2020-05-29 LAB
ALBUMIN SERPL-MCNC: 3.1 G/DL (ref 3.5–5.2)
ALBUMIN/GLOB SERPL: 0.9 G/DL
ALP SERPL-CCNC: 155 U/L (ref 39–117)
ALT SERPL W P-5'-P-CCNC: 35 U/L (ref 1–41)
AMYLASE SERPL-CCNC: 56 U/L (ref 28–100)
ANION GAP SERPL CALCULATED.3IONS-SCNC: 15 MMOL/L (ref 5–15)
AST SERPL-CCNC: 39 U/L (ref 1–40)
BH BB BLOOD EXPIRATION DATE: NORMAL
BH BB BLOOD TYPE BARCODE: 5100
BH BB DISPENSE STATUS: NORMAL
BH BB PRODUCT CODE: NORMAL
BH BB UNIT NUMBER: NORMAL
BILIRUB SERPL-MCNC: 1.5 MG/DL (ref 0.2–1.2)
BUN BLD-MCNC: 29 MG/DL (ref 8–23)
BUN/CREAT SERPL: 24.4 (ref 7–25)
CALCIUM SPEC-SCNC: 8.8 MG/DL (ref 8.6–10.5)
CHLORIDE SERPL-SCNC: 98 MMOL/L (ref 98–107)
CO2 SERPL-SCNC: 23 MMOL/L (ref 22–29)
CREAT BLD-MCNC: 1.19 MG/DL (ref 0.76–1.27)
CROSSMATCH INTERPRETATION: NORMAL
CROSSMATCH INTERPRETATION: NORMAL
DEPRECATED RDW RBC AUTO: 49.3 FL (ref 37–54)
ERYTHROCYTE [DISTWIDTH] IN BLOOD BY AUTOMATED COUNT: 15.1 % (ref 12.3–15.4)
GFR SERPL CREATININE-BSD FRML MDRD: 58 ML/MIN/1.73
GLOBULIN UR ELPH-MCNC: 3.6 GM/DL
GLUCOSE BLD-MCNC: 195 MG/DL (ref 65–99)
HCT VFR BLD AUTO: 34.8 % (ref 37.5–51)
HGB BLD-MCNC: 11.3 G/DL (ref 13–17.7)
INR PPP: 1.67 (ref 0.91–1.09)
LIPASE SERPL-CCNC: 50 U/L (ref 13–60)
MCH RBC QN AUTO: 29.2 PG (ref 26.6–33)
MCHC RBC AUTO-ENTMCNC: 32.5 G/DL (ref 31.5–35.7)
MCV RBC AUTO: 89.9 FL (ref 79–97)
PLATELET # BLD AUTO: 227 10*3/MM3 (ref 140–450)
PMV BLD AUTO: 9.4 FL (ref 6–12)
POTASSIUM BLD-SCNC: 4.6 MMOL/L (ref 3.5–5.2)
PROT SERPL-MCNC: 6.7 G/DL (ref 6–8.5)
PROTHROMBIN TIME: 19.4 SECONDS (ref 11.9–14.6)
RBC # BLD AUTO: 3.87 10*6/MM3 (ref 4.14–5.8)
SODIUM BLD-SCNC: 136 MMOL/L (ref 136–145)
UNIT  ABO: NORMAL
UNIT  RH: NORMAL
WBC NRBC COR # BLD: 14.09 10*3/MM3 (ref 3.4–10.8)

## 2020-05-29 PROCEDURE — 25010000002 ONDANSETRON PER 1 MG: Performed by: SPECIALIST

## 2020-05-29 PROCEDURE — 93005 ELECTROCARDIOGRAM TRACING: CPT | Performed by: NURSE PRACTITIONER

## 2020-05-29 PROCEDURE — 97162 PT EVAL MOD COMPLEX 30 MIN: CPT | Performed by: PHYSICAL THERAPIST

## 2020-05-29 PROCEDURE — 85027 COMPLETE CBC AUTOMATED: CPT | Performed by: SPECIALIST

## 2020-05-29 PROCEDURE — 25010000002 ENOXAPARIN PER 10 MG: Performed by: SPECIALIST

## 2020-05-29 PROCEDURE — 93010 ELECTROCARDIOGRAM REPORT: CPT | Performed by: INTERNAL MEDICINE

## 2020-05-29 PROCEDURE — 80053 COMPREHEN METABOLIC PANEL: CPT | Performed by: SPECIALIST

## 2020-05-29 PROCEDURE — 85610 PROTHROMBIN TIME: CPT | Performed by: SPECIALIST

## 2020-05-29 PROCEDURE — 25010000002 MORPHINE (PF) 10 MG/ML SOLUTION: Performed by: SPECIALIST

## 2020-05-29 PROCEDURE — 25010000002 PIPERACILLIN SOD-TAZOBACTAM PER 1 G: Performed by: SPECIALIST

## 2020-05-29 PROCEDURE — 99222 1ST HOSP IP/OBS MODERATE 55: CPT | Performed by: INTERNAL MEDICINE

## 2020-05-29 PROCEDURE — 25010000002 MORPHINE PER 10 MG: Performed by: SPECIALIST

## 2020-05-29 PROCEDURE — 97530 THERAPEUTIC ACTIVITIES: CPT

## 2020-05-29 PROCEDURE — 82150 ASSAY OF AMYLASE: CPT | Performed by: SPECIALIST

## 2020-05-29 PROCEDURE — 83690 ASSAY OF LIPASE: CPT | Performed by: SPECIALIST

## 2020-05-29 RX ADMIN — ONDANSETRON HYDROCHLORIDE 4 MG: 2 SOLUTION INTRAMUSCULAR; INTRAVENOUS at 10:39

## 2020-05-29 RX ADMIN — TAZOBACTAM SODIUM AND PIPERACILLIN SODIUM 3.38 G: 375; 3 INJECTION, SOLUTION INTRAVENOUS at 17:26

## 2020-05-29 RX ADMIN — ENOXAPARIN SODIUM 40 MG: 40 INJECTION SUBCUTANEOUS at 17:33

## 2020-05-29 RX ADMIN — FAMOTIDINE 20 MG: 10 INJECTION INTRAVENOUS at 08:26

## 2020-05-29 RX ADMIN — SODIUM CHLORIDE, PRESERVATIVE FREE 10 ML: 5 INJECTION INTRAVENOUS at 08:26

## 2020-05-29 RX ADMIN — MORPHINE SULFATE: 10 INJECTION, SOLUTION INTRAMUSCULAR; INTRAVENOUS at 11:19

## 2020-05-29 RX ADMIN — SUCRALFATE 1 G: 1 SUSPENSION ORAL at 01:02

## 2020-05-29 RX ADMIN — SUCRALFATE 1 G: 1 SUSPENSION ORAL at 05:27

## 2020-05-29 RX ADMIN — TAZOBACTAM SODIUM AND PIPERACILLIN SODIUM 3.38 G: 375; 3 INJECTION, SOLUTION INTRAVENOUS at 12:04

## 2020-05-29 RX ADMIN — FAMOTIDINE 20 MG: 10 INJECTION INTRAVENOUS at 20:26

## 2020-05-29 RX ADMIN — SUCRALFATE 1 G: 1 SUSPENSION ORAL at 12:42

## 2020-05-29 RX ADMIN — ONDANSETRON HYDROCHLORIDE 4 MG: 2 SOLUTION INTRAMUSCULAR; INTRAVENOUS at 17:53

## 2020-05-29 RX ADMIN — MORPHINE SULFATE 4 MG: 4 INJECTION, SOLUTION INTRAMUSCULAR; INTRAVENOUS at 18:00

## 2020-05-29 RX ADMIN — SUCRALFATE 1 G: 1 SUSPENSION ORAL at 17:26

## 2020-05-29 RX ADMIN — ONDANSETRON HYDROCHLORIDE 4 MG: 2 SOLUTION INTRAMUSCULAR; INTRAVENOUS at 03:22

## 2020-05-29 RX ADMIN — TAZOBACTAM SODIUM AND PIPERACILLIN SODIUM 3.38 G: 375; 3 INJECTION, SOLUTION INTRAVENOUS at 05:27

## 2020-05-29 NOTE — ANESTHESIA POSTPROCEDURE EVALUATION
"Patient: Jesus Smith    Procedure Summary     Date:  05/28/20 Room / Location:   [facility] OR 06 /  [facility] OR    Anesthesia Start:  1235 Anesthesia Stop:  1514    Procedure:  OPEN PARTIAL CHOLECYSTECTOMY, LYSIS OF ADHESIONS (N/A Abdomen) Diagnosis:       Transaminitis      (Transaminitis [R74.0])    Surgeon:  Agustina Saleem MD Provider:  Rich Montanez CRNA    Anesthesia Type:  general ASA Status:  3          Anesthesia Type: general    Vitals  Vitals Value Taken Time   /72 5/28/2020  3:46 PM   Temp 97.7 °F (36.5 °C) 5/28/2020  4:00 PM   Pulse 65 5/28/2020  4:00 PM   Resp 15 5/28/2020  3:55 PM   SpO2 96 % 5/28/2020  3:56 PM   Vitals shown include unvalidated device data.        Post Anesthesia Care and Evaluation    Patient location during evaluation: PACU  Patient participation: complete - patient participated  Level of consciousness: awake and alert  Pain management: adequate  Airway patency: patent  Anesthetic complications: No anesthetic complications  PONV Status: none  Cardiovascular status: acceptable and hemodynamically stable  Respiratory status: acceptable  Hydration status: acceptable    Comments: Blood pressure (!) 128/38, pulse 53, temperature 97.5 °F (36.4 °C), temperature source Axillary, resp. rate 16, height 177.8 cm (70\"), weight 86.8 kg (191 lb 5.8 oz), SpO2 97 %.    Patient discharged from PACU based upon Sarthak score. Please see RN notes for further details      "

## 2020-05-30 LAB
ALBUMIN SERPL-MCNC: 3.2 G/DL (ref 3.5–5.2)
ALBUMIN/GLOB SERPL: 0.9 G/DL
ALP SERPL-CCNC: 138 U/L (ref 39–117)
ALT SERPL W P-5'-P-CCNC: 31 U/L (ref 1–41)
ANION GAP SERPL CALCULATED.3IONS-SCNC: 15 MMOL/L (ref 5–15)
AST SERPL-CCNC: 24 U/L (ref 1–40)
BILIRUB SERPL-MCNC: 1.4 MG/DL (ref 0.2–1.2)
BUN BLD-MCNC: 38 MG/DL (ref 8–23)
BUN/CREAT SERPL: 24.2 (ref 7–25)
CALCIUM SPEC-SCNC: 8.8 MG/DL (ref 8.6–10.5)
CHLORIDE SERPL-SCNC: 98 MMOL/L (ref 98–107)
CO2 SERPL-SCNC: 26 MMOL/L (ref 22–29)
CREAT BLD-MCNC: 1.57 MG/DL (ref 0.76–1.27)
DEPRECATED RDW RBC AUTO: 50.4 FL (ref 37–54)
ERYTHROCYTE [DISTWIDTH] IN BLOOD BY AUTOMATED COUNT: 15.3 % (ref 12.3–15.4)
GFR SERPL CREATININE-BSD FRML MDRD: 42 ML/MIN/1.73
GLOBULIN UR ELPH-MCNC: 3.7 GM/DL
GLUCOSE BLD-MCNC: 159 MG/DL (ref 65–99)
HCT VFR BLD AUTO: 33.8 % (ref 37.5–51)
HGB BLD-MCNC: 11 G/DL (ref 13–17.7)
INR PPP: 1.85 (ref 0.91–1.09)
MCH RBC QN AUTO: 29.6 PG (ref 26.6–33)
MCHC RBC AUTO-ENTMCNC: 32.5 G/DL (ref 31.5–35.7)
MCV RBC AUTO: 90.9 FL (ref 79–97)
PLATELET # BLD AUTO: 226 10*3/MM3 (ref 140–450)
PMV BLD AUTO: 9.5 FL (ref 6–12)
POTASSIUM BLD-SCNC: 4.2 MMOL/L (ref 3.5–5.2)
PROT SERPL-MCNC: 6.9 G/DL (ref 6–8.5)
PROTHROMBIN TIME: 21.1 SECONDS (ref 11.9–14.6)
RBC # BLD AUTO: 3.72 10*6/MM3 (ref 4.14–5.8)
SODIUM BLD-SCNC: 139 MMOL/L (ref 136–145)
WBC NRBC COR # BLD: 15.72 10*3/MM3 (ref 3.4–10.8)

## 2020-05-30 PROCEDURE — 97530 THERAPEUTIC ACTIVITIES: CPT

## 2020-05-30 PROCEDURE — 25010000002 MORPHINE (PF) 10 MG/ML SOLUTION: Performed by: SPECIALIST

## 2020-05-30 PROCEDURE — 85610 PROTHROMBIN TIME: CPT | Performed by: SPECIALIST

## 2020-05-30 PROCEDURE — 85027 COMPLETE CBC AUTOMATED: CPT | Performed by: SPECIALIST

## 2020-05-30 PROCEDURE — 80053 COMPREHEN METABOLIC PANEL: CPT | Performed by: SPECIALIST

## 2020-05-30 PROCEDURE — 25010000002 PIPERACILLIN SOD-TAZOBACTAM PER 1 G: Performed by: SPECIALIST

## 2020-05-30 PROCEDURE — 25010000002 ENOXAPARIN PER 10 MG: Performed by: SPECIALIST

## 2020-05-30 RX ORDER — ONDANSETRON HCL IN 0.9 % NACL 8 MG/50 ML
8 INTRAVENOUS SOLUTION, PIGGYBACK (ML) INTRAVENOUS EVERY 6 HOURS PRN
Status: DISCONTINUED | OUTPATIENT
Start: 2020-05-30 | End: 2020-05-31 | Stop reason: SDUPTHER

## 2020-05-30 RX ORDER — FAMOTIDINE 20 MG/1
20 TABLET, FILM COATED ORAL 2 TIMES DAILY
Status: DISCONTINUED | OUTPATIENT
Start: 2020-05-30 | End: 2020-05-30 | Stop reason: SDUPTHER

## 2020-05-30 RX ORDER — SUCRALFATE ORAL 1 G/10ML
1 SUSPENSION ORAL EVERY 6 HOURS SCHEDULED
Status: DISCONTINUED | OUTPATIENT
Start: 2020-05-30 | End: 2020-05-30 | Stop reason: SDUPTHER

## 2020-05-30 RX ORDER — ONDANSETRON 8 MG/1
8 TABLET, ORALLY DISINTEGRATING ORAL EVERY 6 HOURS PRN
Status: DISCONTINUED | OUTPATIENT
Start: 2020-05-30 | End: 2020-06-01

## 2020-05-30 RX ORDER — DEXTROSE, SODIUM CHLORIDE, AND POTASSIUM CHLORIDE 5; .45; .15 G/100ML; G/100ML; G/100ML
50 INJECTION INTRAVENOUS CONTINUOUS
Status: DISCONTINUED | OUTPATIENT
Start: 2020-05-30 | End: 2020-06-05 | Stop reason: HOSPADM

## 2020-05-30 RX ORDER — SIMETHICONE 80 MG
80 TABLET,CHEWABLE ORAL 4 TIMES DAILY PRN
Status: DISCONTINUED | OUTPATIENT
Start: 2020-05-30 | End: 2020-06-01

## 2020-05-30 RX ORDER — LORAZEPAM 2 MG/ML
1 INJECTION INTRAMUSCULAR EVERY 4 HOURS PRN
Status: DISCONTINUED | OUTPATIENT
Start: 2020-05-30 | End: 2020-06-01

## 2020-05-30 RX ORDER — FAMOTIDINE 20 MG/1
20 TABLET, FILM COATED ORAL EVERY 12 HOURS SCHEDULED
Status: DISCONTINUED | OUTPATIENT
Start: 2020-05-30 | End: 2020-06-05 | Stop reason: HOSPADM

## 2020-05-30 RX ADMIN — SUCRALFATE 1 G: 1 SUSPENSION ORAL at 17:25

## 2020-05-30 RX ADMIN — FAMOTIDINE 20 MG: 20 TABLET, FILM COATED ORAL at 20:34

## 2020-05-30 RX ADMIN — POTASSIUM CHLORIDE, DEXTROSE MONOHYDRATE AND SODIUM CHLORIDE 100 ML/HR: 150; 5; 450 INJECTION, SOLUTION INTRAVENOUS at 08:40

## 2020-05-30 RX ADMIN — FAMOTIDINE 20 MG: 10 INJECTION INTRAVENOUS at 08:04

## 2020-05-30 RX ADMIN — SODIUM CHLORIDE, POTASSIUM CHLORIDE, SODIUM LACTATE AND CALCIUM CHLORIDE 1000 ML: 600; 310; 30; 20 INJECTION, SOLUTION INTRAVENOUS at 08:40

## 2020-05-30 RX ADMIN — ENOXAPARIN SODIUM 40 MG: 40 INJECTION SUBCUTANEOUS at 17:25

## 2020-05-30 RX ADMIN — TAZOBACTAM SODIUM AND PIPERACILLIN SODIUM 3.38 G: 375; 3 INJECTION, SOLUTION INTRAVENOUS at 00:53

## 2020-05-30 RX ADMIN — TAZOBACTAM SODIUM AND PIPERACILLIN SODIUM 3.38 G: 375; 3 INJECTION, SOLUTION INTRAVENOUS at 04:34

## 2020-05-30 RX ADMIN — SUCRALFATE 1 G: 1 SUSPENSION ORAL at 05:32

## 2020-05-30 RX ADMIN — TAZOBACTAM SODIUM AND PIPERACILLIN SODIUM 3.38 G: 375; 3 INJECTION, SOLUTION INTRAVENOUS at 11:44

## 2020-05-30 RX ADMIN — MORPHINE SULFATE: 10 INJECTION, SOLUTION INTRAMUSCULAR; INTRAVENOUS at 07:14

## 2020-05-30 RX ADMIN — SUCRALFATE 1 G: 1 SUSPENSION ORAL at 11:44

## 2020-05-30 RX ADMIN — SUCRALFATE 1 G: 1 SUSPENSION ORAL at 04:34

## 2020-05-30 RX ADMIN — TAZOBACTAM SODIUM AND PIPERACILLIN SODIUM 3.38 G: 375; 3 INJECTION, SOLUTION INTRAVENOUS at 17:25

## 2020-05-31 LAB
ALBUMIN SERPL-MCNC: 2.4 G/DL (ref 3.5–5.2)
ALBUMIN/GLOB SERPL: 0.7 G/DL
ALP SERPL-CCNC: 109 U/L (ref 39–117)
ALT SERPL W P-5'-P-CCNC: 19 U/L (ref 1–41)
ANION GAP SERPL CALCULATED.3IONS-SCNC: 10 MMOL/L (ref 5–15)
AST SERPL-CCNC: 15 U/L (ref 1–40)
BILIRUB SERPL-MCNC: 1 MG/DL (ref 0.2–1.2)
BUN BLD-MCNC: 34 MG/DL (ref 8–23)
BUN/CREAT SERPL: 32.4 (ref 7–25)
CALCIUM SPEC-SCNC: 8.4 MG/DL (ref 8.6–10.5)
CHLORIDE SERPL-SCNC: 100 MMOL/L (ref 98–107)
CO2 SERPL-SCNC: 29 MMOL/L (ref 22–29)
CREAT BLD-MCNC: 1.05 MG/DL (ref 0.76–1.27)
DEPRECATED RDW RBC AUTO: 52.6 FL (ref 37–54)
ERYTHROCYTE [DISTWIDTH] IN BLOOD BY AUTOMATED COUNT: 15.5 % (ref 12.3–15.4)
ERYTHROCYTE [SEDIMENTATION RATE] IN BLOOD: 82 MM/HR (ref 0–15)
GFR SERPL CREATININE-BSD FRML MDRD: 67 ML/MIN/1.73
GLOBULIN UR ELPH-MCNC: 3.6 GM/DL
GLUCOSE BLD-MCNC: 145 MG/DL (ref 65–99)
GLUCOSE BLDC GLUCOMTR-MCNC: 140 MG/DL (ref 70–130)
GLUCOSE BLDC GLUCOMTR-MCNC: 148 MG/DL (ref 70–130)
HCT VFR BLD AUTO: 28.7 % (ref 37.5–51)
HGB BLD-MCNC: 9 G/DL (ref 13–17.7)
INR PPP: 1.87 (ref 0.91–1.09)
MCH RBC QN AUTO: 29.3 PG (ref 26.6–33)
MCHC RBC AUTO-ENTMCNC: 31.4 G/DL (ref 31.5–35.7)
MCV RBC AUTO: 93.5 FL (ref 79–97)
PLATELET # BLD AUTO: 179 10*3/MM3 (ref 140–450)
PMV BLD AUTO: 9.9 FL (ref 6–12)
POTASSIUM BLD-SCNC: 3.8 MMOL/L (ref 3.5–5.2)
PROT SERPL-MCNC: 6 G/DL (ref 6–8.5)
PROTHROMBIN TIME: 21.3 SECONDS (ref 11.9–14.6)
RBC # BLD AUTO: 3.07 10*6/MM3 (ref 4.14–5.8)
SODIUM BLD-SCNC: 139 MMOL/L (ref 136–145)
WBC NRBC COR # BLD: 13.61 10*3/MM3 (ref 3.4–10.8)

## 2020-05-31 PROCEDURE — 25010000002 MORPHINE (PF) 10 MG/ML SOLUTION: Performed by: SPECIALIST

## 2020-05-31 PROCEDURE — 97110 THERAPEUTIC EXERCISES: CPT

## 2020-05-31 PROCEDURE — 25010000002 PIPERACILLIN SOD-TAZOBACTAM PER 1 G: Performed by: SPECIALIST

## 2020-05-31 PROCEDURE — 25010000002 ONDANSETRON PER 1 MG: Performed by: SPECIALIST

## 2020-05-31 PROCEDURE — 80053 COMPREHEN METABOLIC PANEL: CPT | Performed by: SPECIALIST

## 2020-05-31 PROCEDURE — 25010000002 MORPHINE PER 10 MG: Performed by: SPECIALIST

## 2020-05-31 PROCEDURE — 25010000002 ENOXAPARIN PER 10 MG: Performed by: SPECIALIST

## 2020-05-31 PROCEDURE — 85027 COMPLETE CBC AUTOMATED: CPT | Performed by: SPECIALIST

## 2020-05-31 PROCEDURE — 82962 GLUCOSE BLOOD TEST: CPT

## 2020-05-31 PROCEDURE — 85610 PROTHROMBIN TIME: CPT | Performed by: SPECIALIST

## 2020-05-31 PROCEDURE — 85651 RBC SED RATE NONAUTOMATED: CPT | Performed by: SPECIALIST

## 2020-05-31 PROCEDURE — 97530 THERAPEUTIC ACTIVITIES: CPT

## 2020-05-31 RX ORDER — WARFARIN SODIUM 2 MG/1
1 TABLET ORAL
Status: DISCONTINUED | OUTPATIENT
Start: 2020-05-31 | End: 2020-06-04

## 2020-05-31 RX ORDER — OXYCODONE HYDROCHLORIDE AND ACETAMINOPHEN 5; 325 MG/1; MG/1
1 TABLET ORAL EVERY 4 HOURS PRN
Status: DISCONTINUED | OUTPATIENT
Start: 2020-05-31 | End: 2020-06-05 | Stop reason: HOSPADM

## 2020-05-31 RX ORDER — BISOPROLOL FUMARATE 5 MG/1
5 TABLET, FILM COATED ORAL DAILY
Status: DISCONTINUED | OUTPATIENT
Start: 2020-05-31 | End: 2020-06-05 | Stop reason: HOSPADM

## 2020-05-31 RX ORDER — DEXTROSE MONOHYDRATE 25 G/50ML
25 INJECTION, SOLUTION INTRAVENOUS
Status: DISCONTINUED | OUTPATIENT
Start: 2020-05-31 | End: 2020-06-05 | Stop reason: HOSPADM

## 2020-05-31 RX ORDER — WARFARIN SODIUM 5 MG/1
2.5 TABLET ORAL
Status: DISCONTINUED | OUTPATIENT
Start: 2020-06-02 | End: 2020-06-04

## 2020-05-31 RX ORDER — NICOTINE POLACRILEX 4 MG
15 LOZENGE BUCCAL
Status: DISCONTINUED | OUTPATIENT
Start: 2020-05-31 | End: 2020-06-05 | Stop reason: HOSPADM

## 2020-05-31 RX ORDER — ONDANSETRON 4 MG/1
4 TABLET, FILM COATED ORAL EVERY 6 HOURS PRN
Status: DISCONTINUED | OUTPATIENT
Start: 2020-05-31 | End: 2020-06-01

## 2020-05-31 RX ORDER — FUROSEMIDE 20 MG/1
20 TABLET ORAL DAILY
Status: DISCONTINUED | OUTPATIENT
Start: 2020-05-31 | End: 2020-06-05 | Stop reason: HOSPADM

## 2020-05-31 RX ADMIN — POTASSIUM CHLORIDE, DEXTROSE MONOHYDRATE AND SODIUM CHLORIDE 100 ML/HR: 150; 5; 450 INJECTION, SOLUTION INTRAVENOUS at 00:29

## 2020-05-31 RX ADMIN — SODIUM CHLORIDE, POTASSIUM CHLORIDE, SODIUM LACTATE AND CALCIUM CHLORIDE 500 ML: 600; 310; 30; 20 INJECTION, SOLUTION INTRAVENOUS at 17:17

## 2020-05-31 RX ADMIN — SUCRALFATE 1 G: 1 SUSPENSION ORAL at 06:37

## 2020-05-31 RX ADMIN — MORPHINE SULFATE 4 MG: 4 INJECTION, SOLUTION INTRAMUSCULAR; INTRAVENOUS at 00:44

## 2020-05-31 RX ADMIN — MORPHINE SULFATE: 10 INJECTION, SOLUTION INTRAMUSCULAR; INTRAVENOUS at 06:46

## 2020-05-31 RX ADMIN — SUCRALFATE 1 G: 1 SUSPENSION ORAL at 23:46

## 2020-05-31 RX ADMIN — TAZOBACTAM SODIUM AND PIPERACILLIN SODIUM 3.38 G: 375; 3 INJECTION, SOLUTION INTRAVENOUS at 12:19

## 2020-05-31 RX ADMIN — SUCRALFATE 1 G: 1 SUSPENSION ORAL at 17:17

## 2020-05-31 RX ADMIN — ENOXAPARIN SODIUM 40 MG: 40 INJECTION SUBCUTANEOUS at 17:17

## 2020-05-31 RX ADMIN — TAZOBACTAM SODIUM AND PIPERACILLIN SODIUM 3.38 G: 375; 3 INJECTION, SOLUTION INTRAVENOUS at 16:50

## 2020-05-31 RX ADMIN — FUROSEMIDE 20 MG: 20 TABLET ORAL at 12:19

## 2020-05-31 RX ADMIN — FAMOTIDINE 20 MG: 20 TABLET, FILM COATED ORAL at 08:36

## 2020-05-31 RX ADMIN — MORPHINE SULFATE: 10 INJECTION, SOLUTION INTRAMUSCULAR; INTRAVENOUS at 17:19

## 2020-05-31 RX ADMIN — WARFARIN SODIUM 1 MG: 2 TABLET ORAL at 17:17

## 2020-05-31 RX ADMIN — TAZOBACTAM SODIUM AND PIPERACILLIN SODIUM 3.38 G: 375; 3 INJECTION, SOLUTION INTRAVENOUS at 00:29

## 2020-05-31 RX ADMIN — OXYCODONE HYDROCHLORIDE AND ACETAMINOPHEN 1 TABLET: 5; 325 TABLET ORAL at 16:46

## 2020-05-31 RX ADMIN — SUCRALFATE 1 G: 1 SUSPENSION ORAL at 00:29

## 2020-05-31 RX ADMIN — BISOPROLOL FUMARATE 5 MG: 5 TABLET ORAL at 12:19

## 2020-05-31 RX ADMIN — ONDANSETRON HYDROCHLORIDE 4 MG: 2 SOLUTION INTRAMUSCULAR; INTRAVENOUS at 23:46

## 2020-05-31 RX ADMIN — SUCRALFATE 1 G: 1 SUSPENSION ORAL at 12:01

## 2020-05-31 RX ADMIN — FAMOTIDINE 20 MG: 20 TABLET, FILM COATED ORAL at 20:48

## 2020-05-31 RX ADMIN — POTASSIUM CHLORIDE, DEXTROSE MONOHYDRATE AND SODIUM CHLORIDE 100 ML/HR: 150; 5; 450 INJECTION, SOLUTION INTRAVENOUS at 12:00

## 2020-05-31 RX ADMIN — TAZOBACTAM SODIUM AND PIPERACILLIN SODIUM 3.38 G: 375; 3 INJECTION, SOLUTION INTRAVENOUS at 06:27

## 2020-06-01 LAB
ALBUMIN SERPL-MCNC: 2.5 G/DL (ref 3.5–5.2)
ALBUMIN/GLOB SERPL: 0.7 G/DL
ALP SERPL-CCNC: 142 U/L (ref 39–117)
ALT SERPL W P-5'-P-CCNC: 16 U/L (ref 1–41)
ANION GAP SERPL CALCULATED.3IONS-SCNC: 9 MMOL/L (ref 5–15)
AST SERPL-CCNC: 14 U/L (ref 1–40)
BILIRUB SERPL-MCNC: 1.3 MG/DL (ref 0.2–1.2)
BUN BLD-MCNC: 27 MG/DL (ref 8–23)
BUN/CREAT SERPL: 29.7 (ref 7–25)
CALCIUM SPEC-SCNC: 8.3 MG/DL (ref 8.6–10.5)
CHLORIDE SERPL-SCNC: 99 MMOL/L (ref 98–107)
CO2 SERPL-SCNC: 28 MMOL/L (ref 22–29)
CREAT BLD-MCNC: 0.91 MG/DL (ref 0.76–1.27)
DEPRECATED RDW RBC AUTO: 52 FL (ref 37–54)
ERYTHROCYTE [DISTWIDTH] IN BLOOD BY AUTOMATED COUNT: 15.2 % (ref 12.3–15.4)
GFR SERPL CREATININE-BSD FRML MDRD: 79 ML/MIN/1.73
GLOBULIN UR ELPH-MCNC: 3.5 GM/DL
GLUCOSE BLD-MCNC: 148 MG/DL (ref 65–99)
GLUCOSE BLDC GLUCOMTR-MCNC: 122 MG/DL (ref 70–130)
GLUCOSE BLDC GLUCOMTR-MCNC: 132 MG/DL (ref 70–130)
GLUCOSE BLDC GLUCOMTR-MCNC: 165 MG/DL (ref 70–130)
HCT VFR BLD AUTO: 29.1 % (ref 37.5–51)
HGB BLD-MCNC: 9 G/DL (ref 13–17.7)
INR PPP: 2.02 (ref 0.91–1.09)
MCH RBC QN AUTO: 28.8 PG (ref 26.6–33)
MCHC RBC AUTO-ENTMCNC: 30.9 G/DL (ref 31.5–35.7)
MCV RBC AUTO: 93.3 FL (ref 79–97)
PLATELET # BLD AUTO: 196 10*3/MM3 (ref 140–450)
PMV BLD AUTO: 9.6 FL (ref 6–12)
POTASSIUM BLD-SCNC: 4.3 MMOL/L (ref 3.5–5.2)
PROT SERPL-MCNC: 6 G/DL (ref 6–8.5)
PROTHROMBIN TIME: 22.7 SECONDS (ref 11.9–14.6)
RBC # BLD AUTO: 3.12 10*6/MM3 (ref 4.14–5.8)
SODIUM BLD-SCNC: 136 MMOL/L (ref 136–145)
WBC NRBC COR # BLD: 10.32 10*3/MM3 (ref 3.4–10.8)

## 2020-06-01 PROCEDURE — 80053 COMPREHEN METABOLIC PANEL: CPT | Performed by: SPECIALIST

## 2020-06-01 PROCEDURE — 85027 COMPLETE CBC AUTOMATED: CPT | Performed by: SPECIALIST

## 2020-06-01 PROCEDURE — 63710000001 INSULIN LISPRO (HUMAN) PER 5 UNITS: Performed by: SPECIALIST

## 2020-06-01 PROCEDURE — 97530 THERAPEUTIC ACTIVITIES: CPT

## 2020-06-01 PROCEDURE — 97110 THERAPEUTIC EXERCISES: CPT

## 2020-06-01 PROCEDURE — 25010000002 MORPHINE (PF) 10 MG/ML SOLUTION: Performed by: SPECIALIST

## 2020-06-01 PROCEDURE — 25010000002 PIPERACILLIN SOD-TAZOBACTAM PER 1 G: Performed by: SPECIALIST

## 2020-06-01 PROCEDURE — 82962 GLUCOSE BLOOD TEST: CPT

## 2020-06-01 PROCEDURE — 85610 PROTHROMBIN TIME: CPT | Performed by: SPECIALIST

## 2020-06-01 RX ORDER — LOSARTAN POTASSIUM 50 MG/1
50 TABLET ORAL DAILY
Status: DISCONTINUED | OUTPATIENT
Start: 2020-06-01 | End: 2020-06-05 | Stop reason: HOSPADM

## 2020-06-01 RX ADMIN — FUROSEMIDE 20 MG: 20 TABLET ORAL at 08:52

## 2020-06-01 RX ADMIN — TAZOBACTAM SODIUM AND PIPERACILLIN SODIUM 3.38 G: 375; 3 INJECTION, SOLUTION INTRAVENOUS at 13:00

## 2020-06-01 RX ADMIN — MORPHINE SULFATE: 10 INJECTION, SOLUTION INTRAMUSCULAR; INTRAVENOUS at 18:38

## 2020-06-01 RX ADMIN — TAZOBACTAM SODIUM AND PIPERACILLIN SODIUM 3.38 G: 375; 3 INJECTION, SOLUTION INTRAVENOUS at 00:35

## 2020-06-01 RX ADMIN — TAZOBACTAM SODIUM AND PIPERACILLIN SODIUM 3.38 G: 375; 3 INJECTION, SOLUTION INTRAVENOUS at 05:24

## 2020-06-01 RX ADMIN — SUCRALFATE 1 G: 1 SUSPENSION ORAL at 13:00

## 2020-06-01 RX ADMIN — SUCRALFATE 1 G: 1 SUSPENSION ORAL at 23:02

## 2020-06-01 RX ADMIN — TAZOBACTAM SODIUM AND PIPERACILLIN SODIUM 3.38 G: 375; 3 INJECTION, SOLUTION INTRAVENOUS at 23:02

## 2020-06-01 RX ADMIN — SUCRALFATE 1 G: 1 SUSPENSION ORAL at 17:13

## 2020-06-01 RX ADMIN — WARFARIN SODIUM 1 MG: 2 TABLET ORAL at 17:13

## 2020-06-01 RX ADMIN — INSULIN LISPRO 2 UNITS: 100 INJECTION, SOLUTION INTRAVENOUS; SUBCUTANEOUS at 17:14

## 2020-06-01 RX ADMIN — LOSARTAN POTASSIUM 50 MG: 50 TABLET, FILM COATED ORAL at 08:55

## 2020-06-01 RX ADMIN — TAZOBACTAM SODIUM AND PIPERACILLIN SODIUM 3.38 G: 375; 3 INJECTION, SOLUTION INTRAVENOUS at 17:14

## 2020-06-01 RX ADMIN — FAMOTIDINE 20 MG: 20 TABLET, FILM COATED ORAL at 20:45

## 2020-06-01 RX ADMIN — FAMOTIDINE 20 MG: 20 TABLET, FILM COATED ORAL at 08:52

## 2020-06-01 NOTE — PROGRESS NOTES
Continued Stay Note   Ayanna     Patient Name: Jesus Smith  MRN: [id number]  Today's Date: 6/1/2020    Admit Date: 5/26/2020    Discharge Plan     Row Name 06/01/20 1442       Plan    Plan  [facility]    Patient/Family in Agreement with Plan  yes    Plan Comments  Plan is for pt to return to [facility] at d/c. Noted that he may be ready later in the week. Notified Bobbi [phone].        Discharge Codes    No documentation.             MEGAN Knowles

## 2020-06-01 NOTE — PLAN OF CARE
Problem: Patient Care Overview  Goal: Plan of Care Review  Outcome: Ongoing (interventions implemented as appropriate)  Flowsheets (Taken 6/1/2020 0216)  Progress: no change  Plan of Care Reviewed With: patient  Outcome Summary: Pt c/o pain and discomfort this shift. PCA in use for pain control. IVF infusing and IV abx given per orders. Abdominal dsg changed. Rebecca dermatitis care performed. Voided 200 thus far this shift. Urine dark matias. VSS. Safety maintained. Will continue to monitor.

## 2020-06-01 NOTE — PROGRESS NOTES
Agustina Saleem MD FACS  Progress Note     LOS: 3 days   Patient Care Team:  Matheus Olivera PA as PCP - General (Physician Assistant)      Subjective     Interval History:      +flatus  No bm.  Fulls.  Pain controlled     Objective     Vital Signs  Temp:  [97.8 °F (36.6 °C)-98.5 °F (36.9 °C)] 97.9 °F (36.6 °C)  Heart Rate:  [52-72] 52  Resp:  [13-16] 16  BP: (116-132)/(46-56) 120/46    Physical Exam:  General appearance - alert, well appearing, and in no distress  Abdomen - soft, appropriately tender, clean, NAM bilious      Results Review:    Lab Results (last 24 hours)     Procedure Component Value Units Date/Time    POC Glucose Once [[id number]]  (Abnormal) Collected:  06/01/20 0752    Specimen:  Blood Updated:  06/01/20 0803     Glucose 132 mg/dL      Comment: : [id number] [place] JessicaMeter ID: [id number]       Protime-INR [[id number]]  (Abnormal) Collected:  06/01/20 0520    Specimen:  Blood Updated:  06/01/20 0647     Protime 22.7 Seconds      INR 2.02    Comprehensive Metabolic Panel [[id number]]  (Abnormal) Collected:  06/01/20 0520    Specimen:  Blood Updated:  06/01/20 0551     Glucose 148 mg/dL      BUN 27 mg/dL      Creatinine 0.91 mg/dL      Sodium 136 mmol/L      Potassium 4.3 mmol/L      Chloride 99 mmol/L      CO2 28.0 mmol/L      Calcium 8.3 mg/dL      Total Protein 6.0 g/dL      Albumin 2.50 g/dL      ALT (SGPT) 16 U/L      AST (SGOT) 14 U/L      Alkaline Phosphatase 142 U/L      Total Bilirubin 1.3 mg/dL      eGFR Non African Amer 79 mL/min/1.73      Globulin 3.5 gm/dL      A/G Ratio 0.7 g/dL      BUN/Creatinine Ratio 29.7     Anion Gap 9.0 mmol/L     Narrative:       GFR Normal >60  Chronic Kidney Disease <60  Kidney Failure <15      CBC (No Diff) [[id number]]  (Abnormal) Collected:  06/01/20 0520    Specimen:  Blood Updated:  06/01/20 0536     WBC 10.32 10*3/mm3      RBC 3.12 10*6/mm3      Hemoglobin 9.0 g/dL      Hematocrit 29.1 %      MCV 93.3 fL      MCH 28.8 pg      MCHC 30.9 g/dL       RDW 15.2 %      RDW-SD 52.0 fl      MPV 9.6 fL      Platelets 196 10*3/mm3     POC Glucose Once [[id number]]  (Abnormal) Collected:  05/31/20 1644    Specimen:  Blood Updated:  05/31/20 1703     Glucose 140 mg/dL      Comment: : [id number] [place] PamelaMeter ID: [id number]       POC Glucose Once [[id number]]  (Abnormal) Collected:  05/31/20 1223    Specimen:  Blood Updated:  05/31/20 1235     Glucose 148 mg/dL      Comment: : [id number] Michelle PamelaMeter ID: [id number]       Protime-INR [[id number]]  (Abnormal) Collected:  05/31/20 1151    Specimen:  Blood Updated:  05/31/20 1221     Protime 21.3 Seconds      INR 1.87        Imaging Results (Last 24 Hours)     ** No results found for the last 24 hours. **            Assessment/Plan       POD#4 partial diaz  ADAT  Continue iv abx.  Continue drainage.      Agustina Saleem MD  06/01/20  08:18

## 2020-06-01 NOTE — PLAN OF CARE
Problem: Patient Care Overview  Goal: Plan of Care Review  Outcome: Ongoing (interventions implemented as appropriate)  Flowsheets  Taken 6/1/2020 1459 by Jenna Bains, RN  Progress: improving  Outcome Summary: Pt with some c/o pain this shift, PCA in place, drsg changed this AM, NAM with quite a bit of output this morning. Pt up in chair most of shift. Tolerating regular diet. will cont to monitor.  Taken 6/1/2020 0216 by Samy Chambers, RN  Plan of Care Reviewed With: patient

## 2020-06-01 NOTE — PLAN OF CARE
Problem: Patient Care Overview  Goal: Plan of Care Review  Outcome: Ongoing (interventions implemented as appropriate)  Flowsheets (Taken 6/1/2020 5354)  Progress: improving  Plan of Care Reviewed With: patient  Outcome Summary: PT tx completed. Pt sitting in chair. C/O increased difficulty with mobility. Explains LLE weakness that he has had for a long time makiing walking very difficult. Sit<>stand ModA x 2. Stood x 2, unable to stand erect. Barely able to clear feet off floor. Took 2-3 steps forward/backward. Benefit from PT

## 2020-06-01 NOTE — THERAPY TREATMENT NOTE
Acute Care - Physical Therapy Treatment Note  [facility]     Patient Name: Jesus Smith  : 1935  MRN: [id number]  Today's Date: 2020             Admit Date: 2020    Visit Dx:    ICD-10-CM ICD-9-CM   1. Biliary drain displacement, initial encounter T85.520A 996.59   2. Chronic anticoagulation Z79.01 V58.61   3. Transaminitis R74.0 790.4   4. Impaired mobility Z74.09 799.89     Patient Active Problem List   Diagnosis   • Acute UTI (urinary tract infection)   • Bacteremia   • Transaminitis   • Acute cholecystitis   • Benign essential HTN   • CKD (chronic kidney disease) stage 3, GFR 30-59 ml/min (CMS/[facility])   • History of prosthetic aortic valve   • Hematoma of left iliopsoas muscle   • Chronic anticoagulation   • Hyponatremia   • Biliary drain displacement       Therapy Treatment    Rehabilitation Treatment Summary     Row Name 20 1424             Treatment Time/Intention    Discipline  physical therapy assistant  -KJ      Document Type  therapy note (daily note)  -KJ2      Subjective Information  complains of;weakness  -KJ3      Mode of Treatment  physical therapy  -KJ3      Patient Effort  fair  -KJ3      Existing Precautions/Restrictions  fall;oxygen therapy device and L/min  -KJ3      Treatment Considerations/Comments  LLE weak  -KJ3      Recorded by [KJ] Gabbie Harris, [facility] 20 1425  [KJ2] Gabbie Harris, [facility] 20 1437  [KJ3] Gabbie Harris, [facility] 20 1455      Row Name 20 1424             Bed Mobility Assessment/Treatment    Comment (Bed Mobility)  sitting in chair  -KJ      Recorded by [KJ] Gabbie Harris, [facility] 20 1455      Row Name 20 1424             Sit-Stand Transfer    Sit-Stand [place] (Transfers)  verbal cues;moderate assist (50% patient effort);2 person assist  -KJ      Assistive Device (Sit-Stand Transfers)  walker, front-wheeled  -KJ      Recorded by [KJ] Gabbie Harris, [facility] 20 1455      Row Name 20 1424             Stand-Sit  Transfer    Stand-Sit [place] (Transfers)  verbal cues;minimum assist (75% patient effort);2 person assist  -KJ      Assistive Device (Stand-Sit Transfers)  walker, front-wheeled  -KJ      Recorded by [KJ] Gabbie Harris [facility] 06/01/20 1455      Row Name 06/01/20 1424             Gait/Stairs Assessment/Training    [place] Level (Gait)  verbal cues;minimum assist (75% patient effort);moderate assist (50% patient effort);2 person assist  -KJ      Assistive Device (Gait)  walker, front-wheeled  -KJ      Distance in Feet (Gait)  2-3 steps forward/ backward x 2  -KJ      Pattern (Gait)  step-to  -KJ      Deviations/Abnormal Patterns (Gait)  bilateral deviations;stride length decreased;gait speed decreased  -KJ      Bilateral Gait Deviations  forward flexed posture;heel strike decreased  -KJ      Recorded by [KJ] Gabbie Harris PTA 06/01/20 1455      Row Name 06/01/20 1424             Therapeutic Exercise    Exercise Type (Therapeutic Exercise)  AAROM (active assistive range of motion)  -KJ      Position (Therapeutic Exercise)  seated  -KJ      Sets/Reps (Therapeutic Exercise)  10-15  -KJ      Comment (Therapeutic Exercise)  increased assistance LLE  -KJ      Recorded by [KJ] Gabbie Harris PTA 06/01/20 1455      Row Name 06/01/20 1424             Positioning and Restraints    Pre-Treatment Position  sitting in chair/recliner  -KJ      Post Treatment Position  chair  -KJ      In Bed  call light within reach  -KJ      Recorded by [KJ] Gabbie Harris PTA 06/01/20 1455      Row Name 06/01/20 1424             Pain Scale: Numbers Pre/Post-Treatment    Pain Scale: Numbers, Pretreatment  0/10 - no pain  -KJ      Pain Location - Orientation  upper  -KJ      Recorded by [KJ] Gabbie Harris, [facility] 06/01/20 1455      Row Name                Wound 05/26/20 2152 Left anterior;lower leg Other (comment)    Wound - Properties Group Date first assessed: 05/26/20 [KH] Time first assessed: 2152 [KH] Present on Hospital  Admission: Y [KH] Side: Left [KH] Orientation: anterior;lower [KH] Location: leg [KH] Primary Wound Type: Other [KH] Recorded by:  [KH] Stephanie Olmedo RNA 05/26/20 2153    Row Name                Wound 05/26/20 2153 Left anterior foot Other (comment)    Wound - Properties Group Date first assessed: 05/26/20 [KH] Time first assessed: 2153 [KH] Present on Hospital Admission: Y [KH] Side: Left [KH] Orientation: anterior [KH] Location: foot [KH] Primary Wound Type: Other [KH] Recorded by:  [KH] Stephanie Olmedo RNA 05/26/20 2154    Row Name                Wound 05/26/20 1900 perineum MASD (Moisture associated skin damage)    Wound - Properties Group Date first assessed: 05/26/20 [KH] Time first assessed: 1900 [KH] Present on Hospital Admission: Y [KH] Location: perineum [KH] Primary Wound Type: MASD [KH] Recorded by:  [KH] Stephanie Olmedo RNA 05/27/20 0010    Row Name                Wound 04/05/20 0901 Right upper abdomen Incision    Wound - Properties Group Date first assessed: 04/05/20 [JH] Time first assessed: 0901 [JH] Side: Right [JH2] Orientation: upper [JH2] Location: abdomen [JH] Primary Wound Type: Incision [JH] Recorded by:  [JH] Alejandrina Maravilla RN 04/05/20 0901 [JH2] Alejandrina Maravilla RN 04/05/20 0906    Row Name                Wound 05/28/20 1302 abdomen Incision    Wound - Properties Group Date first assessed: 05/28/20 [ZE] Time first assessed: 1302 [ZE] Present on Hospital Admission: N [ZE] Location: abdomen [ZE] Primary Wound Type: Incision [ZE] Recorded by:  [ZE] Aren Da Silva RN 05/28/20 1302      User Key  (r) = Recorded By, (t) = Taken By, (c) = Cosigned By    Initials Name Effective Dates Discipline    Gabbie Garrido, PTA 08/02/16 -  PT    Alejandrina Jj RN 06/07/17 -  Nurse    Aren Tellez RN 08/02/16 -  Nurse    Stephanie Oneil, RNA 06/24/19 -  Nurse          Wound 04/05/20 0901 Right upper abdomen Incision (Active)   Dressing Appearance dry;intact 5/31/2020  8:40 PM   Closure  Staples 5/31/2020  8:40 PM   Base pink 5/31/2020  8:40 PM   Dressing Care, Wound dressing changed;packed with;gauze;abdominal binder utilized 5/31/2020  8:40 PM       Wound 05/26/20 2152 Left anterior;lower leg Other (comment) (Active)   Dressing Appearance open to air;no drainage 6/1/2020  8:52 AM   Closure Open to air 6/1/2020  8:52 AM   Base scab;red;dry 6/1/2020  8:52 AM   Dressing Care, Wound open to air 6/1/2020  8:52 AM       Wound 05/26/20 2153 Left anterior foot Other (comment) (Active)   Dressing Appearance open to air;no drainage 6/1/2020  8:52 AM   Closure Open to air 6/1/2020  8:52 AM   Base dry;scab;red 6/1/2020  8:52 AM   Dressing Care, Wound open to air 6/1/2020  8:52 AM       Wound 05/26/20 1900 perineum MASD (Moisture associated skin damage) (Active)   Dressing Appearance open to air;no drainage 6/1/2020  8:52 AM   Closure Open to air 6/1/2020  8:52 AM   Base pink 5/31/2020  7:52 PM   Care, Wound barrier applied 6/1/2020  8:52 AM       Wound 05/28/20 1302 abdomen Incision (Active)   Dressing Appearance dry;intact;no drainage 6/1/2020  8:52 AM   Closure Staples 6/1/2020  8:52 AM   Base red;pink 6/1/2020  8:52 AM   Periwound intact;dry;pink 6/1/2020  8:52 AM   Periwound Temperature warm 6/1/2020  8:52 AM   Periwound Skin Turgor soft 6/1/2020  8:52 AM   Drainage Amount none 6/1/2020  8:52 AM   Care, Wound cleansed with;sterile normal saline 6/1/2020  8:52 AM   Dressing Care, Wound dressing changed;gauze, wet-to-moist 6/1/2020  8:52 AM           Physical Therapy Education                 Title: PT OT SLP Therapies (Done)     Topic: Physical Therapy (Done)     Point: Mobility training (Done)     Description:   Instruct learner(s) on safety and technique for assisting patient out of bed, chair or wheelchair.  Instruct in the proper use of assistive devices, such as walker, crutches, cane or brace.              Patient Friendly Description:   It's important to get you on your feet again, but we need to do  so in a way that is safe for you. Falling has serious consequences, and your personal safety is the most important thing of all.        When it's time to get out of bed, one of us or a family member will sit next to you on the bed to give you support.     If your doctor or nurse tells you to use a walker, crutches, a cane, or a brace, be sure you use it every time you get out of bed, even if you think you don't need it.    Learning Progress Summary           Patient Acceptance, E, VU,DU,NR by NEO at 5/31/2020 0755    Comment:  Educated pt on gait training and B LE exercises    Acceptance, E, VU,NR by NEO at 5/30/2020 0810    Comment:  Educated pt on t/f, bed mobility,    Acceptance, E, VU,NR by SB at 5/29/2020 1351    Comment:  pt edu on POC, benefits of act, benefits of being OOB, d/c plans                   Point: Body mechanics (Done)     Description:   Instruct learner(s) on proper positioning and spine alignment for patient and/or caregiver during mobility tasks and/or exercises.              Learning Progress Summary           Patient Acceptance, E, VU,NR by SB at 5/29/2020 1351    Comment:  pt edu on POC, benefits of act, benefits of being OOB, d/c plans                   Point: Precautions (Done)     Description:   Instruct learner(s) on prescribed precautions during mobility and gait tasks              Learning Progress Summary           Patient Acceptance, E, VU,NR by SB at 5/29/2020 1351    Comment:  pt edu on POC, benefits of act, benefits of being OOB, d/c plans                               User Key     Initials Effective Dates Name Provider Type Discipline    NEO 08/02/16 -  Prateek Juan, PT DPT Physical Therapist PT    SB 10/31/19 -  Naima Prasad, PT DPT Physical Therapist PT                PT Recommendation and Plan     Plan of Care Reviewed With: patient  Progress: improving  Outcome Summary: PT tx completed. Pt sitting in chair. C/O increased difficulty with mobility. Explains LLE weakness  that he has had for a long time makiing walking very difficult. Sit<>stand ModA x 2. Stood x 2, unable to stand erect. Barely able to clear feet off floor. Took 2-3 steps forward/backward. Benefit from PT     Time Calculation:   PT Charges     Row Name 06/01/20 1455             Time Calculation    Start Time  1424  -KJ      Stop Time  1451  -KJ      Time Calculation (min)  27 min  -KJ      PT Received On  06/01/20  -KJ      PT Goal Re-Cert Due Date  06/08/20  -KJ         Time Calculation- PT    Total Timed Code Minutes- PT  27 minute(s)  -KJ        User Key  (r) = Recorded By, (t) = Taken By, (c) = Cosigned By    Initials Name Provider Type    Gabbie Garrido, SUSAN Physical Therapy Assistant        Therapy Charges for Today     Code Description Service Date Service Provider Modifiers Qty    50599060701 HC PT THER PROC EA 15 MIN 6/1/2020 Gabbie Harris, SUSAN GP 1    18049792567 HC PT THERAPEUTIC ACT EA 15 MIN 6/1/2020 Gabbie Harris, SUSAN GP 1          PT G-Codes  Outcome Measure Options: AM-PAC 6 Clicks Basic Mobility (PT)  AM-PAC 6 Clicks Score (PT): 11    Gabbie Harris PTA  6/1/2020

## 2020-06-02 LAB
DEPRECATED RDW RBC AUTO: 51 FL (ref 37–54)
ERYTHROCYTE [DISTWIDTH] IN BLOOD BY AUTOMATED COUNT: 15.1 % (ref 12.3–15.4)
GLUCOSE BLDC GLUCOMTR-MCNC: 116 MG/DL (ref 70–130)
GLUCOSE BLDC GLUCOMTR-MCNC: 149 MG/DL (ref 70–130)
GLUCOSE BLDC GLUCOMTR-MCNC: 186 MG/DL (ref 70–130)
HCT VFR BLD AUTO: 28.3 % (ref 37.5–51)
HGB BLD-MCNC: 8.9 G/DL (ref 13–17.7)
INR PPP: 2.4 (ref 0.91–1.09)
MCH RBC QN AUTO: 29 PG (ref 26.6–33)
MCHC RBC AUTO-ENTMCNC: 31.4 G/DL (ref 31.5–35.7)
MCV RBC AUTO: 92.2 FL (ref 79–97)
PLATELET # BLD AUTO: 202 10*3/MM3 (ref 140–450)
PMV BLD AUTO: 9.6 FL (ref 6–12)
PROTHROMBIN TIME: 26.1 SECONDS (ref 11.9–14.6)
RBC # BLD AUTO: 3.07 10*6/MM3 (ref 4.14–5.8)
WBC NRBC COR # BLD: 10.6 10*3/MM3 (ref 3.4–10.8)

## 2020-06-02 PROCEDURE — 25010000002 MORPHINE (PF) 10 MG/ML SOLUTION: Performed by: SPECIALIST

## 2020-06-02 PROCEDURE — 85610 PROTHROMBIN TIME: CPT | Performed by: SPECIALIST

## 2020-06-02 PROCEDURE — 82962 GLUCOSE BLOOD TEST: CPT

## 2020-06-02 PROCEDURE — 97110 THERAPEUTIC EXERCISES: CPT

## 2020-06-02 PROCEDURE — 97530 THERAPEUTIC ACTIVITIES: CPT

## 2020-06-02 PROCEDURE — 63710000001 INSULIN LISPRO (HUMAN) PER 5 UNITS: Performed by: SPECIALIST

## 2020-06-02 PROCEDURE — 85027 COMPLETE CBC AUTOMATED: CPT | Performed by: SPECIALIST

## 2020-06-02 PROCEDURE — 25010000002 PIPERACILLIN SOD-TAZOBACTAM PER 1 G: Performed by: SPECIALIST

## 2020-06-02 RX ADMIN — FAMOTIDINE 20 MG: 20 TABLET, FILM COATED ORAL at 20:07

## 2020-06-02 RX ADMIN — INSULIN LISPRO 2 UNITS: 100 INJECTION, SOLUTION INTRAVENOUS; SUBCUTANEOUS at 17:17

## 2020-06-02 RX ADMIN — POTASSIUM CHLORIDE, DEXTROSE MONOHYDRATE AND SODIUM CHLORIDE 50 ML/HR: 150; 5; 450 INJECTION, SOLUTION INTRAVENOUS at 19:03

## 2020-06-02 RX ADMIN — TAZOBACTAM SODIUM AND PIPERACILLIN SODIUM 3.38 G: 375; 3 INJECTION, SOLUTION INTRAVENOUS at 23:16

## 2020-06-02 RX ADMIN — BISOPROLOL FUMARATE 5 MG: 5 TABLET ORAL at 08:37

## 2020-06-02 RX ADMIN — LOSARTAN POTASSIUM 50 MG: 50 TABLET, FILM COATED ORAL at 08:37

## 2020-06-02 RX ADMIN — SUCRALFATE 1 G: 1 SUSPENSION ORAL at 11:59

## 2020-06-02 RX ADMIN — WARFARIN SODIUM 2.5 MG: 5 TABLET ORAL at 17:17

## 2020-06-02 RX ADMIN — TAZOBACTAM SODIUM AND PIPERACILLIN SODIUM 3.38 G: 375; 3 INJECTION, SOLUTION INTRAVENOUS at 16:56

## 2020-06-02 RX ADMIN — TAZOBACTAM SODIUM AND PIPERACILLIN SODIUM 3.38 G: 375; 3 INJECTION, SOLUTION INTRAVENOUS at 11:59

## 2020-06-02 RX ADMIN — FAMOTIDINE 20 MG: 20 TABLET, FILM COATED ORAL at 08:37

## 2020-06-02 RX ADMIN — TAZOBACTAM SODIUM AND PIPERACILLIN SODIUM 3.38 G: 375; 3 INJECTION, SOLUTION INTRAVENOUS at 05:23

## 2020-06-02 RX ADMIN — SUCRALFATE 1 G: 1 SUSPENSION ORAL at 23:16

## 2020-06-02 RX ADMIN — SUCRALFATE 1 G: 1 SUSPENSION ORAL at 05:23

## 2020-06-02 RX ADMIN — POTASSIUM CHLORIDE, DEXTROSE MONOHYDRATE AND SODIUM CHLORIDE 50 ML/HR: 150; 5; 450 INJECTION, SOLUTION INTRAVENOUS at 06:13

## 2020-06-02 RX ADMIN — MORPHINE SULFATE: 10 INJECTION, SOLUTION INTRAMUSCULAR; INTRAVENOUS at 05:48

## 2020-06-02 RX ADMIN — SUCRALFATE 1 G: 1 SUSPENSION ORAL at 17:17

## 2020-06-02 RX ADMIN — FUROSEMIDE 20 MG: 20 TABLET ORAL at 08:37

## 2020-06-02 NOTE — PLAN OF CARE
Problem: Patient Care Overview  Goal: Plan of Care Review  Outcome: Ongoing (interventions implemented as appropriate)  Flowsheets (Taken 6/2/2020 1112)  Progress: improving  Plan of Care Reviewed With: patient  Outcome Summary: PT tx completed. Pt supine in bed. C/O back pn. ModA sup>sit, Min/ModA sit<>stand, steps to chair with r wx Emily 2. Pt reports he did not walk well at home. Dificulty with LLE mobility and strength which is chronic. A/AAROM BLE's. Benefit from SNF for con't PT.

## 2020-06-02 NOTE — THERAPY TREATMENT NOTE
Acute Care - Physical Therapy Treatment Note  [facility]     Patient Name: Jesus Smith  : 1935  MRN: [id number]  Today's Date: 2020             Admit Date: 2020    Visit Dx:    ICD-10-CM ICD-9-CM   1. Biliary drain displacement, initial encounter T85.520A 996.59   2. Chronic anticoagulation Z79.01 V58.61   3. Transaminitis R74.0 790.4   4. Impaired mobility Z74.09 799.89     Patient Active Problem List   Diagnosis   • Acute UTI (urinary tract infection)   • Bacteremia   • Transaminitis   • Acute cholecystitis   • Benign essential HTN   • CKD (chronic kidney disease) stage 3, GFR 30-59 ml/min (CMS/[facility])   • History of prosthetic aortic valve   • Hematoma of left iliopsoas muscle   • Chronic anticoagulation   • Hyponatremia   • Biliary drain displacement       Therapy Treatment    Rehabilitation Treatment Summary     Row Name 20 1022             Treatment Time/Intention    Discipline  physical therapy assistant  -KJ      Document Type  therapy note (daily note)  -KJ2      Subjective Information  complains of;weakness;pain  -KJ2      Mode of Treatment  physical therapy  -KJ2      Patient Effort  adequate  -KJ2      Existing Precautions/Restrictions  fall  -KJ2      Treatment Considerations/Comments  LLE weak; decreased mobility LLE  -KJ2      Recorded by [KJ] Gabbie Harris, PTA 20 1022  [KJ2] Gabbie Harris, [facility] 20 1111      Row Name 20 1022             Bed Mobility Assessment/Treatment    Supine-Sit [place] (Bed Mobility)  verbal cues;moderate assist (50% patient effort)  -KJ      Bed Mobility, Safety Issues  decreased use of arms for pushing/pulling;decreased use of legs for bridging/pushing  -KJ      Recorded by [KJ] Gabbie Harris, [facility] 20 1111      Row Name 20 1022             Sit-Stand Transfer    Sit-Stand [place] (Transfers)  verbal cues;minimum assist (75% patient effort);moderate assist (50% patient effort);2 person assist  -KJ       Assistive Device (Sit-Stand Transfers)  walker, front-wheeled  -KJ      Recorded by [KJ] Gabbie Harris, [facility] 06/02/20 1111      Row Name 06/02/20 1022             Stand-Sit Transfer    Stand-Sit [place] (Transfers)  verbal cues;moderate assist (50% patient effort);2 person assist  -KJ      Assistive Device (Stand-Sit Transfers)  walker, front-wheeled  -KJ      Recorded by [KJ] Gabbie Harris, [facility] 06/02/20 1111      Row Name 06/02/20 1022             Gait/Stairs Assessment/Training    [place] Level (Gait)  verbal cues;minimum assist (75% patient effort);2 person assist  -KJ      Assistive Device (Gait)  walker, front-wheeled  -KJ      Distance in Feet (Gait)  steps to chair  -KJ      Pattern (Gait)  step-to  -KJ      Deviations/Abnormal Patterns (Gait)  stride length decreased;bilateral deviations  -KJ      Bilateral Gait Deviations  forward flexed posture;heel strike decreased  -KJ      Recorded by [KJ] Gabbie Harris, SUSAN 06/02/20 1111      Row Name 06/02/20 1022             Therapeutic Exercise    Exercise Type (Therapeutic Exercise)  AAROM (active assistive range of motion);AROM (active range of motion)  -KJ      Position (Therapeutic Exercise)  seated  -KJ      Sets/Reps (Therapeutic Exercise)  10-15  -KJ      Recorded by [KJ] Gabbie Harris, [facility] 06/02/20 1111      Row Name 06/02/20 1022             Positioning and Restraints    Pre-Treatment Position  in bed  -KJ      Post Treatment Position  chair  -KJ      Recorded by [KJ] Gabbie Harris [facility] 06/02/20 1111      Row Name 06/02/20 1022             Pain Scale: Numbers Pre/Post-Treatment    Pain Scale: Numbers, Pretreatment  3/10  -KJ      Pain Location - Orientation  upper  -KJ      Pain Location  abdomen  -KJ      Recorded by [KJ] Gabbie Harris PTA 06/02/20 1111      Row Name                Wound 05/26/20 2152 Left anterior;lower leg Other (comment)    Wound - Properties Group Date first assessed: 05/26/20 [KH] Time first assessed: 2152 [KH]  Present on Hospital Admission: Y [KH] Side: Left [KH] Orientation: anterior;lower [KH] Location: leg [KH] Primary Wound Type: Other [KH] Recorded by:  [KH] Stephanie Olmedo RNA 05/26/20 2153    Row Name                Wound 05/26/20 2153 Left anterior foot Other (comment)    Wound - Properties Group Date first assessed: 05/26/20 [KH] Time first assessed: 2153 [KH] Present on Hospital Admission: Y [KH] Side: Left [KH] Orientation: anterior [KH] Location: foot [KH] Primary Wound Type: Other [KH] Recorded by:  [KH] Stephanie Olmedo RNA 05/26/20 2154    Row Name                Wound 05/26/20 1900 perineum MASD (Moisture associated skin damage)    Wound - Properties Group Date first assessed: 05/26/20 [KH] Time first assessed: 1900 [KH] Present on Hospital Admission: Y [KH] Location: perineum [KH] Primary Wound Type: MASD [KH] Recorded by:  [KH] Stephanie Olmedo RNA 05/27/20 0010    Row Name                Wound 04/05/20 0901 Right upper abdomen Incision    Wound - Properties Group Date first assessed: 04/05/20 [JH] Time first assessed: 0901 [JH] Side: Right [JH2] Orientation: upper [JH2] Location: abdomen [JH] Primary Wound Type: Incision [JH] Recorded by:  [JH] Alejandrina Maravilla RN 04/05/20 0901 [JH2] Alejandrina Maravilla RN 04/05/20 0906    Row Name                Wound 05/28/20 1302 abdomen Incision    Wound - Properties Group Date first assessed: 05/28/20 [ZE] Time first assessed: 1302 [ZE] Present on Hospital Admission: N [ZE] Location: abdomen [ZE] Primary Wound Type: Incision [ZE] Recorded by:  [ZE] Aren Da Silva RN 05/28/20 1302      User Key  (r) = Recorded By, (t) = Taken By, (c) = Cosigned By    Initials Name Effective Dates Discipline    Gabbie Garrido, PTA 08/02/16 -  PT    Alejandrina Jj RN 06/07/17 -  Nurse    Aren Tellez RN 08/02/16 -  Nurse    Stephanie Oneil RNA 06/24/19 -  Nurse          Wound 05/26/20 9747 Left anterior;lower leg Other (comment) (Active)   Dressing Appearance  dry;intact 6/2/2020  8:40 AM   Drainage Amount none 6/2/2020  8:40 AM   Dressing Care, Wound non-adherent 6/2/2020  8:40 AM       Wound 05/26/20 2153 Left anterior foot Other (comment) (Active)   Dressing Appearance open to air;no drainage 6/2/2020  8:40 AM       Wound 05/26/20 1900 perineum MASD (Moisture associated skin damage) (Active)   Dressing Appearance open to air;no drainage 6/2/2020  8:40 AM   Care, Wound barrier applied 6/1/2020  9:00 PM       Wound 05/28/20 1302 abdomen Incision (Active)   Dressing Appearance dry;intact;no drainage 6/2/2020  8:40 AM   Closure Staples 6/2/2020  8:40 AM   Base red;pink 6/2/2020  8:40 AM   Periwound intact;dry;pink 6/2/2020  8:40 AM   Periwound Temperature warm 6/2/2020  8:40 AM   Periwound Skin Turgor soft 6/2/2020  8:40 AM   Drainage Characteristics/Odor serosanguineous 6/2/2020  8:40 AM   Drainage Amount scant 6/2/2020  8:40 AM   Care, Wound cleansed with;sterile normal saline 6/1/2020  9:00 PM   Dressing Care, Wound dressing changed;gauze, wet-to-moist 6/2/2020  8:40 AM           Physical Therapy Education                 Title: PT OT SLP Therapies (Done)     Topic: Physical Therapy (Done)     Point: Mobility training (Done)     Description:   Instruct learner(s) on safety and technique for assisting patient out of bed, chair or wheelchair.  Instruct in the proper use of assistive devices, such as walker, crutches, cane or brace.              Patient Friendly Description:   It's important to get you on your feet again, but we need to do so in a way that is safe for you. Falling has serious consequences, and your personal safety is the most important thing of all.        When it's time to get out of bed, one of us or a family member will sit next to you on the bed to give you support.     If your doctor or nurse tells you to use a walker, crutches, a cane, or a brace, be sure you use it every time you get out of bed, even if you think you don't need it.    Learning  Progress Summary           Patient Acceptance, E, VU,DU,NR by NEO at 5/31/2020 0755    Comment:  Educated pt on gait training and B LE exercises    Acceptance, E, VU,NR by NEO at 5/30/2020 0810    Comment:  Educated pt on t/f, bed mobility,    Acceptance, E, VU,NR by SB at 5/29/2020 1351    Comment:  pt edu on POC, benefits of act, benefits of being OOB, d/c plans                   Point: Body mechanics (Done)     Description:   Instruct learner(s) on proper positioning and spine alignment for patient and/or caregiver during mobility tasks and/or exercises.              Learning Progress Summary           Patient Acceptance, E, VU,NR by SB at 5/29/2020 1351    Comment:  pt edu on POC, benefits of act, benefits of being OOB, d/c plans                   Point: Precautions (Done)     Description:   Instruct learner(s) on prescribed precautions during mobility and gait tasks              Learning Progress Summary           Patient Acceptance, E, VU,NR by SB at 5/29/2020 1351    Comment:  pt edu on POC, benefits of act, benefits of being OOB, d/c plans                               User Key     Initials Effective Dates Name Provider Type Discipline    NEO 08/02/16 -  Prateek Juan, PT DPT Physical Therapist PT    SB 10/31/19 -  Naima Prasad, PT DPT Physical Therapist PT                PT Recommendation and Plan     Plan of Care Reviewed With: patient  Progress: improving  Outcome Summary: PT tx completed. Pt supine in bed. C/O back pn. ModA sup>sit, Min/ModA sit<>stand, steps to chair with r wx Emily 2. Pt reports he did not walk well at home. Dificulty with LLE mobility and strength which is chronic. A/AAROM BLE's. Benefit from SNF for con't PT.     Time Calculation:   PT Charges     Row Name 06/02/20 1111             Time Calculation    Start Time  1022  -KJ      Stop Time  1045  -KJ      Time Calculation (min)  23 min  -KJ      PT Received On  06/02/20  -KJ      PT Goal Re-Cert Due Date  06/08/20  -KJ         Time  Calculation- PT    Total Timed Code Minutes- PT  23 minute(s)  -DARLENE        User Key  (r) = Recorded By, (t) = Taken By, (c) = Cosigned By    Initials Name Provider Type    Gabbie Garrido, SUSAN Physical Therapy Assistant        Therapy Charges for Today     Code Description Service Date Service Provider Modifiers Qty    54533931983 HC PT THER PROC EA 15 MIN 6/1/2020 Gabbie Harris, PTA GP 1    35909200635 HC PT THERAPEUTIC ACT EA 15 MIN 6/1/2020 Gabbie Harris, PTA GP 1    12251853020 HC PT THER PROC EA 15 MIN 6/2/2020 Gabbie Harris, PTA GP 1    28754164587 HC PT THERAPEUTIC ACT EA 15 MIN 6/2/2020 Gabbie Harris, PTA GP 1          PT G-Codes  Outcome Measure Options: AM-PAC 6 Clicks Basic Mobility (PT)  AM-PAC 6 Clicks Score (PT): 11    Gabbie Harris PTA  6/2/2020

## 2020-06-02 NOTE — PLAN OF CARE
Problem: Patient Care Overview  Goal: Plan of Care Review  Outcome: Ongoing (interventions implemented as appropriate)  Flowsheets (Taken 6/2/2020 5752)  Progress: no change  Plan of Care Reviewed With: patient  Outcome Summary: ML d/i with staples. Dressing change done wet to dry BID. Excoriation noted to perineum and coccyx; areas washed with foam wash and zguard applied. Turn [id number]. Pt educated on the need to reposition frequently. Worked with PTx2. Up to chair today. IV PCA for pain control. Tolerating regular diet; no SS insulin needed. IV abx. IVF. VSS. Cont to monitor.

## 2020-06-02 NOTE — PROGRESS NOTES
Agustina Saleem MD FACS  Progress Note     LOS: 4 days   Patient Care Team:  Matheus Olivera PA as PCP - General (Physician Assistant)      Subjective     Interval History:      no events.  No complaint.  No appetite.  No bm  Pain controlled     Objective     Vital Signs  Temp:  [97.8 °F (36.6 °C)-98.8 °F (37.1 °C)] 97.9 °F (36.6 °C)  Heart Rate:  [50-58] 56  Resp:  [16] 16  BP: (117-132)/(46-64) 131/59    Physical Exam:  General appearance - alert, well appearing, and in no distress  Abdomen - softer, appropriately tender,clean      Results Review:    Lab Results (last 24 hours)     Procedure Component Value Units Date/Time    Protime-INR [[id number]]  (Abnormal) Collected:  06/02/20 0629    Specimen:  Blood Updated:  06/02/20 0644     Protime 26.1 Seconds      INR 2.40    CBC (No Diff) [[id number]]  (Abnormal) Collected:  06/02/20 0629    Specimen:  Blood Updated:  06/02/20 0640     WBC 10.60 10*3/mm3      RBC 3.07 10*6/mm3      Hemoglobin 8.9 g/dL      Hematocrit 28.3 %      MCV 92.2 fL      MCH 29.0 pg      MCHC 31.4 g/dL      RDW 15.1 %      RDW-SD 51.0 fl      MPV 9.6 fL      Platelets 202 10*3/mm3     POC Glucose Once [[id number]]  (Abnormal) Collected:  06/01/20 1654    Specimen:  Blood Updated:  06/01/20 1705     Glucose 165 mg/dL      Comment: : [id number] ComVibessicaMeter ID: [id number]       POC Glucose Once [[id number]]  (Normal) Collected:  06/01/20 1104    Specimen:  Blood Updated:  06/01/20 1115     Glucose 122 mg/dL      Comment: : [id number] [place] OmnituressicaMeter ID: [id number]           Imaging Results (Last 24 Hours)     ** No results found for the last 24 hours. **            Assessment/Plan       POD#5 open partial diaz    Diet as tolerated.  Continue iv abx.      Agustina Saleem MD  06/02/20  08:36

## 2020-06-02 NOTE — PLAN OF CARE
Problem: Patient Care Overview  Goal: Plan of Care Review  Outcome: Ongoing (interventions implemented as appropriate)  Flowsheets (Taken 6/2/2020 7012)  Progress: improving  Plan of Care Reviewed With: patient  Outcome Summary: Pt complains of pain, PCA in place. On cont pulse ox. IVF and IV abx. Drsg changed @2100. NAM x1. No complaints of nausea. Pt was able to rest most of the night. VSS. Will cont to monitor.

## 2020-06-02 NOTE — THERAPY TREATMENT NOTE
Acute Care - Physical Therapy Treatment Note  [facility]     Patient Name: Jesus Smith  : 1935  MRN: [id number]  Today's Date: 2020             Admit Date: 2020    Visit Dx:    ICD-10-CM ICD-9-CM   1. Biliary drain displacement, initial encounter T85.520A 996.59   2. Chronic anticoagulation Z79.01 V58.61   3. Transaminitis R74.0 790.4   4. Impaired mobility Z74.09 799.89     Patient Active Problem List   Diagnosis   • Acute UTI (urinary tract infection)   • Bacteremia   • Transaminitis   • Acute cholecystitis   • Benign essential HTN   • CKD (chronic kidney disease) stage 3, GFR 30-59 ml/min (CMS/[facility])   • History of prosthetic aortic valve   • Hematoma of left iliopsoas muscle   • Chronic anticoagulation   • Hyponatremia   • Biliary drain displacement       Therapy Treatment    Rehabilitation Treatment Summary     Row Name 20 1459 20 1022          Treatment Time/Intention    Discipline  physical therapy assistant  -KJ  physical therapy assistant  -KJ     Document Type  therapy note (daily note)  -KJ  therapy note (daily note)  -KJ2     Subjective Information  complains of;pain  -KJ  complains of;weakness;pain  -KJ2     Mode of Treatment  physical therapy  -KJ  physical therapy  -KJ2     Patient Effort  adequate  -KJ  adequate  -KJ2     Existing Precautions/Restrictions  fall  -KJ  fall  -KJ2     Treatment Considerations/Comments  --  LLE weak; decreased mobility LLE  -KJ2     Recorded by [KJ] Gabbie Harris, [facility] 20 1515 [KJ] Gabbie Harris, [facility] 20 1022  [KJ2] Gabbie Harris, [facility] 20 1111     Row Name 20 1459 20 1022          Bed Mobility Assessment/Treatment    Supine-Sit [place] (Bed Mobility)  --  verbal cues;moderate assist (50% patient effort)  -KJ     Sit-Supine [place] (Bed Mobility)  verbal cues;moderate assist (50% patient effort);maximum assist (25% patient effort)  -KJ  --     Bed Mobility, Safety Issues  decreased use of arms for  pushing/pulling;decreased use of legs for bridging/pushing  -KJ  decreased use of arms for pushing/pulling;decreased use of legs for bridging/pushing  -KJ     Assistive Device (Bed Mobility)  bed rails  -KJ  --     Recorded by [KJ] Gabbie Harris, PTA 06/02/20 1515 [KJ] Gabbie Harris, PTA 06/02/20 1111     Row Name 06/02/20 1459 06/02/20 1022          Sit-Stand Transfer    Sit-Stand [place] (Transfers)  verbal cues;minimum assist (75% patient effort);moderate assist (50% patient effort);2 person assist  -KJ  verbal cues;minimum assist (75% patient effort);moderate assist (50% patient effort);2 person assist  -KJ     Assistive Device (Sit-Stand Transfers)  walker, front-wheeled  -KJ  walker, front-wheeled  -KJ     Recorded by [KJ] Gabbie Harris, PTA 06/02/20 1515 [KJ] Gabbie Harris, PTA 06/02/20 1111     Row Name 06/02/20 1459 06/02/20 1022          Stand-Sit Transfer    Stand-Sit [place] (Transfers)  verbal cues;moderate assist (50% patient effort);2 person assist  -KJ  verbal cues;moderate assist (50% patient effort);2 person assist  -KJ     Assistive Device (Stand-Sit Transfers)  walker, front-wheeled  -KJ  walker, front-wheeled  -KJ     Recorded by [KJ] Gabbie Harris, PTA 06/02/20 1515 [KJ] Gabbie Harris, [facility] 06/02/20 1111     Row Name 06/02/20 1459 06/02/20 1022          Gait/Stairs Assessment/Training    [place] Level (Gait)  verbal cues;minimum assist (75% patient effort);2 person assist  -KJ  verbal cues;minimum assist (75% patient effort);2 person assist  -KJ     Assistive Device (Gait)  walker, front-wheeled  -KJ  walker, front-wheeled  -KJ     Distance in Feet (Gait)  pivot  back to bed  -KJ  steps to chair  -KJ     Pattern (Gait)  step-to  -KJ  step-to  -KJ     Deviations/Abnormal Patterns (Gait)  stride length decreased;bilateral deviations  -KJ  stride length decreased;bilateral deviations  -KJ     Bilateral Gait Deviations  forward flexed posture;heel strike decreased  -KJ   forward flexed posture;heel strike decreased  -KJ     Recorded by [KJ] Gabbie Harris, [facility] 06/02/20 1515 [KJ] Gabbie Harris, [facility] 06/02/20 1111     Row Name 06/02/20 1459 06/02/20 1022          Therapeutic Exercise    Exercise Type (Therapeutic Exercise)  AAROM (active assistive range of motion)  -KJ  AAROM (active assistive range of motion);AROM (active range of motion)  -KJ     Position (Therapeutic Exercise)  supine  -KJ  seated  -KJ     Sets/Reps (Therapeutic Exercise)  10-15  -KJ  10-15  -KJ     Recorded by [KJ] Gabbie Harris, PTA 06/02/20 1515 [KJ] Gabbie Harris, [facility] 06/02/20 1111     Row Name 06/02/20 1459 06/02/20 1022          Positioning and Restraints    Pre-Treatment Position  sitting in chair/recliner  -KJ  in bed  -KJ     Post Treatment Position  bed  -KJ  chair  -KJ     In Bed  call light within reach  -KJ  --     Recorded by [KJ] Gabibe Harris, [facility] 06/02/20 1515 [KJ] Gabbie Harris, [facility] 06/02/20 1111     Row Name 06/02/20 1459 06/02/20 1022          Pain Scale: Numbers Pre/Post-Treatment    Pain Scale: Numbers, Pretreatment  3/10  -KJ  3/10  -KJ     Pain Location - Orientation  upper  -KJ  upper  -KJ     Pain Location  abdomen  -KJ  abdomen  -KJ     Recorded by [KJ] Gabbie Harris, [facility] 06/02/20 1515 [KJ] Gabbie Harris, [facility] 06/02/20 1111     Row Name                Wound 05/26/20 2152 Left anterior;lower leg Other (comment)    Wound - Properties Group Date first assessed: 05/26/20 [KH] Time first assessed: 2152 [KH] Present on Hospital Admission: Y [KH] Side: Left [KH] Orientation: anterior;lower [KH] Location: leg [KH] Primary Wound Type: Other [KH] Recorded by:  [KH] Stephanie Olmedo RNA 05/26/20 2153    Row Name                Wound 05/26/20 2153 Left anterior foot Other (comment)    Wound - Properties Group Date first assessed: 05/26/20 [KH] Time first assessed: 2153 [KH] Present on Hospital Admission: Y [KH] Side: Left [KH] Orientation: anterior [KH] Location: foot [KH] Primary Wound  Type: Other [KH] Recorded by:  [KH] Stephanie Olmedo, RNA 05/26/20 2154    Row Name                Wound 05/26/20 1900 perineum MASD (Moisture associated skin damage)    Wound - Properties Group Date first assessed: 05/26/20 [KH] Time first assessed: 1900 [KH] Present on Hospital Admission: Y [KH] Location: perineum [KH] Primary Wound Type: MASD [KH] Recorded by:  [KH] Stephanie Olmedo, RNA 05/27/20 0010    Row Name                Wound 04/05/20 0901 Right upper abdomen Incision    Wound - Properties Group Date first assessed: 04/05/20 [JH] Time first assessed: 0901 [JH] Side: Right [JH2] Orientation: upper [JH2] Location: abdomen [JH] Primary Wound Type: Incision [JH] Recorded by:  [JH] Alejandrina Maravilla RN 04/05/20 0901 [JH2] Alejandrina Maravilla RN 04/05/20 0906    Row Name                Wound 05/28/20 1302 abdomen Incision    Wound - Properties Group Date first assessed: 05/28/20 [ZE] Time first assessed: 1302 [ZE] Present on Hospital Admission: N [ZE] Location: abdomen [ZE] Primary Wound Type: Incision [ZE] Recorded by:  [ZE] Aren Da Silva RN 05/28/20 1302      User Key  (r) = Recorded By, (t) = Taken By, (c) = Cosigned By    Initials Name Effective Dates Discipline    Gabbie Garrido, PTA 08/02/16 -  PT    Alejandrina Jj RN 06/07/17 -  Nurse    Aren Tellez RN 08/02/16 -  Nurse    Stephanie Oneil, RNA 06/24/19 -  Nurse          Wound 05/26/20 2152 Left anterior;lower leg Other (comment) (Active)   Dressing Appearance dry;intact 6/2/2020  8:40 AM   Drainage Amount none 6/2/2020  8:40 AM   Dressing Care, Wound non-adherent 6/2/2020  8:40 AM       Wound 05/26/20 2153 Left anterior foot Other (comment) (Active)   Dressing Appearance open to air;no drainage 6/2/2020  8:40 AM   Base dry;scab;red 6/2/2020  8:40 AM   Dressing Care, Wound open to air 6/2/2020  8:40 AM       Wound 05/26/20 1900 perineum MASD (Moisture associated skin damage) (Active)   Dressing Appearance open to air;no drainage 6/2/2020   8:40 AM   Care, Wound barrier applied 6/1/2020  9:00 PM       Wound 05/28/20 1302 abdomen Incision (Active)   Dressing Appearance dry;intact;no drainage 6/2/2020  8:40 AM   Closure [place] 6/2/2020  8:40 AM   Base red;pink 6/2/2020  8:40 AM   Periwound intact;dry;pink 6/2/2020  8:40 AM   Periwound Temperature warm 6/2/2020  8:40 AM   Periwound Skin Turgor soft 6/2/2020  8:40 AM   Drainage Characteristics/Odor serosanguineous 6/2/2020  8:40 AM   Drainage Amount scant 6/2/2020  8:40 AM   Care, Wound cleansed with;sterile normal saline 6/1/2020  9:00 PM   Dressing Care, Wound dressing changed;gauze, wet-to-moist 6/2/2020  8:40 AM           Physical Therapy Education                 Title: PT OT SLP Therapies (Done)     Topic: Physical Therapy (Done)     Point: Mobility training (Done)     Description:   Instruct learner(s) on safety and technique for assisting patient out of bed, chair or wheelchair.  Instruct in the proper use of assistive devices, such as walker, crutches, cane or brace.              Patient Friendly Description:   It's important to get you on your feet again, but we need to do so in a way that is safe for you. Falling has serious consequences, and your personal safety is the most important thing of all.        When it's time to get out of bed, one of us or a family member will sit next to you on the bed to give you support.     If your doctor or nurse tells you to use a walker, crutches, a cane, or a brace, be sure you use it every time you get out of bed, even if you think you don't need it.    Learning Progress Summary           Patient Acceptance, E, VU,DU,NR by NEO at 5/31/2020 0755    Comment:  Educated pt on gait training and B LE exercises    Acceptance, E, VU,NR by NEO at 5/30/2020 0810    Comment:  Educated pt on t/f, bed mobility,    Acceptance, E, VU,NR by MARCELA at 5/29/2020 1351    Comment:  pt edu on POC, benefits of act, benefits of being OOB, d/c plans                   Point: Body  mechanics (Done)     Description:   Instruct learner(s) on proper positioning and spine alignment for patient and/or caregiver during mobility tasks and/or exercises.              Learning Progress Summary           Patient Acceptance, E, VU,NR by SB at 5/29/2020 1351    Comment:  pt edu on POC, benefits of act, benefits of being OOB, d/c plans                   Point: Precautions (Done)     Description:   Instruct learner(s) on prescribed precautions during mobility and gait tasks              Learning Progress Summary           Patient Acceptance, E, VU,NR by SB at 5/29/2020 1351    Comment:  pt edu on POC, benefits of act, benefits of being OOB, d/c plans                               User Key     Initials Effective Dates Name Provider Type Discipline    NEO 08/02/16 -  Prateek Juan, PT DPT Physical Therapist PT    SB 10/31/19 -  Naima Prasad, PT DPT Physical Therapist PT                PT Recommendation and Plan     Plan of Care Reviewed With: patient  Progress: improving  Outcome Summary: PT tx completed. Pt supine in bed. C/O back pn. ModA sup>sit, Min/ModA sit<>stand, steps to chair with r wx Emily 2. Pt reports he did not walk well at home. Dificulty with LLE mobility and strength which is chronic. A/AAROM BLE's. Benefit from SNF for con't PT.     Time Calculation:   PT Charges     Row Name 06/02/20 1515 06/02/20 1111          Time Calculation    Start Time  1459  -KJ  1022  -KJ     Stop Time  1525  -KJ  1045  -KJ     Time Calculation (min)  26 min  -KJ  23 min  -KJ     PT Received On  --  06/02/20  -KJ     PT Goal Re-Cert Due Date  --  06/08/20  -KJ        Time Calculation- PT    Total Timed Code Minutes- PT  26 minute(s)  -KJ  23 minute(s)  -KJ       User Key  (r) = Recorded By, (t) = Taken By, (c) = Cosigned By    Initials Name Provider Type    Gabbie Garrido, PTA Physical Therapy Assistant        Therapy Charges for Today     Code Description Service Date Service Provider Modifiers Qty     67854159105 HC PT THER PROC EA 15 MIN 6/1/2020 Gabbie Harris, PTA GP 1    69925982272 HC PT THERAPEUTIC ACT EA 15 MIN 6/1/2020 Gabbie Harris, PTA GP 1    39479705166 HC PT THER PROC EA 15 MIN 6/2/2020 Gabbie Harris, PTA GP 1    25364645188 HC PT THERAPEUTIC ACT EA 15 MIN 6/2/2020 Gabbie Harris, PTA GP 1    29140425847 HC PT THER PROC EA 15 MIN 6/2/2020 Gabbie Harris, PTA GP 1    42944690701 HC PT THERAPEUTIC ACT EA 15 MIN 6/2/2020 Gabbie Harris, PTA GP 1          PT G-Codes  Outcome Measure Options: AM-PAC 6 Clicks Basic Mobility (PT)  AM-PAC 6 Clicks Score (PT): 11    Gabbie Harris, SUSAN  6/2/2020

## 2020-06-03 LAB
CYTO UR: NORMAL
DEPRECATED RDW RBC AUTO: 49.2 FL (ref 37–54)
ERYTHROCYTE [DISTWIDTH] IN BLOOD BY AUTOMATED COUNT: 15.1 % (ref 12.3–15.4)
GLUCOSE BLDC GLUCOMTR-MCNC: 119 MG/DL (ref 70–130)
GLUCOSE BLDC GLUCOMTR-MCNC: 154 MG/DL (ref 70–130)
GLUCOSE BLDC GLUCOMTR-MCNC: 177 MG/DL (ref 70–130)
HCT VFR BLD AUTO: 27.1 % (ref 37.5–51)
HGB BLD-MCNC: 8.7 G/DL (ref 13–17.7)
HOLD SPECIMEN: NORMAL
INR PPP: 2.62 (ref 0.91–1.09)
LAB AP CASE REPORT: NORMAL
MCH RBC QN AUTO: 28.8 PG (ref 26.6–33)
MCHC RBC AUTO-ENTMCNC: 32.1 G/DL (ref 31.5–35.7)
MCV RBC AUTO: 89.7 FL (ref 79–97)
PATH REPORT.FINAL DX SPEC: NORMAL
PATH REPORT.GROSS SPEC: NORMAL
PLATELET # BLD AUTO: 231 10*3/MM3 (ref 140–450)
PMV BLD AUTO: 9.8 FL (ref 6–12)
PROTHROMBIN TIME: 28 SECONDS (ref 11.9–14.6)
RBC # BLD AUTO: 3.02 10*6/MM3 (ref 4.14–5.8)
WBC NRBC COR # BLD: 11.32 10*3/MM3 (ref 3.4–10.8)

## 2020-06-03 PROCEDURE — 63710000001 INSULIN LISPRO (HUMAN) PER 5 UNITS: Performed by: SPECIALIST

## 2020-06-03 PROCEDURE — 97530 THERAPEUTIC ACTIVITIES: CPT

## 2020-06-03 PROCEDURE — 82962 GLUCOSE BLOOD TEST: CPT

## 2020-06-03 PROCEDURE — 85027 COMPLETE CBC AUTOMATED: CPT | Performed by: SPECIALIST

## 2020-06-03 PROCEDURE — 85610 PROTHROMBIN TIME: CPT | Performed by: SPECIALIST

## 2020-06-03 PROCEDURE — 25010000002 PIPERACILLIN SOD-TAZOBACTAM PER 1 G: Performed by: SPECIALIST

## 2020-06-03 PROCEDURE — 97110 THERAPEUTIC EXERCISES: CPT

## 2020-06-03 RX ORDER — DOCUSATE SODIUM 100 MG/1
100 CAPSULE, LIQUID FILLED ORAL 2 TIMES DAILY
Status: DISCONTINUED | OUTPATIENT
Start: 2020-06-03 | End: 2020-06-05 | Stop reason: HOSPADM

## 2020-06-03 RX ORDER — ASPIRIN 81 MG/1
81 TABLET ORAL DAILY
Status: DISCONTINUED | OUTPATIENT
Start: 2020-06-03 | End: 2020-06-05 | Stop reason: HOSPADM

## 2020-06-03 RX ORDER — MULTIVIT,CALC,MINS/IRON/FOLIC 9MG-400MCG
1 TABLET ORAL DAILY
Status: DISCONTINUED | OUTPATIENT
Start: 2020-06-03 | End: 2020-06-05 | Stop reason: HOSPADM

## 2020-06-03 RX ADMIN — Medication 1 TABLET: at 11:11

## 2020-06-03 RX ADMIN — INSULIN LISPRO 2 UNITS: 100 INJECTION, SOLUTION INTRAVENOUS; SUBCUTANEOUS at 11:54

## 2020-06-03 RX ADMIN — WARFARIN SODIUM 1 MG: 2 TABLET ORAL at 17:00

## 2020-06-03 RX ADMIN — TAZOBACTAM SODIUM AND PIPERACILLIN SODIUM 3.38 G: 375; 3 INJECTION, SOLUTION INTRAVENOUS at 11:11

## 2020-06-03 RX ADMIN — DOCUSATE SODIUM 100 MG: 100 CAPSULE ORAL at 20:20

## 2020-06-03 RX ADMIN — FUROSEMIDE 20 MG: 20 TABLET ORAL at 08:16

## 2020-06-03 RX ADMIN — LOSARTAN POTASSIUM 50 MG: 50 TABLET, FILM COATED ORAL at 08:16

## 2020-06-03 RX ADMIN — SUCRALFATE 1 G: 1 SUSPENSION ORAL at 11:11

## 2020-06-03 RX ADMIN — BISOPROLOL FUMARATE 5 MG: 5 TABLET ORAL at 08:16

## 2020-06-03 RX ADMIN — ASPIRIN 81 MG: 81 TABLET ORAL at 11:11

## 2020-06-03 RX ADMIN — TAZOBACTAM SODIUM AND PIPERACILLIN SODIUM 3.38 G: 375; 3 INJECTION, SOLUTION INTRAVENOUS at 23:41

## 2020-06-03 RX ADMIN — INSULIN LISPRO 2 UNITS: 100 INJECTION, SOLUTION INTRAVENOUS; SUBCUTANEOUS at 08:16

## 2020-06-03 RX ADMIN — TAZOBACTAM SODIUM AND PIPERACILLIN SODIUM 3.38 G: 375; 3 INJECTION, SOLUTION INTRAVENOUS at 04:39

## 2020-06-03 RX ADMIN — SUCRALFATE 1 G: 1 SUSPENSION ORAL at 17:00

## 2020-06-03 RX ADMIN — FAMOTIDINE 20 MG: 20 TABLET, FILM COATED ORAL at 08:16

## 2020-06-03 RX ADMIN — MAGNESIUM HYDROXIDE 10 ML: 2400 SUSPENSION ORAL at 09:29

## 2020-06-03 RX ADMIN — FAMOTIDINE 20 MG: 20 TABLET, FILM COATED ORAL at 20:20

## 2020-06-03 RX ADMIN — SUCRALFATE 1 G: 1 SUSPENSION ORAL at 04:39

## 2020-06-03 RX ADMIN — TAZOBACTAM SODIUM AND PIPERACILLIN SODIUM 3.38 G: 375; 3 INJECTION, SOLUTION INTRAVENOUS at 16:58

## 2020-06-03 RX ADMIN — SUCRALFATE 1 G: 1 SUSPENSION ORAL at 23:41

## 2020-06-03 RX ADMIN — DOCUSATE SODIUM 100 MG: 100 CAPSULE ORAL at 09:29

## 2020-06-03 NOTE — PLAN OF CARE
"  Problem: Patient Care Overview  Goal: Plan of Care Review  Outcome: Ongoing (interventions implemented as appropriate)  Flowsheets (Taken 6/3/2020 1144)  Progress: improving  Plan of Care Reviewed With: patient  Outcome Summary: PT tx completed. Pt supine in bed, c/o pn 'everywhere.\" Mostly in back. Sup>sit Emily using bed rails. SIt<>stand Min/Mod A with r wx. Able to take few steps forward with r wx Emily. Improved today with strength and moviing better.     "

## 2020-06-03 NOTE — PROGRESS NOTES
Agustina Saleem MD FACS  Progress Note     LOS: 5 days   Patient Care Team:  Matheus Olivera PA as PCP - General (Physician Assistant)      Subjective     Interval History:      uncomfortable in bed.  Denies nausea.  Pain controlled     Objective     Vital Signs  Temp:  [97.8 °F (36.6 °C)-98.9 °F (37.2 °C)] 97.8 °F (36.6 °C)  Heart Rate:  [51-64] 51  Resp:  [16-18] 16  BP: (113-147)/(43-57) 127/47    Physical Exam:  General appearance - alert, well appearing, and in no distress  Abdomen - softer, clean, NAM bilious      Results Review:    Lab Results (last 24 hours)     Procedure Component Value Units Date/Time    POC Glucose Once [[id number]]  (Abnormal) Collected:  06/03/20 0756    Specimen:  Blood Updated:  06/03/20 0826     Glucose 177 mg/dL      Comment: : [id number] Jeremias AnnMeter ID: [id number]       Extra Tubes [[id number]] Collected:  06/03/20 0620    Specimen:  Blood, Venous Line Updated:  06/03/20 0730    Narrative:       The following orders were created for panel order Extra Tubes.  Procedure                               Abnormality         Status                     ---------                               -----------         ------                     Green Top (Gel)[[id number]]                                  Final result                 Please view results for these tests on the individual orders.    Green Top (Gel) [[id number]] Collected:  06/03/20 0620    Specimen:  Blood Updated:  06/03/20 0730     Extra Tube Hold for add-ons.     Comment: Auto resulted.       Protime-INR [[id number]]  (Abnormal) Collected:  06/03/20 0615    Specimen:  Blood Updated:  06/03/20 0650     Protime 28.0 Seconds      INR 2.62    CBC (No Diff) [[id number]]  (Abnormal) Collected:  06/03/20 0615    Specimen:  Blood Updated:  06/03/20 0647     WBC 11.32 10*3/mm3      RBC 3.02 10*6/mm3      Hemoglobin 8.7 g/dL      Hematocrit 27.1 %      MCV 89.7 fL      MCH 28.8 pg      MCHC 32.1 g/dL      RDW 15.1 %      RDW-SD 49.2  fl      MPV 9.8 fL      Platelets 231 10*3/mm3     POC Glucose Once [[id number]]  (Abnormal) Collected:  06/02/20 1708    Specimen:  Blood Updated:  06/02/20 1721     Glucose 186 mg/dL      Comment: : [id number] Jeremias AnnMeter ID: [id number]       POC Glucose Once [[id number]]  (Abnormal) Collected:  06/02/20 1059    Specimen:  Blood Updated:  06/02/20 1139     Glucose 149 mg/dL      Comment: : [id number] Jeremias AnnMeter ID: [id number]           Imaging Results (Last 24 Hours)     ** No results found for the last 24 hours. **            Assessment/Plan       POD# 6 partial diaz    Encourage po.  Await full bowel function return  Continue PT  Back to [place] soon      April MAT Saleem MD  06/03/20  10:12

## 2020-06-03 NOTE — THERAPY TREATMENT NOTE
Acute Care - Physical Therapy Treatment Note  [facility]     Patient Name: Jesus Smith  : 1935  MRN: [id number]  Today's Date: 6/3/2020             Admit Date: 2020    Visit Dx:    ICD-10-CM ICD-9-CM   1. Biliary drain displacement, initial encounter T85.520A 996.59   2. Chronic anticoagulation Z79.01 V58.61   3. Transaminitis R74.0 790.4   4. Impaired mobility Z74.09 799.89     Patient Active Problem List   Diagnosis   • Acute UTI (urinary tract infection)   • Bacteremia   • Transaminitis   • Acute cholecystitis   • Benign essential HTN   • CKD (chronic kidney disease) stage 3, GFR 30-59 ml/min (CMS/[facility])   • History of prosthetic aortic valve   • Hematoma of left iliopsoas muscle   • Chronic anticoagulation   • Hyponatremia   • Biliary drain displacement       Therapy Treatment    Rehabilitation Treatment Summary     Row Name 20 1108             Treatment Time/Intention    Discipline  physical therapy assistant  -KJ      Document Type  therapy note (daily note)  -KJ2      Subjective Information  complains of;weakness;pain  -KJ2      Mode of Treatment  physical therapy  -KJ2      Patient Effort  adequate  -KJ2      Existing Precautions/Restrictions  fall  -KJ2      Treatment Considerations/Comments  abdominal binder  -KJ2      Recorded by [KJ] Gabbie Harris, PTA 20 1110  [KJ2] Gabbie Harris, PTA 20 1143      Row Name 20 1108             Bed Mobility Assessment/Treatment    Supine-Sit [place] (Bed Mobility)  verbal cues;minimum assist (75% patient effort)  -KJ      Bed Mobility, Safety Issues  decreased use of arms for pushing/pulling;decreased use of legs for bridging/pushing  -KJ      Recorded by [KJ] Gabbie Harris, PTA 20 1143      Row Name 20 1108             Sit-Stand Transfer    Sit-Stand [place] (Transfers)  verbal cues;minimum assist (75% patient effort);moderate assist (50% patient effort)  -KJ      Assistive Device (Sit-Stand Transfers)   walker, front-wheeled  -KJ      Recorded by [KJ] Gabbie Harris, hospitals 06/03/20 1143      Row Name 06/03/20 1108             Stand-Sit Transfer    Stand-Sit [place] (Transfers)  verbal cues;minimum assist (75% patient effort);moderate assist (50% patient effort)  -KJ      Assistive Device (Stand-Sit Transfers)  walker, front-wheeled  -KJ      Recorded by [KJ] Gabbie Harris, hospitals 06/03/20 1143      Row Name 06/03/20 1108             Gait/Stairs Assessment/Training    [place] Level (Gait)  minimum assist (75% patient effort)  -KJ      Assistive Device (Gait)  walker, front-wheeled  -KJ      Distance in Feet (Gait)  steps to chair  -KJ      Pattern (Gait)  step-through  -KJ      Deviations/Abnormal Patterns (Gait)  gait speed decreased;stride length decreased  -KJ      Bilateral Gait Deviations  forward flexed posture  -KJ      Comment (Gait/Stairs)  stood x 3 from recliner  -KJ      Recorded by [KJ] Gabbie Harris, hospitals 06/03/20 1143      Row Name 06/03/20 1108             Therapeutic Exercise    Exercise Type (Therapeutic Exercise)  AAROM (active assistive range of motion);AROM (active range of motion)  -KJ      Position (Therapeutic Exercise)  seated;supine  -KJ      Sets/Reps (Therapeutic Exercise)  15-20  -KJ      Comment (Therapeutic Exercise)  AAROM LLE  -KJ      Recorded by [KJ] Gabbie Harris, hospitals 06/03/20 1143      Row Name 06/03/20 1108             Positioning and Restraints    Pre-Treatment Position  in bed  -KJ      Post Treatment Position  chair  -KJ      In Bed  call light within reach  -KJ      Recorded by [KJ] Gabbie Harris, hospitals 06/03/20 1143      Row Name 06/03/20 1108             Pain Scale: Numbers Pre/Post-Treatment    Pain Scale: Numbers, Pretreatment  7/10  -KJ      Pain Location  back  -KJ      Recorded by [KJ] Gabbie Harris hospitals 06/03/20 1143      Row Name                Wound 05/26/20 2152 Left anterior;lower leg Other (comment)    Wound - Properties Group Date first assessed:  05/26/20 [KH] Time first assessed: 2152 [KH] Present on Hospital Admission: Y [KH] Side: Left [KH] Orientation: anterior;lower [KH] Location: leg [KH] Primary Wound Type: Other [KH] Recorded by:  [NORM] Stephanie Olmedo RNA 05/26/20 2153    Row Name                Wound 05/26/20 2153 Left anterior foot Other (comment)    Wound - Properties Group Date first assessed: 05/26/20 [KH] Time first assessed: 2153 [KH] Present on Hospital Admission: Y [KH] Side: Left [KH] Orientation: anterior [KH] Location: foot [KH] Primary Wound Type: Other [KH] Recorded by:  [KH] Stephanie Olmedo RNA 05/26/20 2154    Row Name                Wound 05/26/20 1900 perineum MASD (Moisture associated skin damage)    Wound - Properties Group Date first assessed: 05/26/20 [KH] Time first assessed: 1900 [KH] Present on Hospital Admission: Y [KH] Location: perineum [KH] Primary Wound Type: MASD [KH] Recorded by:  [NORM] Stephanie Olmedo RNA 05/27/20 0010    Row Name                Wound 04/05/20 0901 Right upper abdomen Incision    Wound - Properties Group Date first assessed: 04/05/20 [JH] Time first assessed: 0901 [JH] Side: Right [JH2] Orientation: upper [JH2] Location: abdomen [JH] Primary Wound Type: Incision [JH] Recorded by:  [JH] Alejandrina Maravilla RN 04/05/20 0901 [JH2] Alejandrina Maravilla RN 04/05/20 0906    Row Name                Wound 05/28/20 1302 abdomen Incision    Wound - Properties Group Date first assessed: 05/28/20 [ZE] Time first assessed: 1302 [ZE] Present on Hospital Admission: N [ZE] Location: abdomen [ZE] Primary Wound Type: Incision [ZE] Recorded by:  [ZE] Aren Da Silva RN 05/28/20 1302      User Key  (r) = Recorded By, (t) = Taken By, (c) = Cosigned By    Initials Name Effective Dates Discipline    Gabbie Garrido, SUSAN 08/02/16 -  PT    Alejandrina Jj RN 06/07/17 -  Nurse    Aren Tellez RN 08/02/16 -  Nurse    Stephanie Oneil, TD 06/24/19 -  Nurse          Wound 05/26/20 5225 Left anterior;lower leg Other  (comment) (Active)   Dressing Appearance dry;intact 6/3/2020  8:50 AM   Drainage Amount none 6/3/2020  8:50 AM   Dressing Care, Wound non-adherent 6/3/2020  8:50 AM       Wound 05/26/20 2153 Left anterior foot Other (comment) (Active)   Closure Open to air 6/3/2020  8:50 AM   Base dry;scab;red 6/3/2020  8:50 AM   Drainage Amount none 6/3/2020  8:50 AM   Dressing Care, Wound open to air 6/3/2020  8:50 AM       Wound 05/26/20 1900 perineum MASD (Moisture associated skin damage) (Active)   Dressing Appearance open to air;no drainage 6/3/2020  8:50 AM   Drainage Amount none 6/3/2020  8:50 AM   Care, Wound cleansed with;soap and water;barrier applied 6/3/2020  8:50 AM   Dressing Care, Wound open to air 6/3/2020  8:50 AM       Wound 05/28/20 1302 abdomen Incision (Active)   Dressing Appearance dry;intact;no drainage 6/3/2020  8:50 AM   Closure [place] 6/3/2020  8:50 AM   Base red;pink 6/3/2020  8:50 AM   Drainage Characteristics/Odor serosanguineous 6/3/2020  8:50 AM   Drainage Amount scant 6/3/2020  8:50 AM   Care, Wound cleansed with;sterile normal saline 6/3/2020  8:50 AM   Dressing Care, Wound dressing changed;gauze, wet-to-moist 6/3/2020  8:50 AM           Physical Therapy Education                 Title: PT OT SLP Therapies (Done)     Topic: Physical Therapy (Done)     Point: Mobility training (Done)     Description:   Instruct learner(s) on safety and technique for assisting patient out of bed, chair or wheelchair.  Instruct in the proper use of assistive devices, such as walker, crutches, cane or brace.              Patient Friendly Description:   It's important to get you on your feet again, but we need to do so in a way that is safe for you. Falling has serious consequences, and your personal safety is the most important thing of all.        When it's time to get out of bed, one of us or a family member will sit next to you on the bed to give you support.     If your doctor or nurse tells you to use a walker,  "crutches, a cane, or a brace, be sure you use it every time you get out of bed, even if you think you don't need it.    Learning Progress Summary           Patient Acceptance, E, VU,DU,NR by NEO at 5/31/2020 0755    Comment:  Educated pt on gait training and B LE exercises    Acceptance, E, VU,NR by NEO at 5/30/2020 0810    Comment:  Educated pt on t/f, bed mobility,    Acceptance, E, VU,NR by SB at 5/29/2020 1351    Comment:  pt edu on POC, benefits of act, benefits of being OOB, d/c plans                   Point: Body mechanics (Done)     Description:   Instruct learner(s) on proper positioning and spine alignment for patient and/or caregiver during mobility tasks and/or exercises.              Learning Progress Summary           Patient Acceptance, E, VU,NR by SB at 5/29/2020 1351    Comment:  pt edu on POC, benefits of act, benefits of being OOB, d/c plans                   Point: Precautions (Done)     Description:   Instruct learner(s) on prescribed precautions during mobility and gait tasks              Learning Progress Summary           Patient Acceptance, E, VU,NR by SB at 5/29/2020 1351    Comment:  pt edu on POC, benefits of act, benefits of being OOB, d/c plans                               User Key     Initials Effective Dates Name Provider Type Discipline    NEO 08/02/16 -  Prateek Juan, PT DPT Physical Therapist PT    SB 10/31/19 -  Naima Prasad, PT DPT Physical Therapist PT                PT Recommendation and Plan     Plan of Care Reviewed With: patient  Progress: improving  Outcome Summary: PT tx completed. Pt supine in bed, c/o pn 'everywhere.\" Mostly in back. Sup>sit Emily using bed rails. SIt<>stand Min/Mod A with r wx. Able to take few steps forward with r wx Emily. Improved today with strength and moviing better.     Time Calculation:   PT Charges     Row Name 06/03/20 1143             Time Calculation    Start Time  1108  -KJ      Stop Time  1133  -KJ      Time Calculation (min)  25 min  " -KJ      PT Received On  06/03/20  -KJ      PT Goal Re-Cert Due Date  06/08/20  -KJ         Time Calculation- PT    Total Timed Code Minutes- PT  25 minute(s)  -KJ        User Key  (r) = Recorded By, (t) = Taken By, (c) = Cosigned By    Initials Name Provider Type    Gbabie Garrido, SUSAN Physical Therapy Assistant        Therapy Charges for Today     Code Description Service Date Service Provider Modifiers Qty    81964457664 HC PT THER PROC EA 15 MIN 6/2/2020 Gabbie aHrris, PTA GP 1    81170762147 HC PT THERAPEUTIC ACT EA 15 MIN 6/2/2020 Gabbie Harris, PTA GP 1    54835531865 HC PT THER PROC EA 15 MIN 6/2/2020 Gabbie Harris, PTA GP 1    92111340180 HC PT THERAPEUTIC ACT EA 15 MIN 6/2/2020 Gabbie Harris, PTA GP 1    99495541982 HC PT THER PROC EA 15 MIN 6/3/2020 Gabbie Harris, PTA GP 1    46558539020 HC PT THERAPEUTIC ACT EA 15 MIN 6/3/2020 Gabbie Harris, PTA GP 1          PT G-Codes  Outcome Measure Options: AM-PAC 6 Clicks Basic Mobility (PT)  AM-PAC 6 Clicks Score (PT): 11    Gabbie Harris PTA  6/3/2020

## 2020-06-03 NOTE — PLAN OF CARE
Problem: Patient Care Overview  Goal: Plan of Care Review  Outcome: Ongoing (interventions implemented as appropriate)  Flowsheets (Taken 6/3/2020 4215)  Progress: improving  Plan of Care Reviewed With: patient  Outcome Summary: ML d/i with staples; drsg change done wet to dry per order. JPx1 with scant drainage. Perineum wash performed this am; zguard applied. MOM and colace given. IVF. Tolerating reguar/cardiac/ada/low fat diet. PT performed. Up to chair today. Voiding in urinal; Incont at times. VSS. Cont to monitor.

## 2020-06-03 NOTE — PLAN OF CARE
Problem: Patient Care Overview  Goal: Plan of Care Review  Outcome: Ongoing (interventions implemented as appropriate)  Flowsheets (Taken 6/3/2020 0148)  Progress: no change  Plan of Care Reviewed With: patient  Outcome Summary: Pt complains of pain PCA is in use. No complaints of nausea. Drsg was changed at 2100 Wet to dry. Turning q2hrs. IV abx. IVF. VSS. Will cont to monitor.

## 2020-06-04 LAB
ANION GAP SERPL CALCULATED.3IONS-SCNC: 9 MMOL/L (ref 5–15)
BUN BLD-MCNC: 14 MG/DL (ref 8–23)
BUN/CREAT SERPL: 18.4 (ref 7–25)
CALCIUM SPEC-SCNC: 8.2 MG/DL (ref 8.6–10.5)
CHLORIDE SERPL-SCNC: 96 MMOL/L (ref 98–107)
CO2 SERPL-SCNC: 28 MMOL/L (ref 22–29)
CREAT BLD-MCNC: 0.76 MG/DL (ref 0.76–1.27)
DEPRECATED RDW RBC AUTO: 50 FL (ref 37–54)
ERYTHROCYTE [DISTWIDTH] IN BLOOD BY AUTOMATED COUNT: 15.2 % (ref 12.3–15.4)
GFR SERPL CREATININE-BSD FRML MDRD: 98 ML/MIN/1.73
GLUCOSE BLD-MCNC: 144 MG/DL (ref 65–99)
GLUCOSE BLDC GLUCOMTR-MCNC: 146 MG/DL (ref 70–130)
GLUCOSE BLDC GLUCOMTR-MCNC: 168 MG/DL (ref 70–130)
GLUCOSE BLDC GLUCOMTR-MCNC: 205 MG/DL (ref 70–130)
HCT VFR BLD AUTO: 28.9 % (ref 37.5–51)
HGB BLD-MCNC: 9.2 G/DL (ref 13–17.7)
INR PPP: 3 (ref 0.91–1.09)
MCH RBC QN AUTO: 28.6 PG (ref 26.6–33)
MCHC RBC AUTO-ENTMCNC: 31.8 G/DL (ref 31.5–35.7)
MCV RBC AUTO: 89.8 FL (ref 79–97)
PLATELET # BLD AUTO: 252 10*3/MM3 (ref 140–450)
PMV BLD AUTO: 10.1 FL (ref 6–12)
POTASSIUM BLD-SCNC: 3.8 MMOL/L (ref 3.5–5.2)
PROTHROMBIN TIME: 31.3 SECONDS (ref 11.9–14.6)
RBC # BLD AUTO: 3.22 10*6/MM3 (ref 4.14–5.8)
SODIUM BLD-SCNC: 133 MMOL/L (ref 136–145)
WBC NRBC COR # BLD: 9.76 10*3/MM3 (ref 3.4–10.8)

## 2020-06-04 PROCEDURE — 63710000001 INSULIN LISPRO (HUMAN) PER 5 UNITS: Performed by: SPECIALIST

## 2020-06-04 PROCEDURE — 25010000002 PIPERACILLIN SOD-TAZOBACTAM PER 1 G: Performed by: SPECIALIST

## 2020-06-04 PROCEDURE — 85610 PROTHROMBIN TIME: CPT | Performed by: SPECIALIST

## 2020-06-04 PROCEDURE — 82962 GLUCOSE BLOOD TEST: CPT

## 2020-06-04 PROCEDURE — 85027 COMPLETE CBC AUTOMATED: CPT | Performed by: SPECIALIST

## 2020-06-04 PROCEDURE — 97530 THERAPEUTIC ACTIVITIES: CPT

## 2020-06-04 PROCEDURE — 80048 BASIC METABOLIC PNL TOTAL CA: CPT | Performed by: SPECIALIST

## 2020-06-04 RX ORDER — WARFARIN SODIUM 5 MG/1
2.5 TABLET ORAL
Status: DISCONTINUED | OUTPATIENT
Start: 2020-06-09 | End: 2020-06-04

## 2020-06-04 RX ADMIN — TAZOBACTAM SODIUM AND PIPERACILLIN SODIUM 3.38 G: 375; 3 INJECTION, SOLUTION INTRAVENOUS at 11:46

## 2020-06-04 RX ADMIN — BISOPROLOL FUMARATE 5 MG: 5 TABLET ORAL at 08:42

## 2020-06-04 RX ADMIN — FAMOTIDINE 20 MG: 20 TABLET, FILM COATED ORAL at 08:42

## 2020-06-04 RX ADMIN — POTASSIUM CHLORIDE, DEXTROSE MONOHYDRATE AND SODIUM CHLORIDE 50 ML/HR: 150; 5; 450 INJECTION, SOLUTION INTRAVENOUS at 13:44

## 2020-06-04 RX ADMIN — ASPIRIN 81 MG: 81 TABLET ORAL at 08:42

## 2020-06-04 RX ADMIN — TAZOBACTAM SODIUM AND PIPERACILLIN SODIUM 3.38 G: 375; 3 INJECTION, SOLUTION INTRAVENOUS at 06:38

## 2020-06-04 RX ADMIN — SUCRALFATE 1 G: 1 SUSPENSION ORAL at 06:38

## 2020-06-04 RX ADMIN — DOCUSATE SODIUM 100 MG: 100 CAPSULE ORAL at 08:42

## 2020-06-04 RX ADMIN — SUCRALFATE 1 G: 1 SUSPENSION ORAL at 11:46

## 2020-06-04 RX ADMIN — FUROSEMIDE 20 MG: 20 TABLET ORAL at 08:42

## 2020-06-04 RX ADMIN — INSULIN LISPRO 4 UNITS: 100 INJECTION, SOLUTION INTRAVENOUS; SUBCUTANEOUS at 11:48

## 2020-06-04 RX ADMIN — DOCUSATE SODIUM 100 MG: 100 CAPSULE ORAL at 21:09

## 2020-06-04 RX ADMIN — OXYCODONE HYDROCHLORIDE AND ACETAMINOPHEN 1 TABLET: 5; 325 TABLET ORAL at 14:05

## 2020-06-04 RX ADMIN — SUCRALFATE 1 G: 1 SUSPENSION ORAL at 17:14

## 2020-06-04 RX ADMIN — LOSARTAN POTASSIUM 50 MG: 50 TABLET, FILM COATED ORAL at 08:42

## 2020-06-04 RX ADMIN — FAMOTIDINE 20 MG: 20 TABLET, FILM COATED ORAL at 21:09

## 2020-06-04 RX ADMIN — Medication 1 TABLET: at 08:42

## 2020-06-04 RX ADMIN — INSULIN LISPRO 2 UNITS: 100 INJECTION, SOLUTION INTRAVENOUS; SUBCUTANEOUS at 17:14

## 2020-06-04 NOTE — PLAN OF CARE
Problem: Patient Care Overview  Goal: Plan of Care Review  Outcome: Ongoing (interventions implemented as appropriate)  Flowsheets (Taken 6/4/2020 1153)  Progress: improving  Outcome Summary: PT tx completed. Emily bed mobility, sit<>stand Min/ModA 1. Steps to chair Emily with r wx. Posterior lean, but has forward flexed posture.. Needs assistance from faling backwards. Strength improving.

## 2020-06-04 NOTE — THERAPY TREATMENT NOTE
Acute Care - Physical Therapy Treatment Note  [facility]     Patient Name: Jesus Smith  : 1935  MRN: [id number]  Today's Date: 2020             Admit Date: 2020    Visit Dx:    ICD-10-CM ICD-9-CM   1. Biliary drain displacement, initial encounter T85.520A 996.59   2. Chronic anticoagulation Z79.01 V58.61   3. Transaminitis R74.0 790.4   4. Impaired mobility Z74.09 799.89     Patient Active Problem List   Diagnosis   • Acute UTI (urinary tract infection)   • Bacteremia   • Transaminitis   • Acute cholecystitis   • Benign essential HTN   • CKD (chronic kidney disease) stage 3, GFR 30-59 ml/min (CMS/[facility])   • History of prosthetic aortic valve   • Hematoma of left iliopsoas muscle   • Chronic anticoagulation   • Hyponatremia   • Biliary drain displacement       Therapy Treatment    Rehabilitation Treatment Summary     Row Name 20 1120             Treatment Time/Intention    Discipline  physical therapy assistant  -KJ      Document Type  therapy note (daily note)  -KJ      Subjective Information  complains of;pain  -KJ      Mode of Treatment  physical therapy  -KJ      Patient Effort  adequate  -KJ      Existing Precautions/Restrictions  fall  -KJ      Treatment Considerations/Comments  abdominal binder  -KJ      Recorded by [KJ] Gabbie Harris PTA 20 1152      Row Name 20 1120             Bed Mobility Assessment/Treatment    Supine-Sit [place] (Bed Mobility)  verbal cues;minimum assist (75% patient effort)  -KJ      Bed Mobility, Safety Issues  decreased use of legs for bridging/pushing  -KJ      Recorded by [KJ] Gabbie Harris PTA 20 1152      Row Name 20 1120             Sit-Stand Transfer    Sit-Stand [place] (Transfers)  verbal cues;minimum assist (75% patient effort);moderate assist (50% patient effort)  -KJ      Assistive Device (Sit-Stand Transfers)  walker, front-wheeled  -KJ      Recorded by [KJ] Gabbie Harris PTA 20 1152      Row Name  06/04/20 1120             Stand-Sit Transfer    Stand-Sit [place] (Transfers)  verbal cues;minimum assist (75% patient effort)  -KJ      Assistive Device (Stand-Sit Transfers)  walker, front-wheeled  -KJ      Recorded by [KJ] Gabbie Harris, SUSAN 06/04/20 1152      Row Name 06/04/20 1120             Gait/Stairs Assessment/Training    [place] Level (Gait)  verbal cues;minimum assist (75% patient effort)  -KJ      Assistive Device (Gait)  walker, front-wheeled  -KJ      Distance in Feet (Gait)  steps to chair>BSC>chair  -KJ      Pattern (Gait)  step-to  -KJ      Deviations/Abnormal Patterns (Gait)  stride length decreased;gait speed decreased  -KJ      Bilateral Gait Deviations  forward flexed posture  -KJ      Comment (Gait/Stairs)  stood x 3  -KJ      Recorded by [KJ] Gabbie Harris, SUSAN 06/04/20 1152      Row Name 06/04/20 1120             Positioning and Restraints    Pre-Treatment Position  in bed  -KJ      Post Treatment Position  chair  -KJ      In Bed  call light within reach  -KJ      Recorded by [KJ] Gabbie Harris PTA 06/04/20 1152      Row Name 06/04/20 1120             Pain Scale: Numbers Pre/Post-Treatment    Pain Scale: Numbers, Pretreatment  6/10  -KJ      Pain Location - Orientation  upper  -KJ      Pain Location  back buttocks  -KJ      Recorded by [KJ] Gabbie Harris PTA 06/04/20 1152      Row Name                Wound 05/26/20 2152 Left anterior;lower leg Other (comment)    Wound - Properties Group Date first assessed: 05/26/20 [KH] Time first assessed: 2152 [KH] Present on Hospital Admission: Y [KH] Side: Left [KH] Orientation: anterior;lower [KH] Location: leg [KH] Primary Wound Type: Other [KH] Recorded by:  [KH] Stephanie Olmedo RNA 05/26/20 2153    Row Name                Wound 05/26/20 2153 Left anterior foot Other (comment)    Wound - Properties Group Date first assessed: 05/26/20 [KH] Time first assessed: 2153 [KH] Present on Hospital Admission: Y [KH] Side: Left [KH]  Orientation: anterior [KH] Location: foot [KH] Primary Wound Type: Other [KH] Recorded by:  [KH] Stephanie Olmedo, RNA 05/26/20 2154    Row Name                Wound 05/26/20 1900 perineum MASD (Moisture associated skin damage)    Wound - Properties Group Date first assessed: 05/26/20 [KH] Time first assessed: 1900 [KH] Present on Hospital Admission: Y [KH] Location: perineum [KH] Primary Wound Type: MASD [KH] Recorded by:  [KH] Stephanie Olmedo, RNA 05/27/20 0010    Row Name                Wound 04/05/20 0901 Right upper abdomen Incision    Wound - Properties Group Date first assessed: 04/05/20 [JH] Time first assessed: 0901 [JH] Side: Right [JH2] Orientation: upper [JH2] Location: abdomen [JH] Primary Wound Type: Incision [JH] Recorded by:  [JH] Alejandrina Maravilla RN 04/05/20 0901 [JH2] Alejandrina Maravilla RN 04/05/20 0906    Row Name                Wound 05/28/20 1302 abdomen Incision    Wound - Properties Group Date first assessed: 05/28/20 [ZE] Time first assessed: 1302 [ZE] Present on Hospital Admission: N [ZE] Location: abdomen [ZE] Primary Wound Type: Incision [ZE] Recorded by:  [ZE] Aren Da Silva RN 05/28/20 1302      User Key  (r) = Recorded By, (t) = Taken By, (c) = Cosigned By    Initials Name Effective Dates Discipline    Gabbie Garrido, SUSAN 08/02/16 -  PT    Alejandrina Jj RN 06/07/17 -  Nurse    Aren Tellez RN 08/02/16 -  Nurse    Stephanie Oneil, RNA 06/24/19 -  Nurse          Wound 05/26/20 2152 Left anterior;lower leg Other (comment) (Active)   Dressing Appearance dry;intact;no drainage 6/4/2020 10:00 AM   Closure [place]ET[place] 6/4/2020 10:00 AM   Base dressing in place, unable to visualize 6/4/2020 10:00 AM   Periwound intact;dry 6/4/2020 10:00 AM   Drainage Amount none 6/4/2020 10:00 AM   Dressing Care, Wound non-adherent;foam 6/4/2020 10:00 AM       Wound 05/26/20 2153 Left anterior foot Other (comment) (Active)   Closure Open to air 6/3/2020  8:20 PM   Base dry;scab;red 6/3/2020  8:20 PM    Dressing Care, Wound open to air 6/3/2020  8:20 PM       Wound 05/26/20 1900 perineum MASD (Moisture associated skin damage) (Active)   Dressing Appearance open to air;no drainage 6/4/2020 10:00 AM   Base pink 6/4/2020 10:00 AM   Periwound pink;excoriated 6/4/2020 10:00 AM   Periwound Skin Turgor soft 6/4/2020 10:00 AM   Drainage Amount none 6/4/2020 10:00 AM   Care, Wound cleansed with;soap and water;barrier applied 6/4/2020 10:00 AM   Dressing Care, Wound open to air 6/4/2020 10:00 AM       Wound 05/28/20 1302 abdomen Incision (Active)   Dressing Appearance dry;intact;no drainage 6/4/2020 10:00 AM   Closure NEETA 6/4/2020 10:00 AM   Base dressing in place, unable to visualize 6/4/2020 10:00 AM   Periwound intact;dry 6/4/2020 10:00 AM   Periwound Temperature warm 6/4/2020 10:00 AM   Periwound Skin Turgor soft 6/4/2020 10:00 AM   Drainage Characteristics/Odor serosanguineous 6/3/2020  8:20 PM   Drainage Amount none 6/4/2020 10:00 AM   Dressing Care, Wound gauze 6/4/2020 10:00 AM           Physical Therapy Education                 Title: PT OT SLP Therapies (Done)     Topic: Physical Therapy (Done)     Point: Mobility training (Done)     Description:   Instruct learner(s) on safety and technique for assisting patient out of bed, chair or wheelchair.  Instruct in the proper use of assistive devices, such as walker, crutches, cane or brace.              Patient Friendly Description:   It's important to get you on your feet again, but we need to do so in a way that is safe for you. Falling has serious consequences, and your personal safety is the most important thing of all.        When it's time to get out of bed, one of us or a family member will sit next to you on the bed to give you support.     If your doctor or nurse tells you to use a walker, crutches, a cane, or a brace, be sure you use it every time you get out of bed, even if you think you don't need it.    Learning Progress Summary           Patient  Acceptance, E, VU,DU,NR by NEO at 5/31/2020 0755    Comment:  Educated pt on gait training and B LE exercises    Acceptance, E, VU,NR by NEO at 5/30/2020 0810    Comment:  Educated pt on t/f, bed mobility,    Acceptance, E, VU,NR by SB at 5/29/2020 1351    Comment:  pt edu on POC, benefits of act, benefits of being OOB, d/c plans                   Point: Body mechanics (Done)     Description:   Instruct learner(s) on proper positioning and spine alignment for patient and/or caregiver during mobility tasks and/or exercises.              Learning Progress Summary           Patient Acceptance, E, VU,NR by SB at 5/29/2020 1351    Comment:  pt edu on POC, benefits of act, benefits of being OOB, d/c plans                   Point: Precautions (Done)     Description:   Instruct learner(s) on prescribed precautions during mobility and gait tasks              Learning Progress Summary           Patient Acceptance, E, VU,NR by SB at 5/29/2020 1351    Comment:  pt edu on POC, benefits of act, benefits of being OOB, d/c plans                               User Key     Initials Effective Dates Name Provider Type Discipline    NEO 08/02/16 -  Prateek Juan, PT DPT Physical Therapist PT    SB 10/31/19 -  Naima Prasad, PT DPT Physical Therapist PT                PT Recommendation and Plan     Plan of Care Reviewed With: patient  Progress: improving  Outcome Summary: PT tx completed. Emily bed mobility, sit<>stand Min/ModA 1. Steps to chair Emily with r wx. Posterior lean, but has forward flexed posture.. Needs assistance from faling backwards. Strength improving.     Time Calculation:   PT Charges     Row Name 06/04/20 1152             Time Calculation    Start Time  1120  -KJ      Stop Time  1153  -KJ      Time Calculation (min)  33 min  -KJ      PT Received On  06/04/20  -KJ      PT Goal Re-Cert Due Date  06/08/20  -KJ         Time Calculation- PT    Total Timed Code Minutes- PT  33 minute(s)  -KJ        User Key  (r) = Recorded  By, (t) = Taken By, (c) = Cosigned By    Initials Name Provider Type    Gabbie Garrido, SUSAN Physical Therapy Assistant        Therapy Charges for Today     Code Description Service Date Service Provider Modifiers Qty    29071987482 HC PT THER PROC EA 15 MIN 6/3/2020 Gabbie Harris, SUSAN GP 1    64810750211 HC PT THERAPEUTIC ACT EA 15 MIN 6/3/2020 Gabbie Harris, SUSAN GP 1    71917449595 HC PT THERAPEUTIC ACT EA 15 MIN 6/4/2020 Gabbie Harris, SUSAN GP 2          PT G-Codes  Outcome Measure Options: AM-PAC 6 Clicks Basic Mobility (PT)  AM-PAC 6 Clicks Score (PT): 11    Gabbie Harris PTA  6/4/2020

## 2020-06-04 NOTE — PLAN OF CARE
Problem: Patient Care Overview  Goal: Plan of Care Review  Outcome: Ongoing (interventions implemented as appropriate)  Flowsheets  Taken 6/3/2020 1333 by Pamela Mcdowell RN  Progress: improving  Taken 6/3/2020 2020 by Stephanie Olmedo RNA  Plan of Care Reviewed With: patient  Taken 6/4/2020 0531 by Stephanie Olmedo RNA  Outcome Summary: Pt denies pain this shift. Dressing C/D/I; gauze wet-to-dry. Tolerating diet. Perineum wash BID. Turn q2h. VSS. Safety maintained. Will continue to monitor.

## 2020-06-04 NOTE — PROGRESS NOTES
Continued Stay Note   [place]     Patient Name: Jesus Smith  MRN: [id number]  Today's Date: 6/4/2020    Admit Date: 5/26/2020    Discharge Plan     Row Name 06/04/20 1006       Plan    Plan  [facility]    Patient/Family in Agreement with Plan  yes    Plan Comments  Continuing to follow pt. [facility]         Discharge Codes    No documentation.             MEGAN Knowles

## 2020-06-04 NOTE — PLAN OF CARE
VSS, medicated for pain X1 today, INR at 3 today - Coumadin on hold, up with assist of two, in chair much of this shift, dressing changed to midline incision, NAM with small output of bile, abdominal binder in use, BM this shift, abx d/cd, possible discharge back to NH tomorrow, will continue to monitor    Problem: Patient Care Overview  Goal: Plan of Care Review  Outcome: Ongoing (interventions implemented as appropriate)  Flowsheets  Taken 6/4/2020 1537  Progress: improving  Taken 6/4/2020 1000  Plan of Care Reviewed With: patient

## 2020-06-04 NOTE — PROGRESS NOTES
Agustina Saleem MD FACS  Progress Note     LOS: 6 days   Patient Care Team:  Matheus Olivera PA as PCP - General (Physician Assistant)      Subjective     Interval History:      collin po.  +bm  Denies complaint     Objective     Vital Signs  Temp:  [97.9 °F (36.6 °C)-98.7 °F (37.1 °C)] 98.4 °F (36.9 °C)  Heart Rate:  [50-53] 51  Resp:  [16-18] 16  BP: (120-138)/(47-56) 133/51    Physical Exam:  General appearance - alert, well appearing, and in no distress  Abdomen - soft, appropriately tender, clean, NAM decreasing amount biilous      Results Review:    Lab Results (last 24 hours)     Procedure Component Value Units Date/Time    POC Glucose Once [[id number]]  (Abnormal) Collected:  06/04/20 1144    Specimen:  Blood Updated:  06/04/20 1205     Glucose 205 mg/dL      Comment: : [id number] Michelle PamelaMeter ID: [id number]       POC Glucose Once [[id number]]  (Abnormal) Collected:  06/04/20 0749    Specimen:  Blood Updated:  06/04/20 0801     Glucose 146 mg/dL      Comment: : [id number] [place] PamelaMeter ID: [id number]       Basic Metabolic Panel [[id number]]  (Abnormal) Collected:  06/04/20 0618    Specimen:  Blood Updated:  06/04/20 0705     Glucose 144 mg/dL      BUN 14 mg/dL      Creatinine 0.76 mg/dL      Sodium 133 mmol/L      Potassium 3.8 mmol/L      Chloride 96 mmol/L      CO2 28.0 mmol/L      Calcium 8.2 mg/dL      eGFR Non African Amer 98 mL/min/1.73      BUN/Creatinine Ratio 18.4     Anion Gap 9.0 mmol/L     Narrative:       GFR Normal >60  Chronic Kidney Disease <60  Kidney Failure <15      Protime-INR [[id number]]  (Abnormal) Collected:  06/04/20 0618    Specimen:  Blood Updated:  06/04/20 0702     Protime 31.3 Seconds      INR 3.00    CBC (No Diff) [[id number]]  (Abnormal) Collected:  06/04/20 0618    Specimen:  Blood Updated:  06/04/20 0650     WBC 9.76 10*3/mm3      RBC 3.22 10*6/mm3      Hemoglobin 9.2 g/dL      Hematocrit 28.9 %      MCV 89.8 fL      MCH 28.6 pg      MCHC 31.8 g/dL      RDW  "15.2 %      RDW-SD 50.0 fl      MPV 10.1 fL      Platelets 252 10*3/mm3     Tissue Pathology Exam [[id number]] Collected:  05/28/20 1301    Specimen:  Tissue from Gallbladder Updated:  06/03/20 1732     Case Report --     Surgical Pathology Report                         Case: [id number]                                  Authorizing Provider:  Agustina Saleem MD       Collected:           05/28/2020 01:01 PM          Ordering Location:     [facility] OR  Received:            05/29/2020 08:50 AM          Pathologist:           Dominique Almaraz MD                                                        Specimen:    Gallbladder                                                                                 Final Diagnosis --     Gallbladder, cholecystectomy: Chronic active cholecystitis.       Gross Description --     Specimen #1 is received in a formalin filled container labeled with the patient's name, date of birth, and \"gallbladder\".  The specimen consists of an irregularly shaped fragment of soft and rubbery tissue measuring 4.3 x 2.1 cm and measuring 1.0 cm in depth.  The external surface on one side is blue-gray, smooth and dusky with staple line measuring 4.5 cm in length.  The opposite side is yellow-tan, fatty and rubbery.  The side of the fragment is inked black.  The cut surface shows dense yellow to gray fibrofatty tissue with a possible cavity closed with the staple line and measuring 2.5 cm in greatest dimension.  The cavity lining is yellow-brown and appears roughened.  Gallstones are not identified.  Representative sections of the gallbladder are submitted in a single block.       Microscopic Description --     Sections of the gallbladder reveal chronic active cholecystitis.  There is marked fibrosis within the wall of the gallbladder.  Much of the surface epithelium is denuded.  Where residual epithelium is seen, there is no significant atypia.  Bright dior yellow bile pigment is seen " within the lumen.  There is no evidence of malignancy.      POC Glucose Once [[id number]]  (Normal) Collected:  06/03/20 1650    Specimen:  Blood Updated:  06/03/20 1736     Glucose 119 mg/dL      Comment: : [id number] Jeremias AnnMeter ID: [id number]           Imaging Results (Last 24 Hours)     ** No results found for the last 24 hours. **            Assessment/Plan       POD# 7 open partial diaz    Drain continues to decrease amount.  He is still rather weak.  Possibly back to NH tomorrow.      Agustina Saleem MD  06/04/20  14:14

## 2020-06-04 NOTE — THERAPY TREATMENT NOTE
Acute Care - Physical Therapy Treatment Note  [facility]     Patient Name: Jesus Smith  : 1935  MRN: [id number]  Today's Date: 2020             Admit Date: 2020    Visit Dx:    ICD-10-CM ICD-9-CM   1. Biliary drain displacement, initial encounter T85.520A 996.59   2. Chronic anticoagulation Z79.01 V58.61   3. Transaminitis R74.0 790.4   4. Impaired mobility Z74.09 799.89     Patient Active Problem List   Diagnosis   • Acute UTI (urinary tract infection)   • Bacteremia   • Transaminitis   • Acute cholecystitis   • Benign essential HTN   • CKD (chronic kidney disease) stage 3, GFR 30-59 ml/min (CMS/HCC)   • History of prosthetic aortic valve   • Hematoma of left iliopsoas muscle   • Chronic anticoagulation   • Hyponatremia   • Biliary drain displacement       Therapy Treatment    Rehabilitation Treatment Summary     Row Name 20 1508 20 1120          Treatment Time/Intention    Discipline  physical therapy assistant  -EDUARD  physical therapy assistant  -KJ     Document Type  therapy note (daily note)  -EDUARD  therapy note (daily note)  -KJ     Subjective Information  complains of;weakness;fatigue;pain  -JB2  complains of;pain  -KJ     Mode of Treatment  --  physical therapy  -KJ     Patient Effort  --  adequate  -KJ     Existing Precautions/Restrictions  fall  -JB2  fall  -KJ     Treatment Considerations/Comments  abd binder  -JB2  abdominal binder  -KJ     Recorded by [EDUARD] Randy Edmond, PTA 20 1508  [JB2] Randy Edmond, PTA 20 1540 [KJ] Gabbie Harris, PTA 20 1152     Row Name 20 1508 20 1120          Bed Mobility Assessment/Treatment    Supine-Sit [place] (Bed Mobility)  -- up in the chair  -EDUARD  verbal cues;minimum assist (75% patient effort)  -KJ     Sit-Supine [place] (Bed Mobility)  verbal cues;moderate assist (50% patient effort)  -EDUARD  --     Bed Mobility, Safety Issues  --  decreased use of legs for bridging/pushing  -KJ     Recorded by  [EDUARD] Randy Edmond, PTA 06/04/20 1540 [KJ] Gabbie Harris, PTA 06/04/20 1152     Row Name 06/04/20 1508             Chair-Bed Transfer    Chair-Bed [place] (Transfers)  verbal cues;minimum assist (75% patient effort)  -EDUARD      Assistive Device (Chair-Bed Transfers)  walker, front-wheeled  -EDUARD      Recorded by [EDUARD] Randy Edmond, PTA 06/04/20 1540      Row Name 06/04/20 1508 06/04/20 1120          Sit-Stand Transfer    Sit-Stand [place] (Transfers)  verbal cues;moderate assist (50% patient effort)  -EDUARD  verbal cues;minimum assist (75% patient effort);moderate assist (50% patient effort)  -KJ     Assistive Device (Sit-Stand Transfers)  walker, front-wheeled  -EDUARD  walker, front-wheeled  -KJ     Recorded by [EDUARD] Randy Edmond, PTA 06/04/20 1540 [KJ] Gabbie Harris, PTA 06/04/20 1152     Row Name 06/04/20 1508 06/04/20 1120          Stand-Sit Transfer    Stand-Sit [place] (Transfers)  verbal cues;minimum assist (75% patient effort)  -EDUARD  verbal cues;minimum assist (75% patient effort)  -KJ     Assistive Device (Stand-Sit Transfers)  walker, front-wheeled  -EDUARD  walker, front-wheeled  -KJ     Recorded by [EDUARD] Randy Edmond, PTA 06/04/20 1540 [KJ] Gabbie Harris, PTA 06/04/20 1152     Row Name 06/04/20 1120             Gait/Stairs Assessment/Training    [place] Level (Gait)  verbal cues;minimum assist (75% patient effort)  -KJ      Assistive Device (Gait)  walker, front-wheeled  -KJ      Distance in Feet (Gait)  steps to chair>BSC>chair  -KJ      Pattern (Gait)  step-to  -KJ      Deviations/Abnormal Patterns (Gait)  stride length decreased;gait speed decreased  -KJ      Bilateral Gait Deviations  forward flexed posture  -KJ      Comment (Gait/Stairs)  stood x 3  -KJ      Recorded by [KJ] Gabbie Harris, PTA 06/04/20 1152      Row Name 06/04/20 1508 06/04/20 1120          Positioning and Restraints    Pre-Treatment Position  sitting in chair/recliner  -EDUARD  in bed  -KJ     Post  Treatment Position  bed  -EDUARD  chair  -KJ     In Bed  fowlers;call light within reach;encouraged to call for assist;side rails up x2  -EDUARD  call light within reach  -KJ     Recorded by [EDUARD] Randy Edmond, PTA 06/04/20 1540 [KJ] Gabbie Harris, PTA 06/04/20 1152     Row Name 06/04/20 1508 06/04/20 1120          Pain Scale: Numbers Pre/Post-Treatment    Pain Scale: Numbers, Pretreatment  6/10  -EDUARD  6/10  -KJ     Pain Location - Orientation  upper  -EDUARD  upper  -KJ     Pain Location  back  -EDUARD  back buttocks  -KJ     Pain Intervention(s)  Repositioned  -EDUARD  --     Recorded by [EDUARD] Randy Edmond, PTA 06/04/20 1540 [KJ] Gabbie Harris, PTA 06/04/20 1152     Row Name                Wound 05/26/20 2152 Left anterior;lower leg Other (comment)    Wound - Properties Group Date first assessed: 05/26/20 [KH] Time first assessed: 2152 [KH] Present on Hospital Admission: Y [KH] Side: Left [KH] Orientation: anterior;lower [KH] Location: leg [KH] Primary Wound Type: Other [KH] Recorded by:  [KH] Stephanie Olmedo, RNA 05/26/20 2153    Row Name                Wound 05/26/20 2153 Left anterior foot Other (comment)    Wound - Properties Group Date first assessed: 05/26/20 [KH] Time first assessed: 2153 [KH] Present on Hospital Admission: Y [KH] Side: Left [KH] Orientation: anterior [KH] Location: foot [KH] Primary Wound Type: Other [KH] Recorded by:  [NORM] Stephanie Olmedo, RNA 05/26/20 2154    Row Name                Wound 05/26/20 1900 perineum MASD (Moisture associated skin damage)    Wound - Properties Group Date first assessed: 05/26/20 [KH] Time first assessed: 1900 [KH] Present on Hospital Admission: Y [KH] Location: perineum [KH] Primary Wound Type: MASD [KH] Recorded by:  [NORM] Stephanie Olmedo, RNA 05/27/20 0010    Row Name                Wound 04/05/20 0901 Right upper abdomen Incision    Wound - Properties Group Date first assessed: 04/05/20 [JH] Time first assessed: 0901 [JH] Side: Right [JH2] Orientation: upper [JH2] Location:  abdomen [JH] Primary Wound Type: Incision [JH] Recorded by:  [JH] Alejandrina Maravilla RN 04/05/20 0901 [JH2] Alejandrina Maravilla RN 04/05/20 0906    Row Name                Wound 05/28/20 1302 abdomen Incision    Wound - Properties Group Date first assessed: 05/28/20 [ZE] Time first assessed: 1302 [ZE] Present on Hospital Admission: N [ZE] Location: abdomen [ZE] Primary Wound Type: Incision [ZE] Recorded by:  [ZE] Aren Da Silva RN 05/28/20 1302      User Key  (r) = Recorded By, (t) = Taken By, (c) = Cosigned By    Initials Name Effective Dates Discipline    Gabbie Garrido, PTA 08/02/16 -  PT    Randy Ledezma, PTA 12/08/16 -  PT    Alejandrina Jj RN 06/07/17 -  Nurse    Aren Tellez RN 08/02/16 -  Nurse    Stephanie Oneil RNA 06/24/19 -  Nurse          Wound 05/26/20 2152 Left anterior;lower leg Other (comment) (Active)   Dressing Appearance dry;intact;no drainage 6/4/2020 10:00 AM   Closure None 6/4/2020  2:05 PM   Base scab;dry 6/4/2020  2:05 PM   Periwound intact;dry 6/4/2020  2:05 PM   Periwound Temperature warm 6/4/2020  2:05 PM   Periwound Skin Turgor firm 6/4/2020  2:05 PM   Edges irregular 6/4/2020  2:05 PM   Drainage Amount none 6/4/2020  2:05 PM   Care, Wound cleansed with;sterile normal saline 6/4/2020  2:05 PM   Dressing Care, Wound dressing changed;foam;non-adherent 6/4/2020  2:05 PM       Wound 05/26/20 2153 Left anterior foot Other (comment) (Active)   Closure Open to air 6/3/2020  8:20 PM   Base dry;scab;red 6/3/2020  8:20 PM   Dressing Care, Wound open to air 6/3/2020  8:20 PM       Wound 05/26/20 1900 perineum MASD (Moisture associated skin damage) (Active)   Dressing Appearance open to air;no drainage 6/4/2020 10:00 AM   Closure None 6/4/2020  2:05 PM   Base pink 6/4/2020  2:05 PM   Periwound pink;excoriated 6/4/2020  2:05 PM   Periwound Skin Turgor soft 6/4/2020 10:00 AM   Drainage Amount none 6/4/2020  2:05 PM   Care, Wound cleansed with;soap and water;barrier applied  6/4/2020  2:05 PM   Dressing Care, Wound open to air 6/4/2020  2:05 PM       Wound 05/28/20 1302 abdomen Incision (Active)   Dressing Appearance no drainage 6/4/2020  2:05 PM   Closure Staples 6/4/2020  2:05 PM   Base granulating;clean;moist;pink 6/4/2020  2:05 PM   Periwound intact;dry 6/4/2020  2:05 PM   Periwound Temperature warm 6/4/2020  2:05 PM   Periwound Skin Turgor soft 6/4/2020  2:05 PM   Edges open 6/4/2020  2:05 PM   Drainage Characteristics/Odor serosanguineous 6/3/2020  8:20 PM   Drainage Amount scant 6/4/2020  2:05 PM   Care, Wound cleansed with;sterile normal saline 6/4/2020  2:05 PM   Dressing Care, Wound dressing changed;abdominal binder utilized;gauze, wet-to-moist;packed with 6/4/2020  2:05 PM           Physical Therapy Education                 Title: PT OT SLP Therapies (Done)     Topic: Physical Therapy (Done)     Point: Mobility training (Done)     Description:   Instruct learner(s) on safety and technique for assisting patient out of bed, chair or wheelchair.  Instruct in the proper use of assistive devices, such as walker, crutches, cane or brace.              Patient Friendly Description:   It's important to get you on your feet again, but we need to do so in a way that is safe for you. Falling has serious consequences, and your personal safety is the most important thing of all.        When it's time to get out of bed, one of us or a family member will sit next to you on the bed to give you support.     If your doctor or nurse tells you to use a walker, crutches, a cane, or a brace, be sure you use it every time you get out of bed, even if you think you don't need it.    Learning Progress Summary           Patient Acceptance, E, VU,DU,NR by NEO at 5/31/2020 0755    Comment:  Educated pt on gait training and B LE exercises    Acceptance, E, VU,NR by NEO at 5/30/2020 0810    Comment:  Educated pt on t/f, bed mobility,    Acceptance, E, VU,NR by MARCELA at 5/29/2020 1351    Comment:  pt edu on POC,  benefits of act, benefits of being OOB, d/c plans                   Point: Body mechanics (Done)     Description:   Instruct learner(s) on proper positioning and spine alignment for patient and/or caregiver during mobility tasks and/or exercises.              Learning Progress Summary           Patient Acceptance, E, VU,NR by SB at 5/29/2020 1351    Comment:  pt edu on POC, benefits of act, benefits of being OOB, d/c plans                   Point: Precautions (Done)     Description:   Instruct learner(s) on prescribed precautions during mobility and gait tasks              Learning Progress Summary           Patient Acceptance, E, VU,NR by SB at 5/29/2020 1351    Comment:  pt edu on POC, benefits of act, benefits of being OOB, d/c plans                               User Key     Initials Effective Dates Name Provider Type Discipline    NEO 08/02/16 -  Prateek Juan, PT DPT Physical Therapist PT    SB 10/31/19 -  Naima Prasad, PT DPT Physical Therapist PT                PT Recommendation and Plan           Time Calculation:   PT Charges     Row Name 06/04/20 1508 06/04/20 1152          Time Calculation    Start Time  1508  -EDUARD  1120  -KJ     Stop Time  1531  -EDUARD  1153  -KJ     Time Calculation (min)  23 min  -EDUARD  33 min  -KJ     PT Received On  --  06/04/20  -KJ     PT Goal Re-Cert Due Date  --  06/08/20  -KJ        Time Calculation- PT    Total Timed Code Minutes- PT  23 minute(s)  -EDUARD  33 minute(s)  -KJ        Timed Charges    05129 - PT Therapeutic Activity Minutes  23  -EDUARD  --       User Key  (r) = Recorded By, (t) = Taken By, (c) = Cosigned By    Initials Name Provider Type    Gabbie Garrido PTA Physical Therapy Assistant    Randy Ledezma PTA Physical Therapy Assistant        Therapy Charges for Today     Code Description Service Date Service Provider Modifiers Qty    97544862564 HC PT THERAPEUTIC ACT EA 15 MIN 6/4/2020 Randy Edmond PTA GP 2          PT G-Codes  Outcome Measure Options:  AM-PAC 6 Clicks Basic Mobility (PT)  -[facility] 6 Clicks Score (PT): 11    Randy Edmond, PTA  6/4/2020

## 2020-06-05 VITALS
TEMPERATURE: 97.8 F | BODY MASS INDEX: 26.51 KG/M2 | RESPIRATION RATE: 16 BRPM | OXYGEN SATURATION: 99 % | DIASTOLIC BLOOD PRESSURE: 56 MMHG | HEIGHT: 70 IN | WEIGHT: 185.2 LBS | HEART RATE: 50 BPM | SYSTOLIC BLOOD PRESSURE: 151 MMHG

## 2020-06-05 LAB
DEPRECATED RDW RBC AUTO: 49.5 FL (ref 37–54)
ERYTHROCYTE [DISTWIDTH] IN BLOOD BY AUTOMATED COUNT: 15.2 % (ref 12.3–15.4)
GLUCOSE BLDC GLUCOMTR-MCNC: 142 MG/DL (ref 70–130)
GLUCOSE BLDC GLUCOMTR-MCNC: 196 MG/DL (ref 70–130)
HCT VFR BLD AUTO: 31 % (ref 37.5–51)
HGB BLD-MCNC: 10 G/DL (ref 13–17.7)
INR PPP: 2.76 (ref 0.91–1.09)
MCH RBC QN AUTO: 29.1 PG (ref 26.6–33)
MCHC RBC AUTO-ENTMCNC: 32.3 G/DL (ref 31.5–35.7)
MCV RBC AUTO: 90.1 FL (ref 79–97)
PLATELET # BLD AUTO: 300 10*3/MM3 (ref 140–450)
PMV BLD AUTO: 10.2 FL (ref 6–12)
PROTHROMBIN TIME: 29.2 SECONDS (ref 11.9–14.6)
RBC # BLD AUTO: 3.44 10*6/MM3 (ref 4.14–5.8)
WBC NRBC COR # BLD: 9.5 10*3/MM3 (ref 3.4–10.8)

## 2020-06-05 PROCEDURE — 63710000001 INSULIN LISPRO (HUMAN) PER 5 UNITS: Performed by: SPECIALIST

## 2020-06-05 PROCEDURE — 85027 COMPLETE CBC AUTOMATED: CPT | Performed by: SPECIALIST

## 2020-06-05 PROCEDURE — 85610 PROTHROMBIN TIME: CPT | Performed by: SPECIALIST

## 2020-06-05 PROCEDURE — 94640 AIRWAY INHALATION TREATMENT: CPT

## 2020-06-05 PROCEDURE — 82962 GLUCOSE BLOOD TEST: CPT

## 2020-06-05 RX ORDER — SUCRALFATE ORAL 1 G/10ML
1 SUSPENSION ORAL EVERY 6 HOURS SCHEDULED
Status: ON HOLD
Start: 2020-06-05 | End: 2022-05-10

## 2020-06-05 RX ORDER — WARFARIN SODIUM 2 MG/1
1 TABLET ORAL
Status: DISCONTINUED | OUTPATIENT
Start: 2020-06-05 | End: 2020-06-05 | Stop reason: HOSPADM

## 2020-06-05 RX ADMIN — Medication 1 TABLET: at 08:52

## 2020-06-05 RX ADMIN — OXYCODONE HYDROCHLORIDE AND ACETAMINOPHEN 1 TABLET: 5; 325 TABLET ORAL at 05:25

## 2020-06-05 RX ADMIN — SUCRALFATE 1 G: 1 SUSPENSION ORAL at 12:20

## 2020-06-05 RX ADMIN — SUCRALFATE 1 G: 1 SUSPENSION ORAL at 00:41

## 2020-06-05 RX ADMIN — INSULIN LISPRO 2 UNITS: 100 INJECTION, SOLUTION INTRAVENOUS; SUBCUTANEOUS at 12:20

## 2020-06-05 RX ADMIN — ALBUTEROL SULFATE 2.5 MG: 2.5 SOLUTION RESPIRATORY (INHALATION) at 05:37

## 2020-06-05 RX ADMIN — BISOPROLOL FUMARATE 5 MG: 5 TABLET ORAL at 08:52

## 2020-06-05 RX ADMIN — FAMOTIDINE 20 MG: 20 TABLET, FILM COATED ORAL at 08:52

## 2020-06-05 RX ADMIN — LOSARTAN POTASSIUM 50 MG: 50 TABLET, FILM COATED ORAL at 08:52

## 2020-06-05 RX ADMIN — POTASSIUM CHLORIDE, DEXTROSE MONOHYDRATE AND SODIUM CHLORIDE 50 ML/HR: 150; 5; 450 INJECTION, SOLUTION INTRAVENOUS at 07:27

## 2020-06-05 RX ADMIN — FUROSEMIDE 20 MG: 20 TABLET ORAL at 08:52

## 2020-06-05 RX ADMIN — ASPIRIN 81 MG: 81 TABLET ORAL at 08:52

## 2020-06-05 RX ADMIN — SUCRALFATE 1 G: 1 SUSPENSION ORAL at 05:27

## 2020-06-05 RX ADMIN — DOCUSATE SODIUM 100 MG: 100 CAPSULE ORAL at 08:52

## 2020-06-05 RX ADMIN — OXYCODONE HYDROCHLORIDE AND ACETAMINOPHEN 1 TABLET: 5; 325 TABLET ORAL at 12:20

## 2020-06-05 NOTE — PLAN OF CARE
Problem: Patient Care Overview  Goal: Plan of Care Review  6/5/2020 1122 by Shannan Restrepo  Outcome: Ongoing (interventions implemented as appropriate)  Flowsheets (Taken 6/5/2020 1122)  Progress: improving  Plan of Care Reviewed With: patient  Outcome Summary: Pt is on a cardiac, C.CHO, low fat diet. He is tolerating his diet but only has a fair appetite. He has consumed 31% avg of the last 4 meals. He does receive Boost glucose control BID for additional kcal and protein. He has had gradual weight loss. Current weight is 185# with a BMI of 26.57. PO intake encouraged and pt is aware of alternate food selections as needed. Cont to follow while in the hospital. Possibly back to SNF soon.

## 2020-06-05 NOTE — DISCHARGE PLACEMENT REQUEST
Discharge Summary      Agustina Saleem MD at 06/05/20 1202          Consults     Date and Time Order Name Status Description    5/29/2020 1534 Inpatient Cardiology Consult Completed       Agustina PATEL. MD [facility] Discharge Summary    Date of Discharge:  6/5/2020    Discharge Diagnosis: acute on chronic cholecystitis    Presenting Problem/History of Present Illness  Biliary drain displacement, initial encounter [T85.520A]  Chronic cholecystitis [K81.1]  Biliary drain displacement, initial encounter [T85.520A]  Choledocholithiasis with acute cholecystitis with obstruction [K80.43]     Hospital Course  Patient is a 85 y.o. male presented with history of open cholecystostomy tube placement for acute cholecystitis on coumadin.  He returned from the nursing home after the drain became dislodged.  He was admitted and INR was corrected.  He underwent lap converted to open exploration.  There gallbladder was still rather indurated and inflamed.  the fundus was opened and contents evacuated.  He thus underwent only partial cholecystectomy.  Iv abx were continued postoperatively. Diet as advanced as bowel function returned.  All home meds were resumed upon discharge.  Saline wet to dry dressings bid were begun postoperatively.  He will follow up in two weeks.      Procedures Performed  Procedure(s):  OPEN PARTIAL CHOLECYSTECTOMY, LYSIS OF ADHESIONS       Consults:   Consults     Date and Time Order Name Status Description    5/29/2020 1534 Inpatient Cardiology Consult Completed           Condition on Discharge:  good    Vital Signs  Temp:  [97.8 °F (36.6 °C)-98.3 °F (36.8 °C)] 97.8 °F (36.6 °C)  Heart Rate:  [50-54] 50  Resp:  [16-19] 16  BP: (136-151)/(54-61) 151/56    Physical Exam:   See History and Physical found in chart.    Discharge Disposition  Skilled Nursing Facility (DC - External)    Discharge Medications     Discharge Medications      Continue These Medications      Instructions Start Date   aspirin 81  MG EC tablet   81 mg, Oral, Daily      bisacodyl 10 MG suppository  Commonly known as:  DULCOLAX   10 mg, Rectal, Daily PRN      bisoprolol 5 MG tablet  Commonly known as:  ZEBeta   5 mg, Oral, Daily      famotidine 20 MG tablet  Commonly known as:  PEPCID   20 mg, Oral, 2 Times Daily      furosemide 20 MG tablet  Commonly known as:  LASIX   20 mg, Oral, Daily      Glycerin-Hypromellose- 0.2-0.2-1 % solution ophthalmic solution  Commonly known as:  ARTIFICIAL TEARS   1 drop, Both Eyes, Every 1 Hour PRN      insulin lispro 100 UNIT/ML injection  Commonly known as:  humaLOG   Inject 4 times daily as per sliding scale: 150-199= 2 units 200-249= 3 units 250-299= 4 units 300-349= 5 units 350-400= 6 units 401-550= 7 units and call MD      losartan 100 MG tablet  Commonly known as:  COZAAR   100 mg, Oral, Daily      ondansetron 4 MG tablet  Commonly known as:  ZOFRAN   4 mg, Oral, Every 8 Hours PRN      oxyCODONE-acetaminophen 5-325 MG per tablet  Commonly known as:  PERCOCET   1 tablet, Oral, Every 4 Hours PRN      sennosides-docusate 8.6-50 MG per tablet  Commonly known as:  PERICOLACE   1 tablet, Oral, Daily      sucralfate 1 GM/10ML suspension  Commonly known as:  CARAFATE   1 g, Oral, Every 6 Hours Scheduled      vitamin B-12 1000 MCG tablet  Commonly known as:  CYANOCOBALAMIN   1,000 mcg, Oral, Daily      Vitamin D3 50 MCG (2000 UT) capsule   2,000 Units, Oral, Daily      warfarin 1 MG tablet  Commonly known as:  COUMADIN   Give 1 mg QHS M,W,F,Sat and Sun. Give 2.5 mg QHS Tues and Thurs             Discharge Diet:     Activity at Discharge:     Follow-up Appointments  No future appointments.      Test Results Pending at Discharge       Agustina Saleem MD  06/05/20  12:03              Electronically signed by Agustina Saleem MD at 06/05/20 7314

## 2020-06-05 NOTE — NURSING NOTE
Call placed to Marissa Mohinder - daughter in law, to inform of transfer back to [facility] via ambulance

## 2020-06-05 NOTE — PLAN OF CARE
Problem: Patient Care Overview  Goal: Plan of Care Review  Outcome: Ongoing (interventions implemented as appropriate)  Flowsheets  Taken 6/5/2020 0354 by Marialuisa Moore RNA  Progress: improving  Outcome Summary: pt denies pain. NAM drained stripped. pt voiding per urinal. vss. will continue to monitor. safety maintained.  Taken 6/4/2020 1000 by Melissa Michelle RN  Plan of Care Reviewed With: patient

## 2020-06-05 NOTE — PLAN OF CARE
Patient being discharged back to NH today, VSS, NAM removed, dressing changed to abdomen, goals met, will be transported via ambulance, safety maintained    Problem: Patient Care Overview  Goal: Plan of Care Review  Outcome: Outcome(s) achieved

## 2020-06-05 NOTE — DISCHARGE SUMMARY
Consults     Date and Time Order Name Status Description    5/29/2020 1534 Inpatient Cardiology Consult Completed       April MAT Saleem MD [facility] Discharge Summary    Date of Discharge:  6/5/2020    Discharge Diagnosis: acute on chronic cholecystitis    Presenting Problem/History of Present Illness  Biliary drain displacement, initial encounter [T85.520A]  Chronic cholecystitis [K81.1]  Biliary drain displacement, initial encounter [T85.520A]  Choledocholithiasis with acute cholecystitis with obstruction [K80.43]     Hospital Course  Patient is a 85 y.o. male presented with history of open cholecystostomy tube placement for acute cholecystitis on coumadin.  He returned from the nursing home after the drain became dislodged.  He was admitted and INR was corrected.  He underwent lap converted to open exploration.  There gallbladder was still rather indurated and inflamed.  the fundus was opened and contents evacuated.  He thus underwent only partial cholecystectomy.  Iv abx were continued postoperatively. Diet as advanced as bowel function returned.  All home meds were resumed upon discharge.  Saline wet to dry dressings bid were begun postoperatively.  He will follow up in two weeks.      Procedures Performed  Procedure(s):  OPEN PARTIAL CHOLECYSTECTOMY, LYSIS OF ADHESIONS       Consults:   Consults     Date and Time Order Name Status Description    5/29/2020 1534 Inpatient Cardiology Consult Completed           Condition on Discharge:  good    Vital Signs  Temp:  [97.8 °F (36.6 °C)-98.3 °F (36.8 °C)] 97.8 °F (36.6 °C)  Heart Rate:  [50-54] 50  Resp:  [16-19] 16  BP: (136-151)/(54-61) 151/56    Physical Exam:   See History and Physical found in chart.    Discharge Disposition  Skilled Nursing Facility (DC - External)    Discharge Medications     Discharge Medications      Continue These Medications      Instructions Start Date   aspirin 81 MG EC tablet   81 mg, Oral, Daily      bisacodyl 10 MG suppository  Commonly  known as:  DULCOLAX   10 mg, Rectal, Daily PRN      bisoprolol 5 MG tablet  Commonly known as:  ZEBeta   5 mg, Oral, Daily      famotidine 20 MG tablet  Commonly known as:  PEPCID   20 mg, Oral, 2 Times Daily      furosemide 20 MG tablet  Commonly known as:  LASIX   20 mg, Oral, Daily      Glycerin-Hypromellose- 0.2-0.2-1 % solution ophthalmic solution  Commonly known as:  ARTIFICIAL TEARS   1 drop, Both Eyes, Every 1 Hour PRN      insulin lispro 100 UNIT/ML injection  Commonly known as:  humaLOG   Inject 4 times daily as per sliding scale: 150-199= 2 units 200-249= 3 units 250-299= 4 units 300-349= 5 units 350-400= 6 units 401-550= 7 units and call MD      losartan 100 MG tablet  Commonly known as:  COZAAR   100 mg, Oral, Daily      ondansetron 4 MG tablet  Commonly known as:  ZOFRAN   4 mg, Oral, Every 8 Hours PRN      oxyCODONE-acetaminophen 5-325 MG per tablet  Commonly known as:  PERCOCET   1 tablet, Oral, Every 4 Hours PRN      sennosides-docusate 8.6-50 MG per tablet  Commonly known as:  PERICOLACE   1 tablet, Oral, Daily      sucralfate 1 GM/10ML suspension  Commonly known as:  CARAFATE   1 g, Oral, Every 6 Hours Scheduled      vitamin B-12 1000 MCG tablet  Commonly known as:  CYANOCOBALAMIN   1,000 mcg, Oral, Daily      Vitamin D3 50 MCG (2000 UT) capsule   2,000 Units, Oral, Daily      warfarin 1 MG tablet  Commonly known as:  COUMADIN   Give 1 mg QHS M,W,F,Sat and Sun. Give 2.5 mg QHS Tues and Thurs             Discharge Diet:     Activity at Discharge:     Follow-up Appointments  No future appointments.      Test Results Pending at Discharge       Agustina Saleem MD  06/05/20  12:03

## 2020-06-05 NOTE — NURSING NOTE
Report called to Maria E nurse at [facility].  Nurse stated a new COVID test was not required prior to transfer.  Negative covid on 05-.

## 2020-06-05 NOTE — PROGRESS NOTES
Continued Stay Note  [facility]     Patient Name: Jesus Smith  MRN: [id number]  Today's Date: 6/5/2020    Admit Date: 5/26/2020    Discharge Plan     Row Name 06/05/20 1325       Plan    [facility]    Patient/Family in Agreement with Plan  yes    Final Discharge Disposition Code  03 - skilled nursing facility (SNF)    Final Note  Pt is returning to [facility] [phone] today. He will go by [facility] [phone].        Discharge Codes    No documentation.       Expected Discharge Date and Time     Expected Discharge Date Expected Discharge Time    Jun 5, 2020             MEGAN Knowles

## 2020-06-06 NOTE — THERAPY DISCHARGE NOTE
Acute Care - Physical Therapy Discharge Summary  [facility]       Patient Name: Jesus Smith  : 1935  MRN: [id number]    Today's Date: 2020                 Admit Date: 2020      PT Recommendation and Plan    Visit Dx:    ICD-10-CM ICD-9-CM   1. Biliary drain displacement, initial encounter T85.520A 996.59   2. Chronic anticoagulation Z79.01 V58.61   3. Transaminitis R74.0 790.4   4. Impaired mobility Z74.09 799.89               Rehab Goal Summary     Row Name 20 0722             Bed Mobility Goal 1 (PT)    Activity/Assistive Device (Bed Mobility Goal 1, PT)  sit to supine;supine to sit  -AB      Deport Level/Cues Needed (Bed Mobility Goal 1, PT)  supervision required  -AB      Time Frame (Bed Mobility Goal 1, PT)  long term goal (LTG)  -AB      Progress/Outcomes (Bed Mobility Goal 1, PT)  goal not met  -AB         Transfer Goal 1 (PT)    Activity/Assistive Device (Transfer Goal 1, PT)  sit-to-stand/stand-to-sit;bed-to-chair/chair-to-bed;walker, rolling  -AB      Deport Level/Cues Needed (Transfer Goal 1, PT)  contact guard assist  -AB      Time Frame (Transfer Goal 1, PT)  long term goal (LTG)  -AB      Progress/Outcome (Transfer Goal 1, PT)  goal not met  -AB         Gait Training Goal 1 (PT)    Activity/Assistive Device (Gait Training Goal 1, PT)  gait (walking locomotion);improve balance and speed;increase endurance/gait distance;increase energy conservation;decrease fall risk;diminish gait deviation;walker, rolling  -AB      Deport Level (Gait Training Goal 1, PT)  contact guard assist  -AB      Distance (Gait Goal 1, PT)  40  -AB      Time Frame (Gait Training Goal 1, PT)  long term goal (LTG)  -AB      Progress/Outcome (Gait Training Goal 1, PT)  goal not met  -AB        User Key  (r) = Recorded By, (t) = Taken By, (c) = Cosigned By    Initials Name Provider Type Discipline    AB Sahra Arnold, PTA Physical Therapy Assistant PT              PT Discharge  Summary  Anticipated Discharge Disposition (PT): skilled nursing facility  Reason for Discharge: Discharge from facility  Outcomes Achieved: Refer to plan of care for updates on goals achieved  Discharge Destination: SNF      Sahra Arnold, PTA   6/6/2020

## 2020-08-09 ENCOUNTER — HOSPITAL ENCOUNTER (EMERGENCY)
Facility: HOSPITAL | Age: 85
Discharge: HOME | End: 2020-08-09
Admitting: EMERGENCY MEDICINE

## 2020-08-09 ENCOUNTER — APPOINTMENT (OUTPATIENT)
Dept: GENERAL RADIOLOGY | Facility: HOSPITAL | Age: 85
End: 2020-08-09

## 2020-08-09 VITALS
DIASTOLIC BLOOD PRESSURE: 69 MMHG | RESPIRATION RATE: 18 BRPM | HEIGHT: 70 IN | HEART RATE: 50 BPM | OXYGEN SATURATION: 99 % | TEMPERATURE: 98 F | SYSTOLIC BLOOD PRESSURE: 158 MMHG | WEIGHT: 185 LBS | BODY MASS INDEX: 26.48 KG/M2

## 2020-08-09 DIAGNOSIS — R33.8 ACUTE URINARY RETENTION: Primary | ICD-10-CM

## 2020-08-09 DIAGNOSIS — N48.89 PENILE PAIN: ICD-10-CM

## 2020-08-09 LAB
ALBUMIN SERPL-MCNC: 2.7 G/DL (ref 3.5–5.2)
ALBUMIN/GLOB SERPL: 0.7 G/DL
ALP SERPL-CCNC: 195 U/L (ref 39–117)
ALT SERPL W P-5'-P-CCNC: 16 U/L (ref 1–41)
ANION GAP SERPL CALCULATED.3IONS-SCNC: 12 MMOL/L (ref 5–15)
AST SERPL-CCNC: 21 U/L (ref 1–40)
BACTERIA UR QL AUTO: ABNORMAL /HPF
BASOPHILS # BLD AUTO: 0.05 10*3/MM3 (ref 0–0.2)
BASOPHILS NFR BLD AUTO: 0.6 % (ref 0–1.5)
BILIRUB SERPL-MCNC: 0.4 MG/DL (ref 0–1.2)
BILIRUB UR QL STRIP: NEGATIVE
BUN SERPL-MCNC: 16 MG/DL (ref 8–23)
BUN/CREAT SERPL: 16.3 (ref 7–25)
CALCIUM SPEC-SCNC: 9 MG/DL (ref 8.6–10.5)
CHLORIDE SERPL-SCNC: 96 MMOL/L (ref 98–107)
CLARITY UR: CLEAR
CO2 SERPL-SCNC: 28 MMOL/L (ref 22–29)
COLOR UR: YELLOW
CREAT SERPL-MCNC: 0.98 MG/DL (ref 0.76–1.27)
DEPRECATED RDW RBC AUTO: 49.1 FL (ref 37–54)
EOSINOPHIL # BLD AUTO: 0.26 10*3/MM3 (ref 0–0.4)
EOSINOPHIL NFR BLD AUTO: 3 % (ref 0.3–6.2)
ERYTHROCYTE [DISTWIDTH] IN BLOOD BY AUTOMATED COUNT: 16.3 % (ref 12.3–15.4)
GFR SERPL CREATININE-BSD FRML MDRD: 73 ML/MIN/1.73
GLOBULIN UR ELPH-MCNC: 4 GM/DL
GLUCOSE SERPL-MCNC: 120 MG/DL (ref 65–99)
GLUCOSE UR STRIP-MCNC: NEGATIVE MG/DL
HCT VFR BLD AUTO: 30.6 % (ref 37.5–51)
HGB BLD-MCNC: 9.8 G/DL (ref 13–17.7)
HGB UR QL STRIP.AUTO: ABNORMAL
HOLD SPECIMEN: NORMAL
HOLD SPECIMEN: NORMAL
HYALINE CASTS UR QL AUTO: ABNORMAL /LPF
IMM GRANULOCYTES # BLD AUTO: 0.04 10*3/MM3 (ref 0–0.05)
IMM GRANULOCYTES NFR BLD AUTO: 0.5 % (ref 0–0.5)
INR PPP: 1.53 (ref 0.91–1.09)
KETONES UR QL STRIP: NEGATIVE
LEUKOCYTE ESTERASE UR QL STRIP.AUTO: NEGATIVE
LYMPHOCYTES # BLD AUTO: 1.97 10*3/MM3 (ref 0.7–3.1)
LYMPHOCYTES NFR BLD AUTO: 22.4 % (ref 19.6–45.3)
MCH RBC QN AUTO: 26.5 PG (ref 26.6–33)
MCHC RBC AUTO-ENTMCNC: 32 G/DL (ref 31.5–35.7)
MCV RBC AUTO: 82.7 FL (ref 79–97)
MONOCYTES # BLD AUTO: 0.98 10*3/MM3 (ref 0.1–0.9)
MONOCYTES NFR BLD AUTO: 11.1 % (ref 5–12)
NEUTROPHILS NFR BLD AUTO: 5.51 10*3/MM3 (ref 1.7–7)
NEUTROPHILS NFR BLD AUTO: 62.4 % (ref 42.7–76)
NITRITE UR QL STRIP: NEGATIVE
NRBC BLD AUTO-RTO: 0 /100 WBC (ref 0–0.2)
PH UR STRIP.AUTO: 7 [PH] (ref 5–8)
PLATELET # BLD AUTO: 350 10*3/MM3 (ref 140–450)
PMV BLD AUTO: 9.1 FL (ref 6–12)
POTASSIUM SERPL-SCNC: 4.8 MMOL/L (ref 3.5–5.2)
PROT SERPL-MCNC: 6.7 G/DL (ref 6–8.5)
PROT UR QL STRIP: ABNORMAL
PROTHROMBIN TIME: 18.1 SECONDS (ref 11.9–14.6)
RBC # BLD AUTO: 3.7 10*6/MM3 (ref 4.14–5.8)
RBC # UR: ABNORMAL /HPF
REF LAB TEST METHOD: ABNORMAL
SODIUM SERPL-SCNC: 136 MMOL/L (ref 136–145)
SP GR UR STRIP: 1.01 (ref 1–1.03)
SQUAMOUS #/AREA URNS HPF: ABNORMAL /HPF
UROBILINOGEN UR QL STRIP: ABNORMAL
WBC # BLD AUTO: 8.81 10*3/MM3 (ref 3.4–10.8)
WBC UR QL AUTO: ABNORMAL /HPF
WHOLE BLOOD HOLD SPECIMEN: NORMAL
WHOLE BLOOD HOLD SPECIMEN: NORMAL

## 2020-08-09 PROCEDURE — 85610 PROTHROMBIN TIME: CPT | Performed by: NURSE PRACTITIONER

## 2020-08-09 PROCEDURE — 80053 COMPREHEN METABOLIC PANEL: CPT

## 2020-08-09 PROCEDURE — 85025 COMPLETE CBC W/AUTO DIFF WBC: CPT

## 2020-08-09 PROCEDURE — 81001 URINALYSIS AUTO W/SCOPE: CPT

## 2020-08-09 PROCEDURE — 99284 EMERGENCY DEPT VISIT MOD MDM: CPT

## 2020-08-09 PROCEDURE — 74018 RADEX ABDOMEN 1 VIEW: CPT

## 2020-08-09 RX ORDER — SODIUM CHLORIDE 0.9 % (FLUSH) 0.9 %
10 SYRINGE (ML) INJECTION AS NEEDED
Status: DISCONTINUED | OUTPATIENT
Start: 2020-08-09 | End: 2020-08-09 | Stop reason: HOSPADM

## 2020-08-09 NOTE — ED PROVIDER NOTES
Subjective   Mr. Smith is a 85-year-old male patient who presents today with complaints of penile pain and urinary retention that has been gradual in onset over the last 2 days.  Patient states that he was evaulated by provider in [place] where he was attempted to be straight cathed.  The attempt did not result in urine but once the catheter was withdrawn patient was able to urinate freely on his own.  He was then sent here for evaluation.  He denies any continued penile pain and has been urinating very freely as he is usually incontinent.  Patient denies any fever nausea vomiting abdominal pain back pain.  He denies any known history of bladder or kidney stone.  He denies any burning hesitancy scrotal pain or rectal pain.      History provided by:  Patient   used: No        Review of Systems   Constitutional: Negative for chills, fatigue and fever.   HENT: Negative for congestion, rhinorrhea, sore throat and voice change.    Eyes: Negative for discharge and visual disturbance.   Respiratory: Negative for cough, shortness of breath and wheezing.    Cardiovascular: Negative for chest pain.   Gastrointestinal: Negative for abdominal pain, diarrhea, nausea and vomiting.   Endocrine: Negative.    Genitourinary: Positive for decreased urine volume and penile pain. Negative for difficulty urinating.   Musculoskeletal: Negative.  Negative for neck pain.   Skin: Negative.  Negative for wound.   Allergic/Immunologic: Negative.    Neurological: Negative.  Negative for weakness and headaches.   Hematological: Negative.    Psychiatric/Behavioral: Negative.        Past Medical History:   Diagnosis Date   • Bradycardia    • Coronary artery disease    • GERD (gastroesophageal reflux disease)    • Hypertension    • Injury of back    • TIA (transient ischemic attack)        No Known Allergies    Past Surgical History:   Procedure Laterality Date   • APPENDECTOMY     • BACK SURGERY      Fusion   • CARDIAC  "SURGERY      Open Heart   • CHOLECYSTECTOMY N/A 5/28/2020    Procedure: OPEN PARTIAL CHOLECYSTECTOMY, LYSIS OF ADHESIONS;  Surgeon: Agustina Saleem MD;  Location:  [facility] OR;  Service: General;  Laterality: N/A;   • EXPLORATORY LAPAROTOMY N/A 4/5/2020    Procedure: open cholecystostomy tube placement ;  Surgeon: Agustina Saleem MD;  Location:  [facility] OR;  Service: General;  Laterality: N/A;   • HERNIA REPAIR     • JOINT REPLACEMENT Left     s/p L hip replacement   • PACEMAKER IMPLANTATION     • STOMACH SURGERY         Family History   Problem Relation Age of Onset   • Heart disease Mother    • Stroke Father        Social History     Socioeconomic History   • Marital status:      Spouse name: Not on file   • Number of children: Not on file   • Years of education: Not on file   • Highest education level: Not on file   Tobacco Use   • Smoking status: Never Smoker   • Smokeless tobacco: Never Used   Substance and Sexual Activity   • Alcohol use: No   • Drug use: No   • Sexual activity: Defer       /60   Pulse 50   Temp 97.7 °F (36.5 °C) (Oral)   Resp 20   Ht 177.8 cm (70\")   Wt 83.9 kg (185 lb)   SpO2 98%   BMI 26.54 kg/m²       Objective   Physical Exam   Constitutional: He is oriented to person, place, and time. He appears well-developed and well-nourished.   HENT:   Head: Normocephalic and atraumatic.   Right Ear: External ear normal.   Left Ear: External ear normal.   Nose: Nose normal.   Mouth/Throat: Oropharynx is clear and moist.   Eyes: Pupils are equal, round, and reactive to light. EOM are normal.   Neck: Normal range of motion. Neck supple.   Cardiovascular: Normal rate and regular rhythm.   Pulmonary/Chest: Effort normal and breath sounds normal.   Abdominal: Soft. Bowel sounds are normal.   Genitourinary: Testes normal. Uncircumcised. No penile erythema or penile tenderness. No discharge found.   Genitourinary Comments: Adult brief full of yellow urine without odor   Musculoskeletal: " Normal range of motion.   Neurological: He is alert and oriented to person, place, and time.   Skin: Skin is warm and dry.   Nursing note and vitals reviewed.      Procedures  Labs Reviewed   COMPREHENSIVE METABOLIC PANEL - Abnormal; Notable for the following components:       Result Value    Glucose 120 (*)     Chloride 96 (*)     Albumin 2.70 (*)     Alkaline Phosphatase 195 (*)     All other components within normal limits    Narrative:     GFR Normal >60  Chronic Kidney Disease <60  Kidney Failure <15     URINALYSIS W/ MICROSCOPIC IF INDICATED (NO CULTURE) - Abnormal; Notable for the following components:    Blood, UA Large (3+) (*)     Protein, UA Trace (*)     All other components within normal limits   CBC WITH AUTO DIFFERENTIAL - Abnormal; Notable for the following components:    RBC 3.70 (*)     Hemoglobin 9.8 (*)     Hematocrit 30.6 (*)     MCH 26.5 (*)     RDW 16.3 (*)     Monocytes, Absolute 0.98 (*)     All other components within normal limits   PROTIME-INR - Abnormal; Notable for the following components:    Protime 18.1 (*)     INR 1.53 (*)     All other components within normal limits   URINALYSIS, MICROSCOPIC ONLY - Abnormal; Notable for the following components:    RBC, UA Too Numerous to Count (*)     WBC, UA 0-2 (*)     All other components within normal limits   RAINBOW DRAW    Narrative:     The following orders were created for panel order [place] Draw.  Procedure                               Abnormality         Status                     ---------                               -----------         ------                     Light Blue Top[[id number]]                                   Final result               Green Top (Gel)[[id number]]                                  Final result               Lavender Top[[id number]]                                     Final result               Red Top[[id number]]                                          Final result                 Please view results for  these tests on the individual orders.   CBC AND DIFFERENTIAL    Narrative:     The following orders were created for panel order CBC & Differential.  Procedure                               Abnormality         Status                     ---------                               -----------         ------                     CBC Auto Differential[[id number]]        Abnormal            Final result                 Please view results for these tests on the individual orders.   LIGHT BLUE TOP   GREEN TOP   LAVENDER TOP   RED TOP     XR Abdomen KUB   Final Result   No acute findings.           This report was finalized on 08/09/2020 11:16 by Dr. Bruce Sagastume MD.                 ED Course  ED Course as of Aug 09 1149   Sun Aug 09, 2020   1145 Lab and xray results are reviewed, pt is currently asymptomatic. RBC in UA noted, no gross hematuria and no clots noted., could be r/t prior cath attempt or possible stone. Recommendation are to follow up with PCP/Urology especially if symptoms continue.    [BA]      ED Course User Index  [BA] Carmen Schmitz, GÓMEZ                                           [facility]    Final diagnoses:   Acute urinary retention   Penile pain            Carmen Schmitz APRN  08/09/20 1149       Carmen Schmitz APRN  08/09/20 0758

## 2020-08-25 NOTE — PROGRESS NOTES
Subjective    Mr. Smith is 85 y.o. male    Chief Complaint: LUTS  History of Present Illness    Lower Urinary tract symptoms  Patient is here for her lower urinary tract symptoms. The patient is bothered by nocturia, urgency, frequency, but is without slow stream, hesitancy, intermittency.  Onset has been gradual.  The patient perceives the voided amount is Symptoms have been present for several years.  Severity is described as Moderate.  Course has been unchanged. The patient perceives the amount voided is normal for the urge. Previous  history includes no significant issues.  Previous treatment includes none.       The following portions of the patient's history were reviewed and updated as appropriate: allergies, current medications, past family history, past medical history, past social history, past surgical history and problem list.    Review of Systems   Constitutional: Negative for appetite change and fever.   HENT: Negative for hearing loss and sore throat.    Eyes: Negative for pain and redness.   Respiratory: Negative for cough and shortness of breath.    Cardiovascular: Negative for chest pain and leg swelling.   Gastrointestinal: Negative for anal bleeding, nausea and vomiting.   Endocrine: Negative for cold intolerance and heat intolerance.   Genitourinary: Positive for frequency and urgency. Negative for dysuria, flank pain and hematuria.   Musculoskeletal: Negative for joint swelling and myalgias.   Skin: Negative for color change and rash.   Allergic/Immunologic: Negative for immunocompromised state.   Neurological: Negative for dizziness and speech difficulty.   Hematological: Negative for adenopathy. Does not bruise/bleed easily.   Psychiatric/Behavioral: Negative for dysphoric mood and suicidal ideas.         Current Outpatient Medications:   •  aspirin 81 MG EC tablet, Take 81 mg by mouth Daily., Disp: , Rfl:   •  bisacodyl (DULCOLAX) 10 MG suppository, Insert 1 suppository into the rectum  Daily As Needed for Constipation., Disp: , Rfl:   •  bisoprolol (ZEBeta) 5 MG tablet, Take 5 mg by mouth Daily., Disp: , Rfl:   •  Cholecalciferol (VITAMIN D3) 50 MCG (2000 UT) capsule, Take 1 capsule by mouth Daily., Disp: 30 capsule, Rfl: 11  •  famotidine (PEPCID) 20 MG tablet, Take 1 tablet by mouth 2 (Two) Times a Day., Disp: , Rfl:   •  furosemide (LASIX) 20 MG tablet, Take 20 mg by mouth Daily., Disp: , Rfl:   •  Glycerin-Hypromellose- (ARTIFICIAL TEARS) 0.2-0.2-1 % solution ophthalmic solution, Administer 1 drop to both eyes Every 1 (One) Hour As Needed for Dry Eyes., Disp: , Rfl:   •  insulin lispro (humaLOG) 100 UNIT/ML injection, Inject 4 times daily as per sliding scale: 150-199= 2 units 200-249= 3 units 250-299= 4 units 300-349= 5 units 350-400= 6 units 401-550= 7 units and call MD, Disp: , Rfl:   •  losartan (COZAAR) 100 MG tablet, Take 100 mg by mouth Daily., Disp: , Rfl:   •  ondansetron (ZOFRAN) 4 MG tablet, Take 4 mg by mouth Every 8 (Eight) Hours As Needed for Nausea or Vomiting., Disp: , Rfl:   •  oxyCODONE-acetaminophen (PERCOCET) 5-325 MG per tablet, Take 1 tablet by mouth Every 4 (Four) Hours As Needed for Moderate Pain ., Disp: , Rfl:   •  rosuvastatin (CRESTOR) 20 MG tablet, Take 20 mg by mouth., Disp: , Rfl:   •  sennosides-docusate (senna-docusate sodium) 8.6-50 MG per tablet, Take 1 tablet by mouth Daily., Disp: , Rfl:   •  sucralfate (CARAFATE) 1 GM/10ML suspension, Take 10 mL by mouth Every 6 (Six) Hours., Disp: , Rfl:   •  therapeutic multivitamin-minerals (THERAGRAN-M) tablet, Take 1 tablet by mouth., Disp: , Rfl:   •  vitamin B-12 (CYANOCOBALAMIN) 1000 MCG tablet, Take 1,000 mcg by mouth Daily., Disp: , Rfl:   •  vitamin D (ERGOCALCIFEROL) 1.25 MG (30848 UT) capsule capsule, Take 50,000 Units by mouth., Disp: , Rfl:   •  warfarin (COUMADIN) 1 MG tablet, Give 1 mg QHS M,W,F,Sat and Sun. Give 2.5 mg QHS Tues and Thurs, Disp: , Rfl:     Past Medical History:   Diagnosis Date   •  "Bradycardia    • Coronary artery disease    • GERD (gastroesophageal reflux disease)    • Hypertension    • Injury of back    • TIA (transient ischemic attack)        Past Surgical History:   Procedure Laterality Date   • APPENDECTOMY     • BACK SURGERY      Fusion   • CARDIAC SURGERY      Open Heart   • CHOLECYSTECTOMY N/A 5/28/2020    Procedure: OPEN PARTIAL CHOLECYSTECTOMY, LYSIS OF ADHESIONS;  Surgeon: Agustina Saleem MD;  Location: [facility] OR;  Service: General;  Laterality: N/A;   • EXPLORATORY LAPAROTOMY N/A 4/5/2020    Procedure: open cholecystostomy tube placement ;  Surgeon: Agutsina Saleem MD;  Location: [facility] OR;  Service: General;  Laterality: N/A;   • HERNIA REPAIR     • JOINT REPLACEMENT Left     s/p L hip replacement   • PACEMAKER IMPLANTATION     • STOMACH SURGERY         Social History     Socioeconomic History   • Marital status:      Spouse name: Not on file   • Number of children: Not on file   • Years of education: Not on file   • Highest education level: Not on file   Tobacco Use   • Smoking status: Never Smoker   • Smokeless tobacco: Never Used   Substance and Sexual Activity   • Alcohol use: No   • Drug use: No   • Sexual activity: Defer       Family History   Problem Relation Age of Onset   • Heart disease Mother    • Stroke Father        Objective    Temp 97.2 °F (36.2 °C) (Temporal)   Ht 177.8 cm (70\")   Wt 80.7 kg (178 lb) Comment: per pt  BMI 25.54 kg/m²     Physical Exam   Constitutional: He is oriented to person, place, and time. He appears well-developed and well-nourished. No distress.   HENT:   Head: Normocephalic and atraumatic.   Right Ear: External ear and ear canal normal.   Left Ear: External ear and ear canal normal.   Nose: No nasal deformity. No epistaxis.   Mouth/Throat: Oropharynx is clear and moist. Mucous membranes are not pale, not dry and not cyanotic. Normal dentition. No oropharyngeal exudate.   Neck: Trachea normal. No tracheal tenderness present. No " tracheal deviation present. No thyroid mass and no thyromegaly present.   Pulmonary/Chest: Effort normal. No accessory muscle usage. No respiratory distress. Chest wall is not dull to percussion (No flatness or hyperresonance). He exhibits no mass and no tenderness.   On palpation, no tactile fremitus. All movements are symmetric. No intercostal retraction noted.    Abdominal: Soft. Normal appearance. He exhibits no distension and no mass. There is no hepatosplenomegaly. There is no tenderness. No hernia.   Rectal examination or stool specimen is not indicated.    Musculoskeletal:   Normal gait and station. The spine, ribs, and pelvis are examined. No obvious misalignment or asymmetry. ROM is reasonable for age. No instability. No obvious atrophy, flaccidity or spasticity.    Lymphadenopathy:     He has no cervical adenopathy.        Right: No inguinal adenopathy present.        Left: No inguinal adenopathy present.   Neurological: He is alert and oriented to person, place, and time.   Skin: Skin is warm, dry and intact. No lesion and no rash noted. He is not diaphoretic. No cyanosis. No pallor. Nails show no clubbing.   On palpation, there were no induration, subcutaneous nodules, or tightening   Psychiatric: His speech is normal and behavior is normal. Judgment and thought content normal. His mood appears not anxious. His affect is not labile. He does not exhibit a depressed mood.   Vitals reviewed.    CT independent review  The CT scan of the abdomen/pelvis done without contrast is available for me to review.  Treatment recommendations require an independent review.  First I scanned the liver, spleen, and bowel pattern.  The retroperitoneum including the major vessels and lymphatic packages are briefly reviewed.  This film as been reviewed by the radiologist to determine any non urologic abnormalities that are present.  The kidneys are closely inspected for size, symmetry, contour, parenchymal thickness,  perinephric reaction, presence of calcifications, and intrarenal dilation of the collecting system.  The ureters are inspected for their course, caliber, and any calcifications.  The bladder is inspected for its thickness, size, and presence of any calcifications.  This scan shows:    The right kidney appears normal on this non-contrasted CT scan.  The renal parenchymal is normal in thickness.  There are no solid masses or cysts.  There is no hydronephrosis.  There are no stones.      The left kidney appears normal on this non-contrasted CT scan.  The renal parenchymal is normal in thickness.  There are no solid masses or cysts.  There is no hydronephrosis.  There are no stones.      The bladder appears normal on this non-contrasted CT scan.  The bladder appears normal in thickness.  There no masses or stones seen on this exam.        Results for orders placed or performed in visit on 08/28/20   POC Urinalysis Dipstick, Multipro   Result Value Ref Range    Color Yellow Yellow, Straw, Dark Yellow, [place]ola    Clarity, UA Clear Clear    Glucose, UA Negative Negative, 1000 mg/dL (3+) mg/dL    Bilirubin Negative Negative    Ketones, UA Negative Negative    Specific Gravity  1.015 1.005 - 1.030    Blood, UA 3+ (A) Negative    pH, Urine 5.5 5.0 - 8.0    Protein, POC 1+ (A) Negative mg/dL    Urobilinogen, UA Normal Normal    Nitrite, UA Negative Negative    Leukocytes Negative Negative     Assessment and Plan    Diagnoses and all orders for this visit:    Urinary retention  -     POC Urinalysis Dipstick, Multipro    Lower urinary tract symptoms (LUTS)    Urinary incontinence, functional          I patient microscopic hematuria had a CT scan done in May which was negative for upper tract abnormalities he has functional urinary incontinence.  He denies any other voiding symptoms.  I will have him return with cystoscopy.

## 2020-08-28 ENCOUNTER — OFFICE VISIT (OUTPATIENT)
Dept: UROLOGY | Facility: CLINIC | Age: 85
End: 2020-08-28

## 2020-08-28 VITALS — BODY MASS INDEX: 25.48 KG/M2 | TEMPERATURE: 97.2 F | WEIGHT: 178 LBS | HEIGHT: 70 IN

## 2020-08-28 DIAGNOSIS — R39.9 LOWER URINARY TRACT SYMPTOMS (LUTS): ICD-10-CM

## 2020-08-28 DIAGNOSIS — R39.81 URINARY INCONTINENCE, FUNCTIONAL: ICD-10-CM

## 2020-08-28 DIAGNOSIS — R33.9 URINARY RETENTION: Primary | ICD-10-CM

## 2020-08-28 LAB
BILIRUB BLD-MCNC: NEGATIVE MG/DL
CLARITY, POC: CLEAR
COLOR UR: YELLOW
GLUCOSE UR STRIP-MCNC: NEGATIVE MG/DL
KETONES UR QL: NEGATIVE
LEUKOCYTE EST, POC: NEGATIVE
NITRITE UR-MCNC: NEGATIVE MG/ML
PH UR: 5.5 [PH] (ref 5–8)
PROT UR STRIP-MCNC: ABNORMAL MG/DL
RBC # UR STRIP: ABNORMAL /UL
SP GR UR: 1.01 (ref 1–1.03)
UROBILINOGEN UR QL: NORMAL

## 2020-08-28 PROCEDURE — 81003 URINALYSIS AUTO W/O SCOPE: CPT | Performed by: UROLOGY

## 2020-08-28 PROCEDURE — 99204 OFFICE O/P NEW MOD 45 MIN: CPT | Performed by: UROLOGY

## 2020-08-28 RX ORDER — ERGOCALCIFEROL (VITAMIN D2) 50 MCG
2000 CAPSULE ORAL DAILY
COMMUNITY

## 2020-08-28 RX ORDER — M-VIT,TX,IRON,MINS/CALC/FOLIC 27MG-0.4MG
1 TABLET ORAL
COMMUNITY
End: 2022-05-09

## 2020-08-28 RX ORDER — ROSUVASTATIN CALCIUM 20 MG/1
20 TABLET, COATED ORAL NIGHTLY
Status: ON HOLD | COMMUNITY
End: 2022-05-10

## 2020-09-24 ENCOUNTER — TELEPHONE (OUTPATIENT)
Dept: UROLOGY | Facility: CLINIC | Age: 85
End: 2020-09-24

## 2020-09-24 NOTE — TELEPHONE ENCOUNTER
PT IS IN A NURSING HOME IN [place], THEY HEAD NURSE WAS OFF, NEW NURSE WAS IN CHARGE, THE PAX BUS NEVER SHOWED UP AND HE GOT LOST IN THE MIX, DID RESCHEDULE FOR OCT 2.

## 2020-10-12 ENCOUNTER — PROCEDURE VISIT (OUTPATIENT)
Dept: UROLOGY | Facility: CLINIC | Age: 85
End: 2020-10-12

## 2020-10-12 DIAGNOSIS — R39.9 LOWER URINARY TRACT SYMPTOMS (LUTS): Primary | ICD-10-CM

## 2020-10-12 LAB
BILIRUB BLD-MCNC: NEGATIVE MG/DL
CLARITY, POC: CLEAR
COLOR UR: YELLOW
GLUCOSE UR STRIP-MCNC: NEGATIVE MG/DL
KETONES UR QL: NEGATIVE
LEUKOCYTE EST, POC: NEGATIVE
NITRITE UR-MCNC: NEGATIVE MG/ML
PH UR: 6 [PH] (ref 5–8)
PROT UR STRIP-MCNC: NEGATIVE MG/DL
RBC # UR STRIP: ABNORMAL /UL
SP GR UR: 1.01 (ref 1–1.03)
UROBILINOGEN UR QL: NORMAL

## 2020-10-12 PROCEDURE — 52000 CYSTOURETHROSCOPY: CPT | Performed by: UROLOGY

## 2020-10-12 PROCEDURE — 81003 URINALYSIS AUTO W/O SCOPE: CPT | Performed by: UROLOGY

## 2020-10-12 NOTE — PROGRESS NOTES
Pre- operative diagnosis:   Hematuria    Post operative diagnosis:  Same    Procedure:  The patient was prepped and draped in a normal sterile fashion.  The urethra was anesthetized with 2% lidocaine jelly.  A flexible cystoscope was introduced per urethra.      Urethra:  Normal    Bladder:  moderate trabeculation    Ureteral orifices:  Normal position bilaterally and Clear efflux bilaterally    Prostate:  lateral lobe hypertrophy    Patient tolerated the procedure well    Complications: none    Blood loss: minimal    Follow up:    No additional follow up needed    Negative hematuria work-up.

## 2021-04-01 ENCOUNTER — OFFICE VISIT (OUTPATIENT)
Dept: CARDIOLOGY CLINIC | Age: 86
End: 2021-04-01
Payer: MEDICARE

## 2021-04-01 VITALS
SYSTOLIC BLOOD PRESSURE: 112 MMHG | HEIGHT: 70 IN | WEIGHT: 195 LBS | BODY MASS INDEX: 27.92 KG/M2 | HEART RATE: 64 BPM | DIASTOLIC BLOOD PRESSURE: 68 MMHG

## 2021-04-01 DIAGNOSIS — I38 VALVULAR HEART DISEASE: ICD-10-CM

## 2021-04-01 DIAGNOSIS — Z95.0 PACEMAKER: ICD-10-CM

## 2021-04-01 DIAGNOSIS — I25.10 CORONARY ARTERY DISEASE INVOLVING NATIVE CORONARY ARTERY OF NATIVE HEART WITHOUT ANGINA PECTORIS: Primary | ICD-10-CM

## 2021-04-01 DIAGNOSIS — I49.5 SINUS NODE DYSFUNCTION (HCC): ICD-10-CM

## 2021-04-01 DIAGNOSIS — E78.2 MIXED HYPERLIPIDEMIA: ICD-10-CM

## 2021-04-01 DIAGNOSIS — I48.21 PERMANENT ATRIAL FIBRILLATION (HCC): ICD-10-CM

## 2021-04-01 DIAGNOSIS — I10 ESSENTIAL HYPERTENSION: ICD-10-CM

## 2021-04-01 PROCEDURE — 1036F TOBACCO NON-USER: CPT | Performed by: CLINICAL NURSE SPECIALIST

## 2021-04-01 PROCEDURE — 93280 PM DEVICE PROGR EVAL DUAL: CPT | Performed by: CLINICAL NURSE SPECIALIST

## 2021-04-01 PROCEDURE — 1123F ACP DISCUSS/DSCN MKR DOCD: CPT | Performed by: CLINICAL NURSE SPECIALIST

## 2021-04-01 PROCEDURE — 99204 OFFICE O/P NEW MOD 45 MIN: CPT | Performed by: CLINICAL NURSE SPECIALIST

## 2021-04-01 PROCEDURE — G8417 CALC BMI ABV UP PARAM F/U: HCPCS | Performed by: CLINICAL NURSE SPECIALIST

## 2021-04-01 PROCEDURE — G8428 CUR MEDS NOT DOCUMENT: HCPCS | Performed by: CLINICAL NURSE SPECIALIST

## 2021-04-01 PROCEDURE — 4040F PNEUMOC VAC/ADMIN/RCVD: CPT | Performed by: CLINICAL NURSE SPECIALIST

## 2021-04-01 ASSESSMENT — ENCOUNTER SYMPTOMS
FACIAL SWELLING: 0
WHEEZING: 0
EYE REDNESS: 0
SHORTNESS OF BREATH: 1
ABDOMINAL PAIN: 0
CHEST TIGHTNESS: 0
NAUSEA: 0
VOMITING: 0
COUGH: 0

## 2021-04-01 NOTE — PATIENT INSTRUCTIONS
Return in about 6 months (around 10/1/2021) for Dr. Lenin Benítez. Call [employer] for remote monitor  [phone]  Echocardiogram    [place] [facility] located on the first floor of [place] [address] through hospital main entrance and turn immediately to your left. Date/Time:     Patient's contact number:  [phone] (home)     Echocardiogram -  No prep. Takes approximately 30 min. An echocardiogram uses sound waves to produce images of your heart. This commonly used test allows your doctor to see how your heart is beating and pumping blood. Your doctor can use the images from an echocardiogram to identify various abnormalities in the heart muscle and valves. This test has 2 parts:   Ø You will be asked to disrobe from the waist up and given a gown to wear. The technologist will then hook up an EKG monitor to you for the entire exam.   Ø You will then have an ultrasound of your heart (echocardiogram) to assess the heart muscle, heart valves and heart function. You may eat and take any medicines before the exam.     If you need to change your appointment, please call outpatient scheduling at [phone].

## 2021-04-01 NOTE — PROGRESS NOTES
[facility], [address] [facility] [address] [place] [address]  Phone: [phone]  Fax: [phone]    OFFICE VISIT:  2021    Ginny Lefort - : 1935    Reason For Visit:  Melina Stack is a 81 y.o. male who is here for New Patient (Patient is here to establish care. Was previously seen at [facility]. Does have pacemaker.)       Diagnosis Orders   1. Coronary artery disease involving native coronary artery of native heart without angina pectoris     2. Valvular heart disease  ECHO Complete 2D W Doppler W Color   3. Essential hypertension     4. Mixed hyperlipidemia     5. Pacemaker  ECHO Complete 2D W Doppler W Color   6. Sinus node dysfunction (HCC)     7. Permanent atrial fibrillation [facility])         HPI  Patient is referred to our office to establish care. Cardiac history includes CABG x2 and 2 valve replacements in approximately  in the [address] [place] [address] area. He had a pacemaker implanted as well. He has chronic atrial fibrillation and is anticoagulated with Coumadin. There are no bleeding issues. He followed up for short time with Dr. Jazzy Kaur at [facility], but has not been seen by a cardiologist in several years. He is currently residing in a nursing home, [address] [place] [address],  and is here with a worker who transported him today. He feels well overall. He denies chest pain, unusual dyspnea, orthopnea, PND, edema, palpitations. He has a lot of mobility issues and spends a lot of his time in a wheelchair. Unknown Provider Result (Inactive) is PCP.   Ginny Lefort has the following history as recorded in [facility]:    Patient Active Problem List    Diagnosis Date Noted    Pacemaker 2021    ACS (acute coronary syndrome) ([facility] [address]     Dizziness     Coronary artery disease involving native coronary artery      Past Medical History:   Diagnosis Date    CAD (coronary artery disease)     Hypertension     TIA (transient ischemic attack)      Past Surgical cold intolerance and heat intolerance. Genitourinary: Negative for dysuria and hematuria. Musculoskeletal: Negative for arthralgias and myalgias. Skin: Negative for pallor and rash. Neurological: Negative for dizziness, seizures, syncope, weakness and light-headedness. Hematological: Does not bruise/bleed easily. Psychiatric/Behavioral: Negative for agitation. The patient is not nervous/anxious. Objective  Vital Signs - /68   Pulse 64   Ht 5' 10\" (1.778 m)   Wt 195 lb (88.5 kg)   BMI 27.98 kg/m²   Physical Exam  Vitals signs and nursing note reviewed. Constitutional:       General: He is not in acute distress. Appearance: He is well-developed. He is not diaphoretic. Comments: elderly   HENT:      Head: Normocephalic and atraumatic. Right Ear: Hearing and external ear normal.      Left Ear: Hearing and external ear normal.      Nose: Nose normal.   Eyes:      General:         Right eye: No discharge. Left eye: No discharge. Pupils: Pupils are equal, round, and reactive to light. Neck:      Musculoskeletal: Neck supple. No muscular tenderness. Thyroid: No thyromegaly. Vascular: No carotid bruit or JVD. Trachea: No tracheal deviation. Cardiovascular:      Rate and Rhythm: Normal rate and regular rhythm. Heart sounds: Normal heart sounds. No murmur. No friction rub. No gallop. Pulmonary:      Effort: Pulmonary effort is normal. No respiratory distress. Breath sounds: Normal breath sounds. No wheezing or rales. Abdominal:      Palpations: Abdomen is soft. Tenderness: There is no abdominal tenderness. Musculoskeletal:         General: No swelling or deformity. Right lower leg: Edema present. Left lower leg: Edema (1+) present. Comments: In wheelchair   Skin:     General: Skin is warm and dry. Findings: No rash. Neurological:      General: No focal deficit present.       Mental Status: He is alert and oriented to person, place, and time. Cranial Nerves: No cranial nerve deficit. Psychiatric:         Mood and Affect: Mood normal.         Behavior: Behavior normal.         Judgment: Judgment normal.         Assessment:     Diagnosis Orders   1. Coronary artery disease involving native coronary artery of native heart without angina pectoris     2. Valvular heart disease  ECHO Complete 2D W Doppler W Color   3. Essential hypertension     4. Mixed hyperlipidemia     5. Pacemaker  ECHO Complete 2D W Doppler W Color   6. Sinus node dysfunction (HCC)     7. Permanent atrial fibrillation [facility])         CADhistory of CABG x2 in [address] in approximately 2010. He denies any symptoms of angina. Valvular heart disease2 valve replacements at the time of his CABG in 2010. He is not sure which valve to replace and did not bring any of his wallet cards with him today. We will get a baseline 2D echocardiogram.    Permanent atrial fibrillationchronic and ongoing. Anticoagulated with Coumadin which is managed at the nursing home. Hypertensionstable on current regimen    Hyperlipidemiaon statin therapy, managed by PCP    Sinus node dysfunction/pacemaker- order home monitor  Pacemaker check showed adequate battery status @DDDR  Mode: DDDR  Lead impedances are stable  Pacing:  AP 0.3%,  88.6%. Appropriate diagnostics and safety margins noted. Sustained arrythmia: Chronic A. fib  Reprogramming done for sensitivity and threshold testing. Please see the scanned interrogation report      Stable cardiovascular status. No evidence of overt heart failure,angina or dysrhythmia. Plan    Orders Placed This Encounter   Procedures    ECHO Complete 2D W Doppler W Color     Standing Status:   Future     Standing Expiration Date:   4/1/2022     Order Specific Question:   Reason for exam:     Answer:   s/p valve replacements     Return in about 6 months (around 10/1/2021) for Dr. Nazia Monsivais.    Call [employer] for remote home monitor  [phone]  Echocardiogram    Call with any questionsor concerns  Follow up with Unknown Provider Result (Inactive) for non cardiac problems  Report any new problems  Cardiovascular Fitness-Exercise as tolerated. Strive for 15 minutes of exercise most days of the week. Cardiac / HealthyDiet  Continue current medications as directed  Continue plan of treatment  It is always recommended that you bring your medicationsbottles with you to each visit - this is for your safety!        Pearl Amaya, APRN

## 2021-04-02 RX ORDER — MIRTAZAPINE 7.5 MG/1
7.5 TABLET, FILM COATED ORAL NIGHTLY
COMMUNITY
End: 2022-01-11

## 2021-04-02 RX ORDER — ONDANSETRON 4 MG/1
4 TABLET, FILM COATED ORAL EVERY 8 HOURS PRN
COMMUNITY

## 2021-04-02 RX ORDER — LOSARTAN POTASSIUM AND HYDROCHLOROTHIAZIDE 25; 100 MG/1; MG/1
1 TABLET ORAL DAILY
COMMUNITY
End: 2022-05-25

## 2021-04-02 RX ORDER — LOPERAMIDE HYDROCHLORIDE 2 MG/1
2 CAPSULE ORAL PRN
COMMUNITY

## 2021-04-02 RX ORDER — FUROSEMIDE 20 MG/1
20 TABLET ORAL 2 TIMES DAILY
COMMUNITY

## 2021-04-02 RX ORDER — OXYCODONE HYDROCHLORIDE AND ACETAMINOPHEN 5; 325 MG/1; MG/1
1 TABLET ORAL EVERY 4 HOURS PRN
COMMUNITY
End: 2022-01-11

## 2021-04-02 RX ORDER — SENNOSIDES A AND B 8.6 MG/1
1 TABLET, FILM COATED ORAL DAILY
COMMUNITY

## 2021-04-08 ENCOUNTER — HOSPITAL ENCOUNTER (OUTPATIENT)
Dept: NON INVASIVE DIAGNOSTICS | Age: 86
Discharge: HOME | End: 2021-04-08
Payer: MEDICARE

## 2021-04-08 DIAGNOSIS — Z95.0 PACEMAKER: ICD-10-CM

## 2021-04-08 DIAGNOSIS — I38 VALVULAR HEART DISEASE: ICD-10-CM

## 2021-04-08 LAB
LV EF: 58 %
LVEF MODALITY: NORMAL

## 2021-04-08 PROCEDURE — 93306 TTE W/DOPPLER COMPLETE: CPT

## 2021-04-29 ENCOUNTER — TELEPHONE (OUTPATIENT)
Dept: CARDIOLOGY CLINIC | Age: 86
End: 2021-04-29
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
## 2021-07-16 DIAGNOSIS — Z95.0 PACEMAKER: Primary | ICD-10-CM

## 2021-07-16 DIAGNOSIS — I49.5 SINUS NODE DYSFUNCTION (HCC): ICD-10-CM

## 2021-07-16 PROCEDURE — 93296 REM INTERROG EVL PM/IDS: CPT | Performed by: CLINICAL NURSE SPECIALIST

## 2021-07-16 PROCEDURE — 93294 REM INTERROG EVL PM/LDLS PM: CPT | Performed by: CLINICAL NURSE SPECIALIST

## 2021-09-08 ENCOUNTER — HOSPITAL ENCOUNTER (EMERGENCY)
Facility: HOSPITAL | Age: 86
Discharge: NURSING, REHAB OR LONG-TERM CARE | End: 2021-09-08
Attending: EMERGENCY MEDICINE | Admitting: EMERGENCY MEDICINE

## 2021-09-08 ENCOUNTER — APPOINTMENT (OUTPATIENT)
Dept: CT IMAGING | Facility: HOSPITAL | Age: 86
End: 2021-09-08

## 2021-09-08 VITALS
WEIGHT: 195 LBS | OXYGEN SATURATION: 100 % | DIASTOLIC BLOOD PRESSURE: 64 MMHG | BODY MASS INDEX: 27.92 KG/M2 | HEIGHT: 70 IN | HEART RATE: 52 BPM | TEMPERATURE: 97.5 F | RESPIRATION RATE: 12 BRPM | SYSTOLIC BLOOD PRESSURE: 122 MMHG

## 2021-09-08 DIAGNOSIS — N40.0 PROSTATE HYPERTROPHY: ICD-10-CM

## 2021-09-08 DIAGNOSIS — M48.00 SPINAL STENOSIS, UNSPECIFIED SPINAL REGION: Primary | ICD-10-CM

## 2021-09-08 DIAGNOSIS — N49.0: ICD-10-CM

## 2021-09-08 DIAGNOSIS — N17.9 AKI (ACUTE KIDNEY INJURY) (HCC): ICD-10-CM

## 2021-09-08 LAB
ALBUMIN SERPL-MCNC: 3.8 G/DL (ref 3.5–5.2)
ALBUMIN/GLOB SERPL: 1 G/DL
ALP SERPL-CCNC: 150 U/L (ref 39–117)
ALT SERPL W P-5'-P-CCNC: 18 U/L (ref 1–41)
ANION GAP SERPL CALCULATED.3IONS-SCNC: 10 MMOL/L (ref 5–15)
AST SERPL-CCNC: 21 U/L (ref 1–40)
BASOPHILS # BLD AUTO: 0.05 10*3/MM3 (ref 0–0.2)
BASOPHILS NFR BLD AUTO: 0.7 % (ref 0–1.5)
BILIRUB SERPL-MCNC: 0.4 MG/DL (ref 0–1.2)
BILIRUB UR QL STRIP: NEGATIVE
BUN SERPL-MCNC: 50 MG/DL (ref 8–23)
BUN/CREAT SERPL: 26.3 (ref 7–25)
CALCIUM SPEC-SCNC: 9.2 MG/DL (ref 8.6–10.5)
CHLORIDE SERPL-SCNC: 100 MMOL/L (ref 98–107)
CLARITY UR: CLEAR
CO2 SERPL-SCNC: 27 MMOL/L (ref 22–29)
COLOR UR: YELLOW
CREAT SERPL-MCNC: 1.9 MG/DL (ref 0.76–1.27)
DEPRECATED RDW RBC AUTO: 46.5 FL (ref 37–54)
EOSINOPHIL # BLD AUTO: 0.85 10*3/MM3 (ref 0–0.4)
EOSINOPHIL NFR BLD AUTO: 11.1 % (ref 0.3–6.2)
ERYTHROCYTE [DISTWIDTH] IN BLOOD BY AUTOMATED COUNT: 14.5 % (ref 12.3–15.4)
GFR SERPL CREATININE-BSD FRML MDRD: 34 ML/MIN/1.73
GLOBULIN UR ELPH-MCNC: 4 GM/DL
GLUCOSE SERPL-MCNC: 127 MG/DL (ref 65–99)
GLUCOSE UR STRIP-MCNC: NEGATIVE MG/DL
HCT VFR BLD AUTO: 35.9 % (ref 37.5–51)
HGB BLD-MCNC: 11.5 G/DL (ref 13–17.7)
HGB UR QL STRIP.AUTO: NEGATIVE
HOLD SPECIMEN: NORMAL
HOLD SPECIMEN: NORMAL
IMM GRANULOCYTES # BLD AUTO: 0.02 10*3/MM3 (ref 0–0.05)
IMM GRANULOCYTES NFR BLD AUTO: 0.3 % (ref 0–0.5)
INR PPP: 3.19 (ref 0.91–1.09)
KETONES UR QL STRIP: NEGATIVE
LEUKOCYTE ESTERASE UR QL STRIP.AUTO: NEGATIVE
LIPASE SERPL-CCNC: 32 U/L (ref 13–60)
LYMPHOCYTES # BLD AUTO: 2.52 10*3/MM3 (ref 0.7–3.1)
LYMPHOCYTES NFR BLD AUTO: 32.9 % (ref 19.6–45.3)
MCH RBC QN AUTO: 28.5 PG (ref 26.6–33)
MCHC RBC AUTO-ENTMCNC: 32 G/DL (ref 31.5–35.7)
MCV RBC AUTO: 88.9 FL (ref 79–97)
MONOCYTES # BLD AUTO: 1.14 10*3/MM3 (ref 0.1–0.9)
MONOCYTES NFR BLD AUTO: 14.9 % (ref 5–12)
NEUTROPHILS NFR BLD AUTO: 3.07 10*3/MM3 (ref 1.7–7)
NEUTROPHILS NFR BLD AUTO: 40.1 % (ref 42.7–76)
NITRITE UR QL STRIP: NEGATIVE
NRBC BLD AUTO-RTO: 0 /100 WBC (ref 0–0.2)
PH UR STRIP.AUTO: <=5 [PH] (ref 5–8)
PLATELET # BLD AUTO: 177 10*3/MM3 (ref 140–450)
PMV BLD AUTO: 9.3 FL (ref 6–12)
POTASSIUM SERPL-SCNC: 3.9 MMOL/L (ref 3.5–5.2)
PROT SERPL-MCNC: 7.8 G/DL (ref 6–8.5)
PROT UR QL STRIP: NEGATIVE
PROTHROMBIN TIME: 30.5 SECONDS (ref 11.5–13.4)
RBC # BLD AUTO: 4.04 10*6/MM3 (ref 4.14–5.8)
SODIUM SERPL-SCNC: 137 MMOL/L (ref 136–145)
SP GR UR STRIP: 1.01 (ref 1–1.03)
UROBILINOGEN UR QL STRIP: NORMAL
WBC # BLD AUTO: 7.65 10*3/MM3 (ref 3.4–10.8)
WHOLE BLOOD HOLD SPECIMEN: NORMAL
WHOLE BLOOD HOLD SPECIMEN: NORMAL

## 2021-09-08 PROCEDURE — 81003 URINALYSIS AUTO W/O SCOPE: CPT | Performed by: EMERGENCY MEDICINE

## 2021-09-08 PROCEDURE — 25010000002 MORPHINE PER 10 MG: Performed by: EMERGENCY MEDICINE

## 2021-09-08 PROCEDURE — 85610 PROTHROMBIN TIME: CPT | Performed by: EMERGENCY MEDICINE

## 2021-09-08 PROCEDURE — 96376 TX/PRO/DX INJ SAME DRUG ADON: CPT

## 2021-09-08 PROCEDURE — 96374 THER/PROPH/DIAG INJ IV PUSH: CPT

## 2021-09-08 PROCEDURE — 80053 COMPREHEN METABOLIC PANEL: CPT | Performed by: EMERGENCY MEDICINE

## 2021-09-08 PROCEDURE — 96375 TX/PRO/DX INJ NEW DRUG ADDON: CPT

## 2021-09-08 PROCEDURE — 99283 EMERGENCY DEPT VISIT LOW MDM: CPT

## 2021-09-08 PROCEDURE — 83690 ASSAY OF LIPASE: CPT | Performed by: EMERGENCY MEDICINE

## 2021-09-08 PROCEDURE — 25010000002 ONDANSETRON PER 1 MG: Performed by: EMERGENCY MEDICINE

## 2021-09-08 PROCEDURE — 74176 CT ABD & PELVIS W/O CONTRAST: CPT

## 2021-09-08 PROCEDURE — 85025 COMPLETE CBC W/AUTO DIFF WBC: CPT | Performed by: EMERGENCY MEDICINE

## 2021-09-08 PROCEDURE — 72131 CT LUMBAR SPINE W/O DYE: CPT

## 2021-09-08 RX ORDER — CIPROFLOXACIN 500 MG/1
500 TABLET, FILM COATED ORAL 2 TIMES DAILY
Qty: 28 TABLET | Refills: 0 | Status: SHIPPED | OUTPATIENT
Start: 2021-09-08 | End: 2022-05-09

## 2021-09-08 RX ORDER — ONDANSETRON 4 MG/1
4 TABLET, ORALLY DISINTEGRATING ORAL EVERY 8 HOURS PRN
Qty: 12 TABLET | Refills: 0 | Status: ON HOLD | OUTPATIENT
Start: 2021-09-08 | End: 2022-05-10 | Stop reason: SDUPTHER

## 2021-09-08 RX ORDER — ONDANSETRON 2 MG/ML
4 INJECTION INTRAMUSCULAR; INTRAVENOUS ONCE
Status: COMPLETED | OUTPATIENT
Start: 2021-09-08 | End: 2021-09-08

## 2021-09-08 RX ORDER — SODIUM CHLORIDE 0.9 % (FLUSH) 0.9 %
10 SYRINGE (ML) INJECTION AS NEEDED
Status: DISCONTINUED | OUTPATIENT
Start: 2021-09-08 | End: 2021-09-08 | Stop reason: HOSPADM

## 2021-09-08 RX ORDER — HYDROCODONE BITARTRATE AND ACETAMINOPHEN 5; 325 MG/1; MG/1
1 TABLET ORAL EVERY 8 HOURS PRN
Qty: 10 TABLET | Refills: 0 | Status: SHIPPED | OUTPATIENT
Start: 2021-09-08 | End: 2022-05-09

## 2021-09-08 RX ADMIN — ONDANSETRON 4 MG: 2 INJECTION INTRAMUSCULAR; INTRAVENOUS at 09:45

## 2021-09-08 RX ADMIN — ONDANSETRON 4 MG: 2 INJECTION INTRAMUSCULAR; INTRAVENOUS at 11:54

## 2021-09-08 RX ADMIN — MORPHINE SULFATE 4 MG: 4 INJECTION INTRAVENOUS at 09:45

## 2021-09-08 NOTE — ED NOTES
Patient presents to the ED with the ED with the CC of bilateral flank pain. He states he had a kidney stone about 40 years ago. He denies difficulty urinating or painful urination. Denies fall or injury to area.       Stephanie Murphy RN  09/08/21 1841

## 2021-09-08 NOTE — ED PROVIDER NOTES
Subjective   Patient is a 86-year-old male who presents to the ER with low back and flank pain.  Patient states he noticed some right flank pain approximately 2 weeks ago but has been progressively worsened and now includes his entire lower back.  Patient describes it as an achy pain that is worse with movement.  Patient denies any known trauma.  He does have a history of a kidney stone several years ago but none recently.  He denies any fever, chest pain, shortness of air, abdominal pain, nausea vomiting diarrhea, urinary changes, numbness, weakness, saddle anesthesia, incontinence or retention.  Patient has no other concerns.          Review of Systems   Constitutional: Negative.    HENT: Negative.    Eyes: Negative.    Respiratory: Negative.    Cardiovascular: Negative.    Gastrointestinal: Negative.    Endocrine: Negative.    Genitourinary: Positive for flank pain.   Musculoskeletal: Positive for back pain.   Skin: Negative.    Allergic/Immunologic: Negative.    Neurological: Negative.    Hematological: Negative.    Psychiatric/Behavioral: Negative.    All other systems reviewed and are negative.      Past Medical History:   Diagnosis Date   • Bradycardia    • Coronary artery disease    • GERD (gastroesophageal reflux disease)    • Hypertension    • Injury of back    • TIA (transient ischemic attack)        No Known Allergies    Past Surgical History:   Procedure Laterality Date   • APPENDECTOMY     • BACK SURGERY      Fusion   • CARDIAC SURGERY      Open Heart   • CHOLECYSTECTOMY N/A 5/28/2020    Procedure: OPEN PARTIAL CHOLECYSTECTOMY, LYSIS OF ADHESIONS;  Surgeon: Agustina Saleem MD;  Location: [facility] OR;  Service: General;  Laterality: N/A;   • EXPLORATORY LAPAROTOMY N/A 4/5/2020    Procedure: open cholecystostomy tube placement ;  Surgeon: Agustina Saleem MD;  Location: [facility] OR;  Service: General;  Laterality: N/A;   • HERNIA REPAIR     • JOINT REPLACEMENT Left     s/p L hip replacement   • PACEMAKER  IMPLANTATION     • STOMACH SURGERY         Family History   Problem Relation Age of Onset   • Heart disease Mother    • Stroke Father        Social History     Socioeconomic History   • Marital status:      Spouse name: Not on file   • Number of children: Not on file   • Years of education: Not on file   • Highest education level: Not on file   Tobacco Use   • Smoking status: Never Smoker   • Smokeless tobacco: Never Used   Substance and Sexual Activity   • Alcohol use: No   • Drug use: No   • Sexual activity: Defer           Objective   Physical Exam  Vitals and nursing note reviewed.   Constitutional:       Appearance: He is well-developed.   HENT:      Head: Normocephalic and atraumatic.   Eyes:      Conjunctiva/sclera: Conjunctivae normal.      Pupils: Pupils are equal, round, and reactive to light.   Cardiovascular:      Rate and Rhythm: Normal rate and regular rhythm.      Heart sounds: Normal heart sounds.   Pulmonary:      Effort: Pulmonary effort is normal.      Breath sounds: Normal breath sounds.   Abdominal:      Palpations: Abdomen is soft.      Tenderness: There is no abdominal tenderness. There is no right CVA tenderness, left CVA tenderness, guarding or rebound.   Genitourinary:     Prostate: Enlarged. Not tender.   Musculoskeletal:         General: No deformity. Normal range of motion.      Cervical back: Normal range of motion.      Comments: Nontender to palpation throughout including CT and L spines, normal range of motion, neurovascularly intact   Skin:     General: Skin is warm.   Neurological:      Mental Status: He is alert and oriented to person, place, and time.      Cranial Nerves: Cranial nerves are intact.      Sensory: Sensation is intact.      Motor: Motor function is intact.      Coordination: Coordination is intact.   Psychiatric:         Behavior: Behavior normal.         Procedures           ED Course      Patient was given morphine and Zofran.  Improving.     Lab Results  (last 24 hours)     Procedure Component Value Units Date/Time    CBC & Differential [[id number]]  (Abnormal) Collected: 09/08/21 0926    Specimen: Blood Updated: 09/08/21 0939    Narrative:      The following orders were created for panel order CBC & Differential.  Procedure                               Abnormality         Status                     ---------                               -----------         ------                     CBC Auto Differential[[id number]]        Abnormal            Final result                 Please view results for these tests on the individual orders.    Comprehensive Metabolic Panel [[id number]]  (Abnormal) Collected: 09/08/21 0926    Specimen: Blood Updated: 09/08/21 1005     Glucose 127 mg/dL      BUN 50 mg/dL      Creatinine 1.90 mg/dL      Sodium 137 mmol/L      Potassium 3.9 mmol/L      Chloride 100 mmol/L      CO2 27.0 mmol/L      Calcium 9.2 mg/dL      Total Protein 7.8 g/dL      Albumin 3.80 g/dL      ALT (SGPT) 18 U/L      AST (SGOT) 21 U/L      Alkaline Phosphatase 150 U/L      Total Bilirubin 0.4 mg/dL      eGFR Non African Amer 34 mL/min/1.73      Globulin 4.0 gm/dL      A/G Ratio 1.0 g/dL      BUN/Creatinine Ratio 26.3     Anion Gap 10.0 mmol/L     Narrative:      GFR Normal >60  Chronic Kidney Disease <60  Kidney Failure <15      Lipase [[id number]]  (Normal) Collected: 09/08/21 0926    Specimen: Blood Updated: 09/08/21 1005     Lipase 32 U/L     Protime-INR [[id number]]  (Abnormal) Collected: 09/08/21 0926    Specimen: Blood Updated: 09/08/21 0949     Protime 30.5 Seconds      INR 3.19    CBC Auto Differential [[id number]]  (Abnormal) Collected: 09/08/21 0926    Specimen: Blood Updated: 09/08/21 0939     WBC 7.65 10*3/mm3      RBC 4.04 10*6/mm3      Hemoglobin 11.5 g/dL      Hematocrit 35.9 %      MCV 88.9 fL      MCH 28.5 pg      MCHC 32.0 g/dL      RDW 14.5 %      RDW-SD 46.5 fl      MPV 9.3 fL      Platelets 177 10*3/mm3      Neutrophil % 40.1 %      Lymphocyte % 32.9 %       Monocyte % 14.9 %      Eosinophil % 11.1 %      Basophil % 0.7 %      Immature Grans % 0.3 %      Neutrophils, Absolute 3.07 10*3/mm3      Lymphocytes, Absolute 2.52 10*3/mm3      Monocytes, Absolute 1.14 10*3/mm3      Eosinophils, Absolute 0.85 10*3/mm3      Basophils, Absolute 0.05 10*3/mm3      Immature Grans, Absolute 0.02 10*3/mm3      nRBC 0.0 /100 WBC     Urinalysis With Culture If Indicated - Urine, Clean Catch [[id number]]  (Normal) Collected: 09/08/21 1005    Specimen: Urine, Clean Catch Updated: 09/08/21 1016     Color, UA Yellow     Appearance, UA Clear     pH, UA <=5.0     Specific Gravity, UA 1.014     Glucose, UA Negative     Ketones, UA Negative     Bilirubin, UA Negative     Blood, UA Negative     Protein, UA Negative     Leuk Esterase, UA Negative     Nitrite, UA Negative     Urobilinogen, UA 0.2 E.U./dL    Narrative:      Urine microscopic not indicated.        CT Abdomen Pelvis Stone Protocol   Final Result   1. No urinary tract calculi obstructive uropathy change.   2. Mild there is asymmetric prominence of the left seminal vesicle,   significance uncertain. Inflammatory changes considered, correlate with   patient presentation.   3. Mild prostate hypertrophy.    4. Colonic diverticulosis. No CT evidence of acute diverticulitis.   This report was finalized on 09/08/2021 10:23 by Dr. Da Dupree MD.      CT Lumbar Spine Without Contrast   Final Result   1. Extensive degenerative changes of the lumbar spine with evidence of   previous multilevel fusion, no hardware loosening is identified and   there is no evidence of pseudoarthrosis. No fracture or acute   abnormality is detected. There is multilevel spinal stenosis and neural   foraminal narrowing as detailed above.                           This report was finalized on 09/08/2021 10:17 by Dr. Jefferson Elkins MD.           Labs showed an elevated BUN and creatinine.  I had no old labs for comparison.  Also showed an elevated INR.  CT of  the abdomen showed no calculi but did show mild asymmetric prominence of the left seminal vesicle that could be inflammation.  He also had mild prostate hypertrophy.  Rectal exam showed no evidence of prostatitis however we will treat the seminal vesicle inflammation with Cipro.  CT of the L-spine also showed extensive degenerative changes and spinal stenosis.  This could be causing the patient's pain.  He will be discharged with a short course of Lortab and Zofran for pain control.  He is to follow-up with his PCP later this week for repeat evaluation as well as an INR and creatinine recheck.  He is also to see Dr. Delgado with urology for his prostate hypertrophy and seminal vesicle issues.  He is return the ER immediately for any worsening or new pain or other concerns.  Patient agreeable.    KWABENA query complete. Treatment plan to include limited course of prescribed  controlled substance. Risks including addiction, benefits, and alternatives presented to patient.                                 MDM    Final diagnoses:   Spinal stenosis, unspecified spinal region   Prostate hypertrophy   Inflammatory disorder of seminal vesicle   NOLAN (acute kidney injury) (CMS/[facility])       ED Disposition  ED Disposition     ED Disposition Condition Comment    Discharge Good           Matheus Olivera PA  [address]  [place] [address]  [phone]    Schedule an appointment as soon as possible for a visit   followup this week for INR and Cr recheck    Luis Delgado MD  [address] [address]  [phone]    Schedule an appointment as soon as possible for a visit            Medication List      New Prescriptions    ciprofloxacin 500 MG tablet  Commonly known as: CIPRO  Take 1 tablet by mouth 2 (Two) Times a Day.     HYDROcodone-acetaminophen 5-325 MG per tablet  Commonly known as: NORCO  Take 1 tablet by mouth Every 8 (Eight) Hours As Needed for Moderate Pain .     ondansetron ODT 4 MG  disintegrating tablet  Commonly known as: ZOFRAN-ODT  Place 1 tablet on the tongue Every 8 (Eight) Hours As Needed for Nausea or Vomiting.           Where to Get Your Medications      You can get these medications from any pharmacy    Bring a paper prescription for each of these medications  · ciprofloxacin 500 MG tablet  · HYDROcodone-acetaminophen 5-325 MG per tablet  · ondansetron ODT 4 MG disintegrating tablet          Odalys Valero MD  09/08/21 3148

## 2021-09-14 NOTE — PROGRESS NOTES
Subjective    Mr. Smith is 86 y.o. male    Chief Complaint: Inflammation of Seminal Vesicle    History of Present Illness  Patient is a new patient to [facility] urology.  Patient went to the emergency department 09/08/2021 with what he described as flank pain or kidney pain but was mainly confined to the lower back.  He is not seen any gross hematuria denies any fever burning with urination he does have a large prostate which is not new to him he has known this for years.  CT scan was done emergency department revealed no urologic findings slightly larger than normal seminal vesicle on the left.    The following portions of the patient's history were reviewed and updated as appropriate: allergies, current medications, past family history, past medical history, past social history, past surgical history and problem list.    Review of Systems   Constitutional: Negative for appetite change and fever.   HENT: Negative for hearing loss and sore throat.    Eyes: Negative for pain and redness.   Respiratory: Negative for cough and shortness of breath.    Cardiovascular: Negative for chest pain and leg swelling.   Gastrointestinal: Negative for anal bleeding, nausea and vomiting.   Endocrine: Negative for cold intolerance and heat intolerance.   Genitourinary: Negative for dysuria, flank pain and hematuria.   Musculoskeletal: Negative for joint swelling and myalgias.   Skin: Negative for color change and rash.   Allergic/Immunologic: Negative for immunocompromised state.   Neurological: Negative for dizziness and speech difficulty.   Hematological: Negative for adenopathy. Does not bruise/bleed easily.   Psychiatric/Behavioral: Negative for dysphoric mood and suicidal ideas.         Current Outpatient Medications:   •  aspirin 81 MG EC tablet, Take 81 mg by mouth Daily., Disp: , Rfl:   •  bisacodyl (DULCOLAX) 10 MG suppository, Insert 1 suppository into the rectum Daily As Needed for Constipation., Disp: , Rfl:   •   bisoprolol (ZEBeta) 5 MG tablet, Take 5 mg by mouth Daily., Disp: , Rfl:   •  ciprofloxacin (CIPRO) 500 MG tablet, Take 1 tablet by mouth 2 (Two) Times a Day., Disp: 28 tablet, Rfl: 0  •  famotidine (PEPCID) 20 MG tablet, Take 1 tablet by mouth 2 (Two) Times a Day., Disp: , Rfl:   •  furosemide (LASIX) 20 MG tablet, Take 20 mg by mouth Daily., Disp: , Rfl:   •  Glycerin-Hypromellose- (ARTIFICIAL TEARS) 0.2-0.2-1 % solution ophthalmic solution, Administer 1 drop to both eyes Every 1 (One) Hour As Needed for Dry Eyes., Disp: , Rfl:   •  HYDROcodone-acetaminophen (NORCO) 5-325 MG per tablet, Take 1 tablet by mouth Every 8 (Eight) Hours As Needed for Moderate Pain ., Disp: 10 tablet, Rfl: 0  •  insulin lispro (humaLOG) 100 UNIT/ML injection, Inject 4 times daily as per sliding scale: 150-199= 2 units 200-249= 3 units 250-299= 4 units 300-349= 5 units 350-400= 6 units 401-550= 7 units and call MD, Disp: , Rfl:   •  losartan (COZAAR) 100 MG tablet, Take 100 mg by mouth Daily., Disp: , Rfl:   •  ondansetron (ZOFRAN) 4 MG tablet, Take 4 mg by mouth Every 8 (Eight) Hours As Needed for Nausea or Vomiting., Disp: , Rfl:   •  ondansetron ODT (ZOFRAN-ODT) 4 MG disintegrating tablet, Place 1 tablet on the tongue Every 8 (Eight) Hours As Needed for Nausea or Vomiting., Disp: 12 tablet, Rfl: 0  •  oxyCODONE-acetaminophen (PERCOCET) 5-325 MG per tablet, Take 1 tablet by mouth Every 4 (Four) Hours As Needed for Moderate Pain ., Disp: , Rfl:   •  rosuvastatin (CRESTOR) 20 MG tablet, Take 20 mg by mouth., Disp: , Rfl:   •  sennosides-docusate (senna-docusate sodium) 8.6-50 MG per tablet, Take 1 tablet by mouth Daily., Disp: , Rfl:   •  sucralfate (CARAFATE) 1 GM/10ML suspension, Take 10 mL by mouth Every 6 (Six) Hours., Disp: , Rfl:   •  therapeutic multivitamin-minerals (THERAGRAN-M) tablet, Take 1 tablet by mouth., Disp: , Rfl:   •  vitamin B-12 (CYANOCOBALAMIN) 1000 MCG tablet, Take 1,000 mcg by mouth Daily., Disp: , Rfl:   •  " vitamin D (ERGOCALCIFEROL) 1.25 MG (54745 UT) capsule capsule, Take 50,000 Units by mouth., Disp: , Rfl:   •  warfarin (COUMADIN) 1 MG tablet, Give 1 mg QHS M,W,F,Sat and Sun. Give 2.5 mg QHS Tues and Thurs, Disp: , Rfl:     Past Medical History:   Diagnosis Date   • Bradycardia    • Coronary artery disease    • GERD (gastroesophageal reflux disease)    • Hypertension    • Injury of back    • TIA (transient ischemic attack)        Past Surgical History:   Procedure Laterality Date   • APPENDECTOMY     • BACK SURGERY      Fusion   • CARDIAC SURGERY      Open Heart   • CHOLECYSTECTOMY N/A 5/28/2020    Procedure: OPEN PARTIAL CHOLECYSTECTOMY, LYSIS OF ADHESIONS;  Surgeon: Agustina Saleem MD;  Location: [facility] OR;  Service: General;  Laterality: N/A;   • EXPLORATORY LAPAROTOMY N/A 4/5/2020    Procedure: open cholecystostomy tube placement ;  Surgeon: Agustina Saleem MD;  Location:  [facility] OR;  Service: General;  Laterality: N/A;   • HERNIA REPAIR     • JOINT REPLACEMENT Left     s/p L hip replacement   • PACEMAKER IMPLANTATION     • STOMACH SURGERY         Social History     Socioeconomic History   • Marital status:      Spouse name: Not on file   • Number of children: Not on file   • Years of education: Not on file   • Highest education level: Not on file   Tobacco Use   • Smoking status: Never Smoker   • Smokeless tobacco: Never Used   Vaping Use   • Vaping Use: Never used   Substance and Sexual Activity   • Alcohol use: No   • Drug use: No   • Sexual activity: Defer       Family History   Problem Relation Age of Onset   • Heart disease Mother    • Stroke Father        Objective    Temp 97.7 °F (36.5 °C)   Ht 177.8 cm (70\")   Wt 88.5 kg (195 lb)   BMI 27.98 kg/m²     Physical Exam  Vitals reviewed.   Constitutional:       Appearance: Normal appearance.   HENT:      Head: Normocephalic and atraumatic.      Right Ear: External ear normal.      Left Ear: External ear normal.   Pulmonary:      Effort: " Pulmonary effort is normal.   Abdominal:      Palpations: Abdomen is soft.      Tenderness: There is no abdominal tenderness. There is no right CVA tenderness or left CVA tenderness.   Musculoskeletal:      Comments: Patient confined to wheelchair   Skin:     General: Skin is warm and dry.   Neurological:      General: No focal deficit present.      Mental Status: He is alert and oriented to person, place, and time.   Psychiatric:         Mood and Affect: Mood normal.         Behavior: Behavior normal.         Independent review of a CT scan of abdomen and pelvis without contrast  The CT scan of the abdomen/pelvis done without contrast is available for me to review.  Treatment recommendations require an independent review.  First I scanned the liver, spleen, and bowel pattern.  The retroperitoneum including the major vessels and lymphatic packages are briefly reviewed.  This film as been reviewed by the radiologist to determine any non urologic abnormalities that are present.  The kidneys are closely inspected for size, symmetry, contour, parenchymal thickness, perinephric reaction, presence of calcifications, and intrarenal dilation of the collecting system.  The ureters are inspected for their course, caliber, and any calcifications.  The bladder is inspected for its thickness, size, and presence of any calcifications.  This scan shows no urologic findings except for mild grossly enlarged left seminal vesicle..          Assessment and Plan    Diagnoses and all orders for this visit:    1. Hypertrophy of seminal vesicle (Primary)  -     Cancel: POC Urinalysis Dipstick, Multipro    2. Bilateral low back pain without sciatica, unspecified chronicity    The findings on CT scan would not explain the pain he is having there is no treatment indicated for the suspected enlarged left seminal vesicle.  This would not cause bilateral flank or lower back pain I think this is musculoskeletal.  No enlargement kidney stones he  has enlarged prostate but this is a known finding patient is aware of this.  No further follow-up unless he develops any urologic pathology.

## 2021-09-15 ENCOUNTER — OFFICE VISIT (OUTPATIENT)
Dept: UROLOGY | Facility: CLINIC | Age: 86
End: 2021-09-15

## 2021-09-15 VITALS — WEIGHT: 195 LBS | TEMPERATURE: 97.7 F | HEIGHT: 70 IN | BODY MASS INDEX: 27.92 KG/M2

## 2021-09-15 DIAGNOSIS — M54.50 BILATERAL LOW BACK PAIN WITHOUT SCIATICA, UNSPECIFIED CHRONICITY: ICD-10-CM

## 2021-09-15 DIAGNOSIS — N50.89: Primary | ICD-10-CM

## 2021-09-15 PROCEDURE — 99214 OFFICE O/P EST MOD 30 MIN: CPT | Performed by: PHYSICIAN ASSISTANT

## 2021-10-07 ENCOUNTER — TELEPHONE (OUTPATIENT)
Dept: CARDIOLOGY CLINIC | Age: 86
End: 2021-10-07

## 2021-10-07 ENCOUNTER — OFFICE VISIT (OUTPATIENT)
Dept: CARDIOLOGY CLINIC | Age: 86
End: 2021-10-07
Payer: MEDICARE

## 2021-10-07 VITALS
SYSTOLIC BLOOD PRESSURE: 110 MMHG | WEIGHT: 213 LBS | HEIGHT: 70 IN | BODY MASS INDEX: 30.49 KG/M2 | DIASTOLIC BLOOD PRESSURE: 62 MMHG | HEART RATE: 60 BPM

## 2021-10-07 DIAGNOSIS — Z95.1 STATUS POST AORTO-CORONARY ARTERY BYPASS GRAFT: ICD-10-CM

## 2021-10-07 DIAGNOSIS — Z95.0 PACEMAKER: ICD-10-CM

## 2021-10-07 DIAGNOSIS — I25.10 CORONARY ARTERY DISEASE INVOLVING NATIVE CORONARY ARTERY OF NATIVE HEART WITHOUT ANGINA PECTORIS: Primary | ICD-10-CM

## 2021-10-07 DIAGNOSIS — R42 DIZZINESS: ICD-10-CM

## 2021-10-07 DIAGNOSIS — R60.9 EDEMA, UNSPECIFIED TYPE: ICD-10-CM

## 2021-10-07 PROCEDURE — 1036F TOBACCO NON-USER: CPT | Performed by: INTERNAL MEDICINE

## 2021-10-07 PROCEDURE — 4040F PNEUMOC VAC/ADMIN/RCVD: CPT | Performed by: INTERNAL MEDICINE

## 2021-10-07 PROCEDURE — 1123F ACP DISCUSS/DSCN MKR DOCD: CPT | Performed by: INTERNAL MEDICINE

## 2021-10-07 PROCEDURE — G8427 DOCREV CUR MEDS BY ELIG CLIN: HCPCS | Performed by: INTERNAL MEDICINE

## 2021-10-07 PROCEDURE — 99213 OFFICE O/P EST LOW 20 MIN: CPT | Performed by: INTERNAL MEDICINE

## 2021-10-07 PROCEDURE — G8417 CALC BMI ABV UP PARAM F/U: HCPCS | Performed by: INTERNAL MEDICINE

## 2021-10-07 PROCEDURE — 93280 PM DEVICE PROGR EVAL DUAL: CPT | Performed by: INTERNAL MEDICINE

## 2021-10-07 PROCEDURE — G8484 FLU IMMUNIZE NO ADMIN: HCPCS | Performed by: INTERNAL MEDICINE

## 2021-10-07 RX ORDER — GUAIFENESIN 600 MG/1
1200 TABLET, EXTENDED RELEASE ORAL 2 TIMES DAILY PRN
COMMUNITY
End: 2022-01-11

## 2021-10-07 RX ORDER — IBUPROFEN 400 MG/1
400 TABLET ORAL EVERY 6 HOURS PRN
COMMUNITY
End: 2022-01-11

## 2021-10-07 RX ORDER — FAMOTIDINE 20 MG/1
20 TABLET, FILM COATED ORAL 2 TIMES DAILY
COMMUNITY

## 2021-10-07 RX ORDER — IVERMECTIN 3 MG/1
TABLET ORAL ONCE
COMMUNITY
End: 2022-01-11

## 2021-10-07 RX ORDER — SUCRALFATE 1 G/1
1 TABLET ORAL 4 TIMES DAILY
COMMUNITY
End: 2022-01-11

## 2021-10-07 RX ORDER — HYDROCODONE BITARTRATE AND ACETAMINOPHEN 7.5; 325 MG/1; MG/1
1 TABLET ORAL EVERY 4 HOURS PRN
COMMUNITY

## 2021-10-07 ASSESSMENT — ENCOUNTER SYMPTOMS
DIARRHEA: 0
GASTROINTESTINAL NEGATIVE: 1
SHORTNESS OF BREATH: 0
RESPIRATORY NEGATIVE: 1
VOMITING: 0
NAUSEA: 0
EYES NEGATIVE: 1

## 2021-10-07 NOTE — PATIENT INSTRUCTIONS
Patient will need a reading sent in from his home monitor for his PACEMAKER on 01/10/2022. Please make sure this gets sent on this day. New monitor was delivered to facility in July.

## 2021-10-07 NOTE — TELEPHONE ENCOUNTER
Barrington Kyrie resides at [facility]. Diana RN from nursing home called asking to follow up with office about instructions on paperwork from today's visit. Paperwork states that new monitor was delivered to facility in July but they never received it. Please return her call to [phone] ask for Wing 2 in regards to patient. Thank you.

## 2021-10-07 NOTE — PROGRESS NOTES
[facility] Progress Note                            Date:  10/7/2021  Patient: Asia aSntizo  Age:  81 y.o., 1935      Reason for evaluation:         SUBJECTIVE:    Returns today for follow-up assessment overall doing well. Device interrogated functioning appropriately:  Pacemaker interrogated  Presenting rhythm: Afib/,   99.3%  Battey voltage 2.88 V  Lead status:  Lead impedance within range and stable  Sensing:   R waves 4.9 mV  Thresholds: Ventricular 0.875 V @ 0.4ms  Observations:  18 monitored episodes AT/AF (100% burden)  Reprogramming for sensitivity and threshold testing  Next carelink appointment:  01/10/22    Reports overall feeling well. He has some chronic edema which is unchanged. Denies dyspnea denies chest pain no other complaints or issues reported. Blood pressure 110/62 heart 60. Last echocardiogram 4/8/2021 ejection fraction 55 to 35% grade 2 diastolic dysfunction mildly dilated left atrium moderate MR mild mitral stenosis mitral annular calcification. No other complaints or issues reported. Review of Systems   Constitutional: Negative. Negative for chills, fever and unexpected weight change. HENT: Negative. Eyes: Negative. Respiratory: Negative. Negative for shortness of breath. Cardiovascular: Negative. Negative for chest pain. Gastrointestinal: Negative. Negative for diarrhea, nausea and vomiting. Endocrine: Negative. Genitourinary: Negative. Musculoskeletal: Negative. Skin: Negative. Neurological: Negative. All other systems reviewed and are negative. OBJECTIVE:     /62   Pulse 60   Ht 5' 10\" (1.778 m)   Wt 213 lb (96.6 kg)   BMI 30.56 kg/m²     Labs:   CBC: No results for input(s): WBC, HGB, HCT, PLT in the last 72 hours. BMP:No results for input(s): NA, K, CO2, BUN, CREATININE, LABGLOM, GLUCOSE in the last 72 hours. BNP: No results for input(s): BNP in the last 72 hours.   PT/INR: No results for input(s): PROTIME, INR in the last 72 hours. APTT:No results for input(s): APTT in the last 72 hours. CARDIAC ENZYMES:No results for input(s): CKTOTAL, CKMB, CKMBINDEX, TROPONINI in the last 72 hours. FASTING LIPID PANEL:  Lab Results   Component Value Date    HDL 37 06/12/2016    LDLCALC 75 06/12/2016    TRIG 124 06/12/2016     LIVER PROFILE:No results for input(s): AST, ALT, LABALBU in the last 72 hours. Past Medical History:   Diagnosis Date    CAD (coronary artery disease)     Hypertension     TIA (transient ischemic attack)      Past Surgical History:   Procedure Laterality Date    APPENDECTOMY      BACK SURGERY      CARDIAC SURGERY      CORONARY ARTERY BYPASS GRAFT  2010    plus 2 valve replaacement- [facility]    DILATATION, ESOPHAGUS      HERNIA REPAIR      JOINT REPLACEMENT      PACEMAKER PLACEMENT       History reviewed. No pertinent family history. No Known Allergies  Current Outpatient Medications   Medication Sig Dispense Refill    famotidine (PEPCID) 20 MG tablet Take 20 mg by mouth 2 times daily      guaiFENesin (MUCINEX) 600 MG extended release tablet Take 1,200 mg by mouth 2 times daily as needed for Congestion      ibuprofen (ADVIL;MOTRIN) 400 MG tablet Take 400 mg by mouth every 6 hours as needed for Pain      ivermectin 3 MG tablet Take by mouth once      HYDROcodone-acetaminophen (NORCO) 7.5-325 MG per tablet Take 1 tablet by mouth every 6 hours as needed for Pain.       insulin aspart (NOVOLOG) 100 UNIT/ML injection vial Inject into the skin 3 times daily (before meals) Sliding scale      sucralfate (CARAFATE) 1 GM tablet Take 1 g by mouth 4 times daily      furosemide (LASIX) 20 MG tablet Take 20 mg by mouth 2 times daily      cyanocobalamin 1000 MCG tablet Take 1,000 mcg by mouth daily      loperamide (IMODIUM) 2 MG capsule Take 2 mg by mouth as needed for Diarrhea      losartan-hydroCHLOROthiazide (HYZAAR) 100-25 MG per tablet Take 1 tablet by mouth daily      mirtazapine (REMERON) 7.5 MG tablet Take 7.5 mg by mouth nightly      ondansetron (ZOFRAN) 4 MG tablet Take 4 mg by mouth every 8 hours as needed for Nausea or Vomiting      tiotropium (SPIRIVA RESPIMAT) 2.5 MCG/ACT AERS inhaler Inhale 2 puffs into the lungs daily      sertraline (ZOLOFT) 50 MG tablet Take 50 mg by mouth daily       warfarin (COUMADIN) 1 MG tablet Take 0.5 mg by mouth nightly      bisoprolol (ZEBETA) 10 MG tablet Take 10 mg by mouth nightly      Multiple Vitamins-Minerals (THERAPEUTIC MULTIVITAMIN-MINERALS) tablet Take 1 tablet by mouth daily      aspirin 81 MG tablet Take 81 mg by mouth daily      Cholecalciferol (VITAMIN D3) 2000 UNITS CAPS Take 2,000 Units by mouth daily      vitamin D (ERGOCALCIFEROL) 95749 UNITS CAPS capsule Take 50,000 Units by mouth once a week Indications:       oxyCODONE-acetaminophen (PERCOCET) 5-325 MG per tablet Take 1 tablet by mouth every 4 hours as needed for Pain. (Patient not taking: Reported on 10/7/2021)      senna (SENOKOT) 8.6 MG tablet Take 1 tablet by mouth daily (Patient not taking: Reported on 10/7/2021)      losartan (COZAAR) 100 MG tablet Take 100 mg by mouth daily (Patient not taking: Reported on 10/7/2021)      rosuvastatin (CRESTOR) 20 MG tablet Take 20 mg by mouth nightly (Patient not taking: Reported on 10/7/2021)       No current facility-administered medications for this visit. Social History     Socioeconomic History    Marital status:       Spouse name: Rosie Alexander     Number of children: 2    Years of education: 15    Highest education level: Not on file   Occupational History    Occupation: retired    Tobacco Use    Smoking status: Former Smoker     Packs/day: 2.00     Years: 25.00     Pack years: 50.00     Quit date: 1967     Years since quittin.4    Smokeless tobacco: Never Used   Substance and Sexual Activity    Alcohol use: Not on file    Drug use: Not on file    Sexual activity: Not on file   Other Topics Concern    Not on file   Social History Narrative    Not on file     Social Determinants of Health     Financial Resource Strain:     Difficulty of Paying Living Expenses:    Food Insecurity:     Worried About Running Out of Food in the Last Year:     [address] in the Last Year:    Transportation Needs:     Lack of Transportation (Medical):  Lack of Transportation (Non-Medical):    Physical Activity:     Days of Exercise per Week:     Minutes of Exercise per Session:    Stress:     Feeling of Stress :    Social Connections:     Frequency of Communication with Friends and Family:     Frequency of Social Gatherings with Friends and Family:     Attends Yarsanism Services:     Active Member of Clubs or Organizations:     Attends Club or Organization Meetings:     Marital Status:    Intimate Partner Violence:     Fear of Current or Ex-Partner:     Emotionally Abused:     Physically Abused:     Sexually Abused:        Physical Examination:  /62   Pulse 60   Ht 5' 10\" (1.778 m)   Wt 213 lb (96.6 kg)   BMI 30.56 kg/m²   Physical Exam  Vitals reviewed. Constitutional:       Appearance: He is well-developed. Neck:      Vascular: No carotid bruit or JVD. Cardiovascular:      Rate and Rhythm: Normal rate and regular rhythm. Heart sounds: Normal heart sounds. No murmur heard. No friction rub. No gallop. Pulmonary:      Effort: Pulmonary effort is normal. No respiratory distress. Breath sounds: Normal breath sounds. No wheezing or rales. Abdominal:      General: There is no distension. Tenderness: There is no abdominal tenderness. Lymphadenopathy:      Cervical: No cervical adenopathy. Skin:     General: Skin is warm and dry. ASSESSMENT:     Diagnosis Orders   1. Coronary artery disease involving native coronary artery of native heart without angina pectoris     2. Pacemaker     3. Dizziness     4. Edema, unspecified type     5. Status post aorto-coronary artery bypass graft         PLAN:  No orders of the defined types were placed in this encounter. No orders of the defined types were placed in this encounter. 1. Continue present medications  2. Recommend follow-up assessment in 3 months    Return in about 3 months (around 1/7/2022). Heather Canchola MD 10/7/2021 10:52 AM CDT    [facility]      Thisdictation was generated by voice recognition computer software. Although all attempts are made to edit the dictation for accuracy, there may be errors in the transcription that are not intended.

## 2021-10-07 NOTE — TELEPHONE ENCOUNTER
Spoke with patient's nurse. States that the patient's monitor is not able to be found. She is going to ask the DON if it was placed somewhere else and will call us back if they do not find it.

## 2022-01-11 ENCOUNTER — OFFICE VISIT (OUTPATIENT)
Dept: CARDIOLOGY CLINIC | Age: 87
End: 2022-01-11
Payer: MEDICARE

## 2022-01-11 VITALS
SYSTOLIC BLOOD PRESSURE: 136 MMHG | HEIGHT: 70 IN | HEART RATE: 63 BPM | BODY MASS INDEX: 30.92 KG/M2 | DIASTOLIC BLOOD PRESSURE: 74 MMHG | WEIGHT: 216 LBS

## 2022-01-11 DIAGNOSIS — R60.0 EDEMA OF BOTH LOWER EXTREMITIES: ICD-10-CM

## 2022-01-11 DIAGNOSIS — R06.02 SHORTNESS OF BREATH: ICD-10-CM

## 2022-01-11 DIAGNOSIS — I38 VALVULAR HEART DISEASE: ICD-10-CM

## 2022-01-11 DIAGNOSIS — I10 ESSENTIAL HYPERTENSION: ICD-10-CM

## 2022-01-11 DIAGNOSIS — N18.30 STAGE 3 CHRONIC KIDNEY DISEASE, UNSPECIFIED WHETHER STAGE 3A OR 3B CKD (HCC): ICD-10-CM

## 2022-01-11 DIAGNOSIS — Z95.0 PACEMAKER: ICD-10-CM

## 2022-01-11 DIAGNOSIS — I25.10 CORONARY ARTERY DISEASE INVOLVING NATIVE CORONARY ARTERY OF NATIVE HEART WITHOUT ANGINA PECTORIS: Primary | ICD-10-CM

## 2022-01-11 DIAGNOSIS — I49.5 SINUS NODE DYSFUNCTION (HCC): ICD-10-CM

## 2022-01-11 LAB
ANION GAP SERPL CALCULATED.3IONS-SCNC: 12 MMOL/L (ref 7–19)
BUN BLDV-MCNC: 51 MG/DL (ref 8–23)
CALCIUM SERPL-MCNC: 9.6 MG/DL (ref 8.8–10.2)
CHLORIDE BLD-SCNC: 96 MMOL/L (ref 98–111)
CO2: 30 MMOL/L (ref 22–29)
CREAT SERPL-MCNC: 2.2 MG/DL (ref 0.5–1.2)
GFR AFRICAN AMERICAN: 35
GFR NON-AFRICAN AMERICAN: 29
GLUCOSE BLD-MCNC: 115 MG/DL (ref 74–109)
POTASSIUM SERPL-SCNC: 4.2 MMOL/L (ref 3.5–5)
PRO-BNP: 2278 PG/ML (ref 0–1800)
SODIUM BLD-SCNC: 138 MMOL/L (ref 136–145)

## 2022-01-11 PROCEDURE — 1036F TOBACCO NON-USER: CPT | Performed by: CLINICAL NURSE SPECIALIST

## 2022-01-11 PROCEDURE — 93280 PM DEVICE PROGR EVAL DUAL: CPT | Performed by: CLINICAL NURSE SPECIALIST

## 2022-01-11 PROCEDURE — G8427 DOCREV CUR MEDS BY ELIG CLIN: HCPCS | Performed by: CLINICAL NURSE SPECIALIST

## 2022-01-11 PROCEDURE — G8417 CALC BMI ABV UP PARAM F/U: HCPCS | Performed by: CLINICAL NURSE SPECIALIST

## 2022-01-11 PROCEDURE — 4040F PNEUMOC VAC/ADMIN/RCVD: CPT | Performed by: CLINICAL NURSE SPECIALIST

## 2022-01-11 PROCEDURE — 1123F ACP DISCUSS/DSCN MKR DOCD: CPT | Performed by: CLINICAL NURSE SPECIALIST

## 2022-01-11 PROCEDURE — G8484 FLU IMMUNIZE NO ADMIN: HCPCS | Performed by: CLINICAL NURSE SPECIALIST

## 2022-01-11 PROCEDURE — 99214 OFFICE O/P EST MOD 30 MIN: CPT | Performed by: CLINICAL NURSE SPECIALIST

## 2022-01-11 RX ORDER — ACETAMINOPHEN 500 MG
650 TABLET ORAL EVERY 6 HOURS PRN
COMMUNITY

## 2022-01-11 RX ORDER — DOCUSATE SODIUM 100 MG/1
100 CAPSULE, LIQUID FILLED ORAL 2 TIMES DAILY
COMMUNITY

## 2022-01-11 RX ORDER — POLYETHYLENE GLYCOL 3350 17 G/17G
17 POWDER, FOR SOLUTION ORAL DAILY
COMMUNITY

## 2022-01-11 ASSESSMENT — ENCOUNTER SYMPTOMS
NAUSEA: 0
WHEEZING: 0
VOMITING: 0
ABDOMINAL PAIN: 0
COUGH: 0
FACIAL SWELLING: 0
EYE REDNESS: 0
SHORTNESS OF BREATH: 1
CHEST TIGHTNESS: 0

## 2022-01-11 NOTE — PATIENT INSTRUCTIONS
Return in about 4 months (around 5/11/2022) for Dr. Hayde Headley. Labs today- BMP, BNP- call results [facility]    Call with any questionsor concerns  Follow up with Unknown Provider Result (Inactive) for non cardiac problems  Report any new problems  Cardiovascular Fitness-Exercise as tolerated. Strive for 15 minutes of exercise most days of the week. Cardiac / HealthyDiet  Continue current medications as directed  Continue plan of treatment  It is always recommended that you bring your medicationsbottles with you to each visit - this is for your safety!

## 2022-01-11 NOTE — PROGRESS NOTES
[facility], [address] [place] [address], [id number]  Phone: [phone]  Fax: [phone]    OFFICE VISIT:  2022    Rolando Wright - : 1935    Reason For Visit:  Cleola Schaumann is a 81 y.o. male who is here for Follow-up (no cardiac symptoms) and Coronary Artery Disease       Diagnosis Orders   1. Coronary artery disease involving native coronary artery of native heart without angina pectoris     2. Valvular heart disease     3. Essential hypertension     4. Stage 3 chronic kidney disease, unspecified whether stage 3a or 3b CKD ([facility] [address]     5. Edema of both lower extremities  Basic Metabolic Panel    Brain Natriuretic Peptide   6. Shortness of breath  Basic Metabolic Panel    Brain Natriuretic Peptide   7. Pacemaker     8. Sinus node dysfunction (HCC)         HPI  Pt's cardiac history includes CABG x2 and 2 valve replacements in approximately  in the [address] [place] [address] area,  a pacemaker, chronic atrial fibrillation. He establish care with our office and . He is currently residing in a nursing home, [address] [place] [address]. He feels well overall. He denies chest pain, unusual dyspnea, orthopnea, PND,  palpitations. He has a lot of mobility issues and spends most of his time in a wheelchair. He is having an increase in lower extremity edema and has gained weight since his last visit here. States his feet are so swollen that he cannot get them into shoes. Unknown Provider Result (Inactive) is PCP.   Rolando Wright has the following history as recorded in [facility]:    Patient Active Problem List    Diagnosis Date Noted    Pacemaker 2021    ACS (acute coronary syndrome) ([facility] [address] [facility]     Dizziness     Coronary artery disease involving native coronary artery      Past Medical History:   Diagnosis Date    CAD (coronary artery disease)     Hypertension     TIA (transient ischemic attack)      Past Surgical History:   Procedure Laterality Date    10 MG tablet Take 10 mg by mouth nightly      aspirin 81 MG tablet Take 81 mg by mouth daily      Cholecalciferol (VITAMIN D3) 2000 UNITS CAPS Take 2,000 Units by mouth daily       No current facility-administered medications for this visit. Allergies: Patient has no known allergies. Review of Systems  Review of Systems   Constitutional: Positive for fatigue and unexpected weight change (gain). Negative for activity change, diaphoresis and fever. HENT: Negative for facial swelling and nosebleeds. Eyes: Negative for redness and visual disturbance. Respiratory: Positive for shortness of breath (chronic, unchanged). Negative for cough, chest tightness and wheezing. Cardiovascular: Positive for leg swelling (worsening). Negative for chest pain and palpitations. Gastrointestinal: Negative for abdominal pain, nausea and vomiting. Endocrine: Negative for cold intolerance and heat intolerance. Genitourinary: Negative for dysuria and hematuria. Musculoskeletal: Negative for arthralgias and myalgias. Complains of mobility issues   Skin: Negative for pallor and rash. Neurological: Negative for dizziness, seizures, syncope, weakness and light-headedness. Hematological: Does not bruise/bleed easily. Psychiatric/Behavioral: Negative for agitation. The patient is not nervous/anxious. Objective  Vital Signs - /74   Pulse 63   Ht 5' 10\" (1.778 m)   Wt 216 lb (98 kg)   BMI 30.99 kg/m²    Wt Readings from Last 3 Encounters:   01/11/22 216 lb (98 kg)   10/07/21 213 lb (96.6 kg)   04/01/21 195 lb (88.5 kg)      Physical Exam  Vitals and nursing note reviewed. Constitutional:       General: He is not in acute distress. Appearance: He is well-developed. He is not diaphoretic. Comments: elderly   HENT:      Head: Normocephalic and atraumatic.       Right Ear: Hearing and external ear normal.      Left Ear: Hearing and external ear normal.      Nose: Nose normal.   Eyes: General:         Right eye: No discharge. Left eye: No discharge. Pupils: Pupils are equal, round, and reactive to light. Neck:      Thyroid: No thyromegaly. Vascular: No carotid bruit or JVD. Trachea: No tracheal deviation. Cardiovascular:      Rate and Rhythm: Normal rate and regular rhythm. Heart sounds: Normal heart sounds. No murmur heard. No friction rub. No gallop. Pulmonary:      Effort: Pulmonary effort is normal. No respiratory distress. Breath sounds: Normal breath sounds. No wheezing or rales. Abdominal:      Palpations: Abdomen is soft. Tenderness: There is no abdominal tenderness. Musculoskeletal:         General: No swelling or deformity. Cervical back: Neck supple. No muscular tenderness. Right lower leg: Edema present. Left lower leg: Edema (1+) present. Comments: In wheelchair   Skin:     General: Skin is warm and dry. Findings: No rash. Neurological:      General: No focal deficit present. Mental Status: He is alert and oriented to person, place, and time. Cranial Nerves: No cranial nerve deficit. Psychiatric:         Mood and Affect: Mood normal.         Behavior: Behavior normal.         Judgment: Judgment normal.     DATA  Echo 4/8/21  Conclusions      Summary   Mildly thickened mitral leaflets with normal leaflet mobility. Moderate   mitral annular calcification present   mild mitral valve stenosis, moderate mitral regurgitation. There is a bioprosthetic valve in the aortic position that is functioning   normally. There is no evidence of stenosis or regurgitation. Tricuspid valve is structurally normal.   Mild tricuspid regurgitation; estimated RVSP of at least 28 mmHg. Mildly dilated left atrium. Normal left ventricular internal dimensions, mild concentric LVH and   normal LV systolic function (estimated EF, 55-60%). Grade 2 diastolic   dysfunction. Septal bounce consistent with V-pacing seen. Normal right atrial dimension with no evidence of thrombus or mass noted. A lead is seen traversing the RA   Normal right ventricular size with preserved RV function. There is a lead   present in the RV   Aortic root is within normal limits. No evidence of significant pericardial effusion is noted. No evidence of pleural effusion. Signature      ----------------------------------------------------------------   Electronically signed by Gui Oh MD(Interpreting   physician) on 04/20/2021 08:53 AM   ----------------------------------------------------------------    Assessment:     Diagnosis Orders   1. Coronary artery disease involving native coronary artery of native heart without angina pectoris     2. Valvular heart disease     3. Essential hypertension     4. Stage 3 chronic kidney disease, unspecified whether stage 3a or 3b CKD ([facility] [address]     5. Edema of both lower extremities  Basic Metabolic Panel    Brain Natriuretic Peptide   6. Shortness of breath  Basic Metabolic Panel    Brain Natriuretic Peptide   7. Pacemaker     8. Sinus node dysfunction (HCC)         CADhistory of CABG x2 in [address] [place] [address] in approximately 2010. He denies any symptoms of angina. Valvular heart disease2 valve replacements at the time of his CABG in 2010. Baseline echocardiogram shows bioprosthetic aortic valve with normal function. Permanent atrial fibrillationchronic and ongoing. Anticoagulated with Coumadin which is managed at the nursing home. Hypertensionstable on current regimen with bisoprolol and losartan HCTZ    Sinus node dysfunction/pacemaker  Pacemaker check showed adequate battery status at 8mo  Mode: DDDR  Lead impedances are stable  Pacing:  AP 0.1%,  99 %. Appropriate diagnostics and safety margins noted. Sustained arrythmia: Chronic A. fib  Reprogramming done for sensitivity and threshold testing.   Please see the scanned interrogation report    Edema of lower extremities/ CKD-  weight

## 2022-04-28 ENCOUNTER — HOSPITAL ENCOUNTER (EMERGENCY)
Facility: HOSPITAL | Age: 87
Discharge: NURSING, REHAB OR LONG-TERM CARE | End: 2022-04-28
Attending: STUDENT IN AN ORGANIZED HEALTH CARE EDUCATION/TRAINING PROGRAM | Admitting: STUDENT IN AN ORGANIZED HEALTH CARE EDUCATION/TRAINING PROGRAM

## 2022-04-28 ENCOUNTER — APPOINTMENT (OUTPATIENT)
Dept: ULTRASOUND IMAGING | Facility: HOSPITAL | Age: 87
End: 2022-04-28

## 2022-04-28 ENCOUNTER — APPOINTMENT (OUTPATIENT)
Dept: GENERAL RADIOLOGY | Facility: HOSPITAL | Age: 87
End: 2022-04-28

## 2022-04-28 VITALS
HEART RATE: 61 BPM | WEIGHT: 220 LBS | RESPIRATION RATE: 18 BRPM | HEIGHT: 70 IN | DIASTOLIC BLOOD PRESSURE: 63 MMHG | TEMPERATURE: 98.1 F | SYSTOLIC BLOOD PRESSURE: 137 MMHG | OXYGEN SATURATION: 98 % | BODY MASS INDEX: 31.5 KG/M2

## 2022-04-28 DIAGNOSIS — M79.651 PAIN OF RIGHT THIGH: ICD-10-CM

## 2022-04-28 DIAGNOSIS — M79.604 PAIN OF RIGHT LOWER EXTREMITY: Primary | ICD-10-CM

## 2022-04-28 LAB
INR PPP: 3.78 (ref 0.91–1.09)
PROTHROMBIN TIME: 35.9 SECONDS (ref 11.9–14.6)

## 2022-04-28 PROCEDURE — 99283 EMERGENCY DEPT VISIT LOW MDM: CPT

## 2022-04-28 PROCEDURE — 93971 EXTREMITY STUDY: CPT

## 2022-04-28 PROCEDURE — 85610 PROTHROMBIN TIME: CPT | Performed by: STUDENT IN AN ORGANIZED HEALTH CARE EDUCATION/TRAINING PROGRAM

## 2022-04-28 PROCEDURE — 93971 EXTREMITY STUDY: CPT | Performed by: SURGERY

## 2022-04-28 PROCEDURE — 36415 COLL VENOUS BLD VENIPUNCTURE: CPT

## 2022-04-28 PROCEDURE — 73552 X-RAY EXAM OF FEMUR 2/>: CPT

## 2022-04-28 RX ORDER — ONDANSETRON 4 MG/1
4 TABLET, ORALLY DISINTEGRATING ORAL ONCE
Status: DISCONTINUED | OUTPATIENT
Start: 2022-04-28 | End: 2022-04-28 | Stop reason: HOSPADM

## 2022-05-09 ENCOUNTER — APPOINTMENT (OUTPATIENT)
Dept: GENERAL RADIOLOGY | Facility: HOSPITAL | Age: 87
End: 2022-05-09

## 2022-05-09 ENCOUNTER — APPOINTMENT (OUTPATIENT)
Dept: CT IMAGING | Facility: HOSPITAL | Age: 87
End: 2022-05-09

## 2022-05-09 ENCOUNTER — HOSPITAL ENCOUNTER (INPATIENT)
Facility: HOSPITAL | Age: 87
LOS: 3 days | Discharge: NURSING, REHAB OR LONG-TERM CARE | End: 2022-05-12
Attending: INTERNAL MEDICINE | Admitting: FAMILY MEDICINE

## 2022-05-09 DIAGNOSIS — I50.9 ACUTE ON CHRONIC CONGESTIVE HEART FAILURE, UNSPECIFIED HEART FAILURE TYPE: Primary | ICD-10-CM

## 2022-05-09 DIAGNOSIS — S70.10XA: ICD-10-CM

## 2022-05-09 PROBLEM — J90 PLEURAL EFFUSION, BILATERAL: Status: ACTIVE | Noted: 2022-05-09

## 2022-05-09 PROBLEM — Z79.4 INSULIN DEPENDENT TYPE 2 DIABETES MELLITUS (HCC): Status: ACTIVE | Noted: 2022-05-09

## 2022-05-09 PROBLEM — E11.9 INSULIN DEPENDENT TYPE 2 DIABETES MELLITUS (HCC): Status: ACTIVE | Noted: 2022-05-09

## 2022-05-09 PROBLEM — R79.1 SUPRATHERAPEUTIC INR: Status: ACTIVE | Noted: 2022-05-09

## 2022-05-09 PROBLEM — D63.8 ANEMIA OF CHRONIC DISEASE: Status: ACTIVE | Noted: 2022-05-09

## 2022-05-09 PROBLEM — N18.32 STAGE 3B CHRONIC KIDNEY DISEASE (HCC): Status: ACTIVE | Noted: 2020-04-04

## 2022-05-09 PROBLEM — R60.1 ANASARCA: Status: ACTIVE | Noted: 2022-05-09

## 2022-05-09 LAB
ALBUMIN SERPL-MCNC: 3.5 G/DL (ref 3.5–5.2)
ALBUMIN/GLOB SERPL: 0.9 G/DL
ALP SERPL-CCNC: 226 U/L (ref 39–117)
ALT SERPL W P-5'-P-CCNC: 16 U/L (ref 1–41)
ANION GAP SERPL CALCULATED.3IONS-SCNC: 13 MMOL/L (ref 5–15)
APTT PPP: 63.3 SECONDS (ref 24.1–35)
AST SERPL-CCNC: 36 U/L (ref 1–40)
BASOPHILS # BLD AUTO: 0.03 10*3/MM3 (ref 0–0.2)
BASOPHILS NFR BLD AUTO: 0.3 % (ref 0–1.5)
BILIRUB SERPL-MCNC: 2.7 MG/DL (ref 0–1.2)
BUN SERPL-MCNC: 35 MG/DL (ref 8–23)
BUN/CREAT SERPL: 17.9 (ref 7–25)
CALCIUM SPEC-SCNC: 9.1 MG/DL (ref 8.6–10.5)
CHLORIDE SERPL-SCNC: 103 MMOL/L (ref 98–107)
CO2 SERPL-SCNC: 23 MMOL/L (ref 22–29)
CREAT SERPL-MCNC: 1.96 MG/DL (ref 0.76–1.27)
DEPRECATED RDW RBC AUTO: 50.4 FL (ref 37–54)
EGFRCR SERPLBLD CKD-EPI 2021: 32.9 ML/MIN/1.73
EOSINOPHIL # BLD AUTO: 0.13 10*3/MM3 (ref 0–0.4)
EOSINOPHIL NFR BLD AUTO: 1.5 % (ref 0.3–6.2)
ERYTHROCYTE [DISTWIDTH] IN BLOOD BY AUTOMATED COUNT: 15.7 % (ref 12.3–15.4)
FERRITIN SERPL-MCNC: 90.66 NG/ML (ref 30–400)
GLOBULIN UR ELPH-MCNC: 3.7 GM/DL
GLUCOSE SERPL-MCNC: 130 MG/DL (ref 65–99)
HCT VFR BLD AUTO: 30.9 % (ref 37.5–51)
HGB BLD-MCNC: 9 G/DL (ref 13–17.7)
INR PPP: 3.12 (ref 0.91–1.09)
IRON 24H UR-MRATE: 75 MCG/DL (ref 59–158)
IRON SATN MFR SERPL: 20 % (ref 20–50)
LYMPHOCYTES # BLD AUTO: 1.76 10*3/MM3 (ref 0.7–3.1)
LYMPHOCYTES NFR BLD AUTO: 20.3 % (ref 19.6–45.3)
MCH RBC QN AUTO: 26 PG (ref 26.6–33)
MCHC RBC AUTO-ENTMCNC: 29.1 G/DL (ref 31.5–35.7)
MCV RBC AUTO: 89.3 FL (ref 79–97)
MONOCYTES # BLD AUTO: 1.86 10*3/MM3 (ref 0.1–0.9)
MONOCYTES NFR BLD AUTO: 21.5 % (ref 5–12)
NEUTROPHILS NFR BLD AUTO: 4.8 10*3/MM3 (ref 1.7–7)
NEUTROPHILS NFR BLD AUTO: 55.6 % (ref 42.7–76)
NT-PROBNP SERPL-MCNC: 5003 PG/ML (ref 0–1800)
PLATELET # BLD AUTO: 206 10*3/MM3 (ref 140–450)
PMV BLD AUTO: 10.3 FL (ref 6–12)
POTASSIUM SERPL-SCNC: 4.6 MMOL/L (ref 3.5–5.2)
PROT SERPL-MCNC: 7.2 G/DL (ref 6–8.5)
PROTHROMBIN TIME: 31 SECONDS (ref 11.9–14.6)
RBC # BLD AUTO: 3.46 10*6/MM3 (ref 4.14–5.8)
RETICS # AUTO: 0.11 10*6/MM3 (ref 0.02–0.13)
RETICS/RBC NFR AUTO: 3.05 % (ref 0.7–1.9)
SARS-COV-2 RNA PNL SPEC NAA+PROBE: NOT DETECTED
SODIUM SERPL-SCNC: 139 MMOL/L (ref 136–145)
TIBC SERPL-MCNC: 373 MCG/DL (ref 298–536)
TRANSFERRIN SERPL-MCNC: 250 MG/DL (ref 200–360)
TROPONIN T SERPL-MCNC: 0.03 NG/ML (ref 0–0.03)
TROPONIN T SERPL-MCNC: 0.05 NG/ML (ref 0–0.03)
WBC NRBC COR # BLD: 8.65 10*3/MM3 (ref 3.4–10.8)

## 2022-05-09 PROCEDURE — 84484 ASSAY OF TROPONIN QUANT: CPT | Performed by: PHYSICIAN ASSISTANT

## 2022-05-09 PROCEDURE — 71045 X-RAY EXAM CHEST 1 VIEW: CPT

## 2022-05-09 PROCEDURE — 74177 CT ABD & PELVIS W/CONTRAST: CPT

## 2022-05-09 PROCEDURE — 85025 COMPLETE CBC W/AUTO DIFF WBC: CPT | Performed by: PHYSICIAN ASSISTANT

## 2022-05-09 PROCEDURE — 25010000002 MORPHINE SULFATE (PF) 2 MG/ML SOLUTION: Performed by: EMERGENCY MEDICINE

## 2022-05-09 PROCEDURE — 99284 EMERGENCY DEPT VISIT MOD MDM: CPT

## 2022-05-09 PROCEDURE — 0 IOPAMIDOL PER 1 ML: Performed by: PHYSICIAN ASSISTANT

## 2022-05-09 PROCEDURE — 87635 SARS-COV-2 COVID-19 AMP PRB: CPT | Performed by: PHYSICIAN ASSISTANT

## 2022-05-09 PROCEDURE — 82728 ASSAY OF FERRITIN: CPT | Performed by: NURSE PRACTITIONER

## 2022-05-09 PROCEDURE — 93005 ELECTROCARDIOGRAM TRACING: CPT | Performed by: PHYSICIAN ASSISTANT

## 2022-05-09 PROCEDURE — 83880 ASSAY OF NATRIURETIC PEPTIDE: CPT | Performed by: PHYSICIAN ASSISTANT

## 2022-05-09 PROCEDURE — 25010000002 FUROSEMIDE PER 20 MG: Performed by: NURSE PRACTITIONER

## 2022-05-09 PROCEDURE — 85730 THROMBOPLASTIN TIME PARTIAL: CPT | Performed by: PHYSICIAN ASSISTANT

## 2022-05-09 PROCEDURE — 85045 AUTOMATED RETICULOCYTE COUNT: CPT | Performed by: NURSE PRACTITIONER

## 2022-05-09 PROCEDURE — 71275 CT ANGIOGRAPHY CHEST: CPT

## 2022-05-09 PROCEDURE — 25010000002 VITAMIN K1 PER 1 MG: Performed by: NURSE PRACTITIONER

## 2022-05-09 PROCEDURE — 25010000002 ONDANSETRON PER 1 MG: Performed by: PHYSICIAN ASSISTANT

## 2022-05-09 PROCEDURE — 83540 ASSAY OF IRON: CPT | Performed by: NURSE PRACTITIONER

## 2022-05-09 PROCEDURE — 72131 CT LUMBAR SPINE W/O DYE: CPT

## 2022-05-09 PROCEDURE — 73552 X-RAY EXAM OF FEMUR 2/>: CPT

## 2022-05-09 PROCEDURE — 93010 ELECTROCARDIOGRAM REPORT: CPT | Performed by: INTERNAL MEDICINE

## 2022-05-09 PROCEDURE — 84466 ASSAY OF TRANSFERRIN: CPT | Performed by: NURSE PRACTITIONER

## 2022-05-09 PROCEDURE — 80053 COMPREHEN METABOLIC PANEL: CPT | Performed by: PHYSICIAN ASSISTANT

## 2022-05-09 PROCEDURE — 85610 PROTHROMBIN TIME: CPT | Performed by: PHYSICIAN ASSISTANT

## 2022-05-09 RX ORDER — FAMOTIDINE 20 MG/1
20 TABLET, FILM COATED ORAL DAILY
Status: DISCONTINUED | OUTPATIENT
Start: 2022-05-10 | End: 2022-05-12 | Stop reason: HOSPADM

## 2022-05-09 RX ORDER — SODIUM CHLORIDE 0.9 % (FLUSH) 0.9 %
10 SYRINGE (ML) INJECTION AS NEEDED
Status: DISCONTINUED | OUTPATIENT
Start: 2022-05-09 | End: 2022-05-12 | Stop reason: HOSPADM

## 2022-05-09 RX ORDER — NICOTINE POLACRILEX 4 MG
15 LOZENGE BUCCAL
Status: DISCONTINUED | OUTPATIENT
Start: 2022-05-09 | End: 2022-05-12 | Stop reason: HOSPADM

## 2022-05-09 RX ORDER — ACETAMINOPHEN 650 MG/1
650 SUPPOSITORY RECTAL EVERY 4 HOURS PRN
Status: DISCONTINUED | OUTPATIENT
Start: 2022-05-09 | End: 2022-05-12 | Stop reason: HOSPADM

## 2022-05-09 RX ORDER — ACETAMINOPHEN 325 MG/1
650 TABLET ORAL EVERY 4 HOURS PRN
Status: DISCONTINUED | OUTPATIENT
Start: 2022-05-09 | End: 2022-05-12 | Stop reason: HOSPADM

## 2022-05-09 RX ORDER — LOSARTAN POTASSIUM 50 MG/1
100 TABLET ORAL DAILY
Status: DISCONTINUED | OUTPATIENT
Start: 2022-05-10 | End: 2022-05-12 | Stop reason: HOSPADM

## 2022-05-09 RX ORDER — POLYETHYLENE GLYCOL 3350 17 G/17G
17 POWDER, FOR SOLUTION ORAL DAILY
COMMUNITY

## 2022-05-09 RX ORDER — MORPHINE SULFATE 2 MG/ML
2 INJECTION, SOLUTION INTRAMUSCULAR; INTRAVENOUS ONCE
Status: COMPLETED | OUTPATIENT
Start: 2022-05-09 | End: 2022-05-09

## 2022-05-09 RX ORDER — LOPERAMIDE HYDROCHLORIDE 2 MG/1
2 CAPSULE ORAL 4 TIMES DAILY PRN
COMMUNITY

## 2022-05-09 RX ORDER — AMOXICILLIN 250 MG
1 CAPSULE ORAL DAILY
Status: DISCONTINUED | OUTPATIENT
Start: 2022-05-10 | End: 2022-05-12 | Stop reason: HOSPADM

## 2022-05-09 RX ORDER — INSULIN LISPRO 100 [IU]/ML
2-7 INJECTION, SOLUTION INTRAVENOUS; SUBCUTANEOUS
Status: DISCONTINUED | OUTPATIENT
Start: 2022-05-10 | End: 2022-05-12 | Stop reason: HOSPADM

## 2022-05-09 RX ORDER — ACETAMINOPHEN 160 MG/5ML
650 SOLUTION ORAL EVERY 4 HOURS PRN
Status: DISCONTINUED | OUTPATIENT
Start: 2022-05-09 | End: 2022-05-12 | Stop reason: HOSPADM

## 2022-05-09 RX ORDER — PHYTONADIONE 10 MG/ML
10 INJECTION, EMULSION INTRAMUSCULAR; INTRAVENOUS; SUBCUTANEOUS ONCE
Status: DISCONTINUED | OUTPATIENT
Start: 2022-05-09 | End: 2022-05-09

## 2022-05-09 RX ORDER — ONDANSETRON 2 MG/ML
4 INJECTION INTRAMUSCULAR; INTRAVENOUS ONCE
Status: COMPLETED | OUTPATIENT
Start: 2022-05-09 | End: 2022-05-09

## 2022-05-09 RX ORDER — HYDROCODONE BITARTRATE AND ACETAMINOPHEN 7.5; 325 MG/1; MG/1
1 TABLET ORAL EVERY 4 HOURS PRN
Status: ON HOLD | COMMUNITY
End: 2022-05-12 | Stop reason: SDUPTHER

## 2022-05-09 RX ORDER — SODIUM CHLORIDE 0.9 % (FLUSH) 0.9 %
10 SYRINGE (ML) INJECTION EVERY 12 HOURS SCHEDULED
Status: DISCONTINUED | OUTPATIENT
Start: 2022-05-09 | End: 2022-05-12 | Stop reason: HOSPADM

## 2022-05-09 RX ORDER — BISOPROLOL FUMARATE 5 MG/1
5 TABLET, FILM COATED ORAL DAILY
Status: DISCONTINUED | OUTPATIENT
Start: 2022-05-10 | End: 2022-05-12 | Stop reason: HOSPADM

## 2022-05-09 RX ORDER — FUROSEMIDE 10 MG/ML
40 INJECTION INTRAMUSCULAR; INTRAVENOUS EVERY 12 HOURS
Status: DISCONTINUED | OUTPATIENT
Start: 2022-05-09 | End: 2022-05-10

## 2022-05-09 RX ORDER — ONDANSETRON 2 MG/ML
4 INJECTION INTRAMUSCULAR; INTRAVENOUS EVERY 6 HOURS PRN
Status: DISCONTINUED | OUTPATIENT
Start: 2022-05-09 | End: 2022-05-12 | Stop reason: HOSPADM

## 2022-05-09 RX ORDER — ONDANSETRON 4 MG/1
4 TABLET, FILM COATED ORAL EVERY 6 HOURS PRN
Status: DISCONTINUED | OUTPATIENT
Start: 2022-05-09 | End: 2022-05-12 | Stop reason: HOSPADM

## 2022-05-09 RX ORDER — IBUPROFEN 400 MG/1
400 TABLET ORAL 4 TIMES DAILY PRN
COMMUNITY
End: 2022-05-12 | Stop reason: HOSPADM

## 2022-05-09 RX ORDER — ACETAMINOPHEN 325 MG/1
650 TABLET ORAL EVERY 4 HOURS PRN
COMMUNITY

## 2022-05-09 RX ORDER — DEXTROSE MONOHYDRATE 25 G/50ML
25 INJECTION, SOLUTION INTRAVENOUS
Status: DISCONTINUED | OUTPATIENT
Start: 2022-05-09 | End: 2022-05-12 | Stop reason: HOSPADM

## 2022-05-09 RX ORDER — HYDROCODONE BITARTRATE AND ACETAMINOPHEN 7.5; 325 MG/1; MG/1
1 TABLET ORAL EVERY 8 HOURS PRN
Status: DISCONTINUED | OUTPATIENT
Start: 2022-05-09 | End: 2022-05-12 | Stop reason: HOSPADM

## 2022-05-09 RX ADMIN — ONDANSETRON 4 MG: 2 INJECTION INTRAMUSCULAR; INTRAVENOUS at 15:48

## 2022-05-09 RX ADMIN — PHYTONADIONE 10 MG: 10 INJECTION, EMULSION INTRAMUSCULAR; INTRAVENOUS; SUBCUTANEOUS at 20:50

## 2022-05-09 RX ADMIN — FUROSEMIDE 40 MG: 10 INJECTION, SOLUTION INTRAMUSCULAR; INTRAVENOUS at 21:44

## 2022-05-09 RX ADMIN — MORPHINE SULFATE 2 MG: 2 INJECTION, SOLUTION INTRAMUSCULAR; INTRAVENOUS at 15:50

## 2022-05-09 RX ADMIN — IOPAMIDOL 100 ML: 755 INJECTION, SOLUTION INTRAVENOUS at 17:18

## 2022-05-09 NOTE — ED PROVIDER NOTES
"Subjective   History of Present Illness    Patient is an 86-year-old male presenting to ED via EMS from nursing home with right hip pain.  PMH significant for insulin-dependent diabetes, hypertension, history of TIA, coronary artery disease, long-term use of warfarin, status post pacemaker.  Patient states 2 years ago he was placed into a nursing home after complications from a cholecystectomy and since that time he has had significant decrease in mobility and reports that he will intermittently use a cane but does not frequently walk.  Patient states 2 months ago he began having pain in his right hip which radiates through his right femur but never goes distal past the knee or into his spine.  Patient states it has been progressively worsening and he denies any falls, trauma, or injuries.  Patient states that he can no longer get up and walk with his cane due to significant pain.  Patient describes that it slightly went into his right lower back however denies any spinal bony tenderness, thoracic tenderness, cervical abnormalities, left lower extremity problem, or upper extremity problems.  Patient reiterated multiple times he has had no falls or trauma.  Patient denies numbness or paresthesias of the lower extremity.  Patient denies incontinence of bowel or bladder, saddle anesthesia, urinary retention.  Patient states that he could no longer take the pain at which time he presents for further evaluation.  Patient did note that over the past couple days he has began having sharp intermittent chest pains for which she can find no worsening or alleviating factors.  Patient denies shortness of breath, palpitations.  Patient did describe that he feels he is having testicular swelling and soreness for the past couple days however he has not been able to \"see what they look like.\"    Records reviewed show patient was last seen in ED on 4/28/2022 for pain of right lower extremity, pain of right thigh.  Patient had " "negative venous Doppler of the right lower extremity, negative right femur x-ray.    Review of Systems   Constitutional: Negative.    HENT: Negative.    Eyes: Negative.    Respiratory: Negative.  Negative for chest tightness and shortness of breath.    Cardiovascular: Positive for chest pain (sharp, intermitent).   Gastrointestinal: Negative.  Negative for abdominal pain, nausea and vomiting.   Genitourinary: Positive for testicular pain (\"soreness\"). Negative for dysuria, flank pain, hematuria and scrotal swelling.   Musculoskeletal: Positive for arthralgias (right hip, right upper leg), back pain (right lower lumabr) and gait problem. Negative for neck pain.   Skin: Negative.  Negative for color change and wound.   Neurological: Negative for numbness.   Psychiatric/Behavioral: Negative.    All other systems reviewed and are negative.      Past Medical History:   Diagnosis Date   • Bradycardia    • Coronary artery disease    • GERD (gastroesophageal reflux disease)    • Hypertension    • Injury of back    • TIA (transient ischemic attack)        No Known Allergies    Past Surgical History:   Procedure Laterality Date   • APPENDECTOMY     • BACK SURGERY      Fusion   • CARDIAC SURGERY      Open Heart   • CHOLECYSTECTOMY N/A 5/28/2020    Procedure: OPEN PARTIAL CHOLECYSTECTOMY, LYSIS OF ADHESIONS;  Surgeon: Agustina Saleem MD;  Location: [facility] OR;  Service: General;  Laterality: N/A;   • EXPLORATORY LAPAROTOMY N/A 4/5/2020    Procedure: open cholecystostomy tube placement ;  Surgeon: Agustina Saleem MD;  Location: [facility] OR;  Service: General;  Laterality: N/A;   • HERNIA REPAIR     • JOINT REPLACEMENT Left     s/p L hip replacement   • PACEMAKER IMPLANTATION     • STOMACH SURGERY         Family History   Problem Relation Age of Onset   • Heart disease Mother    • Stroke Father        Social History     Socioeconomic History   • Marital status:    Tobacco Use   • Smoking status: Never Smoker   • Smokeless " tobacco: Never Used   Vaping Use   • Vaping Use: Never used   Substance and Sexual Activity   • Alcohol use: No   • Drug use: No   • Sexual activity: Defer           Objective   Physical Exam  Vitals and nursing note reviewed.   Constitutional:       General: He is in acute distress (appears uncomfortable due to pain).      Appearance: Normal appearance. He is well-developed, well-groomed and overweight. He is not diaphoretic.   HENT:      Head: Normocephalic and atraumatic.      Mouth/Throat:      Mouth: Mucous membranes are moist.      Pharynx: Oropharynx is clear.   Eyes:      Conjunctiva/sclera: Conjunctivae normal.      Pupils: Pupils are equal, round, and reactive to light.   Cardiovascular:      Rate and Rhythm: Regular rhythm. Bradycardia present.   Pulmonary:      Effort: Pulmonary effort is normal.      Breath sounds: Normal breath sounds.   Abdominal:      General: Bowel sounds are normal.      Palpations: Abdomen is soft.      Tenderness: There is abdominal tenderness (RLQ). There is right CVA tenderness. There is no left CVA tenderness.   Musculoskeletal:         General: Tenderness present.      Cervical back: Normal, normal range of motion and neck supple. No bony tenderness.      Thoracic back: No bony tenderness. Normal range of motion.      Lumbar back: Tenderness (right paraspinal muscular region) present. No bony tenderness. Negative right straight leg raise test and negative left straight leg raise test.      Right hip: Tenderness present.      Left hip: Normal.      Right upper leg: Tenderness present.      Left upper leg: Normal.      Right knee: Normal.      Right lower le+ Pitting Edema present.      Left lower le+ Pitting Edema present.   Skin:     General: Skin is warm.      Findings: Bruising present. No abrasion or laceration.             Comments: Large area of ecchymosis with overlying swelling, tenderness to palpitation to the right flank region which wraps around towards the  right hip and is intermittent into the right groin region with small area of ecchymosis to right upper inner thigh   Neurological:      Mental Status: He is alert.   Psychiatric:         Behavior: Behavior is cooperative.         Procedures           ED Course  ED Course as of 05/09/22 2151   Mon May 09, 2022   1939 This patient came in with some nonspecific hip pain no history of trauma he has got a retroperitoneal hemorrhage with low hemoglobin on anticoagulation we cannot reverse Coumadin immediately since he is got aortic valve but will hold off his Coumadin does not appear to be actively hemorrhaging at this time.  The patient is going be admitted to the medicine service with the vascular consultation.  He also has CHF. [TS]      ED Course User Index  [TS] Juan Antonio Segal MD                                                 MDM  Number of Diagnoses or Management Options     Amount and/or Complexity of Data Reviewed  Clinical lab tests: reviewed and ordered  Tests in the radiology section of CPT®: reviewed and ordered  Tests in the medicine section of CPT®: ordered and reviewed  Decide to obtain previous medical records or to obtain history from someone other than the patient: yes  Review and summarize past medical records: yes  Discuss the patient with other providers: yes (Dr. Juan Antonio Segal (attending)  Dr. Scott (vascular)  Dr. Simons (hospitalist))    Patient Progress  Patient progress: stable      Patient is an 86-year-old male presenting to ED via EMS from nursing home with right hip pain.  PMH significant for insulin-dependent diabetes, hypertension, history of TIA, coronary artery disease, long-term use of warfarin, status post pacemaker.  CBC is normal with a count 8.65, H&H decreased from patient's baseline at 9/30.9 today.  CMP with BUN 35, creatinine 1.96, GFR of 32.9.  Alk phos 226, total bilirubin elevated at 2.7.  No further electrolyte abnormalities.  PT/INR 31/3.12. Initial troponin elevated at  0.05 with repeat 0.034.  BNP elevated at 5003.  Other testing negative.  Chest x-ray showed: Small bilateral pleural effusions with overlying atelectasis.  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  Chest CTA showed: No pulmonary emboli, decompensated congestive failure with bilateral interstitial and sharlene pulmonary edema as well as bilateral large layering pleural effusions, previous coronary bypass, aortic valvular prosthesis.  CT of the abdomen and pelvis showed: Likely intramuscular hematoma involving left iliopsoas with what appears to be faintly visible intramuscular hematoma in the iliacus measuring approximately 5.3 cm in diameter, do not see adjacent pelvic fracture or obvious lumbar spine fracture, systemic volume overload with bilateral pleural effusions and body wall subcutaneous edema.  Lumbar spine CT showed: No acute osseous injury identified, severely degenerated lumbar spine with multilevel acquired high-grade spinal stenosis and neuroforaminal narrowing, previous L3-L5 posterior spinal fusion with intact fusion construct, no acute fractures, abnormal expansion of right iliacus muscle.  Patient given a dose of pain medicine with some relief in his symptoms.  Discussed with patient need for admission for management of CHF as well as continued monitoring of hematoma.  Case was discussed with Dr. Scott, vascular surgeon, who will kindly consult on patient.  Case discussed with Dr. Simons, hospitalist, who will kindly accept patient for admission under his services.    Final diagnoses:   Acute on chronic congestive heart failure, unspecified heart failure type (HCC)   Hematoma of iliopsoas muscle, initial encounter       ED Disposition  ED Disposition     ED Disposition   Decision to Admit    Condition   --    Comment   Level of Care: Observation Unit [28]   Diagnosis: Acute exacerbation of CHF (congestive heart failure) (HCC)  [450969]   Admitting Physician: DANNY PIERCE [[id number]]               No follow-up provider specified.       Medication List      ASK your doctor about these medications    HYDROcodone-acetaminophen 7.5-325 MG per tablet  Commonly known as: NORCO  Ask about: Which instructions should I use?             Matheus Raygoza PA-C  05/09/22 8320

## 2022-05-10 ENCOUNTER — APPOINTMENT (OUTPATIENT)
Dept: CARDIOLOGY | Facility: HOSPITAL | Age: 87
End: 2022-05-10

## 2022-05-10 PROBLEM — K81.0 ACUTE CHOLECYSTITIS: Status: RESOLVED | Noted: 2020-04-04 | Resolved: 2022-05-10

## 2022-05-10 PROBLEM — T85.520A BILIARY DRAIN DISPLACEMENT: Status: RESOLVED | Noted: 2020-05-26 | Resolved: 2022-05-10

## 2022-05-10 PROBLEM — R78.81 BACTEREMIA: Status: RESOLVED | Noted: 2020-04-04 | Resolved: 2022-05-10

## 2022-05-10 PROBLEM — I50.9 ACUTE CHF (CONGESTIVE HEART FAILURE): Status: ACTIVE | Noted: 2022-05-10

## 2022-05-10 LAB
ALBUMIN SERPL-MCNC: 3.2 G/DL (ref 3.5–5.2)
ALBUMIN/GLOB SERPL: 0.8 G/DL
ALP SERPL-CCNC: 199 U/L (ref 39–117)
ALT SERPL W P-5'-P-CCNC: 26 U/L (ref 1–41)
ANION GAP SERPL CALCULATED.3IONS-SCNC: 11 MMOL/L (ref 5–15)
AST SERPL-CCNC: 39 U/L (ref 1–40)
BASOPHILS # BLD AUTO: 0.04 10*3/MM3 (ref 0–0.2)
BASOPHILS NFR BLD AUTO: 0.5 % (ref 0–1.5)
BH CV ECHO MEAS - AO MAX PG: 15.2 MMHG
BH CV ECHO MEAS - AO MEAN PG: 7 MMHG
BH CV ECHO MEAS - AO ROOT DIAM: 3.6 CM
BH CV ECHO MEAS - AO V2 MAX: 195 CM/SEC
BH CV ECHO MEAS - AO V2 VTI: 30.9 CM
BH CV ECHO MEAS - AVA(I,D): 1.75 CM2
BH CV ECHO MEAS - EDV(CUBED): 64 ML
BH CV ECHO MEAS - EDV(MOD-SP4): 69 ML
BH CV ECHO MEAS - EF(MOD-SP4): 56.5 %
BH CV ECHO MEAS - ESV(CUBED): 15.6 ML
BH CV ECHO MEAS - ESV(MOD-SP4): 30 ML
BH CV ECHO MEAS - FS: 37.5 %
BH CV ECHO MEAS - IVS/LVPW: 0.91 CM
BH CV ECHO MEAS - IVSD: 1 CM
BH CV ECHO MEAS - LA DIMENSION: 4.7 CM
BH CV ECHO MEAS - LV DIASTOLIC VOL/BSA (35-75): 31 CM2
BH CV ECHO MEAS - LV MASS(C)D: 136.2 GRAMS
BH CV ECHO MEAS - LV MAX PG: 3.8 MMHG
BH CV ECHO MEAS - LV MEAN PG: 2 MMHG
BH CV ECHO MEAS - LV SYSTOLIC VOL/BSA (12-30): 13.5 CM2
BH CV ECHO MEAS - LV V1 MAX: 97.1 CM/SEC
BH CV ECHO MEAS - LV V1 VTI: 17.2 CM
BH CV ECHO MEAS - LVIDD: 4 CM
BH CV ECHO MEAS - LVIDS: 2.5 CM
BH CV ECHO MEAS - LVOT AREA: 3.1 CM2
BH CV ECHO MEAS - LVOT DIAM: 2 CM
BH CV ECHO MEAS - LVPWD: 1.1 CM
BH CV ECHO MEAS - MV A MAX VEL: 67.2 CM/SEC
BH CV ECHO MEAS - MV DEC SLOPE: 698 CM/SEC2
BH CV ECHO MEAS - MV DEC TIME: 0.15 MSEC
BH CV ECHO MEAS - MV E MAX VEL: 166 CM/SEC
BH CV ECHO MEAS - MV E/A: 2.47
BH CV ECHO MEAS - MV P1/2T: 72.2 MSEC
BH CV ECHO MEAS - MVA(P1/2T): 3 CM2
BH CV ECHO MEAS - PA V2 MAX: 112 CM/SEC
BH CV ECHO MEAS - PI END-D VEL: 90.2 CM/SEC
BH CV ECHO MEAS - RAP SYSTOLE: 5 MMHG
BH CV ECHO MEAS - RVSP: 50.2 MMHG
BH CV ECHO MEAS - SI(MOD-SP4): 17.5 ML/M2
BH CV ECHO MEAS - SV(LVOT): 54 ML
BH CV ECHO MEAS - SV(MOD-SP4): 39 ML
BH CV ECHO MEAS - TR MAX PG: 45.2 MMHG
BH CV ECHO MEAS - TR MAX VEL: 336 CM/SEC
BH CV XLRA - RV BASE: 5.2 CM
BILIRUB SERPL-MCNC: 3.2 MG/DL (ref 0–1.2)
BILIRUB UR QL STRIP: NEGATIVE
BUN SERPL-MCNC: 35 MG/DL (ref 8–23)
BUN/CREAT SERPL: 20.7 (ref 7–25)
CALCIUM SPEC-SCNC: 8.8 MG/DL (ref 8.6–10.5)
CHLORIDE SERPL-SCNC: 100 MMOL/L (ref 98–107)
CHOLEST SERPL-MCNC: 79 MG/DL (ref 0–200)
CLARITY UR: CLEAR
CO2 SERPL-SCNC: 26 MMOL/L (ref 22–29)
COLOR UR: YELLOW
CREAT SERPL-MCNC: 1.69 MG/DL (ref 0.76–1.27)
DEPRECATED RDW RBC AUTO: 51.5 FL (ref 37–54)
EGFRCR SERPLBLD CKD-EPI 2021: 39.3 ML/MIN/1.73
EOSINOPHIL # BLD AUTO: 0.05 10*3/MM3 (ref 0–0.4)
EOSINOPHIL NFR BLD AUTO: 0.6 % (ref 0.3–6.2)
ERYTHROCYTE [DISTWIDTH] IN BLOOD BY AUTOMATED COUNT: 15.9 % (ref 12.3–15.4)
GLOBULIN UR ELPH-MCNC: 3.9 GM/DL
GLUCOSE SERPL-MCNC: 137 MG/DL (ref 65–99)
GLUCOSE UR STRIP-MCNC: NEGATIVE MG/DL
HBA1C MFR BLD: 6.4 % (ref 4.8–5.6)
HCT VFR BLD AUTO: 30.4 % (ref 37.5–51)
HDLC SERPL-MCNC: 34 MG/DL (ref 40–60)
HGB BLD-MCNC: 8.9 G/DL (ref 13–17.7)
HGB UR QL STRIP.AUTO: NEGATIVE
INR PPP: 2.01 (ref 0.91–1.09)
KETONES UR QL STRIP: NEGATIVE
LDLC SERPL CALC-MCNC: 30 MG/DL (ref 0–100)
LDLC/HDLC SERPL: 0.95 {RATIO}
LEFT ATRIUM VOLUME INDEX: 43.9 ML/M2
LEFT ATRIUM VOLUME: 86 CM3
LEUKOCYTE ESTERASE UR QL STRIP.AUTO: NEGATIVE
LYMPHOCYTES # BLD AUTO: 0.97 10*3/MM3 (ref 0.7–3.1)
LYMPHOCYTES NFR BLD AUTO: 12.4 % (ref 19.6–45.3)
MAXIMAL PREDICTED HEART RATE: 135 BPM
MCH RBC QN AUTO: 26.3 PG (ref 26.6–33)
MCHC RBC AUTO-ENTMCNC: 29.3 G/DL (ref 31.5–35.7)
MCV RBC AUTO: 89.7 FL (ref 79–97)
MONOCYTES # BLD AUTO: 1.65 10*3/MM3 (ref 0.1–0.9)
MONOCYTES NFR BLD AUTO: 21 % (ref 5–12)
NEUTROPHILS NFR BLD AUTO: 5.08 10*3/MM3 (ref 1.7–7)
NEUTROPHILS NFR BLD AUTO: 64.9 % (ref 42.7–76)
NITRITE UR QL STRIP: NEGATIVE
PH UR STRIP.AUTO: 5.5 [PH] (ref 5–8)
PLATELET # BLD AUTO: 191 10*3/MM3 (ref 140–450)
PMV BLD AUTO: 10.6 FL (ref 6–12)
POTASSIUM SERPL-SCNC: 4.6 MMOL/L (ref 3.5–5.2)
PROT SERPL-MCNC: 7.1 G/DL (ref 6–8.5)
PROT UR QL STRIP: NEGATIVE
PROTHROMBIN TIME: 22 SECONDS (ref 11.9–14.6)
RBC # BLD AUTO: 3.39 10*6/MM3 (ref 4.14–5.8)
SODIUM SERPL-SCNC: 137 MMOL/L (ref 136–145)
SP GR UR STRIP: 1.01 (ref 1–1.03)
STRESS TARGET HR: 115 BPM
TRIGL SERPL-MCNC: 63 MG/DL (ref 0–150)
TROPONIN T SERPL-MCNC: 0.04 NG/ML (ref 0–0.03)
UROBILINOGEN UR QL STRIP: NORMAL
VLDLC SERPL-MCNC: 15 MG/DL (ref 5–40)
WBC NRBC COR # BLD: 7.84 10*3/MM3 (ref 3.4–10.8)

## 2022-05-10 PROCEDURE — 36415 COLL VENOUS BLD VENIPUNCTURE: CPT | Performed by: NURSE PRACTITIONER

## 2022-05-10 PROCEDURE — 99222 1ST HOSP IP/OBS MODERATE 55: CPT | Performed by: INTERNAL MEDICINE

## 2022-05-10 PROCEDURE — 25010000002 FUROSEMIDE PER 20 MG: Performed by: NURSE PRACTITIONER

## 2022-05-10 PROCEDURE — 85610 PROTHROMBIN TIME: CPT | Performed by: FAMILY MEDICINE

## 2022-05-10 PROCEDURE — 84484 ASSAY OF TROPONIN QUANT: CPT | Performed by: NURSE PRACTITIONER

## 2022-05-10 PROCEDURE — 80053 COMPREHEN METABOLIC PANEL: CPT | Performed by: NURSE PRACTITIONER

## 2022-05-10 PROCEDURE — 85025 COMPLETE CBC W/AUTO DIFF WBC: CPT | Performed by: NURSE PRACTITIONER

## 2022-05-10 PROCEDURE — 80061 LIPID PANEL: CPT | Performed by: NURSE PRACTITIONER

## 2022-05-10 PROCEDURE — 93306 TTE W/DOPPLER COMPLETE: CPT | Performed by: INTERNAL MEDICINE

## 2022-05-10 PROCEDURE — 93306 TTE W/DOPPLER COMPLETE: CPT

## 2022-05-10 PROCEDURE — 81003 URINALYSIS AUTO W/O SCOPE: CPT | Performed by: NURSE PRACTITIONER

## 2022-05-10 PROCEDURE — 99221 1ST HOSP IP/OBS SF/LOW 40: CPT | Performed by: SURGERY

## 2022-05-10 PROCEDURE — 83036 HEMOGLOBIN GLYCOSYLATED A1C: CPT | Performed by: NURSE PRACTITIONER

## 2022-05-10 RX ORDER — NALOXONE HCL 0.4 MG/ML
0.4 VIAL (ML) INJECTION AS NEEDED
COMMUNITY
End: 2022-05-12 | Stop reason: HOSPADM

## 2022-05-10 RX ORDER — IBUPROFEN 600 MG/1
1 TABLET ORAL
COMMUNITY

## 2022-05-10 RX ORDER — WARFARIN SODIUM 3 MG/1
1.5 TABLET ORAL
Status: DISCONTINUED | OUTPATIENT
Start: 2022-05-10 | End: 2022-05-12 | Stop reason: HOSPADM

## 2022-05-10 RX ORDER — WARFARIN SODIUM 3 MG/1
1.5 TABLET ORAL NIGHTLY
Status: DISCONTINUED | OUTPATIENT
Start: 2022-05-10 | End: 2022-05-10

## 2022-05-10 RX ORDER — PHENOL 1.4 %
1 AEROSOL, SPRAY (ML) MUCOUS MEMBRANE
COMMUNITY

## 2022-05-10 RX ORDER — FUROSEMIDE 10 MG/ML
40 INJECTION INTRAMUSCULAR; INTRAVENOUS ONCE
Status: DISCONTINUED | OUTPATIENT
Start: 2022-05-10 | End: 2022-05-10

## 2022-05-10 RX ORDER — FUROSEMIDE 40 MG/1
40 TABLET ORAL DAILY
Status: DISCONTINUED | OUTPATIENT
Start: 2022-05-11 | End: 2022-05-10

## 2022-05-10 RX ORDER — NALOXONE HYDROCHLORIDE 4 MG/.1ML
1 SPRAY NASAL DAILY PRN
Status: ON HOLD | COMMUNITY
End: 2022-05-10

## 2022-05-10 RX ORDER — SUCRALFATE 1 G/1
1 TABLET ORAL 2 TIMES DAILY
COMMUNITY

## 2022-05-10 RX ORDER — ENOXAPARIN SODIUM 150 MG/ML
1 INJECTION SUBCUTANEOUS ONCE
Status: DISCONTINUED | OUTPATIENT
Start: 2022-05-10 | End: 2022-05-10

## 2022-05-10 RX ORDER — DOCUSATE SODIUM 100 MG/1
100 CAPSULE, LIQUID FILLED ORAL 2 TIMES DAILY
COMMUNITY

## 2022-05-10 RX ORDER — NICOTINE POLACRILEX 4 MG
15 LOZENGE BUCCAL
COMMUNITY

## 2022-05-10 RX ORDER — FUROSEMIDE 10 MG/ML
40 INJECTION INTRAMUSCULAR; INTRAVENOUS EVERY 12 HOURS
Status: COMPLETED | OUTPATIENT
Start: 2022-05-10 | End: 2022-05-11

## 2022-05-10 RX ADMIN — FUROSEMIDE 40 MG: 10 INJECTION, SOLUTION INTRAMUSCULAR; INTRAVENOUS at 09:31

## 2022-05-10 RX ADMIN — FUROSEMIDE 40 MG: 10 INJECTION, SOLUTION INTRAMUSCULAR; INTRAVENOUS at 17:08

## 2022-05-10 RX ADMIN — BISOPROLOL FUMARATE 5 MG: 5 TABLET ORAL at 09:31

## 2022-05-10 RX ADMIN — DOCUSATE SODIUM 50 MG AND SENNOSIDES 8.6 MG 1 TABLET: 8.6; 5 TABLET, FILM COATED ORAL at 09:31

## 2022-05-10 RX ADMIN — FAMOTIDINE 20 MG: 20 TABLET, FILM COATED ORAL at 09:31

## 2022-05-10 RX ADMIN — WARFARIN 1.5 MG: 3 TABLET ORAL at 17:08

## 2022-05-10 RX ADMIN — LOSARTAN POTASSIUM 100 MG: 50 TABLET, FILM COATED ORAL at 09:31

## 2022-05-10 RX ADMIN — Medication 10 ML: at 21:05

## 2022-05-10 NOTE — PLAN OF CARE
Goal Outcome Evaluation:  Plan of Care Reviewed With: patient           Outcome Evaluation: pt c/o soreness repositioned frequently no c/o of severe pain at this time meds given as ordered

## 2022-05-10 NOTE — CASE MANAGEMENT/SOCIAL WORK
Discharge Planning Assessment  [facility]     Patient Name: Jesus Smith  MRN: [id number]  Today's Date: 5/10/2022    Admit Date: 5/9/2022     Discharge Needs Assessment     Row Name 05/10/22 1055       Living Environment    People in Home other (see comments)  SNF    Current Living Arrangements other (see comments)  [facility], [facility]    Provides Primary Care For no one, unable/limited ability to care for self    Family Caregiver if Needed child(rikki), adult    Able to Return to Prior Arrangements yes       Transition Planning    Patient/Family Anticipates Transition to other (see comments)  SNF    Patient/Family Anticipated Services at Transition skilled nursing       Discharge Needs Assessment    Readmission Within the Last 30 Days no previous admission in last 30 days    Discharge Coordination/Progress [facility], spoke with Mare from [facility], [phone], and patient has bed hold and intermediate level of care. Spoke with patient's son Jesus Smith and plan is for patient to return back to [facility] at discharge. Will continue to follow.               Discharge Plan    No documentation.               Continued Care and Services - Admitted Since 5/9/2022    Coordination has not been started for this encounter.       Expected Discharge Date and Time     Expected Discharge Date Expected Discharge Time    May 12, 2022          Demographic Summary    No documentation.                Functional Status    No documentation.                Psychosocial    No documentation.                Abuse/Neglect    No documentation.                Legal    No documentation.                Substance Abuse    No documentation.                Patient Forms    No documentation.                   Sharee Pompa RN

## 2022-05-10 NOTE — PLAN OF CARE
Goal Outcome Evaluation:  Plan of Care Reviewed With: patient           Outcome Evaluation: Pt. admitted from ER with complaint of R. hip pain & exacerbation of CHF. Pt. on 2L O2. Generalized edema; bilateral both legs. Voiding per urinal; good output. Tele on; Bradycardiac HR 58. SCD's on pt. Turn every 2 hrs. VSS. Safety maintained.

## 2022-05-10 NOTE — PROGRESS NOTES
[facility] Medicine Services  INPATIENT PROGRESS NOTE    Length of Stay: 1  Date of Admission: 5/9/2022  Primary Care Physician: Matheus Olivera PA    Subjective     Chief Complaint:     Right flank pain    HPI     The patient's right flank pain is reasonably well controlled with current regimen.  He still has a significant degree of bruising noted in the right flank.  Vascular surgery has seen and evaluated the patient with no intervention planned.  There was concern for discontinuation of anticoagulation secondary to presence of artificial valve.  The valve appears to be a bioprosthetic valve and the patient appears to be anticoagulated for the purposes of stroke prevention secondary to atrial fibrillation.  He appears to be adequately anticoagulated and bridging Lovenox will not be needed.  Current INR 2.01.  The patient has been on an undulating regimen of 1 mg Coumadin alternating with 2.5 mg of Coumadin.  Dosing will be changed to 1.5 mg of Coumadin daily in order to avoid intermittent Coumadin toxicity which may have contributed to iliopsoas hematoma.  Echocardiogram has been ordered because of evidence of decompensated heart failure with interstitial edema at the time of presentation.  Echo report is pending.  Output has exceeded intake by 2300 cc so far.  1 urine has been unmeasured.  Case discussed with cardiology.      Review of Systems     All pertinent negatives and positives are as above. All other systems have been reviewed and are negative unless otherwise stated.     Objective    Temp:  [97.3 °F (36.3 °C)-99.4 °F (37.4 °C)] 99.4 °F (37.4 °C)  Heart Rate:  [50-68] 50  Resp:  [16-20] 16  BP: (133-176)/(46-72) 133/47    Lab Results (last 24 hours)     Procedure Component Value Units Date/Time    Protime-INR [[id number]]  (Abnormal) Collected: 05/10/22 1019    Specimen: Blood Updated: 05/10/22 1038     Protime 22.0 Seconds      INR 2.01    Troponin [[id number]]   (Abnormal) Collected: 05/10/22 0638    Specimen: Blood Updated: 05/10/22 0731     Troponin T 0.045 ng/mL     Narrative:      Troponin T Reference Range:  <= 0.03 ng/mL-   Negative for AMI  >0.03 ng/mL-     Abnormal for myocardial necrosis.  Clinicians would have to utilize clinical acumen, EKG, Troponin and serial changes to determine if it is an Acute Myocardial Infarction or myocardial injury due to an underlying chronic condition.       Results may be falsely decreased if patient taking Biotin.      Lipid Panel [[id number]]  (Abnormal) Collected: 05/10/22 0638    Specimen: Blood Updated: 05/10/22 0727     Total Cholesterol 79 mg/dL      Triglycerides 63 mg/dL      HDL Cholesterol 34 mg/dL      LDL Cholesterol  30 mg/dL      VLDL Cholesterol 15 mg/dL      LDL/HDL Ratio 0.95    Narrative:      Cholesterol Reference Ranges  (U.S. Department of Health and Human Services ATP III Classifications)    Desirable          <200 mg/dL  Borderline High    200-239 mg/dL  High Risk          >240 mg/dL      Triglyceride Reference Ranges  (U.S. Department of Health and Human Services ATP III Classifications)    Normal           <150 mg/dL  Borderline High  150-199 mg/dL  High             200-499 mg/dL  Very High        >500 mg/dL    HDL Reference Ranges  (U.S. Department of Health and Human Services ATP III Classifications)    Low     <40 mg/dl (major risk factor for CHD)  High    >60 mg/dl ('negative' risk factor for CHD)        LDL Reference Ranges  (U.S. Department of Health and Human Services ATP III Classifications)    Optimal          <100 mg/dL  Near Optimal     100-129 mg/dL  Borderline High  130-159 mg/dL  High             160-189 mg/dL  Very High        >189 mg/dL    Comprehensive Metabolic Panel [[id number]]  (Abnormal) Collected: 05/10/22 0638    Specimen: Blood Updated: 05/10/22 0726     Glucose 137 mg/dL      BUN 35 mg/dL      Creatinine 1.69 mg/dL      Sodium 137 mmol/L      Potassium 4.6 mmol/L      Chloride 100  mmol/L      CO2 26.0 mmol/L      Calcium 8.8 mg/dL      Total Protein 7.1 g/dL      Albumin 3.20 g/dL      ALT (SGPT) 26 U/L      AST (SGOT) 39 U/L      Alkaline Phosphatase 199 U/L      Total Bilirubin 3.2 mg/dL      Globulin 3.9 gm/dL      A/G Ratio 0.8 g/dL      BUN/Creatinine Ratio 20.7     Anion Gap 11.0 mmol/L      eGFR 39.3 mL/min/1.73      Comment: National Kidney Foundation and American Society of Nephrology (ASN) Task Force recommended calculation based on the Chronic Kidney Disease Epidemiology Collaboration (CKD-EPI) equation refit without adjustment for race.       Narrative:      GFR Normal >60  Chronic Kidney Disease <60  Kidney Failure <15      Hemoglobin A1c [[id number]]  (Abnormal) Collected: 05/10/22 0638    Specimen: Blood Updated: 05/10/22 0724     Hemoglobin A1C 6.40 %     Narrative:      Hemoglobin A1C Ranges:    Increased Risk for Diabetes  5.7% to 6.4%  Diabetes                     >= 6.5%  Diabetic Goal                < 7.0%    CBC Auto Differential [[id number]]  (Abnormal) Collected: 05/10/22 0638    Specimen: Blood Updated: 05/10/22 0708     WBC 7.84 10*3/mm3      RBC 3.39 10*6/mm3      Hemoglobin 8.9 g/dL      Hematocrit 30.4 %      MCV 89.7 fL      MCH 26.3 pg      MCHC 29.3 g/dL      RDW 15.9 %      RDW-SD 51.5 fl      MPV 10.6 fL      Platelets 191 10*3/mm3      Neutrophil % 64.9 %      Lymphocyte % 12.4 %      Monocyte % 21.0 %      Eosinophil % 0.6 %      Basophil % 0.5 %      Neutrophils, Absolute 5.08 10*3/mm3      Lymphocytes, Absolute 0.97 10*3/mm3      Monocytes, Absolute 1.65 10*3/mm3      Eosinophils, Absolute 0.05 10*3/mm3      Basophils, Absolute 0.04 10*3/mm3     Urinalysis With Microscopic If Indicated (No Culture) - Urine, Clean Catch [[id number]]  (Normal) Collected: 05/10/22 0342    Specimen: Urine, Clean Catch Updated: 05/10/22 0353     Color, UA Yellow     Appearance, UA Clear     pH, UA 5.5     Specific Gravity, UA 1.010     Glucose, UA Negative     Ketones, UA  Negative     Bilirubin, UA Negative     Blood, UA Negative     Protein, UA Negative     Leuk Esterase, UA Negative     Nitrite, UA Negative     Urobilinogen, UA 0.2 E.U./dL    Narrative:      Urine microscopic not indicated.    Ferritin [[id number]]  (Normal) Collected: 05/09/22 1759    Specimen: Blood Updated: 05/09/22 2032     Ferritin 90.66 ng/mL     Narrative:      Results may be falsely decreased if patient taking Biotin.      Iron Profile [[id number]]  (Normal) Collected: 05/09/22 1759    Specimen: Blood Updated: 05/09/22 2032     Iron 75 mcg/dL      Iron Saturation 20 %      Transferrin 250 mg/dL      TIBC 373 mcg/dL     Reticulocytes [[id number]]  (Abnormal) Collected: 05/09/22 1546    Specimen: Blood Updated: 05/09/22 2019     Reticulocyte % 3.05 %      Reticulocyte Absolute 0.1052 10*6/mm3     BNP [[id number]]  (Abnormal) Collected: 05/09/22 1759    Specimen: Blood Updated: 05/09/22 1852     proBNP 5,003.0 pg/mL     Narrative:      Among patients with dyspnea, NT-proBNP is highly sensitive for the detection of acute congestive heart failure. In addition NT-proBNP of <300 pg/ml effectively rules out acute congestive heart failure with 99% negative predictive value.    Results may be falsely decreased if patient taking Biotin.      Troponin [[id number]]  (Abnormal) Collected: 05/09/22 1759    Specimen: Blood Updated: 05/09/22 1829     Troponin T 0.034 ng/mL     Narrative:      Troponin T Reference Range:  <= 0.03 ng/mL-   Negative for AMI  >0.03 ng/mL-     Abnormal for myocardial necrosis.  Clinicians would have to utilize clinical acumen, EKG, Troponin and serial changes to determine if it is an Acute Myocardial Infarction or myocardial injury due to an underlying chronic condition.       Results may be falsely decreased if patient taking Biotin.      COVID-19,[facility]-HOUSE,Nasal Swab No Transport Media 3-4 HR TAT - Swab, Nasal Cavity [[id number]]  (Normal) Collected: 05/09/22 2901    Specimen: Swab from  Nasal Cavity Updated: 05/09/22 1659     COVID19 Not Detected    Narrative:      Fact sheet for providers: https://www.fda.gov/media/168288/download     Fact sheet for patients: https://www.fda.gov/media/032460/download    Test performed by PCR.    Consider negative results in combination with clinical observations, patient history, and epidemiological information.  Fact sheet for providers: https://www.fda.gov/media/458596/download     Fact sheet for patients: https://www.fda.gov/media/565096/download    Test performed by PCR.    Consider negative results in combination with clinical observations, patient history, and epidemiological information.    Comprehensive Metabolic Panel [[id number]]  (Abnormal) Collected: 05/09/22 1546    Specimen: Blood Updated: 05/09/22 1618     Glucose 130 mg/dL      BUN 35 mg/dL      Creatinine 1.96 mg/dL      Sodium 139 mmol/L      Potassium 4.6 mmol/L      Chloride 103 mmol/L      CO2 23.0 mmol/L      Calcium 9.1 mg/dL      Total Protein 7.2 g/dL      Albumin 3.50 g/dL      ALT (SGPT) 16 U/L      AST (SGOT) 36 U/L      Alkaline Phosphatase 226 U/L      Total Bilirubin 2.7 mg/dL      Globulin 3.7 gm/dL      A/G Ratio 0.9 g/dL      BUN/Creatinine Ratio 17.9     Anion Gap 13.0 mmol/L      eGFR 32.9 mL/min/1.73      Comment: National Kidney Foundation and American Society of Nephrology (ASN) Task Force recommended calculation based on the Chronic Kidney Disease Epidemiology Collaboration (CKD-EPI) equation refit without adjustment for race.       Narrative:      GFR Normal >60  Chronic Kidney Disease <60  Kidney Failure <15      Troponin [[id number]]  (Abnormal) Collected: 05/09/22 1546    Specimen: Blood Updated: 05/09/22 1617     Troponin T 0.050 ng/mL     Narrative:      Troponin T Reference Range:  <= 0.03 ng/mL-   Negative for AMI  >0.03 ng/mL-     Abnormal for myocardial necrosis.  Clinicians would have to utilize clinical acumen, EKG, Troponin and serial changes to determine if it is  an Acute Myocardial Infarction or myocardial injury due to an underlying chronic condition.       Results may be falsely decreased if patient taking Biotin.      Protime-INR [[id number]]  (Abnormal) Collected: 05/09/22 1546    Specimen: Blood Updated: 05/09/22 1608     Protime 31.0 Seconds      INR 3.12    aPTT [[id number]]  (Abnormal) Collected: 05/09/22 1546    Specimen: Blood Updated: 05/09/22 1608     PTT 63.3 seconds     CBC & Differential [[id number]]  (Abnormal) Collected: 05/09/22 1546    Specimen: Blood Updated: 05/09/22 1558    Narrative:      The following orders were created for panel order CBC & Differential.  Procedure                               Abnormality         Status                     ---------                               -----------         ------                     CBC Auto Differential[[id number]]        Abnormal            Final result                 Please view results for these tests on the individual orders.    CBC Auto Differential [[id number]]  (Abnormal) Collected: 05/09/22 1546    Specimen: Blood Updated: 05/09/22 1558     WBC 8.65 10*3/mm3      RBC 3.46 10*6/mm3      Hemoglobin 9.0 g/dL      Hematocrit 30.9 %      MCV 89.3 fL      MCH 26.0 pg      MCHC 29.1 g/dL      RDW 15.7 %      RDW-SD 50.4 fl      MPV 10.3 fL      Platelets 206 10*3/mm3      Neutrophil % 55.6 %      Lymphocyte % 20.3 %      Monocyte % 21.5 %      Eosinophil % 1.5 %      Basophil % 0.3 %      Neutrophils, Absolute 4.80 10*3/mm3      Lymphocytes, Absolute 1.76 10*3/mm3      Monocytes, Absolute 1.86 10*3/mm3      Eosinophils, Absolute 0.13 10*3/mm3      Basophils, Absolute 0.03 10*3/mm3           Imaging Results (Last 24 Hours)     Procedure Component Value Units Date/Time    CT Angiogram Chest [[id number]] Collected: 05/09/22 1815     Updated: 05/09/22 1821    Narrative:      CT ANGIOGRAM CHEST- 5/9/2022 5:14 PM CDT      HISTORY: SOB, chest pain, immobilization, leg pain      COMPARISON: None.      DOSE LENGTH  PRODUCT: 538 mGy cm. Automated exposure control was also  utilized to decrease patient radiation dose.     TECHNIQUE: Helical tomographic images of the chest were obtained after  the administration of intravenous contrast following angiogram protocol.  Additionally, 3D and multiplanar reformatted images were provided.        FINDINGS:    Pulmonary arteries: There is adequate enhancement of the pulmonary  arteries to evaluate for central and segmental pulmonary emboli. There  are no filling defects within the main, lobar, segmental or visualized  subsegmental pulmonary arteries. The pulmonary arteries are within  normal limits for size.      Aorta and great vessels: The aorta is not well opacified but  demonstrates no aneurysmal dilation. No flow-limiting stenosis  identified at the great vessel origins.     Visualized neck base: The imaged portion of the base of the neck appears  unremarkable.      Lungs: Bilateral interstitial and alveolar edema with scattered  groundglass opacities. There are bilateral large layering pleural  effusions. Airways are clear.     Heart: Four-chamber cardiomegaly. There is no pericardial effusion.  Advanced coronary artery atheromatous calcification. Previous coronary  bypass. Aortic valvular prosthesis.     Mediastinum and lymph nodes: No suspicious hilar or mediastinal  adenopathy..     Skeletal and soft tissues: Left chest wall pacemaker. No acute bony  abnormality. Thoracic spine degenerative change..        Impression:      1. No pulmonary embolus.  2. Decompensated congestive failure with bilateral interstitial and  sharlene pulmonary edema as well as bilateral large layering pleural  effusions.  3. Previous coronary bypass. Aortic valvular prosthesis.     This report was finalized on 05/09/2022 18:18 by Dr Sai Duvall, .    CT Abdomen Pelvis With Contrast [[id number]] Collected: 05/09/22 1801     Updated: 05/09/22 1814    Narrative:      CT ABDOMEN PELVIS W CONTRAST- 5/9/2022 5:14  "PM CDT     HISTORY: right hip pain, bruising to right flank and bruising to right  groin/testicles, denies injury       COMPARISON: None.      DOSE LENGTH PRODUCT: 836 mGy cm. Automated exposure control was also  utilized to decrease patient radiation dose.     TECHNIQUE: Following the intravenous administration of contrast, helical  CT tomographic images of the abdomen and pelvis were acquired.  Multiplanar reformatted images were provided for review.      FINDINGS:   LOWER CHEST: Bilateral moderate layering pleural effusions. Coronary  atheromatous calcification. Right heart pacer wire.      LIVER: No suspicious liver lesion. The portal veins are patent..      BILIARY SYSTEM: The gallbladder is unremarkable. No intrahepatic or  extrahepatic ductal dilatation.      PANCREAS: No focal pancreatic lesion.      SPLEEN: Unremarkable.      KIDNEYS AND ADRENALS: Adrenal glands are unremarkable.There are 2 small  simple appearing renal cysts. Bilateral renal cortical thinning. No  hydronephrosis. Ureters are decompressed.     RETROPERITONEUM: Asymmetric expansion of the right iliopsoas relative to  the left with what appears to be an approximately 5.3 cm intramuscular  \"mass\"/lesion within the iliacus (series 3-image 37). This is fairly  high density, favor hematoma. No rim-enhancing organized abscess  collection identified. No retroperitoneal adenopathy.     GI TRACT: The stomach is nondistended. Small bowel loops are nondilated.  Colonic diverticulosis.     OTHER: There is no mesenteric mass, lymphadenopathy or fluid collection.  Advanced atheromatous vascular calcification. Left total hip  arthroplasty. 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  Pelvic ring appears intact. Pubic symphysis and SI joints are intact.  Body wall subcutaneous edema. Severely degenerated lumbar spine with  intact posterior fusion hardware spanning L3-L5.     PELVIS: No mass lesion, fluid collection or significant " lymphadenopathy  is seen in the pelvis. The urinary bladder is normal in appearance.       Impression:      1. Likely intramuscular hematoma involving the left iliopsoas, with what  appears to be faintly visible intramuscular hematoma in the iliacus  measuring approximately 5.3 cm diameter. I do not see an adjacent pelvic  fracture or obvious lumbar spine fracture.  2. Systemic volume overload with bilateral pleural effusions and body  wall subcutaneous edema.  This report was finalized on 05/09/2022 18:11 by Dr Sai Duvall, .    CT Lumbar Spine Without Contrast [[id number]] Collected: 05/09/22 1756     Updated: 05/09/22 1811    Narrative:      CT LUMBAR SPINE WO CONTRAST- 5/9/2022 5:14 PM CDT     HISTORY: lower pain into right hip     Comparison: CT scan dated 921      DLP: 791 mGy cm     Technique: Serial helical tomographic images of the lumbar spine were  obtained without the use of intravenous contrast. Additionally, sagittal  and coronal reformatted images were provided for review. Automated  exposure control is utilized to reduce patient radiation dose.     Findings:      Counting will assume 5 lumbar-type vertebrae. The spine is imaged from  T12 to S2.     [place] left lumbar scoliotic curvature with apex at L3. Previous posterior  spinal fusion with construct spanning L3-L5. Posterior fusion construct  is intact. There is a mild degenerative retrolisthesis at L1-L2.  Multilevel loss of intervertebral disc height. Prominent posterior  endplate spurring at L1-L2 and L2-L3. Vertebral body heights are  well-maintained. No acute fracture. Posterior elements are intact.  High-grade acquired spinal stenosis identified at L1-L2 and L2-L3.  Multilevel severe neuroforaminal narrowing. There appears to be abnormal  expansion of the right iliacus muscle which will be better evaluated on  the dedicated abdominal CT. Paraspinal soft tissues are otherwise  unremarkable.       Impression:      Impression:   1. No acute  osseous injury identified.  2. 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 Previous L3-L5 posterior  spinal fusion with intact fusion construct. No acute fractures seen.  3. Abnormal expansion of the right iliacus muscle which will be better  evaluated on the dedicated abdominal CT.        This report was finalized on 05/09/2022 18:01 by Dr Sai Duvall, .             Intake/Output Summary (Last 24 hours) at 5/10/2022 1552  Last data filed at 5/10/2022 1450  Gross per 24 hour   Intake --   Output 2275 ml   Net -2275 ml       Physical Exam  Constitutional:       General: He is not in acute distress.     Appearance: Normal appearance. He is obese.   HENT:      Head: Normocephalic and atraumatic.      Right Ear: External ear normal.      Left Ear: External ear normal.      Nose: Nose normal.      Mouth/Throat:      Mouth: Mucous membranes are dry.      Pharynx: Oropharynx is clear.   Eyes:      General: No scleral icterus.     Conjunctiva/sclera: Conjunctivae normal.   Cardiovascular:      Rate and Rhythm: Regular rhythm. Bradycardia present.      Pulses: Normal pulses.      Heart sounds: Normal heart sounds.   Pulmonary:      Effort: Pulmonary effort is normal. No respiratory distress.      Breath sounds: Normal breath sounds.   Abdominal:      General: Abdomen is flat. Bowel sounds are normal.      Palpations: Abdomen is soft.      Tenderness: There is right CVA tenderness.      Comments: Hematoma right flank.   Musculoskeletal:      Right lower leg: Edema present.      Left lower leg: Edema present.   Skin:     General: Skin is warm and dry.      Coloration: Skin is not pale.   Neurological:      General: No focal deficit present.      Mental Status: He is alert and oriented to person, place, and time. Mental status is at baseline.   Psychiatric:         Mood and Affect: Mood normal.         Judgment: Judgment normal.         Results  Review:  I have reviewed the labs, radiology results, and diagnostic studies since my last progress note and made treatment changes reflective of the results.   I have reviewed the current medications.    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

## 2022-05-10 NOTE — CONSULTS
Jesus Smith  [id number]  [id number]  383/1  BernardinoYasir DO  5/9/2022    Chief Complaint   Patient presents with   • Hip Pain       HPI: Jesus Smith is a 86 y.o. male who presented with complaints of right flank ecchymosis and pain.  Patient is a very poor historian and so history was gained from the chart and previous notes.  From what I can gather he is a resident at [facility] and rehab.  He has been hospitalized previously here with cholecystitis and had a partial open cholecystectomy and previous placement of a cholecystostomy tube.  He also has prior history of coronary artery disease, GERD, diabetes, hypertension, and CKD.  He also has a prior aortic valve replacement and is on Coumadin for this with a goal INR of 2.5-3.5.  He apparently has been complaining of some right flank/hip pain over the past several days and had been noted to have ecchymosis and so was sent to the ER for evaluation.  Here he was hemodynamically stable and hemoglobin was around 9.  CT of the abdomen/pelvis was done which showed evidence of a right psoas and iliacus muscle hematoma.  There was no evidence of any obvious active contrast extravasation.  INR on presentation here was 3.12 and prior to that on 4/28 was noted to be 3.78.  Scans also found evidence of bilateral pleural effusions and signs of volume overload consistent with CHF exacerbation.  He was admitted to the medical service and given this psoas and iliacus hematoma vascular surgery was consulted.  Of note patient did previously have a left psoas hematoma on a previous hospitalization after a prior fall.  However on this occasion he denies any falls or trauma although again he is not a very good historian.  When seen at the bedside currently he is awake and alert.  He is chronically ill-appearing.  He reports some right flank pain with moving but otherwise he has no significant complaints.  Currently his Coumadin is being held and repeat INR this  "morning was 2.01.    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   • Hypertension    • Injury of back    • TIA (transient ischemic attack)        Past Surgical History:   Procedure Laterality Date   • APPENDECTOMY     • BACK SURGERY      Fusion   • CARDIAC SURGERY      Open Heart   • CHOLECYSTECTOMY N/A 5/28/2020    Procedure: OPEN PARTIAL CHOLECYSTECTOMY, LYSIS OF ADHESIONS;  Surgeon: Agustina Saleem MD;  Location: [facility] OR;  Service: General;  Laterality: N/A;   • EXPLORATORY LAPAROTOMY N/A 4/5/2020    Procedure: open cholecystostomy tube placement ;  Surgeon: Agustina Saleem MD;  Location:  [facility] OR;  Service: General;  Laterality: N/A;   • HERNIA REPAIR     • JOINT REPLACEMENT Left     s/p L hip replacement   • PACEMAKER IMPLANTATION     • STOMACH SURGERY         Family History   Problem Relation Age of Onset   • Heart disease Mother    • Stroke Father        Social History     Socioeconomic History   • Marital status:    Tobacco Use   • Smoking status: Never Smoker   • Smokeless tobacco: Never Used   Vaping Use   • Vaping Use: Never used   Substance and Sexual Activity   • Alcohol use: No   • Drug use: No   • Sexual activity: Defer       No Known Allergies    Hospital Medications (active)       Dose Frequency Start End    acetaminophen (TYLENOL) 160 MG/5ML solution 650 mg 650 mg Every 4 Hours PRN 5/9/2022     Admin Instructions: Do not exceed 4 grams of acetaminophen in a 24 hr period. Max dose of 2gm for AST/ALT greater than 120 units/L    If given for fever, use fever parameter: fever greater than 100.4 °F.    If given for pain, use the following pain scale:   Mild Pain = Pain Score of 1-3, CPOT 1-2  Moderate Pain = Pain Score of 4-6, CPOT 3-4  Severe Pain = Pain Score of 7-10, CPOT 5-8    Route: Oral    Linked Group 1: \"Or\" Linked " "Group Details        acetaminophen (TYLENOL) suppository 650 mg 650 mg Every 4 Hours PRN 5/9/2022     Admin Instructions: Do not exceed 4 grams of acetaminophen in a 24 hr period. Max dose of 2gm for AST/ALT greater than 120 units/L    If given for fever, use fever parameter: fever greater than 100.4 °F.    If given for pain, use the following pain scale:   Mild Pain = Pain Score of 1-3, CPOT 1-2  Moderate Pain = Pain Score of 4-6, CPOT 3-4  Severe Pain = Pain Score of 7-10, CPOT 5-8    Route: Rectal    Linked Group 1: \"Or\" Linked Group Details        acetaminophen (TYLENOL) tablet 650 mg 650 mg Every 4 Hours PRN 5/9/2022     Admin Instructions: Do not exceed 4 grams of acetaminophen in a 24 hr period. Max dose of 2gm for AST/ALT greater than 120 units/L    If given for fever, use fever parameter: fever greater than 100.4 °F.    If given for pain, use the following pain scale:   Mild Pain = Pain Score of 1-3, CPOT 1-2  Moderate Pain = Pain Score of 4-6, CPOT 3-4  Severe Pain = Pain Score of 7-10, CPOT 5-8    Route: Oral    Linked Group 1: \"Or\" Linked Group Details        bisoprolol (ZEBeta) tablet 5 mg 5 mg Daily 5/10/2022     Admin Instructions: Caution: Look alike/sound alike drug alert    Route: Oral    dextrose (D50W) (25 g/50 mL) IV injection 25 g 25 g Every 15 Minutes PRN 5/9/2022     Admin Instructions: Blood sugar less than 70; patient has IV access - Unresponsive, NPO or Unable To Safely Swallow    Route: Intravenous    dextrose (GLUTOSE) oral gel 15 g 15 g Every 15 Minutes PRN 5/9/2022     Admin Instructions: BS<70, Patient Alert, Is not NPO, Can safely swallow.    Route: Oral    famotidine (PEPCID) tablet 20 mg 20 mg Daily 5/10/2022     Route: Oral    furosemide (LASIX) injection 40 mg 40 mg Every 12 Hours 5/9/2022     Route: Intravenous    glucagon (human recombinant) (GLUCAGEN DIAGNOSTIC) injection 1 mg 1 mg Every 15 Minutes PRN 5/9/2022     Admin Instructions: Blood Glucose Less Than 70 - Patient " "Without IV Access - Unresponsive, NPO or Unable To Safely Swallow    Route: Intramuscular    Glycerin-Hypromellose- (ARTIFICIAL TEARS) 0.2-0.2-1 % ophthalmic solution solution 1 drop 1 drop Every 1 Hour PRN 5/9/2022     Route: Both Eyes    HYDROcodone-acetaminophen (NORCO) 7.5-325 MG per tablet 1 tablet 1 tablet Every 8 Hours PRN 5/9/2022     Admin Instructions: [IAN]    Do not exceed 4 grams of acetaminophen in a 24 hr period. Max dose of 2gm for AST/ALT greater than 120 units/L        If given for pain, use the following pain scale:   Mild Pain = Pain Score of 1-3, CPOT 1-2  Moderate Pain = Pain Score of 4-6, CPOT 3-4  Severe Pain = Pain Score of 7-10, CPOT 5-8    Route: Oral    Insulin Lispro (humaLOG) injection 2-7 Units 2-7 Units 3 Times Daily Before Meals 5/10/2022     Admin Instructions: Correction - Low Dose.  Less than 40 units/day total insulin dose or lean, elderly, renal patients    Blood glucose 150-199 mg/dL - 2 units  Blood glucose 200-249 mg/dL - 3 units  Blood glucose 250-299 mg/dL - 4 units  Blood glucose 300-349 mg/dL - 5 units  Blood glucose 350-400 mg/dL - 6 units  Blood glucose greater than 400 mg/dL - 7 units and call provider   Caution: Look alike/sound alike drug alert    Route: Subcutaneous    losartan (COZAAR) tablet 100 mg 100 mg Daily 5/10/2022     Route: Oral    ondansetron (ZOFRAN) injection 4 mg 4 mg Every 6 Hours PRN 5/9/2022     Admin Instructions: If BOTH ondansetron (ZOFRAN) and promethazine (PHENERGAN) are ordered use ondansetron first and THEN promethazine IF ondansetron is ineffective.    Route: Intravenous    Linked Group 2: \"Or\" Linked Group Details        ondansetron (ZOFRAN) tablet 4 mg 4 mg Every 6 Hours PRN 5/9/2022     Admin Instructions: If BOTH ondansetron (ZOFRAN) and promethazine (PHENERGAN) are ordered use ondansetron first and THEN promethazine IF ondansetron is ineffective.    Route: Oral    Linked Group 2: \"Or\" Linked Group Details        " "sennosides-docusate (PERICOLACE) 8.6-50 MG per tablet 1 tablet 1 tablet Daily 5/10/2022     Route: Oral    sodium chloride 0.9 % flush 10 mL 10 mL As Needed 5/9/2022     Route: Intravenous    Cosign for Ordering: Accepted by Kurtis Winn MD on 5/10/2022  6:48 AM    Linked Group 3: \"And\" Linked Group Details        sodium chloride 0.9 % flush 10 mL 10 mL Every 12 Hours Scheduled 5/9/2022     Route: Intravenous    sodium chloride 0.9 % flush 10 mL 10 mL As Needed 5/9/2022     Route: Intravenous          Review of Systems   Constitutional: Negative.  Negative for activity change, appetite change, chills, diaphoresis, fatigue and fever.   HENT: Negative.  Negative for congestion, sneezing, sore throat and trouble swallowing.    Eyes: Negative.  Negative for visual disturbance.   Respiratory: Positive for shortness of breath (With exertion). Negative for chest tightness.    Cardiovascular: Positive for leg swelling. Negative for chest pain and palpitations.   Gastrointestinal: Negative.  Negative for abdominal distention, abdominal pain, nausea and vomiting.   Endocrine: Negative.    Genitourinary: Negative.    Musculoskeletal: Negative.         Right flank pain and bruising   Skin: Negative.    Allergic/Immunologic: Negative.    Neurological: Negative.    Hematological: Negative.    Psychiatric/Behavioral: Negative.        Physical Exam  Vitals reviewed.   Constitutional:       Appearance: He is ill-appearing (Chronically ill-appearing).   HENT:      Head: Normocephalic and atraumatic.      Nose: Nose normal.      Mouth/Throat:      Mouth: Mucous membranes are moist.   Eyes:      Extraocular Movements: Extraocular movements intact.      Pupils: Pupils are equal, round, and reactive to light.   Cardiovascular:      Rate and Rhythm: Normal rate and regular rhythm.      Pulses:           Carotid pulses are 2+ on the right side and 2+ on the left side.       Radial pulses are 2+ on the right side and 2+ on " the left side.        Femoral pulses are 2+ on the right side and 2+ on the left side.     Comments: He has edema of the bilateral lower extremities.    He has ecchymosis to the right flank region extending from the mid back down to the lower back.  He also has some ecchymosis extending down towards the left scrotum.  He is mildly tender to palpation of the right flank.  There is no obvious ecchymosis to the left flank.  Pulmonary:      Effort: Pulmonary effort is normal. No respiratory distress.   Abdominal:      General: There is no distension.      Palpations: Abdomen is soft. There is no mass.      Tenderness: There is no abdominal tenderness.   Musculoskeletal:         General: Normal range of motion.      Cervical back: Normal range of motion and neck supple.      Right lower leg: Edema present.      Left lower leg: Edema present.   Skin:     General: Skin is warm and dry.      Capillary Refill: Capillary refill takes 2 to 3 seconds.   Neurological:      General: No focal deficit present.      Mental Status: He is alert and oriented to person, place, and time.   Psychiatric:         Mood and Affect: Mood normal.         Behavior: Behavior normal.         Thought Content: Thought content normal.         Judgment: Judgment normal.         Laboratory Data:  Results from last 7 days   Lab Units 05/10/22  0638 05/09/22  1546   WBC 10*3/mm3 7.84 8.65   HEMOGLOBIN g/dL 8.9* 9.0*   HEMATOCRIT % 30.4* 30.9*   PLATELETS 10*3/mm3 191 206       Results from last 7 days   Lab Units 05/10/22  0638 05/09/22  1546   SODIUM mmol/L 137 139   POTASSIUM mmol/L 4.6 4.6   CHLORIDE mmol/L 100 103   CO2 mmol/L 26.0 23.0   BUN mg/dL 35* 35*   CREATININE mg/dL 1.69* 1.96*   CALCIUM mg/dL 8.8 9.1   BILIRUBIN mg/dL 3.2* 2.7*   ALK PHOS U/L 199* 226*   ALT (SGPT) U/L 26 16   AST (SGOT) U/L 39 36   GLUCOSE mg/dL 137* 130*     Results from last 7 days   Lab Units 05/10/22  1019 05/09/22  1546   INR  2.01* 3.12*   APTT seconds  --  63.3*          Diagnostic Data:  Imaging Results (Last 24 Hours)     Procedure Component Value Units Date/Time    CT Angiogram Chest [[id number]] Collected: 05/09/22 1815     Updated: 05/09/22 1821    Narrative:      CT ANGIOGRAM CHEST- 5/9/2022 5:14 PM CDT      HISTORY: SOB, chest pain, immobilization, leg pain      COMPARISON: None.      DOSE LENGTH PRODUCT: 538 mGy cm. Automated exposure control was also  utilized to decrease patient radiation dose.     TECHNIQUE: Helical tomographic images of the chest were obtained after  the administration of intravenous contrast following angiogram protocol.  Additionally, 3D and multiplanar reformatted images were provided.        FINDINGS:    Pulmonary arteries: There is adequate enhancement of the pulmonary  arteries to evaluate for central and segmental pulmonary emboli. There  are no filling defects within the main, lobar, segmental or visualized  subsegmental pulmonary arteries. The pulmonary arteries are within  normal limits for size.      Aorta and great vessels: The aorta is not well opacified but  demonstrates no aneurysmal dilation. No flow-limiting stenosis  identified at the great vessel origins.     Visualized neck base: The imaged portion of the base of the neck appears  unremarkable.      Lungs: Bilateral interstitial and alveolar edema with scattered  groundglass opacities. There are bilateral large layering pleural  effusions. Airways are clear.     Heart: Four-chamber cardiomegaly. There is no pericardial effusion.  Advanced coronary artery atheromatous calcification. Previous coronary  bypass. Aortic valvular prosthesis.     Mediastinum and lymph nodes: No suspicious hilar or mediastinal  adenopathy..     Skeletal and soft tissues: Left chest wall pacemaker. No acute bony  abnormality. Thoracic spine degenerative change..        Impression:      1. No pulmonary embolus.  2. Decompensated congestive failure with bilateral interstitial and  sharlene pulmonary edema as  "well as bilateral large layering pleural  effusions.  3. Previous coronary bypass. Aortic valvular prosthesis.     This report was finalized on 05/09/2022 18:18 by Dr Sai Duvall, .    CT Abdomen Pelvis With Contrast [[id number]] Collected: 05/09/22 1801     Updated: 05/09/22 1814    Narrative:      CT ABDOMEN PELVIS W CONTRAST- 5/9/2022 5:14 PM CDT     HISTORY: right hip pain, bruising to right flank and bruising to right  groin/testicles, denies injury       COMPARISON: None.      DOSE LENGTH PRODUCT: 836 mGy cm. Automated exposure control was also  utilized to decrease patient radiation dose.     TECHNIQUE: Following the intravenous administration of contrast, helical  CT tomographic images of the abdomen and pelvis were acquired.  Multiplanar reformatted images were provided for review.      FINDINGS:   LOWER CHEST: Bilateral moderate layering pleural effusions. Coronary  atheromatous calcification. Right heart pacer wire.      LIVER: No suspicious liver lesion. The portal veins are patent..      BILIARY SYSTEM: The gallbladder is unremarkable. No intrahepatic or  extrahepatic ductal dilatation.      PANCREAS: No focal pancreatic lesion.      SPLEEN: Unremarkable.      KIDNEYS AND ADRENALS: Adrenal glands are unremarkable.There are 2 small  simple appearing renal cysts. Bilateral renal cortical thinning. No  hydronephrosis. Ureters are decompressed.     RETROPERITONEUM: Asymmetric expansion of the right iliopsoas relative to  the left with what appears to be an approximately 5.3 cm intramuscular  \"mass\"/lesion within the iliacus (series 3-image 37). This is fairly  high density, favor hematoma. No rim-enhancing organized abscess  collection identified. No retroperitoneal adenopathy.     GI TRACT: The stomach is nondistended. Small bowel loops are nondilated.  Colonic diverticulosis.     OTHER: There is no mesenteric mass, lymphadenopathy or fluid collection.  Advanced atheromatous vascular calcification. Left " total hip  arthroplasty. 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  Pelvic ring appears intact. Pubic symphysis and SI joints are intact.  Body wall subcutaneous edema. Severely degenerated lumbar spine with  intact posterior fusion hardware spanning L3-L5.     PELVIS: No mass lesion, fluid collection or significant lymphadenopathy  is seen in the pelvis. The urinary bladder is normal in appearance.       Impression:      1. Likely intramuscular hematoma involving the left iliopsoas, with what  appears to be faintly visible intramuscular hematoma in the iliacus  measuring approximately 5.3 cm diameter. I do not see an adjacent pelvic  fracture or obvious lumbar spine fracture.  2. Systemic volume overload with bilateral pleural effusions and body  wall subcutaneous edema.  This report was finalized on 05/09/2022 18:11 by Dr Sai Duvall, .    CT Lumbar Spine Without Contrast [[id number]] Collected: 05/09/22 1756     Updated: 05/09/22 1811    Narrative:      CT LUMBAR SPINE WO CONTRAST- 5/9/2022 5:14 PM CDT     HISTORY: lower pain into right hip     Comparison: CT scan dated 921      DLP: 791 mGy cm     Technique: Serial helical tomographic images of the lumbar spine were  obtained without the use of intravenous contrast. Additionally, sagittal  and coronal reformatted images were provided for review. Automated  exposure control is utilized to reduce patient radiation dose.     Findings:      Counting will assume 5 lumbar-type vertebrae. The spine is imaged from  T12 to S2.     [place] left lumbar scoliotic curvature with apex at L3. Previous posterior  spinal fusion with construct spanning L3-L5. Posterior fusion construct  is intact. There is a mild degenerative retrolisthesis at L1-L2.  Multilevel loss of intervertebral disc height. Prominent posterior  endplate spurring at L1-L2 and L2-L3. Vertebral body heights are  well-maintained. No acute fracture. Posterior elements are intact.  High-grade acquired  spinal stenosis identified at L1-L2 and L2-L3.  Multilevel severe neuroforaminal narrowing. There appears to be abnormal  expansion of the right iliacus muscle which will be better evaluated on  the dedicated abdominal CT. Paraspinal soft tissues are otherwise  unremarkable.       Impression:      Impression:   1. No acute osseous injury identified.  2. 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 Previous L3-L5 posterior  spinal fusion with intact fusion construct. No acute fractures seen.  3. Abnormal expansion of the right iliacus muscle which will be better  evaluated on the dedicated abdominal CT.        This report was finalized on 05/09/2022 18:01 by Dr Sai Duvall, .    XR Chest 1 View [[id number]] Collected: 05/09/22 1457     Updated: 05/09/22 1501    Narrative:      EXAM: XR CHEST 1 VW-     INDICATION: Chest pain     COMPARISON: 4/3/2020     FINDINGS:     Cardiac silhouette is borderline prominent but stable. Prosthetic heart  valve noted. Prior median sternotomy. LEFT chest wall cardiac pacing  device. Small bilateral pleural effusions with mild overlying  atelectasis. Mid and upper lung zones are clear. No acute osseous  finding. Advanced degenerative change in the cervical spine.       Impression:         Small bilateral pleural effusions with overlying atelectasis.  This report was finalized on 05/09/2022 14:58 by Dr. Chan Goldberg MD.    XR Femur 2 View Right [[id number]] Collected: 05/09/22 1452     Updated: 05/09/22 1456    Narrative:      RIGHT FEMUR, 2 VIEWS 5/9/2022 2:31 PM CDT     HISTORY: Thigh pain     COMPARISON: Radiographs dated 4/20/2022     FINDINGS:     Frontal and lateral radiographs of the right femur were obtained.     No visualized acute fracture. Grade 4 osteoarthritis at the hip joint  with bone-on-bone change. Ring osteophyte formation at the hip joint.  Grade 4 osteoarthritis at the knee with bone-on-bone  change in the  medial knee compartment. No capsular distention at the knee.  Atheromatous vascular calcification.       Impression:      1. No acute osseous injury.   2. Grade 4 osteoarthritis at the right knee and hip joints.  This report was finalized on 05/09/2022 14:53 by Dr Sai Duvall, .          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  He is admitted with an unclear duration of right flank/scrotal ecchymosis and some discomfort.  Ultimately on CAT scan he was found to have likely hematoma of the right psoas muscle as well as the right iliacus muscle.  The etiology of this is unclear but he is on chronic anticoagulation with Coumadin and INR from 4/28 was 3.78.  He has previously had a left iliopsoas hematoma back in 2020 that was a result of a fall while on anticoagulation.  This is slightly more complex given that he has history of aortic valve replacement and that is why he is on anticoagulation and so I would be reluctant to stop it and let INR fall too low.  As such I would recommend at this point initially restarting Lovenox therapeutically and monitoring his H&H serially to ensure that it remains stable.  If this is the case then he can ultimately be restarted on his Coumadin and continue the Lovenox bridge until his INR is therapeutic.  Otherwise he should have continued fall precautions to try and avoid any trauma in the future that could increase his risk of bleeding/hematoma formation.  Otherwise further treatment of his apparent CHF exacerbation as per the medical team. Thank you for  allowing me to see Jesus Smith in consult. Please feel free to reach out with any questions or concerns.    Electronically signed by Hong Scott MD, 05/10/22, 12:48 PM CDT.

## 2022-05-10 NOTE — CONSULTS
"[facility] CONSULT NOTE    Referring Provider: No Known Provider    Reason for Consultation: Acute Heart Failure   Chief complaint:   Chief Complaint   Patient presents with   • Hip Pain       Subjective .     History of present illness:  Jesus Smith is a 86 y.o. male who presented to the ER for acute hip pain. Patient resides in the nursing home. He notes severe and acute right hip pain and flank pain that brought him to the ER. He had a CT of his abdomen which showed hematoma. Vascular surgery was consulted and recommended conservative treatment. Patient was also found to be volume overloaded with ascites and bilateral pleural effusions. He does note over the last few months he has had increasing shortness of breath. He notes a few weeks ago his \"legs blew up like balloons.\" He was treated with PO Lasix with good response- but notes the shortness of breath and leg swelling while improved, never resolved. He has been given IV Lasix with good urine response. He notes his breathing is only minimal better. But notes he has urinated large amounts. [facility], atrial flutter, valve disease, coronary artery disease and pacemaker. Patient also has had issues with GERD, hypertension and TIA. He notes he has had a significant decline in his health since battling with a bad gallbladder.     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 He had A-fib. Of note we were consulted in 2020 for \"bradycardia\". Device interrogation at that time was stable. Patient was noted to be paroxysmal atrial fibrillation but had been in A-fib for roughly the month leading up to that admission- with good function.     History  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   • " Hypertension    • Injury of back    • TIA (transient ischemic attack)    ,   Past Surgical History:   Procedure Laterality Date   • APPENDECTOMY     • BACK SURGERY      Fusion   • CARDIAC SURGERY      Open Heart   • CHOLECYSTECTOMY N/A 5/28/2020    Procedure: OPEN PARTIAL CHOLECYSTECTOMY, LYSIS OF ADHESIONS;  Surgeon: Agustina Saleem MD;  Location: [facility] OR;  Service: General;  Laterality: N/A;   • EXPLORATORY LAPAROTOMY N/A 4/5/2020    Procedure: open cholecystostomy tube placement ;  Surgeon: Agustina Saleem MD;  Location: [facility] OR;  Service: General;  Laterality: N/A;   • HERNIA REPAIR     • JOINT REPLACEMENT Left     s/p L hip replacement   • PACEMAKER IMPLANTATION     • STOMACH SURGERY     ,   Family History   Problem Relation Age of Onset   • Heart disease Mother    • Stroke Father    ,   Social History     Tobacco Use   • Smoking status: Never Smoker   • Smokeless tobacco: Never Used   Vaping Use   • Vaping Use: Never used   Substance Use Topics   • Alcohol use: No   • Drug use: No   ,     Medications    Prior to Admission medications    Medication Sig Start Date End Date Taking? Authorizing Provider   acetaminophen (TYLENOL) 325 MG tablet Take 650 mg by mouth Every 4 (Four) Hours As Needed for Mild Pain .   Yes Abdirizak Rico MD   aspirin 81 MG EC tablet Take 81 mg by mouth Daily.   Yes Abdirizak Rico MD   bisoprolol (ZEBeta) 5 MG tablet Take 5 mg by mouth Daily.   Yes Abdirizak Rico MD   famotidine (PEPCID) 20 MG tablet Take 1 tablet by mouth 2 (Two) Times a Day. 4/13/20  Yes Mamadou Dodd MD   furosemide (LASIX) 20 MG tablet Take 20 mg by mouth Daily.   Yes ProviderAbdirizak MD   Glycerin-Hypromellose- (ARTIFICIAL TEARS) 0.2-0.2-1 % solution ophthalmic solution Administer 1 drop to both eyes Every 1 (One) Hour As Needed for Dry Eyes. 4/13/20  Yes Mamadou Dodd MD   HYDROcodone-acetaminophen (NORCO) 7.5-325 MG per tablet Take 1 tablet by mouth Every 4 (Four) Hours  As Needed for Moderate Pain .   Yes Abdirizak Rico MD   ibuprofen (ADVIL,MOTRIN) 400 MG tablet Take 400 mg by mouth 4 (Four) Times a Day As Needed for Mild Pain .   Yes Abdirizak Rico MD   losartan (COZAAR) 100 MG tablet Take 100 mg by mouth Daily.   Yes Abdirizak Rico MD   sennosides-docusate (senna-docusate sodium) 8.6-50 MG per tablet Take 1 tablet by mouth Daily. 4/13/20  Yes Mamadou Dodd MD   sucralfate (CARAFATE) 1 g tablet Take 1 g by mouth 2 (Two) Times a Day.   Yes Abdirizak Rico MD   tiotropium (SPIRIVA) 18 MCG per inhalation capsule Place 2 capsules into inhaler and inhale Daily.   Yes Abdirizak Rico MD   vitamin B-12 (CYANOCOBALAMIN) 1000 MCG tablet Take 1,000 mcg by mouth Daily.   Yes Abdirizak Rico MD   Vitamin D, Ergocalciferol, 50 MCG (2000 UT) capsule Take 50,000 Units by mouth Daily.   Yes Abdirizak Rico MD   bisacodyl (DULCOLAX) 10 MG suppository Insert 1 suppository into the rectum Daily As Needed for Constipation. 4/13/20   Mamadou Dodd MD   guaiFENesin (ROBITUSSIN) 100 MG/5ML syrup Take 100 mg by mouth Every 4 (Four) Hours As Needed for Cough.    Abdirizak Rico MD   insulin lispro (humaLOG) 100 UNIT/ML injection Inject 1 Units under the skin into the appropriate area as directed Every Night. Inject 4 times daily as per sliding scale:  150-199= 2 units  200-249= 3 units  250-299= 4 units  300-349= 5 units  350-400= 6 units  401-550= 7 units and call MD    Abdirizak Rico MD   loperamide (IMODIUM) 2 MG capsule Take 2 mg by mouth 4 (Four) Times a Day As Needed for Diarrhea.    Abdirizak Rico MD   ondansetron (ZOFRAN) 4 MG tablet Take 4 mg by mouth Every 8 (Eight) Hours As Needed for Nausea or Vomiting.    Abdirizak Rico MD   polyethylene glycol (MIRALAX) 17 GM/SCOOP powder Take 17 g by mouth Daily.    Abdirizak Rico MD   rosuvastatin (CRESTOR) 20 MG tablet Take 20 mg by mouth Every Night.    Trisha  MD Abdirizak   warfarin (COUMADIN) 1 MG tablet Take 1 mg by mouth Every Night. Give 1 mg QHS M,W,F,Sat and Sun. Give 2.5 mg QHS Tues and Thurs    ProviderAbdirizak MD   ondansetron ODT (ZOFRAN-ODT) 4 MG disintegrating tablet Place 1 tablet on the tongue Every 8 (Eight) Hours As Needed for Nausea or Vomiting. 9/8/21 5/10/22  Odalys Valero MD   sucralfate (CARAFATE) 1 GM/10ML suspension Take 10 mL by mouth Every 6 (Six) Hours.  Patient taking differently: No sig reported 6/5/20 5/10/22  Agustina Saleem MD       Current Facility-Administered Medications   Medication Dose Route Frequency Provider Last Rate Last Admin   • acetaminophen (TYLENOL) tablet 650 mg  650 mg Oral Q4H PRN Izabella Michelle APRN        Or   • acetaminophen (TYLENOL) 160 MG/5ML solution 650 mg  650 mg Oral Q4H PRN Izabella Michelle APRN        Or   • acetaminophen (TYLENOL) suppository 650 mg  650 mg Rectal Q4H PRN Izabella Michelle APRN       • bisoprolol (ZEBeta) tablet 5 mg  5 mg Oral Daily Izabella Michelle APRN   5 mg at 05/10/22 0931   • dextrose (D50W) (25 g/50 mL) IV injection 25 g  25 g Intravenous Q15 Min PRN Izabella Michelle APRN       • dextrose (GLUTOSE) oral gel 15 g  15 g Oral Q15 Min PRN Izabella Michelle APRN       • famotidine (PEPCID) tablet 20 mg  20 mg Oral Daily Izabella Michelle APRN   20 mg at 05/10/22 0931   • furosemide (LASIX) injection 40 mg  40 mg Intravenous Once Yasir Lebron DO       • [START ON 5/11/2022] furosemide (LASIX) tablet 40 mg  40 mg Oral Daily Yasir Lebron DO       • glucagon (human recombinant) (GLUCAGEN DIAGNOSTIC) injection 1 mg  1 mg Intramuscular Q15 Min PRN Izabella Michelle APRN       • Glycerin-Hypromellose- (ARTIFICIAL TEARS) 0.2-0.2-1 % ophthalmic solution solution 1 drop  1 drop Both Eyes Q1H PRN Izabella Michelle APRN       • HYDROcodone-acetaminophen (NORCO) 7.5-325 MG per tablet 1 tablet  1 tablet Oral Q8H PRN Izabella Michelle, APRN       •  Insulin Lispro (humaLOG) injection 2-7 Units  2-7 Units Subcutaneous TID AC Izabella Michelle, GÓMEZ       • losartan (COZAAR) tablet 100 mg  100 mg Oral Daily Izabella Michelle APRN   100 mg at 05/10/22 0931   • ondansetron (ZOFRAN) tablet 4 mg  4 mg Oral Q6H PRN Izabella Michelle APRN        Or   • ondansetron (ZOFRAN) injection 4 mg  4 mg Intravenous Q6H PRN Izabella Michelle APRN       • Pharmacy to Dose enoxaparin (LOVENOX)   Does not apply Continuous PRN Yasir Lebron DO       • sennosides-docusate (PERICOLACE) 8.6-50 MG per tablet 1 tablet  1 tablet Oral Daily Izabella Michelle APRN   1 tablet at 05/10/22 0931   • sodium chloride 0.9 % flush 10 mL  10 mL Intravenous PRN Matheus Raygoza PA-C       • sodium chloride 0.9 % flush 10 mL  10 mL Intravenous Q12H Izabella Michelle APRN       • sodium chloride 0.9 % flush 10 mL  10 mL Intravenous PRN Izabella Michelle APRN       • warfarin (COUMADIN) tablet 1.5 mg  1.5 mg Oral Nightly Yasir Lebron DO           Allergies:  Patient has no known allergies.    Review of Systems  Review of Systems   Constitutional: Positive for malaise/fatigue. Negative for chills, decreased appetite, fever, weight gain and weight loss.   HENT: Negative for nosebleeds.    Eyes: Negative for visual disturbance.   Cardiovascular: Positive for dyspnea on exertion, leg swelling and orthopnea. Negative for chest pain, near-syncope, palpitations, paroxysmal nocturnal dyspnea and syncope.   Respiratory: Positive for shortness of breath. Negative for cough, hemoptysis and snoring.    Endocrine: Negative for cold intolerance and heat intolerance.   Hematologic/Lymphatic: Negative for bleeding problem. Does not bruise/bleed easily.   Skin: Negative for rash.   Musculoskeletal: Positive for joint pain. Negative for back pain and falls.   Gastrointestinal: Negative for abdominal pain, constipation, diarrhea, heartburn, melena, nausea and vomiting.   Genitourinary: Negative for  "hematuria.   Neurological: Negative for dizziness, headaches and light-headedness.   Psychiatric/Behavioral: Negative for altered mental status.   Allergic/Immunologic: Negative for persistent infections.       Objective     Physical Exam:  Patient Vitals for the past 24 hrs:   BP Temp Temp src Pulse Resp SpO2 Height Weight   05/10/22 1404 154/72 -- -- -- -- -- 177.8 cm (70\") 106 kg (233 lb)   05/10/22 1222 142/50 98.6 °F (37 °C) Oral 50 18 96 % -- --   05/10/22 0755 149/46 98.1 °F (36.7 °C) Oral 68 18 98 % -- --   05/10/22 0600 142/58 98.7 °F (37.1 °C) Oral 51 18 94 % -- --   05/09/22 2251 158/53 97.3 °F (36.3 °C) Oral 59 20 97 % -- --   05/09/22 2016 149/60 -- -- 50 20 100 % -- --   05/09/22 1746 176/64 -- -- 64 20 100 % -- --     Constitutional:       Appearance: Well-developed. Frail. Chronically ill-appearing.      Interventions: Nasal cannula in place.   Eyes:      Pupils: Pupils are equal, round, and reactive to light.   HENT:      Head: Normocephalic and atraumatic.   Neck:      Vascular: No carotid bruit or JVD.   Pulmonary:      Effort: Pulmonary effort is normal.      Breath sounds: Examination of the right-lower field reveals decreased breath sounds. Examination of the left-lower field reveals decreased breath sounds. Decreased breath sounds present. Rales present.   Cardiovascular:      Normal rate. Regular rhythm.      Murmurs: There is a systolic murmur.   Pulses:     Intact distal pulses.   Edema:     Thigh: bilateral pitting edema of the thigh.     Pretibial: bilateral 1+ edema of the pretibial area with pitting on the right.     Ankle: bilateral 2+ pitting edema of the ankle.  Abdominal:      General: Bowel sounds are normal.      Palpations: Abdomen is soft.   Musculoskeletal: Normal range of motion.      Cervical back: Normal range of motion and neck supple. Skin:     General: Skin is warm and dry.      Comments: Right sided abdomen bruising    Neurological:      Mental Status: Alert and oriented " to person, place, and time.      Deep Tendon Reflexes: Reflexes are normal and symmetric.   Psychiatric:         Behavior: Behavior normal.         Thought Content: Thought content normal.         Judgment: Judgment normal.         Results Review:   I reviewed the patient's new clinical results.    Lab Results (last 72 hours)     Procedure Component Value Units Date/Time    Protime-INR [[id number]]  (Abnormal) Collected: 05/10/22 1019    Specimen: Blood Updated: 05/10/22 1038     Protime 22.0 Seconds      INR 2.01    Troponin [[id number]]  (Abnormal) Collected: 05/10/22 0638    Specimen: Blood Updated: 05/10/22 0731     Troponin T 0.045 ng/mL     Narrative:      Troponin T Reference Range:  <= 0.03 ng/mL-   Negative for AMI  >0.03 ng/mL-     Abnormal for myocardial necrosis.  Clinicians would have to utilize clinical acumen, EKG, Troponin and serial changes to determine if it is an Acute Myocardial Infarction or myocardial injury due to an underlying chronic condition.       Results may be falsely decreased if patient taking Biotin.      Lipid Panel [[id number]]  (Abnormal) Collected: 05/10/22 0638    Specimen: Blood Updated: 05/10/22 0727     Total Cholesterol 79 mg/dL      Triglycerides 63 mg/dL      HDL Cholesterol 34 mg/dL      LDL Cholesterol  30 mg/dL      VLDL Cholesterol 15 mg/dL      LDL/HDL Ratio 0.95    Narrative:      Cholesterol Reference Ranges  (U.S. Department of Health and Human Services ATP III Classifications)    Desirable          <200 mg/dL  Borderline High    200-239 mg/dL  High Risk          >240 mg/dL      Triglyceride Reference Ranges  (U.S. Department of Health and Human Services ATP III Classifications)    Normal           <150 mg/dL  Borderline High  150-199 mg/dL  High             200-499 mg/dL  Very High        >500 mg/dL    HDL Reference Ranges  (U.S. Department of Health and Human Services ATP III Classifications)    Low     <40 mg/dl (major risk factor for CHD)  High    >60 mg/dl  ('negative' risk factor for CHD)        LDL Reference Ranges  (U.S. Department of Health and Human Services ATP III Classifications)    Optimal          <100 mg/dL  Near Optimal     100-129 mg/dL  Borderline High  130-159 mg/dL  High             160-189 mg/dL  Very High        >189 mg/dL    Comprehensive Metabolic Panel [[id number]]  (Abnormal) Collected: 05/10/22 0638    Specimen: Blood Updated: 05/10/22 0726     Glucose 137 mg/dL      BUN 35 mg/dL      Creatinine 1.69 mg/dL      Sodium 137 mmol/L      Potassium 4.6 mmol/L      Chloride 100 mmol/L      CO2 26.0 mmol/L      Calcium 8.8 mg/dL      Total Protein 7.1 g/dL      Albumin 3.20 g/dL      ALT (SGPT) 26 U/L      AST (SGOT) 39 U/L      Alkaline Phosphatase 199 U/L      Total Bilirubin 3.2 mg/dL      Globulin 3.9 gm/dL      A/G Ratio 0.8 g/dL      BUN/Creatinine Ratio 20.7     Anion Gap 11.0 mmol/L      eGFR 39.3 mL/min/1.73      Comment: National Kidney Foundation and American Society of Nephrology (ASN) Task Force recommended calculation based on the Chronic Kidney Disease Epidemiology Collaboration (CKD-EPI) equation refit without adjustment for race.       Narrative:      GFR Normal >60  Chronic Kidney Disease <60  Kidney Failure <15      Hemoglobin A1c [[id number]]  (Abnormal) Collected: 05/10/22 0638    Specimen: Blood Updated: 05/10/22 0724     Hemoglobin A1C 6.40 %     Narrative:      Hemoglobin A1C Ranges:    Increased Risk for Diabetes  5.7% to 6.4%  Diabetes                     >= 6.5%  Diabetic Goal                < 7.0%    CBC Auto Differential [[id number]]  (Abnormal) Collected: 05/10/22 0638    Specimen: Blood Updated: 05/10/22 0708     WBC 7.84 10*3/mm3      RBC 3.39 10*6/mm3      Hemoglobin 8.9 g/dL      Hematocrit 30.4 %      MCV 89.7 fL      MCH 26.3 pg      MCHC 29.3 g/dL      RDW 15.9 %      RDW-SD 51.5 fl      MPV 10.6 fL      Platelets 191 10*3/mm3      Neutrophil % 64.9 %      Lymphocyte % 12.4 %      Monocyte % 21.0 %      Eosinophil % 0.6  %      Basophil % 0.5 %      Neutrophils, Absolute 5.08 10*3/mm3      Lymphocytes, Absolute 0.97 10*3/mm3      Monocytes, Absolute 1.65 10*3/mm3      Eosinophils, Absolute 0.05 10*3/mm3      Basophils, Absolute 0.04 10*3/mm3     Urinalysis With Microscopic If Indicated (No Culture) - Urine, Clean Catch [[id number]]  (Normal) Collected: 05/10/22 0342    Specimen: Urine, Clean Catch Updated: 05/10/22 0357     Color, UA Yellow     Appearance, UA Clear     pH, UA 5.5     Specific Gravity, UA 1.010     Glucose, UA Negative     Ketones, UA Negative     Bilirubin, UA Negative     Blood, UA Negative     Protein, UA Negative     Leuk Esterase, UA Negative     Nitrite, UA Negative     Urobilinogen, UA 0.2 E.U./dL    Narrative:      Urine microscopic not indicated.    Ferritin [[id number]]  (Normal) Collected: 05/09/22 1759    Specimen: Blood Updated: 05/09/22 2032     Ferritin 90.66 ng/mL     Narrative:      Results may be falsely decreased if patient taking Biotin.      Iron Profile [[id number]]  (Normal) Collected: 05/09/22 1759    Specimen: Blood Updated: 05/09/22 2032     Iron 75 mcg/dL      Iron Saturation 20 %      Transferrin 250 mg/dL      TIBC 373 mcg/dL     Reticulocytes [[id number]]  (Abnormal) Collected: 05/09/22 1546    Specimen: Blood Updated: 05/09/22 2019     Reticulocyte % 3.05 %      Reticulocyte Absolute 0.1052 10*6/mm3     BNP [[id number]]  (Abnormal) Collected: 05/09/22 1759    Specimen: Blood Updated: 05/09/22 1852     proBNP 5,003.0 pg/mL     Narrative:      Among patients with dyspnea, NT-proBNP is highly sensitive for the detection of acute congestive heart failure. In addition NT-proBNP of <300 pg/ml effectively rules out acute congestive heart failure with 99% negative predictive value.    Results may be falsely decreased if patient taking Biotin.      Troponin [[id number]]  (Abnormal) Collected: 05/09/22 1759    Specimen: Blood Updated: 05/09/22 1829     Troponin T 0.034 ng/mL     Narrative:       Troponin T Reference Range:  <= 0.03 ng/mL-   Negative for AMI  >0.03 ng/mL-     Abnormal for myocardial necrosis.  Clinicians would have to utilize clinical acumen, EKG, Troponin and serial changes to determine if it is an Acute Myocardial Infarction or myocardial injury due to an underlying chronic condition.       Results may be falsely decreased if patient taking Biotin.      COVID-19,[facility] IN-HOUSE,Nasal Swab No Transport Media 3-4 HR TAT - Swab, Nasal Cavity [[id number]]  (Normal) Collected: 05/09/22 1554    Specimen: Swab from Nasal Cavity Updated: 05/09/22 1659     COVID19 Not Detected    Narrative:      Fact sheet for providers: https://www.fda.gov/media/048966/download     Fact sheet for patients: https://www.fda.gov/media/583324/download    Test performed by PCR.    Consider negative results in combination with clinical observations, patient history, and epidemiological information.  Fact sheet for providers: https://www.fda.gov/media/934426/download     Fact sheet for patients: https://www.fda.gov/media/125600/download    Test performed by PCR.    Consider negative results in combination with clinical observations, patient history, and epidemiological information.    Comprehensive Metabolic Panel [[id number]]  (Abnormal) Collected: 05/09/22 1546    Specimen: Blood Updated: 05/09/22 1618     Glucose 130 mg/dL      BUN 35 mg/dL      Creatinine 1.96 mg/dL      Sodium 139 mmol/L      Potassium 4.6 mmol/L      Chloride 103 mmol/L      CO2 23.0 mmol/L      Calcium 9.1 mg/dL      Total Protein 7.2 g/dL      Albumin 3.50 g/dL      ALT (SGPT) 16 U/L      AST (SGOT) 36 U/L      Alkaline Phosphatase 226 U/L      Total Bilirubin 2.7 mg/dL      Globulin 3.7 gm/dL      A/G Ratio 0.9 g/dL      BUN/Creatinine Ratio 17.9     Anion Gap 13.0 mmol/L      eGFR 32.9 mL/min/1.73      Comment: National Kidney Foundation and American Society of Nephrology (ASN) Task Force recommended calculation based on the Chronic Kidney  Disease Epidemiology Collaboration (CKD-EPI) equation refit without adjustment for race.       Narrative:      GFR Normal >60  Chronic Kidney Disease <60  Kidney Failure <15      Troponin [[id number]]  (Abnormal) Collected: 05/09/22 1546    Specimen: Blood Updated: 05/09/22 1617     Troponin T 0.050 ng/mL     Narrative:      Troponin T Reference Range:  <= 0.03 ng/mL-   Negative for AMI  >0.03 ng/mL-     Abnormal for myocardial necrosis.  Clinicians would have to utilize clinical acumen, EKG, Troponin and serial changes to determine if it is an Acute Myocardial Infarction or myocardial injury due to an underlying chronic condition.       Results may be falsely decreased if patient taking Biotin.      Protime-INR [[id number]]  (Abnormal) Collected: 05/09/22 1546    Specimen: Blood Updated: 05/09/22 1608     Protime 31.0 Seconds      INR 3.12    aPTT [[id number]]  (Abnormal) Collected: 05/09/22 1546    Specimen: Blood Updated: 05/09/22 1608     PTT 63.3 seconds     CBC & Differential [[id number]]  (Abnormal) Collected: 05/09/22 1546    Specimen: Blood Updated: 05/09/22 1558    Narrative:      The following orders were created for panel order CBC & Differential.  Procedure                               Abnormality         Status                     ---------                               -----------         ------                     CBC Auto Differential[[id number]]        Abnormal            Final result                 Please view results for these tests on the individual orders.    CBC Auto Differential [[id number]]  (Abnormal) Collected: 05/09/22 1546    Specimen: Blood Updated: 05/09/22 1558     WBC 8.65 10*3/mm3      RBC 3.46 10*6/mm3      Hemoglobin 9.0 g/dL      Hematocrit 30.9 %      MCV 89.3 fL      MCH 26.0 pg      MCHC 29.1 g/dL      RDW 15.7 %      RDW-SD 50.4 fl      MPV 10.3 fL      Platelets 206 10*3/mm3      Neutrophil % 55.6 %      Lymphocyte % 20.3 %      Monocyte % 21.5 %      Eosinophil % 1.5 %       Basophil % 0.3 %      Neutrophils, Absolute 4.80 10*3/mm3      Lymphocytes, Absolute 1.76 10*3/mm3      Monocytes, Absolute 1.86 10*3/mm3      Eosinophils, Absolute 0.13 10*3/mm3      Basophils, Absolute 0.03 10*3/mm3         Results from last 7 days   Lab Units 05/10/22  0638 05/09/22  1546   SODIUM mmol/L 137 139   POTASSIUM mmol/L 4.6 4.6   CHLORIDE mmol/L 100 103   CO2 mmol/L 26.0 23.0   BUN mg/dL 35* 35*   CREATININE mg/dL 1.69* 1.96*   GLUCOSE mg/dL 137* 130*   CALCIUM mg/dL 8.8 9.1       No results found for: ECHOEFEST    Imaging Results (Last 72 Hours)     Procedure Component Value Units Date/Time    CT Angiogram Chest [[id number]] Collected: 05/09/22 1815     Updated: 05/09/22 1821    Narrative:      CT ANGIOGRAM CHEST- 5/9/2022 5:14 PM CDT      HISTORY: SOB, chest pain, immobilization, leg pain      COMPARISON: None.      DOSE LENGTH PRODUCT: 538 mGy cm. Automated exposure control was also  utilized to decrease patient radiation dose.     TECHNIQUE: Helical tomographic images of the chest were obtained after  the administration of intravenous contrast following angiogram protocol.  Additionally, 3D and multiplanar reformatted images were provided.        FINDINGS:    Pulmonary arteries: There is adequate enhancement of the pulmonary  arteries to evaluate for central and segmental pulmonary emboli. There  are no filling defects within the main, lobar, segmental or visualized  subsegmental pulmonary arteries. The pulmonary arteries are within  normal limits for size.      Aorta and great vessels: The aorta is not well opacified but  demonstrates no aneurysmal dilation. No flow-limiting stenosis  identified at the great vessel origins.     Visualized neck base: The imaged portion of the base of the neck appears  unremarkable.      Lungs: Bilateral interstitial and alveolar edema with scattered  groundglass opacities. There are bilateral large layering pleural  effusions. Airways are clear.     Heart:  Four-chamber cardiomegaly. There is no pericardial effusion.  Advanced coronary artery atheromatous calcification. Previous coronary  bypass. Aortic valvular prosthesis.     Mediastinum and lymph nodes: No suspicious hilar or mediastinal  adenopathy..     Skeletal and soft tissues: Left chest wall pacemaker. No acute bony  abnormality. Thoracic spine degenerative change..        Impression:      1. No pulmonary embolus.  2. Decompensated congestive failure with bilateral interstitial and  sharlene pulmonary edema as well as bilateral large layering pleural  effusions.  3. Previous coronary bypass. Aortic valvular prosthesis.     This report was finalized on 05/09/2022 18:18 by Dr Sai Duvall, .    CT Abdomen Pelvis With Contrast [[id number]] Collected: 05/09/22 1801     Updated: 05/09/22 1814    Narrative:      CT ABDOMEN PELVIS W CONTRAST- 5/9/2022 5:14 PM CDT     HISTORY: right hip pain, bruising to right flank and bruising to right  groin/testicles, denies injury       COMPARISON: None.      DOSE LENGTH PRODUCT: 836 mGy cm. Automated exposure control was also  utilized to decrease patient radiation dose.     TECHNIQUE: Following the intravenous administration of contrast, helical  CT tomographic images of the abdomen and pelvis were acquired.  Multiplanar reformatted images were provided for review.      FINDINGS:   LOWER CHEST: Bilateral moderate layering pleural effusions. Coronary  atheromatous calcification. Right heart pacer wire.      LIVER: No suspicious liver lesion. The portal veins are patent..      BILIARY SYSTEM: The gallbladder is unremarkable. No intrahepatic or  extrahepatic ductal dilatation.      PANCREAS: No focal pancreatic lesion.      SPLEEN: Unremarkable.      KIDNEYS AND ADRENALS: Adrenal glands are unremarkable.There are 2 small  simple appearing renal cysts. Bilateral renal cortical thinning. No  hydronephrosis. Ureters are decompressed.     RETROPERITONEUM: Asymmetric expansion of the  "right iliopsoas relative to  the left with what appears to be an approximately 5.3 cm intramuscular  \"mass\"/lesion within the iliacus (series 3-image 37). This is fairly  high density, favor hematoma. No rim-enhancing organized abscess  collection identified. No retroperitoneal adenopathy.     GI TRACT: The stomach is nondistended. Small bowel loops are nondilated.  Colonic diverticulosis.     OTHER: There is no mesenteric mass, lymphadenopathy or fluid collection.  Advanced atheromatous vascular calcification. Left total hip  arthroplasty. 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  Pelvic ring appears intact. Pubic symphysis and SI joints are intact.  Body wall subcutaneous edema. Severely degenerated lumbar spine with  intact posterior fusion hardware spanning L3-L5.     PELVIS: No mass lesion, fluid collection or significant lymphadenopathy  is seen in the pelvis. The urinary bladder is normal in appearance.       Impression:      1. Likely intramuscular hematoma involving the left iliopsoas, with what  appears to be faintly visible intramuscular hematoma in the iliacus  measuring approximately 5.3 cm diameter. I do not see an adjacent pelvic  fracture or obvious lumbar spine fracture.  2. Systemic volume overload with bilateral pleural effusions and body  wall subcutaneous edema.  This report was finalized on 05/09/2022 18:11 by Dr Sai Duvall, .    CT Lumbar Spine Without Contrast [[id number]] Collected: 05/09/22 1756     Updated: 05/09/22 1811    Narrative:      CT LUMBAR SPINE WO CONTRAST- 5/9/2022 5:14 PM CDT     HISTORY: lower pain into right hip     Comparison: CT scan dated 921      DLP: 791 mGy cm     Technique: Serial helical tomographic images of the lumbar spine were  obtained without the use of intravenous contrast. Additionally, sagittal  and coronal reformatted images were provided for review. Automated  exposure control is utilized to reduce patient radiation dose.     Findings:    "   Counting will assume 5 lumbar-type vertebrae. The spine is imaged from  T12 to S2.     [place] left lumbar scoliotic curvature with apex at L3. Previous posterior  spinal fusion with construct spanning L3-L5. Posterior fusion construct  is intact. There is a mild degenerative retrolisthesis at L1-L2.  Multilevel loss of intervertebral disc height. Prominent posterior  endplate spurring at L1-L2 and L2-L3. Vertebral body heights are  well-maintained. No acute fracture. Posterior elements are intact.  High-grade acquired spinal stenosis identified at L1-L2 and L2-L3.  Multilevel severe neuroforaminal narrowing. There appears to be abnormal  expansion of the right iliacus muscle which will be better evaluated on  the dedicated abdominal CT. Paraspinal soft tissues are otherwise  unremarkable.       Impression:      Impression:   1. No acute osseous injury identified.  2. 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 Previous L3-L5 posterior  spinal fusion with intact fusion construct. No acute fractures seen.  3. Abnormal expansion of the right iliacus muscle which will be better  evaluated on the dedicated abdominal CT.        This report was finalized on 05/09/2022 18:01 by Dr Sai Duvall, .    XR Chest 1 View [[id number]] Collected: 05/09/22 1457     Updated: 05/09/22 1501    Narrative:      EXAM: XR CHEST 1 VW-     INDICATION: Chest pain     COMPARISON: 4/3/2020     FINDINGS:     Cardiac silhouette is borderline prominent but stable. Prosthetic heart  valve noted. Prior median sternotomy. LEFT chest wall cardiac pacing  device. Small bilateral pleural effusions with mild overlying  atelectasis. Mid and upper lung zones are clear. No acute osseous  finding. Advanced degenerative change in the cervical spine.       Impression:         Small bilateral pleural effusions with overlying atelectasis.  This report was finalized on 05/09/2022 14:58 by  Dr. Chan Goldberg MD.    XR Femur 2 View Right [[id number]] Collected: 05/09/22 1452     Updated: 05/09/22 1456    Narrative:      RIGHT FEMUR, 2 VIEWS 5/9/2022 2:31 PM CDT     HISTORY: Thigh pain     COMPARISON: Radiographs dated 4/20/2022     FINDINGS:     Frontal and lateral radiographs of the right femur were obtained.     No visualized acute fracture. Grade 4 osteoarthritis at the hip joint  with bone-on-bone change. Ring osteophyte formation at the hip joint.  Grade 4 osteoarthritis at the knee with bone-on-bone change in the  medial knee compartment. No capsular distention at the knee.  Atheromatous vascular calcification.       Impression:      1. No acute osseous injury.   2. Grade 4 osteoarthritis at the right knee and hip joints.  This report was finalized on 05/09/2022 14:53 by Dr Sai Duvall, .          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  Patient is on Losartan and Bisoprolol. Good urine output- minimal symptom improvement. Low Salt diet. Daily weights.    Bioprosthetic Aortic Valve- good function on last echo in April 2020. Today's echo pending.    Atrial Fibrillation and Atrial Flutter - he was noted to be paroxysmal atrial fibrillation in the past. EKG on admission appears atrial flutter. He has been documented to be asymptomatic to A-fib in the past. Denies any palpitations or heart racing. Unclear if patient is permanently out of rhythm or this is paroxysmal. Will ask for device interrogation to assess burden.     Chronic Kidney Disease- renal function has improved with diuresis on admission. Continue to monitor strict I&O, daily weights, renal function  and electrolytes    Anemia- Chronic. Occult stool ordered. Work up per primary team.     Coronary Artery Disease- CABG in early 2000s. Denies any angina symptoms. Troponin elevated at 0.05, 0.034 and 0.045. Troponin elevated thought secondary to volume overload and anemia     Hematoma - with acute hip pain, this was reason for admission. Denies recent injury.     [facility]. Per patient,echo and previous note patient has a bioprosthetic valve, which would suggest he is anticoagulated for atrial fibrillation/atrial flutter and goal INR 2.0-3.0. Therefore bridging with Lovenox is not necessary and could resume coumadin without bridge.     Further orders per Dr. Agosto    Thank you for asking us to follow this patient with you.       Electronically signed by GÓMEZ Vivas, 05/10/22, 3:20 PM CDT.

## 2022-05-10 NOTE — ED PROVIDER NOTES
Subjective   History of Present Illness    Review of Systems    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   • Hypertension    • Injury of back    • TIA (transient ischemic attack)        No Known Allergies    Past Surgical History:   Procedure Laterality Date   • APPENDECTOMY     • BACK SURGERY      Fusion   • CARDIAC SURGERY      Open Heart   • CHOLECYSTECTOMY N/A 5/28/2020    Procedure: OPEN PARTIAL CHOLECYSTECTOMY, LYSIS OF ADHESIONS;  Surgeon: Agustina Saleem MD;  Location:  [facility] OR;  Service: General;  Laterality: N/A;   • EXPLORATORY LAPAROTOMY N/A 4/5/2020    Procedure: open cholecystostomy tube placement ;  Surgeon: Agustina Saleem MD;  Location:  [facility] OR;  Service: General;  Laterality: N/A;   • HERNIA REPAIR     • JOINT REPLACEMENT Left     s/p L hip replacement   • PACEMAKER IMPLANTATION     • STOMACH SURGERY         Family History   Problem Relation Age of Onset   • Heart disease Mother    • Stroke Father        Social History     Socioeconomic History   • Marital status:    Tobacco Use   • Smoking status: Never Smoker   • Smokeless tobacco: Never Used   Vaping Use   • Vaping Use: Never used   Substance and Sexual Activity   • Alcohol use: No   • Drug use: No   • Sexual activity: Defer           Objective   Physical Exam    Procedures           ED Course  ED Course as of 05/12/22 0711   Mon May 09, 2022   1939 This patient came in with some nonspecific hip pain no history of trauma he has got a retroperitoneal hemorrhage with low hemoglobin on anticoagulation we cannot reverse Coumadin immediately since he is got aortic valve but will hold off his Coumadin does not appear to be actively hemorrhaging at this time.  The patient is going be admitted to the medicine service with the vascular consultation.  He also has CHF. [TS]       ED Course User Index  [TS] Juan Antonio Segal MD                                                 [facility]    Final diagnoses:   Acute on chronic congestive heart failure, unspecified heart failure type (HCC)   Hematoma of iliopsoas muscle, initial encounter       ED Disposition  ED Disposition     ED Disposition   Decision to Admit    Condition   --    Comment   Level of Care: Observation Unit [28]   Diagnosis: Acute exacerbation of CHF (congestive heart failure) (HCC) [085593]   Admitting Physician: DANNY PIERCE [[id number]]               No follow-up provider specified.       Medication List      ASK your doctor about these medications    HYDROcodone-acetaminophen 7.5-325 MG per tablet  Commonly known as: NORCO  Ask about: Which instructions should I use?     naloxone 0.4 MG/ML injection  Commonly known as: NARCAN  Ask about: Which instructions should I use?     sucralfate 1 g tablet  Commonly known as: CARAFATE  Ask about: Which instructions should I use?             Juan Antonio Segal MD  05/09/22 1940       Juan Antonio Segal MD  05/12/22 0711

## 2022-05-10 NOTE — H&P
[facility] Medicine Services  HISTORY AND PHYSICAL    Date of Admission: 5/9/2022  Primary Care Physician: Matheus Olivera PA    Subjective     Chief Complaint: Right hip pain    History of Present Illness  Jesus Smith is an 86-year-old male with a past medical history of coronary artery disease, V pacing with underlying atrial flutter on chronic warfarin use, GERD, type 2 diabetes, hypertension, chronic kidney disease stage IIIb, TIA.  Patient resides at [facility] and rehab.  Patient was transferred to [facility] emergency department for evaluation of bruising on the right flank region.  Patient is extremely poor historian.  He did report to ER provider that he has been having pain in the right hip for approximately 2 months.  Per nursing home staff he has had no falls or injuries.  Patient does report shortness of breathing that has been worsening over the past 6 months.  He does not utilize continuous oxygen therapy.  Currently patient is utilizing nasal cannula oxygen at 2 L with 100% saturation however he continues to have labored breathing.  Work-up revealed elevated troponin that is normalizing, BNP 5003.0, creatinine 1.96 which is baseline.  ER work-up revealed intramuscular hematoma involving the left iliopsoas, with what appears to be faintly visible intramuscular hematoma in the iliacus measuring approximately 5.3 cm diameter, systemic volume overload with bilateral pleural effusions and body wall subcutaneous edema.  CT angiogram of the chest revealed decompensated congestive failure with bilateral interstitial and sharlene pulmonary edema as well as bilateral large layering pleural effusions.  Patient denies chest pain.  Patient is extremely uncomfortable, diffuse anasarca noted.  He is admitted for further evaluation treatment.    Review of Systems   A 10 point review of systems was completed, all negative except for those discussed in HPI    Past  Medical History:   Past Medical History:   Diagnosis Date   • Bradycardia    • Coronary artery disease    • GERD (gastroesophageal reflux disease)    • Hypertension    • Injury of back    • TIA (transient ischemic attack)        Past Surgical History:   Past Surgical History:   Procedure Laterality Date   • APPENDECTOMY     • BACK SURGERY      Fusion   • CARDIAC SURGERY      Open Heart   • CHOLECYSTECTOMY N/A 5/28/2020    Procedure: OPEN PARTIAL CHOLECYSTECTOMY, LYSIS OF ADHESIONS;  Surgeon: Agustina Saleem MD;  Location: [facility] OR;  Service: General;  Laterality: N/A;   • EXPLORATORY LAPAROTOMY N/A 4/5/2020    Procedure: open cholecystostomy tube placement ;  Surgeon: Agustina Saleem MD;  Location:  [facility] OR;  Service: General;  Laterality: N/A;   • HERNIA REPAIR     • JOINT REPLACEMENT Left     s/p L hip replacement   • PACEMAKER IMPLANTATION     • STOMACH SURGERY         Family History: family history includes Heart disease in his mother; Stroke in his father.    Social History:  reports that he has never smoked. He has never used smokeless tobacco. He reports that he does not drink alcohol and does not use drugs.    Code Status: DNR/DNI if unable speak for himself family will speak for him      Allergies:  No Known Allergies    Medications:  No current facility-administered medications on file prior to encounter.     Current Outpatient Medications on File Prior to Encounter   Medication Sig Dispense Refill   • acetaminophen (TYLENOL) 325 MG tablet Take 650 mg by mouth Every 4 (Four) Hours As Needed for Mild Pain .     • aspirin 81 MG EC tablet Take 81 mg by mouth Daily.     • bisacodyl (DULCOLAX) 10 MG suppository Insert 1 suppository into the rectum Daily As Needed for Constipation.     • bisoprolol (ZEBeta) 5 MG tablet Take 5 mg by mouth Daily.     • famotidine (PEPCID) 20 MG tablet Take 1 tablet by mouth 2 (Two) Times a Day.     • furosemide (LASIX) 20 MG tablet Take 20 mg by mouth Daily.     •  Glycerin-Hypromellose- (ARTIFICIAL TEARS) 0.2-0.2-1 % solution ophthalmic solution Administer 1 drop to both eyes Every 1 (One) Hour As Needed for Dry Eyes.     • guaiFENesin (ROBITUSSIN) 100 MG/5ML syrup Take 100 mg by mouth Every 4 (Four) Hours As Needed for Cough.     • HYDROcodone-acetaminophen (NORCO) 7.5-325 MG per tablet Take 1 tablet by mouth Every 4 (Four) Hours As Needed for Moderate Pain .     • ibuprofen (ADVIL,MOTRIN) 400 MG tablet Take 400 mg by mouth 4 (Four) Times a Day As Needed for Mild Pain .     • insulin lispro (humaLOG) 100 UNIT/ML injection Inject 1 Units under the skin into the appropriate area as directed Every Night. Inject 4 times daily as per sliding scale:  150-199= 2 units  200-249= 3 units  250-299= 4 units  300-349= 5 units  350-400= 6 units  401-550= 7 units and call MD     • loperamide (IMODIUM) 2 MG capsule Take 2 mg by mouth 4 (Four) Times a Day As Needed for Diarrhea.     • losartan (COZAAR) 100 MG tablet Take 100 mg by mouth Daily.     • ondansetron (ZOFRAN) 4 MG tablet Take 4 mg by mouth Every 8 (Eight) Hours As Needed for Nausea or Vomiting.     • ondansetron ODT (ZOFRAN-ODT) 4 MG disintegrating tablet Place 1 tablet on the tongue Every 8 (Eight) Hours As Needed for Nausea or Vomiting. 12 tablet 0   • polyethylene glycol (GlycoLax) 17 GM/SCOOP powder Take 17 g by mouth Daily.     • rosuvastatin (CRESTOR) 20 MG tablet Take 20 mg by mouth.     • sennosides-docusate (senna-docusate sodium) 8.6-50 MG per tablet Take 1 tablet by mouth Daily.     • sucralfate (CARAFATE) 1 GM/10ML suspension Take 10 mL by mouth Every 6 (Six) Hours. (Patient taking differently: Take 1 g by mouth 2 (Two) Times a Day.)     • tiotropium (SPIRIVA) 18 MCG per inhalation capsule Place 2 capsules into inhaler and inhale Daily.     • vitamin B-12 (CYANOCOBALAMIN) 1000 MCG tablet Take 1,000 mcg by mouth Daily.     • Vitamin D, Ergocalciferol, 50 MCG (2000 UT) capsule Take 50,000 Units by mouth Daily.    "  • warfarin (COUMADIN) 1 MG tablet Take 1 mg by mouth Every Night.        I have utilized all available immediate resources to obtain, update, and review the patient's current medications.    Objective     /60   Pulse 50   Temp 98.1 °F (36.7 °C)   Resp 20   Ht 177.8 cm (70\")   Wt 106 kg (233 lb)   SpO2 100%   BMI 33.43 kg/m²   Physical Exam  Vitals reviewed.   Constitutional:       Appearance: He is ill-appearing.   HENT:      Head: Normocephalic and atraumatic.      Mouth/Throat:      Mouth: Mucous membranes are moist.      Pharynx: Oropharynx is clear.   Eyes:      Extraocular Movements: Extraocular movements intact.      Conjunctiva/sclera: Conjunctivae normal.   Cardiovascular:      Rate and Rhythm: Normal rate and regular rhythm.      Heart sounds: Murmur heard.   Pulmonary:      Comments: Increased respiratory effort without overt distress, diminished breath sounds throughout  Abdominal:      Palpations: Abdomen is soft.      Comments: Protuberant   Musculoskeletal:         General: Swelling ( Anasarca) present.      Cervical back: Normal range of motion and neck supple.      Right lower leg: Edema present.      Left lower leg: Edema present.      Comments: Generalized weakness and debility   Skin:     General: Skin is warm and dry.      Coloration: Skin is pale.   Neurological:      General: No focal deficit present.      Mental Status: He is oriented to person, place, and time.   Psychiatric:         Mood and Affect: Mood normal.         Behavior: Behavior normal.       Pertinent Data:   Lab Results (last 72 hours)     Procedure Component Value Units Date/Time    Ferritin [[id number]]  (Normal) Collected: 05/09/22 1759    Specimen: Blood Updated: 05/09/22 2032     Ferritin 90.66 ng/mL     Narrative:      Results may be falsely decreased if patient taking Biotin.      Iron Profile [[id number]]  (Normal) Collected: 05/09/22 1759    Specimen: Blood Updated: 05/09/22 2032     Iron 75 mcg/dL      Iron " Saturation 20 %      Transferrin 250 mg/dL      TIBC 373 mcg/dL     Reticulocytes [[id number]]  (Abnormal) Collected: 05/09/22 1546    Specimen: Blood Updated: 05/09/22 2019     Reticulocyte % 3.05 %      Reticulocyte Absolute 0.1052 10*6/mm3     BNP [[id number]]  (Abnormal) Collected: 05/09/22 1759    Specimen: Blood Updated: 05/09/22 1852     proBNP 5,003.0 pg/mL     Troponin [[id number]]  (Abnormal) Collected: 05/09/22 1759    Specimen: Blood Updated: 05/09/22 1829     Troponin T 0.034 ng/mL     COVID-19,[facility] IN-HOUSE,Nasal Swab No Transport Media 3-4 HR TAT - Swab, Nasal Cavity [[id number]]  (Normal) Collected: 05/09/22 1554    Specimen: Swab from Nasal Cavity Updated: 05/09/22 1659     COVID19 Not Detected    Comprehensive Metabolic Panel [[id number]]  (Abnormal) Collected: 05/09/22 1546    Specimen: Blood Updated: 05/09/22 1618     Glucose 130 mg/dL      BUN 35 mg/dL      Creatinine 1.96 mg/dL      Sodium 139 mmol/L      Potassium 4.6 mmol/L      Chloride 103 mmol/L      CO2 23.0 mmol/L      Calcium 9.1 mg/dL      Total Protein 7.2 g/dL      Albumin 3.50 g/dL      ALT (SGPT) 16 U/L      AST (SGOT) 36 U/L      Alkaline Phosphatase 226 U/L      Total Bilirubin 2.7 mg/dL      Globulin 3.7 gm/dL      A/G Ratio 0.9 g/dL      BUN/Creatinine Ratio 17.9     Anion Gap 13.0 mmol/L      eGFR 32.9 mL/min/1.73     Troponin [[id number]]  (Abnormal) Collected: 05/09/22 1546    Specimen: Blood Updated: 05/09/22 1617     Troponin T 0.050 ng/mL     Protime-INR [[id number]]  (Abnormal) Collected: 05/09/22 1546    Specimen: Blood Updated: 05/09/22 1608     Protime 31.0 Seconds      INR 3.12    aPTT [[id number]]  (Abnormal) Collected: 05/09/22 1546    Specimen: Blood Updated: 05/09/22 1608     PTT 63.3 seconds     CBC Auto Differential [[id number]]  (Abnormal) Collected: 05/09/22 1546    Specimen: Blood Updated: 05/09/22 1558     WBC 8.65 10*3/mm3      RBC 3.46 10*6/mm3      Hemoglobin 9.0 g/dL      Hematocrit 30.9 %      MCV 89.3 fL       MCH 26.0 pg      MCHC 29.1 g/dL      RDW 15.7 %      RDW-SD 50.4 fl      MPV 10.3 fL      Platelets 206 10*3/mm3      Neutrophil % 55.6 %      Lymphocyte % 20.3 %      Monocyte % 21.5 %      Eosinophil % 1.5 %      Basophil % 0.3 %      Neutrophils, Absolute 4.80 10*3/mm3      Lymphocytes, Absolute 1.76 10*3/mm3      Monocytes, Absolute 1.86 10*3/mm3      Eosinophils, Absolute 0.13 10*3/mm3      Basophils, Absolute 0.03 10*3/mm3         Imaging Results (Last 24 Hours)     Procedure Component Value Units Date/Time    CT Angiogram Chest [[id number]] Collected: 05/09/22 1815     Updated: 05/09/22 1821    Narrative:      CT ANGIOGRAM CHEST- 5/9/2022 5:14 PM CDT      HISTORY: SOB, chest pain, immobilization, leg pain      COMPARISON: None.      DOSE LENGTH PRODUCT: 538 mGy cm. Automated exposure control was also  utilized to decrease patient radiation dose.     TECHNIQUE: Helical tomographic images of the chest were obtained after  the administration of intravenous contrast following angiogram protocol.  Additionally, 3D and multiplanar reformatted images were provided.        FINDINGS:    Pulmonary arteries: There is adequate enhancement of the pulmonary  arteries to evaluate for central and segmental pulmonary emboli. There  are no filling defects within the main, lobar, segmental or visualized  subsegmental pulmonary arteries. The pulmonary arteries are within  normal limits for size.      Aorta and great vessels: The aorta is not well opacified but  demonstrates no aneurysmal dilation. No flow-limiting stenosis  identified at the great vessel origins.     Visualized neck base: The imaged portion of the base of the neck appears  unremarkable.      Lungs: Bilateral interstitial and alveolar edema with scattered  groundglass opacities. There are bilateral large layering pleural  effusions. Airways are clear.     Heart: Four-chamber cardiomegaly. There is no pericardial effusion.  Advanced coronary artery atheromatous  calcification. Previous coronary  bypass. Aortic valvular prosthesis.     Mediastinum and lymph nodes: No suspicious hilar or mediastinal  adenopathy..     Skeletal and soft tissues: Left chest wall pacemaker. No acute bony  abnormality. Thoracic spine degenerative change..        Impression:      1. No pulmonary embolus.  2. Decompensated congestive failure with bilateral interstitial and  sharlene pulmonary edema as well as bilateral large layering pleural  effusions.  3. Previous coronary bypass. Aortic valvular prosthesis.     This report was finalized on 05/09/2022 18:18 by Dr Sai Duvall, .    CT Abdomen Pelvis With Contrast [[id number]] Collected: 05/09/22 1801     Updated: 05/09/22 1814    Narrative:      CT ABDOMEN PELVIS W CONTRAST- 5/9/2022 5:14 PM CDT     HISTORY: right hip pain, bruising to right flank and bruising to right  groin/testicles, denies injury       COMPARISON: None.      DOSE LENGTH PRODUCT: 836 mGy cm. Automated exposure control was also  utilized to decrease patient radiation dose.     TECHNIQUE: Following the intravenous administration of contrast, helical  CT tomographic images of the abdomen and pelvis were acquired.  Multiplanar reformatted images were provided for review.      FINDINGS:   LOWER CHEST: Bilateral moderate layering pleural effusions. Coronary  atheromatous calcification. Right heart pacer wire.      LIVER: No suspicious liver lesion. The portal veins are patent..      BILIARY SYSTEM: The gallbladder is unremarkable. No intrahepatic or  extrahepatic ductal dilatation.      PANCREAS: No focal pancreatic lesion.      SPLEEN: Unremarkable.      KIDNEYS AND ADRENALS: Adrenal glands are unremarkable.There are 2 small  simple appearing renal cysts. Bilateral renal cortical thinning. No  hydronephrosis. Ureters are decompressed.     RETROPERITONEUM: Asymmetric expansion of the right iliopsoas relative to  the left with what appears to be an approximately 5.3 cm  "intramuscular  \"mass\"/lesion within the iliacus (series 3-image 37). This is fairly  high density, favor hematoma. No rim-enhancing organized abscess  collection identified. No retroperitoneal adenopathy.     GI TRACT: The stomach is nondistended. Small bowel loops are nondilated.  Colonic diverticulosis.     OTHER: There is no mesenteric mass, lymphadenopathy or fluid collection.  Advanced atheromatous vascular calcification. Left total hip  arthroplasty. 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  Pelvic ring appears intact. Pubic symphysis and SI joints are intact.  Body wall subcutaneous edema. Severely degenerated lumbar spine with  intact posterior fusion hardware spanning L3-L5.     PELVIS: No mass lesion, fluid collection or significant lymphadenopathy  is seen in the pelvis. The urinary bladder is normal in appearance.       Impression:      1. Likely intramuscular hematoma involving the left iliopsoas, with what  appears to be faintly visible intramuscular hematoma in the iliacus  measuring approximately 5.3 cm diameter. I do not see an adjacent pelvic  fracture or obvious lumbar spine fracture.  2. Systemic volume overload with bilateral pleural effusions and body  wall subcutaneous edema.  This report was finalized on 05/09/2022 18:11 by Dr Sai Duvall, .    CT Lumbar Spine Without Contrast [[id number]] Collected: 05/09/22 1756     Updated: 05/09/22 1811    Narrative:      CT LUMBAR SPINE WO CONTRAST- 5/9/2022 5:14 PM CDT     HISTORY: lower pain into right hip     Comparison: CT scan dated 921      DLP: 791 mGy cm     Technique: Serial helical tomographic images of the lumbar spine were  obtained without the use of intravenous contrast. Additionally, sagittal  and coronal reformatted images were provided for review. Automated  exposure control is utilized to reduce patient radiation dose.     Findings:      Counting will assume 5 lumbar-type vertebrae. The spine is imaged from  T12 to S2.   "   [place] left lumbar scoliotic curvature with apex at L3. Previous posterior  spinal fusion with construct spanning L3-L5. Posterior fusion construct  is intact. There is a mild degenerative retrolisthesis at L1-L2.  Multilevel loss of intervertebral disc height. Prominent posterior  endplate spurring at L1-L2 and L2-L3. Vertebral body heights are  well-maintained. No acute fracture. Posterior elements are intact.  High-grade acquired spinal stenosis identified at L1-L2 and L2-L3.  Multilevel severe neuroforaminal narrowing. There appears to be abnormal  expansion of the right iliacus muscle which will be better evaluated on  the dedicated abdominal CT. Paraspinal soft tissues are otherwise  unremarkable.       Impression:      Impression:   1. No acute osseous injury identified.  2. 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 Previous L3-L5 posterior  spinal fusion with intact fusion construct. No acute fractures seen.  3. Abnormal expansion of the right iliacus muscle which will be better  evaluated on the dedicated abdominal CT.        This report was finalized on 05/09/2022 18:01 by Dr Sai Duvall, .    XR Chest 1 View [[id number]] Collected: 05/09/22 3053     Updated: 05/09/22 1501    Narrative:      EXAM: XR CHEST 1 VW-     INDICATION: Chest pain     COMPARISON: 4/3/2020     FINDINGS:     Cardiac silhouette is borderline prominent but stable. Prosthetic heart  valve noted. Prior median sternotomy. LEFT chest wall cardiac pacing  device. Small bilateral pleural effusions with mild overlying  atelectasis. Mid and upper lung zones are clear. No acute osseous  finding. Advanced degenerative change in the cervical spine.       Impression:         Small bilateral pleural effusions with overlying atelectasis.  This report was finalized on 05/09/2022 14:58 by Dr. Chan Goldberg MD.    XR Femur 2 View Right [[id number]] Collected: 05/09/22 1570      Updated: 05/09/22 1456    Narrative:      RIGHT FEMUR, 2 VIEWS 5/9/2022 2:31 PM CDT     HISTORY: Thigh pain     COMPARISON: Radiographs dated 4/20/2022     FINDINGS:     Frontal and lateral radiographs of the right femur were obtained.     No visualized acute fracture. Grade 4 osteoarthritis at the hip joint  with bone-on-bone change. Ring osteophyte formation at the hip joint.  Grade 4 osteoarthritis at the knee with bone-on-bone change in the  medial knee compartment. No capsular distention at the knee.  Atheromatous vascular calcification.       Impression:      1. No acute osseous injury.   2. Grade 4 osteoarthritis at the right knee and hip joints.  This report was finalized on 05/09/2022 14:53 by Dr Sai Duvall, .        4/14/2020 CT of abdomen pelvis with contrast  IMPRESSION:  1. Cholecystostomy tube is present in the gallbladder.  2. Moderate-sized left iliopsoas intramuscular fluid collection. Most likely this is a hematoma. This is a change from April 3  3. 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     These images are initially reviewed by stat rad 04/14/2020 at 2230 hours  This report was finalized on 04/15/2020 07:20 by Dr. Leeroy Sal MD.    4/4/2020 ECHO  Interpretation Summary    · Left ventricular systolic function is normal. Estimated EF appears to be in the range of 56 - 60%.  · Left ventricular wall thickness is consistent with moderate concentric hypertrophy.  · There is a prosthetic aortic valve present. The aortic valve peak and mean gradients are within normal limits.  · Mild tricuspid valve regurgitation is present. Estimated right ventricular systolic pressure from tricuspid regurgitation is normal (<35 mmHg).       Assessment / Plan     Assessment:   Active Hospital Problems    Diagnosis    • **Acute exacerbation of CHF (congestive heart failure) (HCC)    • Supratherapeutic INR    • Anemia of chronic disease    • Anasarca    • Insulin dependent type 2 diabetes  mellitus (HCC)    • Pleural effusion, bilateral    • Hematoma of left iliopsoas muscle    • Stage 3b chronic kidney disease (HCC)        Plan:   1.  Admit as inpatient  2.  Echocardiogram in a.m.  3.  Lasix 40 mg IV every 12 hours, daily weights, monitor kidney function closely  4.  Glucose AC with regular insulin sliding scale coverage  5.  Vitamin K 10 mg IV x1  6.  Home medications reviewed and restarted as appropriate  6.  Incentive spirometry, supplemental oxygen, continuous pulse oximetry  7.  Labs in a.m., check stool for occult blood, urinalysis with microscopic evaluation, repeat troponin in a.m.  8.  DVT prophylaxis with SCDs, warfarin on hold  9.  Check postvoid residual and place indwelling Day catheter for accurate I&O measurement for 350 mL or greater  10.  Dr. Scott, vascular surgeon made aware of patient's iliopsoas hematoma and will evaluate in a.m.    I discussed the patient's findings and my recommendations with: Brigido Simons MD  Time spent 45 minutes    Patient seen and examined by me on 5/9/2022 at 7:14 PM.    Electronically signed by GÓMEZ Linn, 05/09/22, 22:14 CDT.    This visit was performed by both a physician and an APC. I personally evaluated and examined the patient. I performed all aspects of the MDM as documented.  Patient with increased swelling and shortness of breath.  Evidence of volume overload is noted.  Patient with also iliopsoas hematoma which apparently is chronic as we found out later.  He is supratherapeutic also on his warfarin.  Vitamin K was given unfortunately we did not realize that this is a chronic hematoma but it has enlarged.  Vascular surgery to take a look at them agree with all plans as above.    Electronically signed by Brigido Simons MD, 5/10/2022, 00:29 CDT.

## 2022-05-11 LAB
ANION GAP SERPL CALCULATED.3IONS-SCNC: 10 MMOL/L (ref 5–15)
BUN SERPL-MCNC: 32 MG/DL (ref 8–23)
BUN/CREAT SERPL: 19.2 (ref 7–25)
CALCIUM SPEC-SCNC: 9 MG/DL (ref 8.6–10.5)
CHLORIDE SERPL-SCNC: 100 MMOL/L (ref 98–107)
CO2 SERPL-SCNC: 27 MMOL/L (ref 22–29)
CREAT SERPL-MCNC: 1.67 MG/DL (ref 0.76–1.27)
DEPRECATED RDW RBC AUTO: 51.6 FL (ref 37–54)
EGFRCR SERPLBLD CKD-EPI 2021: 39.9 ML/MIN/1.73
ERYTHROCYTE [DISTWIDTH] IN BLOOD BY AUTOMATED COUNT: 16.4 % (ref 12.3–15.4)
GLUCOSE BLDC GLUCOMTR-MCNC: 138 MG/DL (ref 70–130)
GLUCOSE BLDC GLUCOMTR-MCNC: 145 MG/DL (ref 70–130)
GLUCOSE BLDC GLUCOMTR-MCNC: 148 MG/DL (ref 70–130)
GLUCOSE SERPL-MCNC: 133 MG/DL (ref 65–99)
HCT VFR BLD AUTO: 29.2 % (ref 37.5–51)
HGB BLD-MCNC: 8.6 G/DL (ref 13–17.7)
INR PPP: 1.54 (ref 0.91–1.09)
MCH RBC QN AUTO: 26.5 PG (ref 26.6–33)
MCHC RBC AUTO-ENTMCNC: 29.5 G/DL (ref 31.5–35.7)
MCV RBC AUTO: 89.8 FL (ref 79–97)
PLATELET # BLD AUTO: 200 10*3/MM3 (ref 140–450)
PMV BLD AUTO: 10.4 FL (ref 6–12)
POTASSIUM SERPL-SCNC: 4.2 MMOL/L (ref 3.5–5.2)
PROTHROMBIN TIME: 17.9 SECONDS (ref 11.9–14.6)
QT INTERVAL: 510 MS
QTC INTERVAL: 474 MS
RBC # BLD AUTO: 3.25 10*6/MM3 (ref 4.14–5.8)
SODIUM SERPL-SCNC: 137 MMOL/L (ref 136–145)
WBC NRBC COR # BLD: 8.45 10*3/MM3 (ref 3.4–10.8)

## 2022-05-11 PROCEDURE — 99232 SBSQ HOSP IP/OBS MODERATE 35: CPT | Performed by: NURSE PRACTITIONER

## 2022-05-11 PROCEDURE — 85610 PROTHROMBIN TIME: CPT | Performed by: FAMILY MEDICINE

## 2022-05-11 PROCEDURE — 80048 BASIC METABOLIC PNL TOTAL CA: CPT | Performed by: FAMILY MEDICINE

## 2022-05-11 PROCEDURE — 25010000002 FUROSEMIDE PER 20 MG: Performed by: NURSE PRACTITIONER

## 2022-05-11 PROCEDURE — 85027 COMPLETE CBC AUTOMATED: CPT | Performed by: FAMILY MEDICINE

## 2022-05-11 PROCEDURE — 25010000002 FUROSEMIDE PER 20 MG: Performed by: FAMILY MEDICINE

## 2022-05-11 PROCEDURE — 82962 GLUCOSE BLOOD TEST: CPT

## 2022-05-11 RX ORDER — ROSUVASTATIN CALCIUM 20 MG/1
20 TABLET, COATED ORAL NIGHTLY
Status: DISCONTINUED | OUTPATIENT
Start: 2022-05-11 | End: 2022-05-12 | Stop reason: HOSPADM

## 2022-05-11 RX ORDER — FUROSEMIDE 40 MG/1
40 TABLET ORAL DAILY
Status: DISCONTINUED | OUTPATIENT
Start: 2022-05-12 | End: 2022-05-12 | Stop reason: HOSPADM

## 2022-05-11 RX ADMIN — BISOPROLOL FUMARATE 5 MG: 5 TABLET ORAL at 08:30

## 2022-05-11 RX ADMIN — FUROSEMIDE 40 MG: 10 INJECTION, SOLUTION INTRAMUSCULAR; INTRAVENOUS at 17:56

## 2022-05-11 RX ADMIN — FUROSEMIDE 40 MG: 10 INJECTION, SOLUTION INTRAMUSCULAR; INTRAVENOUS at 06:03

## 2022-05-11 RX ADMIN — FAMOTIDINE 20 MG: 20 TABLET, FILM COATED ORAL at 08:30

## 2022-05-11 RX ADMIN — Medication 10 ML: at 08:30

## 2022-05-11 RX ADMIN — Medication 10 ML: at 20:17

## 2022-05-11 RX ADMIN — DOCUSATE SODIUM 50 MG AND SENNOSIDES 8.6 MG 1 TABLET: 8.6; 5 TABLET, FILM COATED ORAL at 08:30

## 2022-05-11 RX ADMIN — WARFARIN 1.5 MG: 3 TABLET ORAL at 17:56

## 2022-05-11 RX ADMIN — ROSUVASTATIN CALCIUM 20 MG: 20 TABLET, FILM COATED ORAL at 20:17

## 2022-05-11 RX ADMIN — HYDROCODONE BITARTRATE AND ACETAMINOPHEN 1 TABLET: 7.5; 325 TABLET ORAL at 08:35

## 2022-05-11 RX ADMIN — LOSARTAN POTASSIUM 100 MG: 50 TABLET, FILM COATED ORAL at 08:30

## 2022-05-11 NOTE — PROGRESS NOTES
[facility] Medicine Services  INPATIENT PROGRESS NOTE    Length of Stay: 2  Date of Admission: 5/9/2022  Primary Care Physician: Matheus Olivera PA    Subjective     Chief Complaint:     Right flank pain    HPI     Patient's right flank pain is improved.  INR has dipped to 1.54 likely secondary to administration of vitamin K at the time of admission.  It will likely take a few days for INR to rebound.  The patient's H&H remained stable at 8.6.  Creatinine has slightly improved to 1.67 with diuresis.  The patient is currently on room air with oxygen saturations at 95 to 96%.  IV diuretics can be discontinued after the last dose today with oral diuretics to be started in a.m.  Plan would be to discharge back to skilled nursing facility tomorrow with continued management of INR at skilled nursing facility.  I discussed the case with Dr. Scott and we both believe that the patient's hematoma is likely result of trauma rather than a spontaneous bleed.  The patient's memory is very poor and he likely has no recall of the event.  Lower extremity edema has improved.      Review of Systems     All pertinent negatives and positives are as above. All other systems have been reviewed and are negative unless otherwise stated.     Objective    Temp:  [97.8 °F (36.6 °C)-98.2 °F (36.8 °C)] 98.1 °F (36.7 °C)  Heart Rate:  [50-58] 50  Resp:  [16-18] 16  BP: (111-169)/(44-64) 134/60    Lab Results (last 24 hours)     Procedure Component Value Units Date/Time    POC Glucose Once [[id number]]  (Abnormal) Collected: 05/11/22 1639    Specimen: Blood Updated: 05/11/22 1650     Glucose 145 mg/dL      Comment: : [id number] Miguel Angel PamelaMeter ID: [id number]       POC Glucose Once [[id number]]  (Abnormal) Collected: 05/11/22 1142    Specimen: Blood Updated: 05/11/22 1154     Glucose 148 mg/dL      Comment: : [id number] Miguel Angel PamelaMeter ID: [id number]       POC Glucose Once [[id number]]   (Abnormal) Collected: 05/11/22 0823    Specimen: Blood Updated: 05/11/22 0842     Glucose 138 mg/dL      Comment: : [id number] Michelle PamelaMeter ID: [id number]       Basic Metabolic Panel [[id number]]  (Abnormal) Collected: 05/11/22 0649    Specimen: Blood Updated: 05/11/22 0744     Glucose 133 mg/dL      BUN 32 mg/dL      Creatinine 1.67 mg/dL      Sodium 137 mmol/L      Potassium 4.2 mmol/L      Chloride 100 mmol/L      CO2 27.0 mmol/L      Calcium 9.0 mg/dL      BUN/Creatinine Ratio 19.2     Anion Gap 10.0 mmol/L      eGFR 39.9 mL/min/1.73      Comment: National Kidney Foundation and American Society of Nephrology (ASN) Task Force recommended calculation based on the Chronic Kidney Disease Epidemiology Collaboration (CKD-EPI) equation refit without adjustment for race.       Narrative:      GFR Normal >60  Chronic Kidney Disease <60  Kidney Failure <15      Protime-INR [[id number]]  (Abnormal) Collected: 05/11/22 0649    Specimen: Blood Updated: 05/11/22 0734     Protime 17.9 Seconds      INR 1.54    CBC (No Diff) [[id number]]  (Abnormal) Collected: 05/11/22 0649    Specimen: Blood Updated: 05/11/22 0727     WBC 8.45 10*3/mm3      RBC 3.25 10*6/mm3      Hemoglobin 8.6 g/dL      Hematocrit 29.2 %      MCV 89.8 fL      MCH 26.5 pg      MCHC 29.5 g/dL      RDW 16.4 %      RDW-SD 51.6 fl      MPV 10.4 fL      Platelets 200 10*3/mm3           Imaging Results (Last 24 Hours)     ** No results found for the last 24 hours. **             Intake/Output Summary (Last 24 hours) at 5/11/2022 1745  Last data filed at 5/11/2022 1500  Gross per 24 hour   Intake 480 ml   Output 1975 ml   Net -1495 ml       Physical Exam  Constitutional:       General: He is not in acute distress.     Appearance: Normal appearance. He is obese.   HENT:      Head: Normocephalic and atraumatic.      Right Ear: External ear normal.      Left Ear: External ear normal.      Nose: Nose normal.      Mouth/Throat:      Mouth: Mucous membranes are dry.       Pharynx: Oropharynx is clear.   Eyes:      General: No scleral icterus.     Conjunctiva/sclera: Conjunctivae normal.   Cardiovascular:      Rate and Rhythm: Regular rhythm. Bradycardia present.      Pulses: Normal pulses.      Heart sounds: Normal heart sounds.   Pulmonary:      Effort: Pulmonary effort is normal. No respiratory distress.      Breath sounds: Normal breath sounds.   Abdominal:      General: Abdomen is flat. Bowel sounds are normal.      Palpations: Abdomen is soft.      Tenderness: There is right CVA tenderness.      Comments: Hematoma right flank.   Musculoskeletal:      Right lower leg: Edema present.      Left lower leg: Edema present.   Skin:     General: Skin is warm and dry.      Coloration: Skin is not pale.   Neurological:      General: No focal deficit present.      Mental Status: He is alert and oriented to person, place, and time. Mental status is at baseline.   Psychiatric:         Mood and Affect: Mood normal.         Judgment: Judgment normal.     Results Review:  I have reviewed the labs, radiology results, and diagnostic studies since my last progress note and made treatment changes reflective of the results.   I have reviewed the current medications.    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 every morning  Continue Coumadin  Probable discharge to SNF tomorrow    Electronically signed by Yasir Lebron DO, 05/11/22, 17:45 CDT.

## 2022-05-11 NOTE — PLAN OF CARE
Safety maintained, IV lasix as ordered - IID otherwise, medicated for pain X1 with PO pain medication, weakness, A/O, calm and cooperative, O2 at 2L, paced on telemetry, ace wraps to BLE, productive cough, cooperative with care    Problem: Adult Inpatient Plan of Care  Goal: Plan of Care Review  Outcome: Ongoing, Progressing  Flowsheets  Taken 5/11/2022 1747 by Melissa Michelle, RN  Progress: improving  Taken 5/11/2022 0326 by Sary Malave, RN  Plan of Care Reviewed With: patient   Goal Outcome Evaluation:           Progress: improving

## 2022-05-11 NOTE — PROGRESS NOTES
[facility] -  Progress Note     LOS: 2 days   Patient Care Team:  Matheus Olivera PA as PCP - General (Physician Assistant)    Chief Complaint: CHF follow up    Subjective     Interval History: Sitting in bed eating lunch.  He remains on supplemental oxygen at 1.5 L.  He has a low functional capacity at baseline reports minimal shortness of breath at this time.  He describes lying in a nearly flat position throughout the night without orthopnea or PND.  He has chronic lower extremit swelling.  He denies any chest discomfort.  He has some discomfort to his flank area at the site of his hematoma.  He references previous cardiology visits at [facility].  He lives in a skilled nursing facility and does not have any other family.    He remains on IV diuresis.  He is net negative this admission based on what is documented.  His creatinine is improving and may be at his baseline.      Review of Systems:     Review of Systems   Constitutional: Positive for fatigue. Negative for chills and diaphoresis.   HENT: Negative for congestion.    Respiratory: Positive for shortness of breath. Negative for chest tightness and wheezing.    Cardiovascular: Positive for leg swelling. Negative for chest pain and palpitations.   Gastrointestinal: Negative for abdominal pain.   Genitourinary: Negative for difficulty urinating.   Neurological: Positive for weakness.     Objective     Vital Sign Min/Max for last 24 hours  Temp  Min: 97.8 °F (36.6 °C)  Max: 99.4 °F (37.4 °C)   BP  Min: 133/47  Max: 169/64   Pulse  Min: 50  Max: 58   Resp  Min: 16  Max: 18   SpO2  Min: 94 %  Max: 100 %   No data recorded   Weight  Min: 106 kg (233 lb)  Max: 106 kg (233 lb)         05/10/22  1404   Weight: 106 kg (233 lb)       Physical Exam:    Physical Exam  Results Review:   Lab Results (last 72 hours)     Procedure Component Value Units Date/Time    POC Glucose Once [[id number]]  (Abnormal) Collected: 05/11/22 1142     Specimen: Blood Updated: 05/11/22 1154     Glucose 148 mg/dL      Comment: : [id number] Miguel Angel PamelaMeter ID: [id number]       POC Glucose Once [[id number]]  (Abnormal) Collected: 05/11/22 0823    Specimen: Blood Updated: 05/11/22 0842     Glucose 138 mg/dL      Comment: : [id number] Miguel Angel PamelaMeter ID: [id number]       Basic Metabolic Panel [[id number]]  (Abnormal) Collected: 05/11/22 0649    Specimen: Blood Updated: 05/11/22 0744     Glucose 133 mg/dL      BUN 32 mg/dL      Creatinine 1.67 mg/dL      Sodium 137 mmol/L      Potassium 4.2 mmol/L      Chloride 100 mmol/L      CO2 27.0 mmol/L      Calcium 9.0 mg/dL      BUN/Creatinine Ratio 19.2     Anion Gap 10.0 mmol/L      eGFR 39.9 mL/min/1.73      Comment: National Kidney Foundation and American Society of Nephrology (ASN) Task Force recommended calculation based on the Chronic Kidney Disease Epidemiology Collaboration (CKD-EPI) equation refit without adjustment for race.       Narrative:      GFR Normal >60  Chronic Kidney Disease <60  Kidney Failure <15      Protime-INR [[id number]]  (Abnormal) Collected: 05/11/22 0649    Specimen: Blood Updated: 05/11/22 0734     Protime 17.9 Seconds      INR 1.54    CBC (No Diff) [[id number]]  (Abnormal) Collected: 05/11/22 0649    Specimen: Blood Updated: 05/11/22 0727     WBC 8.45 10*3/mm3      RBC 3.25 10*6/mm3      Hemoglobin 8.6 g/dL      Hematocrit 29.2 %      MCV 89.8 fL      MCH 26.5 pg      MCHC 29.5 g/dL      RDW 16.4 %      RDW-SD 51.6 fl      MPV 10.4 fL      Platelets 200 10*3/mm3     Protime-INR [[id number]]  (Abnormal) Collected: 05/10/22 1019    Specimen: Blood Updated: 05/10/22 1038     Protime 22.0 Seconds      INR 2.01    Troponin [[id number]]  (Abnormal) Collected: 05/10/22 0638    Specimen: Blood Updated: 05/10/22 0731     Troponin T 0.045 ng/mL     Narrative:      Troponin T Reference Range:  <= 0.03 ng/mL-   Negative for AMI  >0.03 ng/mL-     Abnormal for myocardial necrosis.  Clinicians would have  to utilize clinical acumen, EKG, Troponin and serial changes to determine if it is an Acute Myocardial Infarction or myocardial injury due to an underlying chronic condition.       Results may be falsely decreased if patient taking Biotin.      Lipid Panel [[id number]]  (Abnormal) Collected: 05/10/22 0638    Specimen: Blood Updated: 05/10/22 0727     Total Cholesterol 79 mg/dL      Triglycerides 63 mg/dL      HDL Cholesterol 34 mg/dL      LDL Cholesterol  30 mg/dL      VLDL Cholesterol 15 mg/dL      LDL/HDL Ratio 0.95    Narrative:      Cholesterol Reference Ranges  (U.S. Department of Health and Human Services ATP III Classifications)    Desirable          <200 mg/dL  Borderline High    200-239 mg/dL  High Risk          >240 mg/dL      Triglyceride Reference Ranges  (U.S. Department of Health and Human Services ATP III Classifications)    Normal           <150 mg/dL  Borderline High  150-199 mg/dL  High             200-499 mg/dL  Very High        >500 mg/dL    HDL Reference Ranges  (U.S. Department of Health and Human Services ATP III Classifications)    Low     <40 mg/dl (major risk factor for CHD)  High    >60 mg/dl ('negative' risk factor for CHD)        LDL Reference Ranges  (U.S. Department of Health and Human Services ATP III Classifications)    Optimal          <100 mg/dL  Near Optimal     100-129 mg/dL  Borderline High  130-159 mg/dL  High             160-189 mg/dL  Very High        >189 mg/dL    Comprehensive Metabolic Panel [[id number]]  (Abnormal) Collected: 05/10/22 0638    Specimen: Blood Updated: 05/10/22 0726     Glucose 137 mg/dL      BUN 35 mg/dL      Creatinine 1.69 mg/dL      Sodium 137 mmol/L      Potassium 4.6 mmol/L      Chloride 100 mmol/L      CO2 26.0 mmol/L      Calcium 8.8 mg/dL      Total Protein 7.1 g/dL      Albumin 3.20 g/dL      ALT (SGPT) 26 U/L      AST (SGOT) 39 U/L      Alkaline Phosphatase 199 U/L      Total Bilirubin 3.2 mg/dL      Globulin 3.9 gm/dL      A/G Ratio 0.8 g/dL       BUN/Creatinine Ratio 20.7     Anion Gap 11.0 mmol/L      eGFR 39.3 mL/min/1.73      Comment: National Kidney Foundation and American Society of Nephrology (ASN) Task Force recommended calculation based on the Chronic Kidney Disease Epidemiology Collaboration (CKD-EPI) equation refit without adjustment for race.       Narrative:      GFR Normal >60  Chronic Kidney Disease <60  Kidney Failure <15      Hemoglobin A1c [[id number]]  (Abnormal) Collected: 05/10/22 0638    Specimen: Blood Updated: 05/10/22 0724     Hemoglobin A1C 6.40 %     Narrative:      Hemoglobin A1C Ranges:    Increased Risk for Diabetes  5.7% to 6.4%  Diabetes                     >= 6.5%  Diabetic Goal                < 7.0%    CBC Auto Differential [[id number]]  (Abnormal) Collected: 05/10/22 0638    Specimen: Blood Updated: 05/10/22 0708     WBC 7.84 10*3/mm3      RBC 3.39 10*6/mm3      Hemoglobin 8.9 g/dL      Hematocrit 30.4 %      MCV 89.7 fL      MCH 26.3 pg      MCHC 29.3 g/dL      RDW 15.9 %      RDW-SD 51.5 fl      MPV 10.6 fL      Platelets 191 10*3/mm3      Neutrophil % 64.9 %      Lymphocyte % 12.4 %      Monocyte % 21.0 %      Eosinophil % 0.6 %      Basophil % 0.5 %      Neutrophils, Absolute 5.08 10*3/mm3      Lymphocytes, Absolute 0.97 10*3/mm3      Monocytes, Absolute 1.65 10*3/mm3      Eosinophils, Absolute 0.05 10*3/mm3      Basophils, Absolute 0.04 10*3/mm3     Urinalysis With Microscopic If Indicated (No Culture) - Urine, Clean Catch [[id number]]  (Normal) Collected: 05/10/22 0342    Specimen: Urine, Clean Catch Updated: 05/10/22 0357     Color, UA Yellow     Appearance, UA Clear     pH, UA 5.5     Specific Gravity, UA 1.010     Glucose, UA Negative     Ketones, UA Negative     Bilirubin, UA Negative     Blood, UA Negative     Protein, UA Negative     Leuk Esterase, UA Negative     Nitrite, UA Negative     Urobilinogen, UA 0.2 E.U./dL    Narrative:      Urine microscopic not indicated.    Ferritin [[id number]]  (Normal) Collected:  05/09/22 1759    Specimen: Blood Updated: 05/09/22 2032     Ferritin 90.66 ng/mL     Narrative:      Results may be falsely decreased if patient taking Biotin.      Iron Profile [[id number]]  (Normal) Collected: 05/09/22 1759    Specimen: Blood Updated: 05/09/22 2032     Iron 75 mcg/dL      Iron Saturation 20 %      Transferrin 250 mg/dL      TIBC 373 mcg/dL     Reticulocytes [[id number]]  (Abnormal) Collected: 05/09/22 1546    Specimen: Blood Updated: 05/09/22 2019     Reticulocyte % 3.05 %      Reticulocyte Absolute 0.1052 10*6/mm3     BNP [[id number]]  (Abnormal) Collected: 05/09/22 1759    Specimen: Blood Updated: 05/09/22 1852     proBNP 5,003.0 pg/mL     Narrative:      Among patients with dyspnea, NT-proBNP is highly sensitive for the detection of acute congestive heart failure. In addition NT-proBNP of <300 pg/ml effectively rules out acute congestive heart failure with 99% negative predictive value.    Results may be falsely decreased if patient taking Biotin.      Troponin [[id number]]  (Abnormal) Collected: 05/09/22 1759    Specimen: Blood Updated: 05/09/22 1829     Troponin T 0.034 ng/mL     Narrative:      Troponin T Reference Range:  <= 0.03 ng/mL-   Negative for AMI  >0.03 ng/mL-     Abnormal for myocardial necrosis.  Clinicians would have to utilize clinical acumen, EKG, Troponin and serial changes to determine if it is an Acute Myocardial Infarction or myocardial injury due to an underlying chronic condition.       Results may be falsely decreased if patient taking Biotin.      COVID-19,[facility] IN-HOUSE,Nasal Swab No Transport Media 3-4 HR TAT - Swab, Nasal Cavity [[id number]]  (Normal) Collected: 05/09/22 1554    Specimen: Swab from Nasal Cavity Updated: 05/09/22 1659     COVID19 Not Detected    Narrative:      Fact sheet for providers: https://www.fda.gov/media/601651/download     Fact sheet for patients: https://www.fda.gov/media/119996/download    Test performed by PCR.    Consider negative results  in combination with clinical observations, patient history, and epidemiological information.  Fact sheet for providers: https://www.fda.gov/media/532538/download     Fact sheet for patients: https://www.fda.gov/media/457025/download    Test performed by PCR.    Consider negative results in combination with clinical observations, patient history, and epidemiological information.    Comprehensive Metabolic Panel [[id number]]  (Abnormal) Collected: 05/09/22 1546    Specimen: Blood Updated: 05/09/22 1618     Glucose 130 mg/dL      BUN 35 mg/dL      Creatinine 1.96 mg/dL      Sodium 139 mmol/L      Potassium 4.6 mmol/L      Chloride 103 mmol/L      CO2 23.0 mmol/L      Calcium 9.1 mg/dL      Total Protein 7.2 g/dL      Albumin 3.50 g/dL      ALT (SGPT) 16 U/L      AST (SGOT) 36 U/L      Alkaline Phosphatase 226 U/L      Total Bilirubin 2.7 mg/dL      Globulin 3.7 gm/dL      A/G Ratio 0.9 g/dL      BUN/Creatinine Ratio 17.9     Anion Gap 13.0 mmol/L      eGFR 32.9 mL/min/1.73      Comment: National Kidney Foundation and American Society of Nephrology (ASN) Task Force recommended calculation based on the Chronic Kidney Disease Epidemiology Collaboration (CKD-EPI) equation refit without adjustment for race.       Narrative:      GFR Normal >60  Chronic Kidney Disease <60  Kidney Failure <15      Troponin [[id number]]  (Abnormal) Collected: 05/09/22 1546    Specimen: Blood Updated: 05/09/22 1617     Troponin T 0.050 ng/mL     Narrative:      Troponin T Reference Range:  <= 0.03 ng/mL-   Negative for AMI  >0.03 ng/mL-     Abnormal for myocardial necrosis.  Clinicians would have to utilize clinical acumen, EKG, Troponin and serial changes to determine if it is an Acute Myocardial Infarction or myocardial injury due to an underlying chronic condition.       Results may be falsely decreased if patient taking Biotin.      Protime-INR [[id number]]  (Abnormal) Collected: 05/09/22 1546    Specimen: Blood Updated: 05/09/22 1607      Protime 31.0 Seconds      INR 3.12    aPTT [[id number]]  (Abnormal) Collected: 05/09/22 1546    Specimen: Blood Updated: 05/09/22 1608     PTT 63.3 seconds     CBC & Differential [[id number]]  (Abnormal) Collected: 05/09/22 1546    Specimen: Blood Updated: 05/09/22 1558    Narrative:      The following orders were created for panel order CBC & Differential.  Procedure                               Abnormality         Status                     ---------                               -----------         ------                     CBC Auto Differential[[id number]]        Abnormal            Final result                 Please view results for these tests on the individual orders.    CBC Auto Differential [[id number]]  (Abnormal) Collected: 05/09/22 1546    Specimen: Blood Updated: 05/09/22 1558     WBC 8.65 10*3/mm3      RBC 3.46 10*6/mm3      Hemoglobin 9.0 g/dL      Hematocrit 30.9 %      MCV 89.3 fL      MCH 26.0 pg      MCHC 29.1 g/dL      RDW 15.7 %      RDW-SD 50.4 fl      MPV 10.3 fL      Platelets 206 10*3/mm3      Neutrophil % 55.6 %      Lymphocyte % 20.3 %      Monocyte % 21.5 %      Eosinophil % 1.5 %      Basophil % 0.3 %      Neutrophils, Absolute 4.80 10*3/mm3      Lymphocytes, Absolute 1.76 10*3/mm3      Monocytes, Absolute 1.86 10*3/mm3      Eosinophils, Absolute 0.13 10*3/mm3      Basophils, Absolute 0.03 10*3/mm3           Medication Review: yes  Current Facility-Administered Medications   Medication Dose Route Frequency Provider Last Rate Last Admin   • acetaminophen (TYLENOL) tablet 650 mg  650 mg Oral Q4H PRN Izabella Michelle APRN        Or   • acetaminophen (TYLENOL) 160 MG/5ML solution 650 mg  650 mg Oral Q4H PRN Izabella Michelle APRN        Or   • acetaminophen (TYLENOL) suppository 650 mg  650 mg Rectal Q4H PRN Izabella Michelle APRN       • bisoprolol (ZEBeta) tablet 5 mg  5 mg Oral Daily Izabella Michelle APRN   5 mg at 05/11/22 0830   • dextrose (D50W) (25 g/50 mL) IV injection 25 g  25  g Intravenous Q15 Min PRN Izabella Michelle APRJUANJO       • dextrose (GLUTOSE) oral gel 15 g  15 g Oral Q15 Min PRN Izabella Michelle APRJUANJO       • famotidine (PEPCID) tablet 20 mg  20 mg Oral Daily Izabella Michelle APRN   20 mg at 05/11/22 0830   • furosemide (LASIX) injection 40 mg  40 mg Intravenous Q12H Pee Mccauley APRN   40 mg at 05/11/22 0603   • glucagon (human recombinant) (GLUCAGEN DIAGNOSTIC) injection 1 mg  1 mg Intramuscular Q15 Min PRN Izabella Michelle APRJUANJO       • Glycerin-Hypromellose- (ARTIFICIAL TEARS) 0.2-0.2-1 % ophthalmic solution solution 1 drop  1 drop Both Eyes Q1H PRN Izabella Michelle APRN       • HYDROcodone-acetaminophen (NORCO) 7.5-325 MG per tablet 1 tablet  1 tablet Oral Q8H PRN Izabella Michelle APRN   1 tablet at 05/11/22 0835   • Insulin Lispro (humaLOG) injection 2-7 Units  2-7 Units Subcutaneous TID AC Izabella Michelle APRN       • losartan (COZAAR) tablet 100 mg  100 mg Oral Daily Izabella Michelle APRN   100 mg at 05/11/22 0830   • ondansetron (ZOFRAN) tablet 4 mg  4 mg Oral Q6H PRN Izabella Michelle APRN        Or   • ondansetron (ZOFRAN) injection 4 mg  4 mg Intravenous Q6H PRN Izabella Michelle APRN       • sennosides-docusate (PERICOLACE) 8.6-50 MG per tablet 1 tablet  1 tablet Oral Daily Izabella Michelle APRJUANJO   1 tablet at 05/11/22 0830   • sodium chloride 0.9 % flush 10 mL  10 mL Intravenous PRN Matheus Raygoza PA-C       • sodium chloride 0.9 % flush 10 mL  10 mL Intravenous Q12H Izabella Michelle APRN   10 mL at 05/11/22 0830   • sodium chloride 0.9 % flush 10 mL  10 mL Intravenous PRN Izabella Michelle APRN       • warfarin (COUMADIN) tablet 1.5 mg  1.5 mg Oral Daily Esau Agosto MD   1.5 mg at 05/10/22 1708     Results for orders placed during the hospital encounter of 05/09/22    Adult Transthoracic Echo Complete With Contrast if Necessary Per Protocol    Interpretation Summary  · Left ventricular ejection fraction appears to be 56 - 60%.  Left ventricular systolic function is normal.  · Left ventricular diastolic function is consistent with (grade II w/high LAP) pseudonormalization.  · The right ventricular cavity is mildly dilated. Systolic function is normal.  · There is biatrial enlargement.  · There is a bioprosthetic aortic valve present, which appears to be functioning normally.  · Moderate to severe tricuspid valve regurgitation is present.  · Estimated right ventricular systolic pressure from tricuspid regurgitation is mildly elevated (35-45 mmHg).  · There is a left pleural effusion.        Assessment & Plan     1.  Acute congestive heart failure: Likely due to valvular heart disease and diastolic dysfunction.  He had an echo approximately 1 year ago at [facility] where he follows.  Changes noted with an increase in his right ventricular systolic pressure, worsening tricuspid valve regurgitation, and mild RV dilation noted.  Diastolic dysfunction is unchanged.  Continue with improvement of symptoms with diuretics.  Monitor renal function and electrolytes.  Low-sodium diet.  From home medication list it appears the patient is on maintenance oral Lasix.  Should be able to transition back to oral Lasix soon.  His renal function is improving with diuresis.  He remains on supplemental oxygen which is not his baseline.  He has lower extremity edema which has been reported to be chronic.  He would benefit from lower extremity compression.  Bilateral lower extremity wraps have been ordered.    2.  Permanent atrial fibrillation: There was some concern regarding the patient's atrial fibrillation burden but according to documentation at [facility] and [facility], the patient has permanent atrial fibrillation managed on rate controlling medications including bisoprolol and chronically anticoagulated with use of warfarin.  Interrogation today reveals atrial fibrillation for greater than 12 months.  Continue with home  medications as he has adequate rate control.    3.  Subtherapeutic INR: The patient is anticoagulated with warfarin for stroke risk reduction in the setting of atrial fibrillation.  Goal INR 2.0-3.0.  Patient's home dose is managed through his nursing home.  Continue with daily INR to achieve therapeutic INR.  Of note, the patient did receive vitamin K at the time of his admission.  He may need higher warfarin doses, than what he receives at his facility, to achieve therapeutic INR.    4.  Coronary artery disease with previous coronary artery bypass grafting x2: Records reflect bypass was performed in 2010.  Stable without ischemic complaints.    5.  Aortic valve disease with previous aortic valve replacement: According to records the patient has had valve replacement at the time of his bypass in 2010 in [place].  Recent echocardiogram demonstrates bioprosthetic aortic valve with normal function.    6.  Essential hypertension: Stable.    7.  Sinus node dysfunction with permanent pacemaker: Historically patient is ventricularly paced with underlying chronic atrial fibrillation confirmed by previous interrogations.  The patient's device was interrogated this hospitalization noting battery life to be estimated at approximately 3 months.  He was due to follow-up with his primary cardiology office tomorrow however he remains hospitalized.  He needs to have an appointment rescheduled at the time of discharge so that he can follow-up in a timely manner to further discuss need for generator replacement.  He follows with Dr. Strickland and has been seen by GÓMEZ Clemente in the past at [facility] cardiology.    8.  Chronic kidney disease: Stage IIIb. continue monitoring renal function and electrolytes.  Creatinine has improved.  Creatinine today 1.67 which may be near his baseline.    9.  Diabetes mellitus, type II: Insulin-dependent.  Managed by his primary care physician.    10.  Obesity: BMI 33.43 based on current  documentation.    11.  History reported of hyperlipidemia lipid panel this admission notes well-controlled LDL at 30.  Given his known coronary artery disease the patient would be recommended to remain on statin therapy.  Previous documentation indicates that the patient was on rosuvastatin 20 mg daily.  I am uncertain if this was discontinued due to an intolerance and the patient is unsure.  Would recommend ongoing statin therapy given his known coronary artery disease history.  We will resume his previously prescribed medication and dose.      -Continue IV diuretics today.  May be able to transition to oral Lasix tomorrow morning or afternoon depending on his ongoing oxygen requirement.  Lung sounds stable with ongoing lower extremity swelling which is felt to be chronic.  I did order lower extremity compression.  -INR in a.m. for ongoing monitoring as the patient is now subtherapeutic likely due to administration of vitamin K at the time of his arrival.  - BMP to reevaluate renal function and electrolytes in a.m.  - Close follow-up with his primary cardiologist after discharge due to estimated battery reserve on his current generator.  - Continue losartan, bisoprolol.  Patient started on statin therapy which was a previous medication that he took, details referenced above.      Electronically signed by GÓMEZ Ken, 05/11/22, 12:17 PM CDT.

## 2022-05-11 NOTE — PLAN OF CARE
Goal Outcome Evaluation:  Plan of Care Reviewed With: patient           Outcome Evaluation: Pt. resting in bed. 2L of O2; 90-92%. Generalized edema. Lasix given this AM. Voiding per urinal. Bradycardiac. Repositioned due to discomfort of back and R. hip. VSS. Safety maintained.

## 2022-05-12 VITALS
WEIGHT: 233 LBS | OXYGEN SATURATION: 100 % | HEART RATE: 50 BPM | TEMPERATURE: 97.9 F | DIASTOLIC BLOOD PRESSURE: 54 MMHG | HEIGHT: 70 IN | RESPIRATION RATE: 16 BRPM | SYSTOLIC BLOOD PRESSURE: 134 MMHG | BODY MASS INDEX: 33.36 KG/M2

## 2022-05-12 LAB
ANION GAP SERPL CALCULATED.3IONS-SCNC: 9 MMOL/L (ref 5–15)
BUN SERPL-MCNC: 38 MG/DL (ref 8–23)
BUN/CREAT SERPL: 21.6 (ref 7–25)
CALCIUM SPEC-SCNC: 8.6 MG/DL (ref 8.6–10.5)
CHLORIDE SERPL-SCNC: 98 MMOL/L (ref 98–107)
CO2 SERPL-SCNC: 29 MMOL/L (ref 22–29)
CREAT SERPL-MCNC: 1.76 MG/DL (ref 0.76–1.27)
DEPRECATED RDW RBC AUTO: 52.5 FL (ref 37–54)
EGFRCR SERPLBLD CKD-EPI 2021: 37.4 ML/MIN/1.73
ERYTHROCYTE [DISTWIDTH] IN BLOOD BY AUTOMATED COUNT: 17.3 % (ref 12.3–15.4)
GLUCOSE BLDC GLUCOMTR-MCNC: 137 MG/DL (ref 70–130)
GLUCOSE BLDC GLUCOMTR-MCNC: 137 MG/DL (ref 70–130)
GLUCOSE BLDC GLUCOMTR-MCNC: 165 MG/DL (ref 70–130)
GLUCOSE SERPL-MCNC: 136 MG/DL (ref 65–99)
HCT VFR BLD AUTO: 30.2 % (ref 37.5–51)
HGB BLD-MCNC: 8.8 G/DL (ref 13–17.7)
INR PPP: 1.66 (ref 0.91–1.09)
MCH RBC QN AUTO: 26.7 PG (ref 26.6–33)
MCHC RBC AUTO-ENTMCNC: 29.1 G/DL (ref 31.5–35.7)
MCV RBC AUTO: 91.8 FL (ref 79–97)
PLATELET # BLD AUTO: 180 10*3/MM3 (ref 140–450)
PMV BLD AUTO: 11.2 FL (ref 6–12)
POTASSIUM SERPL-SCNC: 4 MMOL/L (ref 3.5–5.2)
PROTHROMBIN TIME: 19 SECONDS (ref 11.9–14.6)
RBC # BLD AUTO: 3.29 10*6/MM3 (ref 4.14–5.8)
SARS-COV-2 RNA PNL SPEC NAA+PROBE: NOT DETECTED
SODIUM SERPL-SCNC: 136 MMOL/L (ref 136–145)
WBC NRBC COR # BLD: 7.27 10*3/MM3 (ref 3.4–10.8)

## 2022-05-12 PROCEDURE — 85027 COMPLETE CBC AUTOMATED: CPT | Performed by: FAMILY MEDICINE

## 2022-05-12 PROCEDURE — 85610 PROTHROMBIN TIME: CPT | Performed by: FAMILY MEDICINE

## 2022-05-12 PROCEDURE — 99232 SBSQ HOSP IP/OBS MODERATE 35: CPT | Performed by: NURSE PRACTITIONER

## 2022-05-12 PROCEDURE — 63710000001 INSULIN LISPRO (HUMAN) PER 5 UNITS: Performed by: NURSE PRACTITIONER

## 2022-05-12 PROCEDURE — 82962 GLUCOSE BLOOD TEST: CPT

## 2022-05-12 PROCEDURE — 80048 BASIC METABOLIC PNL TOTAL CA: CPT | Performed by: FAMILY MEDICINE

## 2022-05-12 PROCEDURE — 87635 SARS-COV-2 COVID-19 AMP PRB: CPT | Performed by: FAMILY MEDICINE

## 2022-05-12 RX ORDER — HYDROCODONE BITARTRATE AND ACETAMINOPHEN 7.5; 325 MG/1; MG/1
1 TABLET ORAL EVERY 4 HOURS PRN
Qty: 5 TABLET | Refills: 0 | Status: SHIPPED | OUTPATIENT
Start: 2022-05-12

## 2022-05-12 RX ORDER — ROSUVASTATIN CALCIUM 20 MG/1
20 TABLET, COATED ORAL NIGHTLY
Qty: 90 TABLET
Start: 2022-05-12

## 2022-05-12 RX ORDER — FUROSEMIDE 40 MG/1
40 TABLET ORAL DAILY
Start: 2022-05-13

## 2022-05-12 RX ORDER — WARFARIN SODIUM 3 MG/1
TABLET ORAL
Start: 2022-05-12

## 2022-05-12 RX ADMIN — FAMOTIDINE 20 MG: 20 TABLET, FILM COATED ORAL at 08:34

## 2022-05-12 RX ADMIN — BISOPROLOL FUMARATE 5 MG: 5 TABLET ORAL at 08:34

## 2022-05-12 RX ADMIN — FUROSEMIDE 40 MG: 40 TABLET ORAL at 08:34

## 2022-05-12 RX ADMIN — DOCUSATE SODIUM 50 MG AND SENNOSIDES 8.6 MG 1 TABLET: 8.6; 5 TABLET, FILM COATED ORAL at 08:34

## 2022-05-12 RX ADMIN — WARFARIN 1.5 MG: 3 TABLET ORAL at 17:23

## 2022-05-12 RX ADMIN — LOSARTAN POTASSIUM 100 MG: 50 TABLET, FILM COATED ORAL at 08:34

## 2022-05-12 RX ADMIN — Medication 10 ML: at 08:35

## 2022-05-12 RX ADMIN — INSULIN LISPRO 2 UNITS: 100 INJECTION, SOLUTION INTRAVENOUS; SUBCUTANEOUS at 08:35

## 2022-05-12 RX ADMIN — HYDROCODONE BITARTRATE AND ACETAMINOPHEN 1 TABLET: 7.5; 325 TABLET ORAL at 15:49

## 2022-05-12 NOTE — PROGRESS NOTES
[facility] -  Progress Note     LOS: 3 days   Patient Care Team:  Matheus Olivera PA as PCP - General (Physician Assistant)    Chief Complaint: chf follow up     Subjective     Interval History:   IV lasix was changed to PO this morning. Creatinine mildly elevated today from previous. He is net negative since admission however no PO intake was documented overnight. He notes he is currently not short of breath. He was unable to sleep well last night but denies orthopnea. He is unsure if his legs are swollen as he has not seen them since yesterday.         Review of Systems:   Review of Systems   Constitutional: Negative for chills, diaphoresis, fatigue and unexpected weight change.   HENT: Negative for nosebleeds.    Respiratory: Negative for cough, chest tightness, shortness of breath and wheezing.    Cardiovascular: Negative for chest pain, palpitations and leg swelling.   Gastrointestinal: Negative for anal bleeding, blood in stool, diarrhea and nausea.   Neurological: Negative for dizziness, syncope and light-headedness.   All other systems reviewed and are negative.      Objective     Vital Sign Min/Max for last 24 hours  Temp  Min: 97.8 °F (36.6 °C)  Max: 98.5 °F (36.9 °C)   BP  Min: 118/46  Max: 135/50   Pulse  Min: 50  Max: 54   Resp  Min: 16  Max: 16   SpO2  Min: 98 %  Max: 100 %   Flow (L/min)  Min: 1.5  Max: 2   No data recorded         05/10/22  1404   Weight: 106 kg (233 lb)         Intake/Output Summary (Last 24 hours) at 5/12/2022 1356  Last data filed at 5/11/2022 2041  Gross per 24 hour   Intake 360 ml   Output 600 ml   Net -240 ml         Physical Exam:  Vitals reviewed.   Constitutional:       General: Not in acute distress.     Appearance: Healthy appearance. Well-developed. Not diaphoretic.   Eyes:      General: No scleral icterus.     Conjunctiva/sclera: Conjunctivae normal.      Pupils: Pupils are equal, round, and reactive to light.   HENT:      Head:  Normocephalic.    Mouth/Throat:      Pharynx: No oropharyngeal exudate.   Neck:      Vascular: No JVR.   Pulmonary:      Effort: Pulmonary effort is normal. No respiratory distress.      Breath sounds: Normal breath sounds. No wheezing. No rhonchi. No rales.   Chest:      Chest wall: Not tender to palpatation.   Cardiovascular:      Normal rate. Irregularly irregular rhythm.   Pulses:     Intact distal pulses.   Edema:     Peripheral edema present.     Pretibial: bilateral 2+ non-pitting edema of the pretibial area.     Ankle: bilateral 2+ non-pitting edema of the ankle.     Feet: bilateral 2+ non-pitting edema of the feet.  Abdominal:      General: Bowel sounds are normal. There is no distension.      Palpations: Abdomen is soft.      Tenderness: There is no abdominal tenderness.   Musculoskeletal: Normal range of motion.      Cervical back: Normal range of motion and neck supple. Skin:     General: Skin is warm and dry.      Coloration: Skin is not pale.      Findings: No erythema or rash.   Neurological:      Mental Status: Alert, oriented to person, place, and time and oriented to person, place and time.      Deep Tendon Reflexes: Reflexes are normal and symmetric.   Psychiatric:         Behavior: Behavior normal.          Results Review:   Lab Results (last 72 hours)     Procedure Component Value Units Date/Time    POC Glucose Once [[id number]]  (Abnormal) Collected: 05/12/22 1207    Specimen: Blood Updated: 05/12/22 1221     Glucose 137 mg/dL      Comment: : [id number] Michelle PamelaMeter ID: [id number]       POC Glucose Once [[id number]]  (Abnormal) Collected: 05/12/22 0816    Specimen: Blood Updated: 05/12/22 0827     Glucose 165 mg/dL      Comment: : [id number] Michelle PamelaMeter ID: [id number]       Basic Metabolic Panel [[id number]]  (Abnormal) Collected: 05/12/22 0501    Specimen: Blood Updated: 05/12/22 0550     Glucose 136 mg/dL      BUN 38 mg/dL      Creatinine 1.76 mg/dL      Sodium 136 mmol/L       Potassium 4.0 mmol/L      Chloride 98 mmol/L      CO2 29.0 mmol/L      Calcium 8.6 mg/dL      BUN/Creatinine Ratio 21.6     Anion Gap 9.0 mmol/L      eGFR 37.4 mL/min/1.73      Comment: National Kidney Foundation and American Society of Nephrology (ASN) Task Force recommended calculation based on the Chronic Kidney Disease Epidemiology Collaboration (CKD-EPI) equation refit without adjustment for race.       Narrative:      GFR Normal >60  Chronic Kidney Disease <60  Kidney Failure <15      Protime-INR [[id number]]  (Abnormal) Collected: 05/12/22 0501    Specimen: Blood Updated: 05/12/22 0539     Protime 19.0 Seconds      INR 1.66    CBC (No Diff) [[id number]]  (Abnormal) Collected: 05/12/22 0501    Specimen: Blood Updated: 05/12/22 0532     WBC 7.27 10*3/mm3      RBC 3.29 10*6/mm3      Hemoglobin 8.8 g/dL      Hematocrit 30.2 %      MCV 91.8 fL      MCH 26.7 pg      MCHC 29.1 g/dL      RDW 17.3 %      RDW-SD 52.5 fl      MPV 11.2 fL      Platelets 180 10*3/mm3     POC Glucose Once [[id number]]  (Abnormal) Collected: 05/11/22 1639    Specimen: Blood Updated: 05/11/22 1650     Glucose 145 mg/dL      Comment: : [id number] Miguel Angel Rhythm NewMediaelaMeter ID: [id number]       POC Glucose Once [[id number]]  (Abnormal) Collected: 05/11/22 1142    Specimen: Blood Updated: 05/11/22 1154     Glucose 148 mg/dL      Comment: : [id number] Miguel Angel PamelaMeter ID: [id number]       POC Glucose Once [[id number]]  (Abnormal) Collected: 05/11/22 0823    Specimen: Blood Updated: 05/11/22 0842     Glucose 138 mg/dL      Comment: : [id number] Miguel Angel PamelaMeter ID: [id number]       Basic Metabolic Panel [[id number]]  (Abnormal) Collected: 05/11/22 0649    Specimen: Blood Updated: 05/11/22 0744     Glucose 133 mg/dL      BUN 32 mg/dL      Creatinine 1.67 mg/dL      Sodium 137 mmol/L      Potassium 4.2 mmol/L      Chloride 100 mmol/L      CO2 27.0 mmol/L      Calcium 9.0 mg/dL      BUN/Creatinine Ratio 19.2     Anion Gap 10.0 mmol/L      eGFR 39.9  mL/min/1.73      Comment: National Kidney Foundation and American Society of Nephrology (ASN) Task Force recommended calculation based on the Chronic Kidney Disease Epidemiology Collaboration (CKD-EPI) equation refit without adjustment for race.       Narrative:      GFR Normal >60  Chronic Kidney Disease <60  Kidney Failure <15      Protime-INR [[id number]]  (Abnormal) Collected: 05/11/22 0649    Specimen: Blood Updated: 05/11/22 0734     Protime 17.9 Seconds      INR 1.54    CBC (No Diff) [[id number]]  (Abnormal) Collected: 05/11/22 0649    Specimen: Blood Updated: 05/11/22 0727     WBC 8.45 10*3/mm3      RBC 3.25 10*6/mm3      Hemoglobin 8.6 g/dL      Hematocrit 29.2 %      MCV 89.8 fL      MCH 26.5 pg      MCHC 29.5 g/dL      RDW 16.4 %      RDW-SD 51.6 fl      MPV 10.4 fL      Platelets 200 10*3/mm3     Protime-INR [[id number]]  (Abnormal) Collected: 05/10/22 1019    Specimen: Blood Updated: 05/10/22 1038     Protime 22.0 Seconds      INR 2.01    Troponin [[id number]]  (Abnormal) Collected: 05/10/22 0638    Specimen: Blood Updated: 05/10/22 0731     Troponin T 0.045 ng/mL     Narrative:      Troponin T Reference Range:  <= 0.03 ng/mL-   Negative for AMI  >0.03 ng/mL-     Abnormal for myocardial necrosis.  Clinicians would have to utilize clinical acumen, EKG, Troponin and serial changes to determine if it is an Acute Myocardial Infarction or myocardial injury due to an underlying chronic condition.       Results may be falsely decreased if patient taking Biotin.      Lipid Panel [[id number]]  (Abnormal) Collected: 05/10/22 0638    Specimen: Blood Updated: 05/10/22 0727     Total Cholesterol 79 mg/dL      Triglycerides 63 mg/dL      HDL Cholesterol 34 mg/dL      LDL Cholesterol  30 mg/dL      VLDL Cholesterol 15 mg/dL      LDL/HDL Ratio 0.95    Narrative:      Cholesterol Reference Ranges  (U.S. Department of Health and Human Services ATP III Classifications)    Desirable          <200 mg/dL  Borderline High     200-239 mg/dL  High Risk          >240 mg/dL      Triglyceride Reference Ranges  (U.S. Department of Health and Human Services ATP III Classifications)    Normal           <150 mg/dL  Borderline High  150-199 mg/dL  High             200-499 mg/dL  Very High        >500 mg/dL    HDL Reference Ranges  (U.S. Department of Health and Human Services ATP III Classifications)    Low     <40 mg/dl (major risk factor for CHD)  High    >60 mg/dl ('negative' risk factor for CHD)        LDL Reference Ranges  (U.S. Department of Health and Human Services ATP III Classifications)    Optimal          <100 mg/dL  Near Optimal     100-129 mg/dL  Borderline High  130-159 mg/dL  High             160-189 mg/dL  Very High        >189 mg/dL    Comprehensive Metabolic Panel [[id number]]  (Abnormal) Collected: 05/10/22 0638    Specimen: Blood Updated: 05/10/22 0726     Glucose 137 mg/dL      BUN 35 mg/dL      Creatinine 1.69 mg/dL      Sodium 137 mmol/L      Potassium 4.6 mmol/L      Chloride 100 mmol/L      CO2 26.0 mmol/L      Calcium 8.8 mg/dL      Total Protein 7.1 g/dL      Albumin 3.20 g/dL      ALT (SGPT) 26 U/L      AST (SGOT) 39 U/L      Alkaline Phosphatase 199 U/L      Total Bilirubin 3.2 mg/dL      Globulin 3.9 gm/dL      A/G Ratio 0.8 g/dL      BUN/Creatinine Ratio 20.7     Anion Gap 11.0 mmol/L      eGFR 39.3 mL/min/1.73      Comment: National Kidney Foundation and American Society of Nephrology (ASN) Task Force recommended calculation based on the Chronic Kidney Disease Epidemiology Collaboration (CKD-EPI) equation refit without adjustment for race.       Narrative:      GFR Normal >60  Chronic Kidney Disease <60  Kidney Failure <15      Hemoglobin A1c [[id number]]  (Abnormal) Collected: 05/10/22 0638    Specimen: Blood Updated: 05/10/22 0724     Hemoglobin A1C 6.40 %     Narrative:      Hemoglobin A1C Ranges:    Increased Risk for Diabetes  5.7% to 6.4%  Diabetes                     >= 6.5%  Diabetic Goal                <  7.0%    CBC Auto Differential [[id number]]  (Abnormal) Collected: 05/10/22 0638    Specimen: Blood Updated: 05/10/22 0708     WBC 7.84 10*3/mm3      RBC 3.39 10*6/mm3      Hemoglobin 8.9 g/dL      Hematocrit 30.4 %      MCV 89.7 fL      MCH 26.3 pg      MCHC 29.3 g/dL      RDW 15.9 %      RDW-SD 51.5 fl      MPV 10.6 fL      Platelets 191 10*3/mm3      Neutrophil % 64.9 %      Lymphocyte % 12.4 %      Monocyte % 21.0 %      Eosinophil % 0.6 %      Basophil % 0.5 %      Neutrophils, Absolute 5.08 10*3/mm3      Lymphocytes, Absolute 0.97 10*3/mm3      Monocytes, Absolute 1.65 10*3/mm3      Eosinophils, Absolute 0.05 10*3/mm3      Basophils, Absolute 0.04 10*3/mm3     Urinalysis With Microscopic If Indicated (No Culture) - Urine, Clean Catch [[id number]]  (Normal) Collected: 05/10/22 0342    Specimen: Urine, Clean Catch Updated: 05/10/22 0357     Color, UA Yellow     Appearance, UA Clear     pH, UA 5.5     Specific Gravity, UA 1.010     Glucose, UA Negative     Ketones, UA Negative     Bilirubin, UA Negative     Blood, UA Negative     Protein, UA Negative     Leuk Esterase, UA Negative     Nitrite, UA Negative     Urobilinogen, UA 0.2 E.U./dL    Narrative:      Urine microscopic not indicated.    Ferritin [[id number]]  (Normal) Collected: 05/09/22 1759    Specimen: Blood Updated: 05/09/22 2032     Ferritin 90.66 ng/mL     Narrative:      Results may be falsely decreased if patient taking Biotin.      Iron Profile [[id number]]  (Normal) Collected: 05/09/22 1759    Specimen: Blood Updated: 05/09/22 2032     Iron 75 mcg/dL      Iron Saturation 20 %      Transferrin 250 mg/dL      TIBC 373 mcg/dL     Reticulocytes [[id number]]  (Abnormal) Collected: 05/09/22 1546    Specimen: Blood Updated: 05/09/22 2019     Reticulocyte % 3.05 %      Reticulocyte Absolute 0.1052 10*6/mm3     BNP [[id number]]  (Abnormal) Collected: 05/09/22 1759    Specimen: Blood Updated: 05/09/22 1852     proBNP 5,003.0 pg/mL     Narrative:      Among patients  with dyspnea, NT-proBNP is highly sensitive for the detection of acute congestive heart failure. In addition NT-proBNP of <300 pg/ml effectively rules out acute congestive heart failure with 99% negative predictive value.    Results may be falsely decreased if patient taking Biotin.      Troponin [[id number]]  (Abnormal) Collected: 05/09/22 1759    Specimen: Blood Updated: 05/09/22 1829     Troponin T 0.034 ng/mL     Narrative:      Troponin T Reference Range:  <= 0.03 ng/mL-   Negative for AMI  >0.03 ng/mL-     Abnormal for myocardial necrosis.  Clinicians would have to utilize clinical acumen, EKG, Troponin and serial changes to determine if it is an Acute Myocardial Infarction or myocardial injury due to an underlying chronic condition.       Results may be falsely decreased if patient taking Biotin.      COVID-19,[facility] IN-HOUSE,Nasal Swab No Transport Media 3-4 HR TAT - Swab, Nasal Cavity [[id number]]  (Normal) Collected: 05/09/22 1554    Specimen: Swab from Nasal Cavity Updated: 05/09/22 1659     COVID19 Not Detected    Narrative:      Fact sheet for providers: https://www.fda.gov/media/816367/download     Fact sheet for patients: https://www.fda.gov/media/809301/download    Test performed by PCR.    Consider negative results in combination with clinical observations, patient history, and epidemiological information.  Fact sheet for providers: https://www.fda.gov/media/554382/download     Fact sheet for patients: https://www.fda.gov/media/804809/download    Test performed by PCR.    Consider negative results in combination with clinical observations, patient history, and epidemiological information.    Comprehensive Metabolic Panel [[id number]]  (Abnormal) Collected: 05/09/22 1546    Specimen: Blood Updated: 05/09/22 1618     Glucose 130 mg/dL      BUN 35 mg/dL      Creatinine 1.96 mg/dL      Sodium 139 mmol/L      Potassium 4.6 mmol/L      Chloride 103 mmol/L      CO2 23.0 mmol/L      Calcium 9.1 mg/dL       Total Protein 7.2 g/dL      Albumin 3.50 g/dL      ALT (SGPT) 16 U/L      AST (SGOT) 36 U/L      Alkaline Phosphatase 226 U/L      Total Bilirubin 2.7 mg/dL      Globulin 3.7 gm/dL      A/G Ratio 0.9 g/dL      BUN/Creatinine Ratio 17.9     Anion Gap 13.0 mmol/L      eGFR 32.9 mL/min/1.73      Comment: National Kidney Foundation and American Society of Nephrology (ASN) Task Force recommended calculation based on the Chronic Kidney Disease Epidemiology Collaboration (CKD-EPI) equation refit without adjustment for race.       Narrative:      GFR Normal >60  Chronic Kidney Disease <60  Kidney Failure <15      Troponin [[id number]]  (Abnormal) Collected: 05/09/22 1546    Specimen: Blood Updated: 05/09/22 1617     Troponin T 0.050 ng/mL     Narrative:      Troponin T Reference Range:  <= 0.03 ng/mL-   Negative for AMI  >0.03 ng/mL-     Abnormal for myocardial necrosis.  Clinicians would have to utilize clinical acumen, EKG, Troponin and serial changes to determine if it is an Acute Myocardial Infarction or myocardial injury due to an underlying chronic condition.       Results may be falsely decreased if patient taking Biotin.      Protime-INR [[id number]]  (Abnormal) Collected: 05/09/22 1546    Specimen: Blood Updated: 05/09/22 1608     Protime 31.0 Seconds      INR 3.12    aPTT [[id number]]  (Abnormal) Collected: 05/09/22 1546    Specimen: Blood Updated: 05/09/22 1608     PTT 63.3 seconds     CBC & Differential [[id number]]  (Abnormal) Collected: 05/09/22 1546    Specimen: Blood Updated: 05/09/22 1558    Narrative:      The following orders were created for panel order CBC & Differential.  Procedure                               Abnormality         Status                     ---------                               -----------         ------                     CBC Auto Differential[[id number]]        Abnormal            Final result                 Please view results for these tests on the individual orders.    CBC Auto  Differential [[id number]]  (Abnormal) Collected: 05/09/22 1546    Specimen: Blood Updated: 05/09/22 1558     WBC 8.65 10*3/mm3      RBC 3.46 10*6/mm3      Hemoglobin 9.0 g/dL      Hematocrit 30.9 %      MCV 89.3 fL      MCH 26.0 pg      MCHC 29.1 g/dL      RDW 15.7 %      RDW-SD 50.4 fl      MPV 10.3 fL      Platelets 206 10*3/mm3      Neutrophil % 55.6 %      Lymphocyte % 20.3 %      Monocyte % 21.5 %      Eosinophil % 1.5 %      Basophil % 0.3 %      Neutrophils, Absolute 4.80 10*3/mm3      Lymphocytes, Absolute 1.76 10*3/mm3      Monocytes, Absolute 1.86 10*3/mm3      Eosinophils, Absolute 0.13 10*3/mm3      Basophils, Absolute 0.03 10*3/mm3               Echo EF Estimated  No results found for: ECHOEFEST      Cath Ejection Fraction Quantitative  No results found for: CATHEF        Medication Review: yes  Current Facility-Administered Medications   Medication Dose Route Frequency Provider Last Rate Last Admin   • acetaminophen (TYLENOL) tablet 650 mg  650 mg Oral Q4H PRN Izabella Michelle APRN        Or   • acetaminophen (TYLENOL) 160 MG/5ML solution 650 mg  650 mg Oral Q4H PRN Izabella Michelle APRN        Or   • acetaminophen (TYLENOL) suppository 650 mg  650 mg Rectal Q4H PRN Izabella Michelle APRN       • bisoprolol (ZEBeta) tablet 5 mg  5 mg Oral Daily Izabella Michelle APRN   5 mg at 05/12/22 0834   • dextrose (D50W) (25 g/50 mL) IV injection 25 g  25 g Intravenous Q15 Min PRN Izabella Michelle APRN       • dextrose (GLUTOSE) oral gel 15 g  15 g Oral Q15 Min PRN Izabella Michelle APRN       • famotidine (PEPCID) tablet 20 mg  20 mg Oral Daily Izabella Michelle APRN   20 mg at 05/12/22 0834   • furosemide (LASIX) tablet 40 mg  40 mg Oral Daily Yasir Lebron DO   40 mg at 05/12/22 0834   • glucagon (human recombinant) (GLUCAGEN DIAGNOSTIC) injection 1 mg  1 mg Intramuscular Q15 Min PRN Michelle, Izabella D, APRN       • Glycerin-Hypromellose- (ARTIFICIAL TEARS) 0.2-0.2-1 % ophthalmic solution  solution 1 drop  1 drop Both Eyes Q1H PRN Izabella Michelle, APRN       • HYDROcodone-acetaminophen (NORCO) 7.5-325 MG per tablet 1 tablet  1 tablet Oral Q8H PRN Izabella Michelle, APRN   1 tablet at 05/11/22 0835   • Insulin Lispro (humaLOG) injection 2-7 Units  2-7 Units Subcutaneous TID AC Izabella Michelle, APRN   2 Units at 05/12/22 0835   • losartan (COZAAR) tablet 100 mg  100 mg Oral Daily Izabella Michelle APRN   100 mg at 05/12/22 0834   • ondansetron (ZOFRAN) tablet 4 mg  4 mg Oral Q6H PRN Izabella Michelle, APRN        Or   • ondansetron (ZOFRAN) injection 4 mg  4 mg Intravenous Q6H PRN Izabella Michelle, APRN       • rosuvastatin (CRESTOR) tablet 20 mg  20 mg Oral Nightly Sahra Sylvester, APRN   20 mg at 05/11/22 2017   • sennosides-docusate (PERICOLACE) 8.6-50 MG per tablet 1 tablet  1 tablet Oral Daily Izabella Michelle, APRN   1 tablet at 05/12/22 0834   • sodium chloride 0.9 % flush 10 mL  10 mL Intravenous PRN Matheus Raygoza PA-C       • sodium chloride 0.9 % flush 10 mL  10 mL Intravenous Q12H Izabella Michelle, APRN   10 mL at 05/12/22 0835   • sodium chloride 0.9 % flush 10 mL  10 mL Intravenous PRN Izabella Michelle, APRN       • warfarin (COUMADIN) tablet 1.5 mg  1.5 mg Oral Daily Esau Agosto MD   1.5 mg at 05/11/22 7309       Assessment & Plan       Acute CHF (congestive heart failure) (HCC)    Stage 3b chronic kidney disease (HCC)    Hematoma of left iliopsoas muscle    Chronic anticoagulation    Supratherapeutic INR    Anemia of chronic disease    Anasarca    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  He had an echo approximately 1 year ago at [facility] cardiology where he follows.  Changes noted with an increase in his right ventricular systolic pressure, worsening tricuspid valve regurgitation, and mild RV dilation noted.  Diastolic dysfunction is unchanged.  Appears to be at baseline, volume-wise. Continue lasix 40mg daily.     Permanent AF: There was some concern regarding the patient's atrial fibrillation burden but according to documentation at [facility] cardiology and [facility], the patient has permanent atrial fibrillation managed on rate controlling medications including bisoprolol and chronically anticoagulated with use of warfarin.  Interrogation yesterday revealed atrial fibrillation for greater than 12 months.  Continue with home medications as he has adequate rate control.    Subtherapeutic INR: remains subtherapeutic with INR 1.66 today. Goal 2.0-3.0 for stroke risk reduction in the setting of permanent afib.     CAD with 2v CABG: established problem, stable. No ischemic symptoms    Aortic valve disease: s/p bioprosthetic AVR at the time of CABG in 2010. Normal function per most recent echo.     Sinus node dysfunction: stable. S/p pacemaker.The patient's device was interrogated this hospitalization noting battery life to be estimated at approximately 3 months.  He was due to follow-up with his primary cardiology office today however he remains hospitalized.  He needs to have an appointment rescheduled at the time of discharge so that he can follow-up in a timely manner to further discuss need for generator replacement.  He follows with Dr. Strickland and has been seen by GÓMEZ Clemente in the past at [facility] cardiology.     Cardiology will sign off at this time. Please let us know if we can be of further assistance.     As mentioned above, patient follows with Dr. Strickland at [facility]. He will need a follow up scheduled at the time of d/c.     Further recommendations per Dr. Morales    Electronically signed by GÓMEZ Flores, 05/12/22, 1:23 PM CDT.

## 2022-05-12 NOTE — DISCHARGE SUMMARY
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  His breathing is back to baseline.  Creatinine is slightly higher today at 1.76.  INR is recovering at 1.66.  The patient's congestive failure has stabilized and he appears to be stable for discharge back to skilled nursing facility.  He will continue on oral diuretics and other guideline directed treatments for congestive heart failure.  He is to follow-up with [facility] cardiology in 2 weeks.    Hospital Course  Jesus Smith is an 86-year-old male with a past medical history of coronary artery disease, V pacing with underlying atrial flutter on chronic warfarin use, GERD, type 2 diabetes, hypertension, chronic kidney disease stage IIIb, TIA.  Patient resides at [facility] re[facility].  Patient was transferred to [facility] emergency department for evaluation of bruising on the right flank region.  Patient is extremely poor historian.  He did report to ER provider that he has been having pain in the right hip for approximately 2 months.  Per nursing home staff he has had no falls or  injuries.  Patient does report shortness of breathing that has been worsening over the past 6 months.  He does not utilize continuous oxygen therapy.  Currently patient is utilizing nasal cannula oxygen at 2 L with 100% saturation however he continues to have labored breathing.  Work-up revealed elevated troponin that is normalizing, BNP 5003.0, creatinine 1.96 which is baseline.  ER work-up revealed intramuscular hematoma involving the left iliopsoas, with what appears to be faintly visible intramuscular hematoma in the iliacus measuring approximately 5.3 cm diameter, systemic volume overload with bilateral pleural effusions and body wall subcutaneous edema.  CT angiogram of the chest revealed decompensated congestive failure with bilateral interstitial and sharlene pulmonary edema as well as bilateral large layering pleural effusions.  Patient denies chest pain.  Patient is extremely uncomfortable, diffuse anasarca noted.  He is admitted for further evaluation treatment.   Plan:   1.  Admit as inpatient  2.  Echocardiogram in a.m.  3.  Lasix 40 mg IV every 12 hours, daily weights, monitor kidney function closely  4.  Glucose AC with regular insulin sliding scale coverage  5.  Vitamin K 10 mg IV x1  6.  Home medications reviewed and restarted as appropriate  6.  Incentive spirometry, supplemental oxygen, continuous pulse oximetry  7.  Labs in a.m., check stool for occult blood, urinalysis with microscopic evaluation, repeat troponin in a.m.  8.  DVT prophylaxis with SCDs, warfarin on hold  9.  Check postvoid residual and place indwelling Day catheter for accurate I&O measurement for 350 mL or greater  10.  Dr. Scott, vascular surgeon made aware of patient's iliopsoas hematoma and will evaluate in a.m.    On the morning after admission, the patient's flank pain was reasonably well controlled.  He was noted to have a significant degree of bruising in the right flank.  Vascular surgery saw and evaluated the patient  offering no intervention and preferring conservative management.  There was initial concern for discontinuation of anticoagulation secondary to presence of artificial valve.  The patient was actually given a dose of vitamin K out of concern for the bleeding.  The bowel was noted to be a bioprosthetic valve and the patient appears to be anticoagulated for the purposes of stroke prevention secondary to atrial fibrillation.  Bridging Lovenox was not felt to be needed.  INR drifted down and has recovered to 1.66 today.  Alternating Coumadin regimen 1 mg was 2.5 mg intermittently was discontinued in favor of a consistent dosing of 1.5 mg daily and noted to avoid intermittent Coumadin toxicity which may have contributed to iliopsoas hematoma.  An echocardiogram was ordered at the time of presentation because of evidence of decompensated heart failure with interstitial edema.  There was no evidence of systolic failure.  The patient was noted to have grade 2 diastolic dysfunction with moderate to severe tricuspid valve regurgitation and elevated right ventricular pressures at 35 to 45 mmHg.  This combination of factors was felt to be the origin of the patient's pulmonary edema.  IV diuresis was discontinued and the patient was transitioned to oral Lasix prior to discharge.  H&H remained stable throughout his stay.  The patient stable for discharge today on a slightly higher dose of diuretic therapy.  He is to follow-up with [facility] cardiology in 2 weeks.  INR is to be reevaluated periodically.  Recommendation is for a consistent dosage of Coumadin on a daily basis rather than an undulating dosing schedule in order to prevent intermittent Coumadin toxicity and possible spontaneous hemorrhage.    Consults:   Vascular surgery:  Impression:    Stage 3b chronic kidney disease (HCC)    Hematoma of left iliopsoas muscle    Chronic anticoagulation    Supratherapeutic INR    Anemia of chronic disease    Anasarca    Insulin dependent  type 2 diabetes mellitus (HCC)    Pleural effusion, bilateral    Acute CHF (congestive heart failure) ([facility])        Plan: After thoroughly evaluating Jesus Smith, I believe the best course of action is to remain conservative from a vascular standpoint.  He is admitted with an unclear duration of right flank/scrotal ecchymosis and some discomfort.  Ultimately on CAT scan he was found to have likely hematoma of the right psoas muscle as well as the right iliacus muscle.  The etiology of this is unclear but he is on chronic anticoagulation with Coumadin and INR from 4/28 was 3.78.  He has previously had a left iliopsoas hematoma back in 2020 that was a result of a fall while on anticoagulation.  This is slightly more complex given that he has history of aortic valve replacement and that is why he is on anticoagulation and so I would be reluctant to stop it and let INR fall too low.  As such I would recommend at this point initially restarting Lovenox therapeutically and monitoring his H&H serially to ensure that it remains stable.  If this is the case then he can ultimately be restarted on his Coumadin and continue the Lovenox bridge until his INR is therapeutic.  Otherwise he should have continued fall precautions to try and avoid any trauma in the future that could increase his risk of bleeding/hematoma formation.  Otherwise further treatment of his apparent CHF exacerbation as per the medical team. Thank you for allowing me to see Jesus Smith in consult. Please feel free to reach out with any questions or concerns.     Electronically signed by Hong Scott MD    Cardiology:  Plan:  Acute Diastolic Congestive Heart Failure- with anasarca and pleural effusions.  Patient is on Losartan and Bisoprolol. Good urine output- minimal symptom improvement. Low Salt diet. Daily weights.     Bioprosthetic Aortic Valve- good function on last echo in April 2020. Today's echo pending.     Atrial Fibrillation and Atrial  Flutter - he was noted to be paroxysmal atrial fibrillation in the past. EKG on admission appears atrial flutter. He has been documented to be asymptomatic to A-fib in the past. Denies any palpitations or heart racing. Unclear if patient is permanently out of rhythm or this is paroxysmal. Will ask for device interrogation to assess burden.      Chronic Kidney Disease- renal function has improved with diuresis on admission. Continue to monitor strict I&O, daily weights, renal function and electrolytes     Anemia- Chronic. Occult stool ordered. Work up per primary team.      Coronary Artery Disease- CABG in early 2000s. Denies any angina symptoms. Troponin elevated at 0.05, 0.034 and 0.045. Troponin elevated thought secondary to volume overload and anemia      Hematoma - with acute hip pain, this was reason for admission. Denies recent injury.      [facility]. Per patient,echo and previous note patient has a bioprosthetic valve, which would suggest he is anticoagulated for atrial fibrillation/atrial flutter and goal INR 2.0-3.0. Therefore bridging with Lovenox is not necessary and could resume coumadin without bridge.      Further orders per Dr. Agosto    Assessment:   1.  Acute on chronic diastolic CHF: Elevated proBNP with evidence of volume overload on CT abdomen.  Appears to be a component of right-sided heart failure with mild RV dilation and moderate to severe TR  2.  Paroxysmal atrial fibrillation/flutter  3.  Cardiac pacemaker in place   4.  Bioprosthetic arctic valve replacement: Normal function on echo from today.   5.  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  Coronary artery disease: History of CABG.  No chest pain symptoms.  Mild troponin elevation likely secondary to CHF.  7.  Hematoma: Conservative therapy recommended by vascular surgery.       Plan:   -Continue IV diuresis with close monitoring of renal function and electrolytes.   -Continue bisoprolol and  "anticoagulation with warfarin.     Result Review    Result Review:  I have personally reviewed the results from the time of this admission to 5/12/2022 13:54 CDT and agree with these findings:  []  Laboratory  []  Microbiology  []  Radiology  []  EKG/Telemetry   []  Cardiology/Vascular   []  Pathology  []  Old records  []  Other:      Condition on Discharge:    Stable and improved    Physical Exam on Discharge:  /54 (BP Location: Left arm, Patient Position: Lying)   Pulse 50   Temp 97.9 °F (36.6 °C) (Oral)   Resp 16   Ht 177.8 cm (70\")   Wt 106 kg (233 lb)   SpO2 100%   BMI 33.43 kg/m²   Physical Exam     Constitutional:       General: He is not in acute distress.     Appearance: Normal appearance. He is obese.   HENT:      Head: Normocephalic and atraumatic.      Right Ear: External ear normal.      Left Ear: External ear normal.      Nose: Nose normal.      Mouth: Mucous membranes are dry.   Eyes:      General: No scleral icterus.     Conjunctiva/sclera: Conjunctivae normal.   Cardiovascular:      Rate and Rhythm: Regular rhythm. Bradycardia present.      Pulses: Normal pulses.      Heart sounds: Normal heart sounds.   Pulmonary:      Effort: Pulmonary effort is normal. No respiratory distress.      Breath sounds: Normal breath sounds.   Abdominal:      General: Abdomen is flat. Bowel sounds are normal.      Palpations: Abdomen is soft.      Tenderness: There is right CVA tenderness.      Comments: Hematoma right flank.   Musculoskeletal:      Right lower leg: Edema present.      Left lower leg: Edema present.   Skin:     General: Skin is warm and dry.      Coloration: Skin is not pale.   Neurological:      General: No focal deficit present.      Mental Status: He is alert and oriented to person, place, and time. Mental status is at baseline.   Psychiatric:         Mood and Affect: Mood normal.         Judgment: Judgment normal.       Discharge Disposition:  Skilled Nursing Facility (DC - " External)    Discharge Medications:     Discharge Medications      New Medications      Instructions Start Date   rosuvastatin 20 MG tablet  Commonly known as: CRESTOR   20 mg, Oral, Nightly         Changes to Medications      Instructions Start Date   furosemide 40 MG tablet  Commonly known as: LASIX  What changed:   · medication strength  · how much to take   40 mg, Oral, Daily   Start Date: May 13, 2022     warfarin 3 MG tablet  Commonly known as: COUMADIN  What changed:   · medication strength  · how much to take  · how to take this  · when to take this  · additional instructions   1.5 mg p.o. daily         Continue These Medications      Instructions Start Date   acetaminophen 325 MG tablet  Commonly known as: TYLENOL   650 mg, Oral, Every 4 Hours PRN      aspirin 81 MG EC tablet   81 mg, Oral, Daily      Biofreeze 4 % gel  Generic drug: Menthol (Topical Analgesic)   1 application, Apply externally, Daily PRN      bisacodyl 10 MG suppository  Commonly known as: DULCOLAX   10 mg, Rectal, Daily PRN      bisoprolol 5 MG tablet  Commonly known as: ZEBeta   5 mg, Oral, Daily      Chloraseptic 1.4 % liquid liquid  Generic drug: phenol   1 spray, Mouth/Throat, Every 2 Hours PRN      dextrose 40 % gel  Commonly known as: GLUTOSE   15 g, Oral, Every 15 Minutes PRN      docusate sodium 100 MG capsule  Commonly known as: COLACE   100 mg, Oral, 2 Times Daily      famotidine 20 MG tablet  Commonly known as: PEPCID   20 mg, Oral, 2 Times Daily      Glucagon Emergency 1 MG kit   1 mg, Injection, Every 15 Minutes PRN      Glycerin-Hypromellose- 0.2-0.2-1 % solution ophthalmic solution  Commonly known as: ARTIFICIAL TEARS   1 drop, Both Eyes, Every 1 Hour PRN      HYDROcodone-acetaminophen 7.5-325 MG per tablet  Commonly known as: NORCO   1 tablet, Oral, Every 4 Hours PRN      insulin lispro 100 UNIT/ML injection  Commonly known as: humaLOG   1 Units, Subcutaneous, Nightly, Inject 4 times daily as per sliding scale:  150-199= 2 units 200-249= 3 units 250-299= 4 units 300-349= 5 units 350-400= 6 units 401-550= 7 units and call MD       ipratropium-albuterol  MCG/ACT inhaler  Commonly known as: COMBIVENT RESPIMAT   2 puffs, Inhalation, Every 6 Hours PRN      loperamide 2 MG capsule  Commonly known as: IMODIUM   2 mg, Oral, 4 Times Daily PRN      losartan 100 MG tablet  Commonly known as: COZAAR   100 mg, Oral, Daily      ondansetron 4 MG tablet  Commonly known as: ZOFRAN   4 mg, Oral, Every 8 Hours PRN      polyethylene glycol 17 GM/SCOOP powder  Commonly known as: MIRALAX   17 g, Oral, Daily      Sennosides 8.6 MG capsule   1 capsule, Oral, Daily PRN      sucralfate 1 g tablet  Commonly known as: CARAFATE   1 g, Oral, 2 Times Daily      vitamin B-12 1000 MCG tablet  Commonly known as: CYANOCOBALAMIN   1,000 mcg, Oral, Daily      Vitamin D (Ergocalciferol) 50 MCG (2000 UT) capsule   2,000 Units, Oral, Daily         Stop These Medications    guaiFENesin 100 MG/5ML syrup  Commonly known as: ROBITUSSIN     ibuprofen 400 MG tablet  Commonly known as: ADVIL,MOTRIN     naloxone 0.4 MG/ML injection  Commonly known as: NARCAN            Discharge Diet:   Diet Instructions     Diet: Consistent Carbohydrate; Thin      Discharge Diet: Consistent Carbohydrate    Fluid Consistency: Thin          Discharge Care Plan / Instructions:   Discharge to skilled nursing facility    Activity at Discharge:   Activity Instructions     Activity as Tolerated            Follow-up Appointments:  [facility] cardiology in 2 weeks       Electronically signed by Yasir Lebron DO, 05/12/22, 13:54 CDT.    Time: Discharge Over 30 min    Part of this note may be an electronic transcription/translation of spoken language to printed text using the Dragon Dictation system.

## 2022-05-12 NOTE — CASE MANAGEMENT/SOCIAL WORK
Continued Stay Note  [facility]     Patient Name: Jesus Smith  MRN: [id number]  Today's Date: 5/12/2022    Admit Date: 5/9/2022     Discharge Plan     Row Name 05/12/22 1421       Plan    Plan [facility]    Patient/Family in Agreement with Plan yes    Final Discharge Disposition Code 03 - skilled nursing facility (SNF)    Final Note [facility] is going [facility] [phone]. Faxed the d/c summary to them at [phone]. Left a message for pt's son, Jesus [phone], to inform of d/c.               Discharge Codes    No documentation.               Expected Discharge Date and Time     Expected Discharge Date Expected Discharge Time    May 12, 2022             MEGAN Knowles

## 2022-05-12 NOTE — PLAN OF CARE
Patient being discharged back to [facility] today.  Goals met.  VSS, O2 at 2L.  Weakness.  Bruising noted to R hip/flank/abdominal area.  Compression wraps and SCDs to BLE.  Tolerating ADA /AHA / Renal diet.  A/O X3.  Safety maintained.  Report will be called to NH.    Problem: Adult Inpatient Plan of Care  Goal: Plan of Care Review  Outcome: Met  Flowsheets  Taken 5/12/2022 1438 by Melissa Michelle, RN  Progress: improving  Taken 5/11/2022 0326 by Sary Malave, RN  Plan of Care Reviewed With: patient   Goal Outcome Evaluation:           Progress: improving

## 2022-05-12 NOTE — NURSING NOTE
Report called to Maria E, nurse at [facility].  Complete report provided.  Stressed that Coumadin has been given for today.  Also explained that patient's pacemaker has approximately 3 months battery left and patient missed his cardiology appointment at [facility] while hospitalized.  Patient will need this appointment rescheduled quickly.

## 2022-05-16 ENCOUNTER — TELEPHONE (OUTPATIENT)
Dept: CARDIOLOGY CLINIC | Age: 87
End: 2022-05-16

## 2022-05-16 NOTE — TELEPHONE ENCOUNTER
Spoke to Augustus Kahn RN at [email or web address] to send in carelink from monitor. She voiced understanding.

## 2022-05-17 ENCOUNTER — TELEPHONE (OUTPATIENT)
Dept: CARDIOLOGY CLINIC | Age: 87
End: 2022-05-17

## 2022-05-17 NOTE — TELEPHONE ENCOUNTER
Jamie Galo called to report they do not have a carelink monitor for patient. Reviewed of carelink shows a monitor was shipped to the facility on 7/12/21 and a carelink was received on 7/16/21. Jamie Galo was not sure what the monitor looked like. Had her pull up a picture of the monitor on her phone and told her where to look for the serial number. She will try to find the monitor. Also gave her the number to [employer] to call and order a new monitor if they cannot find his monitor. Patient has as scheduled OV on 5/19/22.

## 2022-05-19 ENCOUNTER — APPOINTMENT (OUTPATIENT)
Dept: CT IMAGING | Facility: HOSPITAL | Age: 87
End: 2022-05-19

## 2022-05-19 ENCOUNTER — HOSPITAL ENCOUNTER (EMERGENCY)
Facility: HOSPITAL | Age: 87
Discharge: NURSING, REHAB OR LONG-TERM CARE | End: 2022-05-19
Attending: STUDENT IN AN ORGANIZED HEALTH CARE EDUCATION/TRAINING PROGRAM | Admitting: EMERGENCY MEDICINE

## 2022-05-19 ENCOUNTER — TELEPHONE (OUTPATIENT)
Dept: CARDIOLOGY CLINIC | Age: 87
End: 2022-05-19

## 2022-05-19 VITALS
DIASTOLIC BLOOD PRESSURE: 55 MMHG | SYSTOLIC BLOOD PRESSURE: 135 MMHG | HEART RATE: 51 BPM | RESPIRATION RATE: 18 BRPM | WEIGHT: 204 LBS | OXYGEN SATURATION: 98 % | BODY MASS INDEX: 29.2 KG/M2 | TEMPERATURE: 97.5 F | HEIGHT: 70 IN

## 2022-05-19 DIAGNOSIS — R79.1 SUPRATHERAPEUTIC INR: Primary | ICD-10-CM

## 2022-05-19 LAB
ABO GROUP BLD: NORMAL
ALBUMIN SERPL-MCNC: 3.2 G/DL (ref 3.5–5.2)
ALBUMIN/GLOB SERPL: 0.8 G/DL
ALP SERPL-CCNC: 229 U/L (ref 39–117)
ALT SERPL W P-5'-P-CCNC: 17 U/L (ref 1–41)
ANION GAP SERPL CALCULATED.3IONS-SCNC: 9 MMOL/L (ref 5–15)
APTT PPP: 79.6 SECONDS (ref 24.1–35)
AST SERPL-CCNC: 21 U/L (ref 1–40)
BASOPHILS # BLD AUTO: 0.04 10*3/MM3 (ref 0–0.2)
BASOPHILS NFR BLD AUTO: 0.6 % (ref 0–1.5)
BILIRUB SERPL-MCNC: 1.4 MG/DL (ref 0–1.2)
BLD GP AB SCN SERPL QL: NEGATIVE
BUN SERPL-MCNC: 27 MG/DL (ref 8–23)
BUN/CREAT SERPL: 17.3 (ref 7–25)
CALCIUM SPEC-SCNC: 8.7 MG/DL (ref 8.6–10.5)
CHLORIDE SERPL-SCNC: 98 MMOL/L (ref 98–107)
CO2 SERPL-SCNC: 28 MMOL/L (ref 22–29)
CREAT SERPL-MCNC: 1.56 MG/DL (ref 0.76–1.27)
DEPRECATED RDW RBC AUTO: 56.7 FL (ref 37–54)
EGFRCR SERPLBLD CKD-EPI 2021: 43.3 ML/MIN/1.73
EOSINOPHIL # BLD AUTO: 0.36 10*3/MM3 (ref 0–0.4)
EOSINOPHIL NFR BLD AUTO: 5.6 % (ref 0.3–6.2)
ERYTHROCYTE [DISTWIDTH] IN BLOOD BY AUTOMATED COUNT: 18.3 % (ref 12.3–15.4)
GLOBULIN UR ELPH-MCNC: 4 GM/DL
GLUCOSE SERPL-MCNC: 112 MG/DL (ref 65–99)
HCT VFR BLD AUTO: 34.2 % (ref 37.5–51)
HGB BLD-MCNC: 10 G/DL (ref 13–17.7)
IMM GRANULOCYTES # BLD AUTO: 0.03 10*3/MM3 (ref 0–0.05)
IMM GRANULOCYTES NFR BLD AUTO: 0.5 % (ref 0–0.5)
INR PPP: 4 (ref 0.91–1.09)
LYMPHOCYTES # BLD AUTO: 1.26 10*3/MM3 (ref 0.7–3.1)
LYMPHOCYTES NFR BLD AUTO: 19.7 % (ref 19.6–45.3)
MCH RBC QN AUTO: 26.4 PG (ref 26.6–33)
MCHC RBC AUTO-ENTMCNC: 29.2 G/DL (ref 31.5–35.7)
MCV RBC AUTO: 90.2 FL (ref 79–97)
MONOCYTES # BLD AUTO: 1.22 10*3/MM3 (ref 0.1–0.9)
MONOCYTES NFR BLD AUTO: 19 % (ref 5–12)
NEUTROPHILS NFR BLD AUTO: 3.5 10*3/MM3 (ref 1.7–7)
NEUTROPHILS NFR BLD AUTO: 54.6 % (ref 42.7–76)
NRBC BLD AUTO-RTO: 0 /100 WBC (ref 0–0.2)
PLATELET # BLD AUTO: 276 10*3/MM3 (ref 140–450)
PMV BLD AUTO: 10 FL (ref 6–12)
POTASSIUM SERPL-SCNC: 4.5 MMOL/L (ref 3.5–5.2)
PROT SERPL-MCNC: 7.2 G/DL (ref 6–8.5)
PROTHROMBIN TIME: 37.5 SECONDS (ref 11.9–14.6)
RBC # BLD AUTO: 3.79 10*6/MM3 (ref 4.14–5.8)
RH BLD: POSITIVE
SARS-COV-2 RNA PNL SPEC NAA+PROBE: NOT DETECTED
SODIUM SERPL-SCNC: 135 MMOL/L (ref 136–145)
T&S EXPIRATION DATE: NORMAL
WBC NRBC COR # BLD: 6.41 10*3/MM3 (ref 3.4–10.8)

## 2022-05-19 PROCEDURE — 80053 COMPREHEN METABOLIC PANEL: CPT | Performed by: STUDENT IN AN ORGANIZED HEALTH CARE EDUCATION/TRAINING PROGRAM

## 2022-05-19 PROCEDURE — 87635 SARS-COV-2 COVID-19 AMP PRB: CPT | Performed by: STUDENT IN AN ORGANIZED HEALTH CARE EDUCATION/TRAINING PROGRAM

## 2022-05-19 PROCEDURE — 85730 THROMBOPLASTIN TIME PARTIAL: CPT | Performed by: STUDENT IN AN ORGANIZED HEALTH CARE EDUCATION/TRAINING PROGRAM

## 2022-05-19 PROCEDURE — 86901 BLOOD TYPING SEROLOGIC RH(D): CPT | Performed by: STUDENT IN AN ORGANIZED HEALTH CARE EDUCATION/TRAINING PROGRAM

## 2022-05-19 PROCEDURE — 86900 BLOOD TYPING SEROLOGIC ABO: CPT | Performed by: STUDENT IN AN ORGANIZED HEALTH CARE EDUCATION/TRAINING PROGRAM

## 2022-05-19 PROCEDURE — 74177 CT ABD & PELVIS W/CONTRAST: CPT

## 2022-05-19 PROCEDURE — 85610 PROTHROMBIN TIME: CPT | Performed by: STUDENT IN AN ORGANIZED HEALTH CARE EDUCATION/TRAINING PROGRAM

## 2022-05-19 PROCEDURE — 25010000002 IOPAMIDOL 61 % SOLUTION: Performed by: STUDENT IN AN ORGANIZED HEALTH CARE EDUCATION/TRAINING PROGRAM

## 2022-05-19 PROCEDURE — 36415 COLL VENOUS BLD VENIPUNCTURE: CPT

## 2022-05-19 PROCEDURE — 85025 COMPLETE CBC W/AUTO DIFF WBC: CPT | Performed by: STUDENT IN AN ORGANIZED HEALTH CARE EDUCATION/TRAINING PROGRAM

## 2022-05-19 PROCEDURE — 86850 RBC ANTIBODY SCREEN: CPT | Performed by: STUDENT IN AN ORGANIZED HEALTH CARE EDUCATION/TRAINING PROGRAM

## 2022-05-19 PROCEDURE — 99284 EMERGENCY DEPT VISIT MOD MDM: CPT

## 2022-05-19 RX ADMIN — IOPAMIDOL 100 ML: 612 INJECTION, SOLUTION INTRAVENOUS at 07:05

## 2022-05-19 NOTE — ED NOTES
Patient transported to room one and cleansed. Patient placed in clean gown and placed back in hallway to wait for room. Patient assisted with meal sit up. Patient visual from nursing station.

## 2022-05-19 NOTE — ED NOTES
Patient moved to hallway C and provided with sandwich, chips and drink while waiting for transport. Patient assisted in getting food ready and within reach. Patient visualized from nursing station.

## 2022-05-19 NOTE — TELEPHONE ENCOUNTER
daina with [facility] called to cancel appt due to pt being in the hospital. per daina nurse tried to call this morning and was left on hold

## 2022-05-19 NOTE — DISCHARGE INSTRUCTIONS
Your INR was slightly elevated at 4.0.  You do not need any additional vitamin K.  Hold your Coumadin dose today and have INR rechecked before resuming tomorrow.

## 2022-05-19 NOTE — ED NOTES
Return phone call from Maria E at [facility]. Maria E reports that they were able to arrange transport for Mr. Lee. Maria E reports that is will be at least one hour to one and a half hours before van arrives to discharge patient.

## 2022-05-19 NOTE — ED PROVIDER NOTES
Subjective   Patient states that he was sent here for abnormal labs.  EMS states that the patient's INR at the nursing home was greater than 8.4 and so he was given vitamin K.  They state that after the vitamin K they checked his INR and it was still high so they sent him here for further evaluation.  Patient states that he has been having right upper quadrant abdominal pain for quite some time although is not sure the reason for it.  He states that he is not having any chest pain, shortness of breath, numbness, tingling.  States that he does have some pain over the right thigh that he has had for a long time.          Review of Systems   All other systems reviewed and are negative.      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   • Hypertension    • Injury of back    • TIA (transient ischemic attack)        No Known Allergies    Past Surgical History:   Procedure Laterality Date   • APPENDECTOMY     • BACK SURGERY      Fusion   • CARDIAC SURGERY      Open Heart   • CHOLECYSTECTOMY N/A 5/28/2020    Procedure: OPEN PARTIAL CHOLECYSTECTOMY, LYSIS OF ADHESIONS;  Surgeon: Agustina Saleem MD;  Location: [facility] OR;  Service: General;  Laterality: N/A;   • EXPLORATORY LAPAROTOMY N/A 4/5/2020    Procedure: open cholecystostomy tube placement ;  Surgeon: Agustina Saleem MD;  Location: [facility] OR;  Service: General;  Laterality: N/A;   • HERNIA REPAIR     • JOINT REPLACEMENT Left     s/p L hip replacement   • PACEMAKER IMPLANTATION     • STOMACH SURGERY         Family History   Problem Relation Age of Onset   • Heart disease Mother    • Stroke Father        Social History     Socioeconomic History   • Marital status:    Tobacco Use   • Smoking status: Never Smoker   • Smokeless tobacco: Never Used   Vaping Use   • Vaping Use: Never used   Substance  and Sexual Activity   • Alcohol use: No   • Drug use: No   • Sexual activity: Defer           Objective   Physical Exam  Vitals and nursing note reviewed.   Constitutional:       General: He is not in acute distress.     Appearance: He is not toxic-appearing or diaphoretic.   HENT:      Head: Normocephalic and atraumatic.      Nose: Nose normal.   Eyes:      General:         Right eye: No discharge.         Left eye: No discharge.      Extraocular Movements: Extraocular movements intact.      Conjunctiva/sclera: Conjunctivae normal.   Cardiovascular:      Rate and Rhythm: Normal rate.      Pulses: Normal pulses.   Pulmonary:      Effort: Pulmonary effort is normal. No respiratory distress.      Breath sounds: No stridor. No rhonchi.   Abdominal:      General: Abdomen is flat.      Tenderness: There is abdominal tenderness (ruq pain).   Musculoskeletal:         General: Normal range of motion.      Cervical back: No rigidity.      Right lower leg: Edema present.      Left lower leg: Edema present.   Skin:     General: Skin is warm.      Capillary Refill: Capillary refill takes less than 2 seconds.      Findings: Bruising present.   Neurological:      General: No focal deficit present.      Mental Status: He is alert and oriented to person, place, and time.      Cranial Nerves: No cranial nerve deficit.      Sensory: No sensory deficit.      Motor: No weakness.   Psychiatric:         Mood and Affect: Mood normal.         Behavior: Behavior normal.         Thought Content: Thought content normal.         Judgment: Judgment normal.         Procedures           ED Course  ED Course as of 05/19/22 0942   Thu May 19, 2022   0939 CT negative for acute intra-abdominal abnormality.  Right iliopsoas intramuscular hematoma is smaller as compared to previous imaging per radiology.  INR has improved to 4.  Patient needs to hold his Coumadin dose today, have INR checked tomorrow before resuming. [AW]      ED Course User  Index  [AW] Nitesh Bhakta MD                                                 MDM  Number of Diagnoses or Management Options  Diagnosis management comments: Patient arrived hemodynamically stable is afebrile.  Given his history and physical examination plan to obtain a CBC, CMP, PT/PTT.  Patient's INR is 4.0.  Patient's T bili is 1.4.  Given his right upper quadrant abdominal pain plan to pursue a CT scan of the abdomen pelvis to evaluate for any hepatic lesions or pathologies. Pending CT scan read and disposition, patient was signed out to the oncoming physician, Dr. Bhakta.         Amount and/or Complexity of Data Reviewed  Clinical lab tests: reviewed and ordered  Tests in the radiology section of CPT®: reviewed and ordered  Decide to obtain previous medical records or to obtain history from someone other than the patient: yes        Final diagnoses:   Supratherapeutic INR       ED Disposition  ED Disposition     ED Disposition   Discharge    Condition   Stable    Comment   --             Matheus Olivera PA  [address]  [place] [address]  [phone]    Schedule an appointment as soon as possible for a visit in 1 day           Medication List      No changes were made to your prescriptions during this visit.          Nitesh Bhakta MD  05/19/22 9413

## 2022-05-19 NOTE — ED NOTES
Patient called out stating that he needed to urinate and states that he can not stand very well. Urinal obtained but patient reported that it was too late and that he had urinated on himself.

## 2022-05-19 NOTE — ED NOTES
[facility] contacted in regards to patient discharge. Discharge instructions reviewed with nurse. Nurse aware to hold coumadin dose in till INR rechecked tomorrow. Nurse at this time asked about patient transport. Nurse reports that patient has not family involvement. Nurse reports that she will contact her DON to see if transport is available today to transport patient at time of discharge.

## 2022-05-19 NOTE — ED NOTES
Pt in bed. No s/s distress noted. Alert to verbal stimuli. Denies any needs/pain/discomforts. Pt repositioned in bed for comfort. POC dicussed. Will cont monitoring. Advised to call any needs. Call light in reach.

## 2022-05-25 ENCOUNTER — OFFICE VISIT (OUTPATIENT)
Dept: CARDIOLOGY CLINIC | Age: 87
End: 2022-05-25
Payer: MEDICARE

## 2022-05-25 VITALS
DIASTOLIC BLOOD PRESSURE: 70 MMHG | HEIGHT: 70 IN | BODY MASS INDEX: 30.99 KG/M2 | SYSTOLIC BLOOD PRESSURE: 102 MMHG | HEART RATE: 55 BPM

## 2022-05-25 DIAGNOSIS — I49.5 SINUS NODE DYSFUNCTION (HCC): ICD-10-CM

## 2022-05-25 DIAGNOSIS — I10 ESSENTIAL HYPERTENSION: ICD-10-CM

## 2022-05-25 DIAGNOSIS — I25.10 CORONARY ARTERY DISEASE INVOLVING NATIVE CORONARY ARTERY OF NATIVE HEART WITHOUT ANGINA PECTORIS: ICD-10-CM

## 2022-05-25 DIAGNOSIS — I50.32 CHRONIC DIASTOLIC CHF (CONGESTIVE HEART FAILURE) (HCC): Primary | ICD-10-CM

## 2022-05-25 DIAGNOSIS — N18.30 STAGE 3 CHRONIC KIDNEY DISEASE, UNSPECIFIED WHETHER STAGE 3A OR 3B CKD (HCC): ICD-10-CM

## 2022-05-25 DIAGNOSIS — I38 VALVULAR HEART DISEASE: ICD-10-CM

## 2022-05-25 DIAGNOSIS — Z95.0 PACEMAKER: ICD-10-CM

## 2022-05-25 PROCEDURE — 1123F ACP DISCUSS/DSCN MKR DOCD: CPT | Performed by: CLINICAL NURSE SPECIALIST

## 2022-05-25 PROCEDURE — 93280 PM DEVICE PROGR EVAL DUAL: CPT | Performed by: CLINICAL NURSE SPECIALIST

## 2022-05-25 PROCEDURE — 99214 OFFICE O/P EST MOD 30 MIN: CPT | Performed by: CLINICAL NURSE SPECIALIST

## 2022-05-25 PROCEDURE — G8417 CALC BMI ABV UP PARAM F/U: HCPCS | Performed by: CLINICAL NURSE SPECIALIST

## 2022-05-25 PROCEDURE — G8427 DOCREV CUR MEDS BY ELIG CLIN: HCPCS | Performed by: CLINICAL NURSE SPECIALIST

## 2022-05-25 PROCEDURE — 1036F TOBACCO NON-USER: CPT | Performed by: CLINICAL NURSE SPECIALIST

## 2022-05-25 RX ORDER — ROSUVASTATIN CALCIUM 20 MG/1
20 TABLET, COATED ORAL NIGHTLY
COMMUNITY
Start: 2022-05-12

## 2022-05-25 RX ORDER — LOSARTAN POTASSIUM 100 MG/1
TABLET ORAL
COMMUNITY
Start: 2022-05-09

## 2022-05-25 RX ORDER — CYCLOBENZAPRINE HCL 5 MG
TABLET ORAL
COMMUNITY
Start: 2022-05-04

## 2022-05-25 RX ORDER — SUCRALFATE 1 G/1
1 TABLET ORAL 2 TIMES DAILY
COMMUNITY

## 2022-05-25 RX ORDER — SPIRONOLACTONE 25 MG/1
25 TABLET ORAL DAILY
Qty: 30 TABLET | Refills: 5 | Status: SHIPPED | OUTPATIENT
Start: 2022-05-25 | End: 2022-05-25 | Stop reason: SDUPTHER

## 2022-05-25 RX ORDER — SPIRONOLACTONE 25 MG/1
25 TABLET ORAL DAILY
Qty: 30 TABLET | Refills: 5 | Status: SHIPPED | OUTPATIENT
Start: 2022-05-25

## 2022-05-25 ASSESSMENT — ENCOUNTER SYMPTOMS
FACIAL SWELLING: 0
NAUSEA: 0
WHEEZING: 0
VOMITING: 0
COUGH: 0
EYE REDNESS: 0
ABDOMINAL PAIN: 0
SHORTNESS OF BREATH: 1
CHEST TIGHTNESS: 0

## 2022-05-25 NOTE — PROGRESS NOTES
[facility], [address] [place] [address], [address] [id number]  Phone: [phone]  Fax: [phone]    OFFICE VISIT:  2022    Oraciojaquan Reinoso - : 1935    Reason For Visit:  Jose Ceja is a 81 y.o. male who is here for Follow-up and Coronary Artery Disease       Diagnosis Orders   1. Chronic diastolic CHF (congestive heart failure) (HCC)  Basic Metabolic Panel    Brain Natriuretic Peptide   2. Coronary artery disease involving native coronary artery of native heart without angina pectoris     3. Valvular heart disease     4. Essential hypertension     5. Stage 3 chronic kidney disease, unspecified whether stage 3a or 3b CKD ([facility])     6. Sinus node dysfunction (HCC)     7. Pacemaker         HPI  Pt's cardiac history includes CABG x2 and 2 valve replacements in approximately  in the [address] [place] [address] area,  a pacemaker, chronic atrial fibrillation, CKD. He establish care with our office and . He is currently residing in a nursing home, [address] [place] [address]. Recently he was admitted to the hospital in 2022 with flank bruising, supratherapeutic INR and congestive heart failure. He feels his lower extremity edema has improved somewhat. He spends most of the day in the chair and transfers only. Very little exertion tolerated. He does not notice any change in his breathing. He denies orthopnea or PND    He has been unable to obtain a [employer] remote CareLink device due to backorder. His pacemaker battery is nearing recommended replacement time      Unknown Provider Result (Inactive) is PCPBryant Juddfara has the following history as recorded in [employer]:    Patient Active Problem List    Diagnosis Date Noted    Pacemaker 2021    ACS (acute coronary syndrome) ([facility])     Dizziness     Coronary artery disease involving native coronary artery      Past Medical History:   Diagnosis Date    CAD (coronary artery disease)     Hypertension     TIA (transient ischemic attack)      Past Surgical History:   Procedure Laterality Date    APPENDECTOMY      BACK SURGERY      CARDIAC SURGERY      CORONARY ARTERY BYPASS GRAFT      plus 2 valve replaacement- [facility]    DILATATION, ESOPHAGUS      HERNIA REPAIR      JOINT REPLACEMENT      PACEMAKER PLACEMENT       History reviewed. No pertinent family history. Social History     Tobacco Use    Smoking status: Former Smoker     Packs/day: 2.00     Years: 25.00     Pack years: 50.00     Quit date: 1967     Years since quittin.5    Smokeless tobacco: Never Used   Substance Use Topics    Alcohol use: Not Currently     Alcohol/week: 0.0 standard drinks      Current Outpatient Medications   Medication Sig Dispense Refill    insulin lispro (HUMALOG) 100 UNIT/ML injection vial Inject 1 Units into the skin nightly      losartan (COZAAR) 100 MG tablet       rosuvastatin (CRESTOR) 20 MG tablet Take 20 mg by mouth nightly      sucralfate (CARAFATE) 1 GM tablet Take 1 g by mouth 2 times daily      cyclobenzaprine (FLEXERIL) 5 MG tablet       glucagon 1 MG injection 1 mg      spironolactone (ALDACTONE) 25 MG tablet Take 1 tablet by mouth daily 30 tablet 5    polyethylene glycol (GLYCOLAX) 17 GM/SCOOP powder Take 17 g by mouth daily      docusate sodium (COLACE) 100 MG capsule Take 100 mg by mouth 2 times daily      acetaminophen (TYLENOL) 500 MG tablet Take 650 mg by mouth every 6 hours as needed for Pain      Ipratropium-Albuterol (COMBIVENT RESPIMAT IN) Inhale into the lungs as needed      famotidine (PEPCID) 20 MG tablet Take 20 mg by mouth 2 times daily      HYDROcodone-acetaminophen (NORCO) 7.5-325 MG per tablet Take 1 tablet by mouth every 4 hours as needed for Pain.        insulin aspart (NOVOLOG) 100 UNIT/ML injection vial Inject into the skin 3 times daily (before meals) Sliding scale      furosemide (LASIX) 20 MG tablet Take 20 mg by mouth 2 times daily      cyanocobalamin 1000 MCG tablet Take 1,000 mcg by mouth daily      loperamide (IMODIUM) 2 MG capsule Take 2 mg by mouth as needed for Diarrhea      ondansetron (ZOFRAN) 4 MG tablet Take 4 mg by mouth every 8 hours as needed for Nausea or Vomiting      senna (SENOKOT) 8.6 MG tablet Take 1 tablet by mouth daily       tiotropium (SPIRIVA RESPIMAT) 2.5 MCG/ACT AERS inhaler Inhale 2 puffs into the lungs daily      warfarin (COUMADIN) 1 MG tablet Take 0.5 mg by mouth nightly      bisoprolol (ZEBETA) 10 MG tablet Take 10 mg by mouth nightly      aspirin 81 MG tablet Take 81 mg by mouth daily      Cholecalciferol (VITAMIN D3) 2000 UNITS CAPS Take 2,000 Units by mouth daily       No current facility-administered medications for this visit. Allergies: Patient has no known allergies. Review of Systems  Review of Systems   Constitutional: Positive for fatigue. Negative for activity change, diaphoresis, fever and unexpected weight change. HENT: Negative for facial swelling and nosebleeds. Eyes: Negative for redness and visual disturbance. Respiratory: Positive for shortness of breath (chronic, unchanged). Negative for cough, chest tightness and wheezing. Cardiovascular: Positive for leg swelling. Negative for chest pain and palpitations. Gastrointestinal: Negative for abdominal pain, nausea and vomiting. Endocrine: Negative for cold intolerance and heat intolerance. Genitourinary: Negative for dysuria and hematuria. Musculoskeletal: Negative for arthralgias and myalgias. Complains of mobility issues   Skin: Negative for pallor and rash. Neurological: Negative for dizziness, seizures, syncope, weakness and light-headedness. Hematological: Does not bruise/bleed easily. Psychiatric/Behavioral: Negative for agitation. The patient is not nervous/anxious.         Objective  Vital Signs - /70   Pulse 55   Ht 5' 10\" (1.778 m)   BMI 30.99 kg/m²    Wt Readings from Last 3 Encounters:   01/11/22 216 lb (98 kg) 10/07/21 213 lb (96.6 kg)   04/01/21 195 lb (88.5 kg)    (Pt unable to stand on scale to weigh)    Physical Exam  Vitals and nursing note reviewed. Constitutional:       General: He is not in acute distress. Appearance: He is well-developed. He is not diaphoretic. Comments: elderly   HENT:      Head: Normocephalic and atraumatic. Right Ear: Hearing and external ear normal.      Left Ear: Hearing and external ear normal.      Nose: Nose normal.   Eyes:      General:         Right eye: No discharge. Left eye: No discharge. Pupils: Pupils are equal, round, and reactive to light. Neck:      Thyroid: No thyromegaly. Vascular: No carotid bruit or JVD. Trachea: No tracheal deviation. Cardiovascular:      Rate and Rhythm: Normal rate and regular rhythm. Heart sounds: Normal heart sounds. No murmur heard. No friction rub. No gallop. Pulmonary:      Effort: Pulmonary effort is normal. No respiratory distress. Breath sounds: Normal breath sounds. No wheezing or rales. Abdominal:      Palpations: Abdomen is soft. Tenderness: There is no abdominal tenderness. Musculoskeletal:         General: No swelling or deformity. Cervical back: Neck supple. No muscular tenderness. Right lower leg: Edema (3+) present. Left lower leg: Edema (4+) present. Comments: In wheelchair   Skin:     General: Skin is warm and dry. Findings: No rash. Neurological:      General: No focal deficit present. Mental Status: He is alert and oriented to person, place, and time. Cranial Nerves: No cranial nerve deficit. Psychiatric:         Mood and Affect: Mood normal.         Behavior: Behavior normal.         Judgment: Judgment normal.     DATA  Echo 4/8/21  Conclusions      Summary   Mildly thickened mitral leaflets with normal leaflet mobility. Moderate   mitral annular calcification present   mild mitral valve stenosis, moderate mitral regurgitation. There is a bioprosthetic valve in the aortic position that is functioning   normally. There is no evidence of stenosis or regurgitation. Tricuspid valve is structurally normal.   Mild tricuspid regurgitation; estimated RVSP of at least 28 mmHg. Mildly dilated left atrium. Normal left ventricular internal dimensions, mild concentric LVH and   normal LV systolic function (estimated EF, 55-60%). Grade 2 diastolic   dysfunction. Septal bounce consistent with V-pacing seen. Normal right atrial dimension with no evidence of thrombus or mass noted. A lead is seen traversing the RA   Normal right ventricular size with preserved RV function. There is a lead   present in the RV   Aortic root is within normal limits. No evidence of significant pericardial effusion is noted. No evidence of pleural effusion. Signature      ----------------------------------------------------------------   Electronically signed by Christine Malik MD(Interpreting   physician) on 04/20/2021 08:53 AM   ----------------------------------------------------------------    Assessment:     Diagnosis Orders   1. Chronic diastolic CHF (congestive heart failure) (HCC)  Basic Metabolic Panel    Brain Natriuretic Peptide   2. Coronary artery disease involving native coronary artery of native heart without angina pectoris     3. Valvular heart disease     4. Essential hypertension     5. Stage 3 chronic kidney disease, unspecified whether stage 3a or 3b CKD ([address]     6. Sinus node dysfunction (HCC)     7. Pacemaker         Chronic diastolic heart failure NYHA Class III, Stage C-EF 55 to 60% per recent echo during hospitalization. He still has some significant lower extremity edema. We will try in addition of spironolactone with close monitoring with a BMP and BNP in 2 weeks at the nursing home. Orders provided as well as prescription for spironolactone. We discussed monitoring weights daily, reporting sudden weight gain.   It Is unclear if they will be able to follow through at the nursing home with daily weights and he is not able to manage this himself. Low-sodium diet encouraged   CADhistory of CA  BG x2 in [address] in approximately 2010. He denies any symptoms of angina. Valvular heart disease2 valve replacements at the time of his CABG in 2010. Reviewed recent echocardiogram that shows functioning valves    Permanent atrial fibrillationchronic and ongoing. Anticoagulated with Coumadin which is managed at the nursing home. Hypertensionstable on current regimen with bisoprolol and losartan HCTZ    Sinus node dysfunction/pacemaker  Pacemaker check showed adequate battery status at 1-5mo  Mode: changed to VVIR with chronic AF  Lead impedances are stable  Pacing:  AP 0.1%,  99 %. Appropriate diagnostics and safety margins noted. Sustained arrythmia: Chronic A. fib  Reprogramming done for sensitivity and threshold testing. Please see the scanned interrogation report  Patient unable to obtain a remote CareLink monitor. Will come back to the office in 6 weeks and if battery is at recommended replacement time, we will schedule pacemaker GEN change then    CKD-likely a contributing factor of lower extremity edema. Last creatinine 1.5 on 5/19/2022. Repeating labs again in 2 weeks      Plan      Return in about 6 weeks (around 7/6/2022). Add Spironolactone 25mg daily (additional fluid pill)    Lab- BMP, BNP at nursing home in 2 weeks- send results to office    Office check in 6 weeks to monitor pacemaker battery    Call with any questionsor concerns  Follow up with Unknown Provider Result (Inactive) for non cardiac problems  Report any new problems  Cardiovascular Fitness-Exercise as tolerated. Strive for 15 minutes of exercise most days of the week.     Cardiac / HealthyDiet  Continue current medications as directed  Continue plan of treatment  It is always recommended that you bring your medicationsbottles with you to each visit - this is for your safety!        AMANDA Alcazar

## 2022-05-25 NOTE — PATIENT INSTRUCTIONS
Return in about 6 weeks (around 7/6/2022).    Add Spironolactone 25mg daily (additional fluid pill)    Lab- BMP, BNP at nursing home in 2 weeks- send results to office    Office check in 6 weeks to monitor pacemaker battery

## 2022-06-29 ENCOUNTER — TELEPHONE (OUTPATIENT)
Dept: CARDIOLOGY CLINIC | Age: 87
End: 2022-06-29

## 2022-07-19 ENCOUNTER — OFFICE VISIT (OUTPATIENT)
Dept: CARDIOLOGY CLINIC | Age: 87
End: 2022-07-19
Payer: MEDICARE

## 2022-07-19 ENCOUNTER — TELEPHONE (OUTPATIENT)
Dept: CARDIOLOGY CLINIC | Age: 87
End: 2022-07-19

## 2022-07-19 VITALS
SYSTOLIC BLOOD PRESSURE: 112 MMHG | BODY MASS INDEX: 31.5 KG/M2 | HEART RATE: 74 BPM | HEIGHT: 70 IN | DIASTOLIC BLOOD PRESSURE: 72 MMHG | WEIGHT: 220 LBS

## 2022-07-19 DIAGNOSIS — I49.5 SINUS NODE DYSFUNCTION (HCC): ICD-10-CM

## 2022-07-19 DIAGNOSIS — I10 ESSENTIAL HYPERTENSION: ICD-10-CM

## 2022-07-19 DIAGNOSIS — Z01.818 PRE-OP EVALUATION: Primary | ICD-10-CM

## 2022-07-19 DIAGNOSIS — I38 VALVULAR HEART DISEASE: ICD-10-CM

## 2022-07-19 DIAGNOSIS — Z95.0 PACEMAKER: ICD-10-CM

## 2022-07-19 DIAGNOSIS — I50.32 CHRONIC DIASTOLIC CHF (CONGESTIVE HEART FAILURE) (HCC): ICD-10-CM

## 2022-07-19 DIAGNOSIS — Z01.818 PRE-OP EVALUATION: ICD-10-CM

## 2022-07-19 DIAGNOSIS — I25.10 CORONARY ARTERY DISEASE INVOLVING NATIVE CORONARY ARTERY OF NATIVE HEART WITHOUT ANGINA PECTORIS: ICD-10-CM

## 2022-07-19 LAB
ANION GAP SERPL CALCULATED.3IONS-SCNC: 11 MMOL/L (ref 7–19)
BUN BLDV-MCNC: 32 MG/DL (ref 8–23)
CALCIUM SERPL-MCNC: 9 MG/DL (ref 8.8–10.2)
CHLORIDE BLD-SCNC: 98 MMOL/L (ref 98–111)
CO2: 28 MMOL/L (ref 22–29)
CREAT SERPL-MCNC: 2 MG/DL (ref 0.5–1.2)
GFR AFRICAN AMERICAN: 39
GFR NON-AFRICAN AMERICAN: 32
GLUCOSE BLD-MCNC: 94 MG/DL (ref 74–109)
HCT VFR BLD CALC: 34.3 % (ref 42–52)
HEMOGLOBIN: 10 G/DL (ref 14–18)
MCH RBC QN AUTO: 26.5 PG (ref 27–31)
MCHC RBC AUTO-ENTMCNC: 29.2 G/DL (ref 33–37)
MCV RBC AUTO: 91 FL (ref 80–94)
PDW BLD-RTO: 18 % (ref 11.5–14.5)
PLATELET # BLD: 140 K/UL (ref 130–400)
PMV BLD AUTO: 10.1 FL (ref 9.4–12.4)
POTASSIUM SERPL-SCNC: 4.1 MMOL/L (ref 3.5–5)
RBC # BLD: 3.77 M/UL (ref 4.7–6.1)
SODIUM BLD-SCNC: 137 MMOL/L (ref 136–145)
WBC # BLD: 5.5 K/UL (ref 4.8–10.8)

## 2022-07-19 PROCEDURE — 99214 OFFICE O/P EST MOD 30 MIN: CPT | Performed by: CLINICAL NURSE SPECIALIST

## 2022-07-19 PROCEDURE — 1036F TOBACCO NON-USER: CPT | Performed by: CLINICAL NURSE SPECIALIST

## 2022-07-19 PROCEDURE — 93279 PRGRMG DEV EVAL PM/LDLS PM: CPT | Performed by: CLINICAL NURSE SPECIALIST

## 2022-07-19 PROCEDURE — G8427 DOCREV CUR MEDS BY ELIG CLIN: HCPCS | Performed by: CLINICAL NURSE SPECIALIST

## 2022-07-19 PROCEDURE — G8417 CALC BMI ABV UP PARAM F/U: HCPCS | Performed by: CLINICAL NURSE SPECIALIST

## 2022-07-19 PROCEDURE — 1123F ACP DISCUSS/DSCN MKR DOCD: CPT | Performed by: CLINICAL NURSE SPECIALIST

## 2022-07-19 ASSESSMENT — ENCOUNTER SYMPTOMS
NAUSEA: 0
SHORTNESS OF BREATH: 1
VOMITING: 0
FACIAL SWELLING: 0
WHEEZING: 0
ABDOMINAL PAIN: 0
CHEST TIGHTNESS: 0
EYE REDNESS: 0
COUGH: 0

## 2022-07-19 NOTE — TELEPHONE ENCOUNTER
Frida please schedule pacemaker generator change for patient. He resides in [address] [place] [address] and rehab. He states he has no family locally and at the nursing home will be bringing him for the procedure. Please contact the nursing home to arrange. I did have him go ahead and get labs today while he was here, or agrees he was supposed to stop there before leaving. And please note he is also on Coumadin.     Thank you

## 2022-07-19 NOTE — PROGRESS NOTES
[facility], [address] [place] [address] BRANDON Fu [facility] Jordana Bangura, [address] [address]  Phone: [phone]  Fax: [phone]    OFFICE VISIT:  2022    Carol España - : 1935    Reason For Visit:  Cyn Cruz is a 81 y.o. male who is here for Follow-up, Congestive Heart Failure, and Coronary Artery Disease       Diagnosis Orders   1. Pre-op evaluation  CBC    Basic Metabolic Panel      2. Chronic diastolic CHF (congestive heart failure) ([address]        3. Coronary artery disease involving native coronary artery of native heart without angina pectoris        4. Valvular heart disease        5. Essential hypertension        6. Sinus node dysfunction (HCC)        7. Pacemaker            HPI  Pt follows with history includes CABG x2 and 2 valve replacements in approximately  in the [facility],  a pacemaker, chronic atrial fibrillation, CKD, diastolic heart failure he establish care with our office and . He is currently residing in a nursing home, [facility] and [address] [place] [address] and is brought to the office by bus. Recently he was admitted to the hospital in May 2022 with flank bruising, supratherapeutic INR and congestive heart failure. He he has chronic lower extremity edema which she does not feel is worse than usual.  He is rarely weight at the nursing home and does not feel he is able to stand to weigh here in the office today. He has some chronic dyspnea which is unchanged, but denies orthopnea, PND. No complaints of chest pain. Pacemaker battery is nearing end-of-life. Patient does not have a home monitor      Unknown Provider Result (Inactive) is PCP.   Carol España has the following history as recorded in [employer]:    Patient Active Problem List    Diagnosis Date Noted    Pacemaker 2021    ACS (acute coronary syndrome) [facility])     Dizziness     Coronary artery disease involving native coronary artery      Past Medical History:   Diagnosis Date    CAD (coronary artery disease)     Hypertension     TIA (transient ischemic attack)      Past Surgical History:   Procedure Laterality Date    APPENDECTOMY      BACK SURGERY      CARDIAC SURGERY      CORONARY ARTERY BYPASS GRAFT      plus 2 valve replaacement- [facility]    DILATATION, ESOPHAGUS      HERNIA REPAIR      JOINT REPLACEMENT      PACEMAKER PLACEMENT       No family history on file. Social History     Tobacco Use    Smoking status: Former     Packs/day: 2.00     Years: 25.00     Pack years: 50.00     Types: Cigarettes     Quit date: 1967     Years since quittin.1    Smokeless tobacco: Never   Substance Use Topics    Alcohol use: Not Currently     Alcohol/week: 0.0 standard drinks      Current Outpatient Medications   Medication Sig Dispense Refill    insulin lispro (HUMALOG) 100 UNIT/ML injection vial Inject 1 Units into the skin nightly      losartan (COZAAR) 100 MG tablet       rosuvastatin (CRESTOR) 20 MG tablet Take 20 mg by mouth nightly      sucralfate (CARAFATE) 1 GM tablet Take 1 g by mouth 2 times daily      cyclobenzaprine (FLEXERIL) 5 MG tablet       glucagon 1 MG injection 1 mg      spironolactone (ALDACTONE) 25 MG tablet Take 1 tablet by mouth daily 30 tablet 5    polyethylene glycol (GLYCOLAX) 17 GM/SCOOP powder Take 17 g by mouth daily      docusate sodium (COLACE) 100 MG capsule Take 100 mg by mouth 2 times daily      acetaminophen (TYLENOL) 500 MG tablet Take 650 mg by mouth every 6 hours as needed for Pain      Ipratropium-Albuterol (COMBIVENT RESPIMAT IN) Inhale into the lungs as needed      famotidine (PEPCID) 20 MG tablet Take 20 mg by mouth 2 times daily      HYDROcodone-acetaminophen (NORCO) 7.5-325 MG per tablet Take 1 tablet by mouth every 4 hours as needed for Pain.        insulin aspart (NOVOLOG) 100 UNIT/ML injection vial Inject into the skin 3 times daily (before meals) Sliding scale      furosemide (LASIX) 20 MG tablet Take 20 mg by mouth 2 times daily      cyanocobalamin 1000 MCG tablet Take 1,000 mcg by mouth daily      loperamide (IMODIUM) 2 MG capsule Take 2 mg by mouth as needed for Diarrhea      ondansetron (ZOFRAN) 4 MG tablet Take 4 mg by mouth every 8 hours as needed for Nausea or Vomiting      senna (SENOKOT) 8.6 MG tablet Take 1 tablet by mouth daily       tiotropium (SPIRIVA RESPIMAT) 2.5 MCG/ACT AERS inhaler Inhale 2 puffs into the lungs daily      warfarin (COUMADIN) 1 MG tablet Take 0.5 mg by mouth nightly      bisoprolol (ZEBETA) 10 MG tablet Take 10 mg by mouth nightly      aspirin 81 MG tablet Take 81 mg by mouth daily      Cholecalciferol (VITAMIN D3) 2000 UNITS CAPS Take 2,000 Units by mouth daily       No current facility-administered medications for this visit. Allergies: Patient has no known allergies. Review of Systems  Review of Systems   Constitutional:  Positive for fatigue. Negative for activity change, diaphoresis, fever and unexpected weight change. HENT:  Negative for facial swelling and nosebleeds. Eyes:  Negative for redness and visual disturbance. Respiratory:  Positive for shortness of breath (chronic, unchanged). Negative for cough, chest tightness and wheezing. Cardiovascular:  Positive for leg swelling. Negative for chest pain and palpitations. Gastrointestinal:  Negative for abdominal pain, nausea and vomiting. Endocrine: Negative for cold intolerance and heat intolerance. Genitourinary:  Negative for dysuria and hematuria. Musculoskeletal:  Negative for arthralgias and myalgias. Complains of mobility issues   Skin:  Negative for pallor and rash. Neurological:  Negative for dizziness, seizures, syncope, weakness and light-headedness. Hematological:  Does not bruise/bleed easily. Psychiatric/Behavioral:  Negative for agitation. The patient is not nervous/anxious. Objective  Vital Signs - /72   Pulse 74   Ht 5' 10\" (1.778 m)   Wt 220 lb (99.8 kg) Comment: Patient stated weight, unable to stand on scale.   BMI 31.57 kg/m²    Wt Readings from Last 3 Encounters:   07/19/22 220 lb (99.8 kg)   01/11/22 216 lb (98 kg)   10/07/21 213 lb (96.6 kg)    (Pt unable to stand on scale to weigh)    Physical Exam  Vitals and nursing note reviewed. Constitutional:       General: He is not in acute distress. Appearance: He is well-developed. He is not diaphoretic. Comments: elderly   HENT:      Head: Normocephalic and atraumatic. Right Ear: Hearing and external ear normal.      Left Ear: Hearing and external ear normal.      Nose: Nose normal.   Eyes:      General:         Right eye: No discharge. Left eye: No discharge. Pupils: Pupils are equal, round, and reactive to light. Neck:      Thyroid: No thyromegaly. Vascular: No carotid bruit or JVD. Trachea: No tracheal deviation. Cardiovascular:      Rate and Rhythm: Normal rate and regular rhythm. Heart sounds: Normal heart sounds. No murmur heard. No friction rub. No gallop. Pulmonary:      Effort: Pulmonary effort is normal. No respiratory distress. Breath sounds: Normal breath sounds. No wheezing or rales. Abdominal:      Palpations: Abdomen is soft. Tenderness: There is no abdominal tenderness. Musculoskeletal:         General: No swelling or deformity. Cervical back: Neck supple. No muscular tenderness. Right lower leg: Edema (3+) present. Left lower leg: Edema (4+) present. Comments: In wheelchair   Skin:     General: Skin is warm and dry. Findings: No rash. Neurological:      General: No focal deficit present. Mental Status: He is alert and oriented to person, place, and time. Cranial Nerves: No cranial nerve deficit. Psychiatric:         Mood and Affect: Mood normal.         Behavior: Behavior normal.         Judgment: Judgment normal.     DATA  Echo 4/8/21  Conclusions      Summary   Mildly thickened mitral leaflets with normal leaflet mobility.  Moderate   mitral annular calcification present   mild mitral valve stenosis, moderate mitral regurgitation. There is a bioprosthetic valve in the aortic position that is functioning   normally. There is no evidence of stenosis or regurgitation. Tricuspid valve is structurally normal.   Mild tricuspid regurgitation; estimated RVSP of at least 28 mmHg. Mildly dilated left atrium. Normal left ventricular internal dimensions, mild concentric LVH and   normal LV systolic function (estimated EF, 55-60%). Grade 2 diastolic   dysfunction. Septal bounce consistent with V-pacing seen. Normal right atrial dimension with no evidence of thrombus or mass noted. A lead is seen traversing the RA   Normal right ventricular size with preserved RV function. There is a lead   present in the RV   Aortic root is within normal limits. No evidence of significant pericardial effusion is noted. No evidence of pleural effusion. Signature      ----------------------------------------------------------------   Electronically signed by Rhett Juárez MD(Interpreting   physician) on 04/20/2021 08:53 AM   ----------------------------------------------------------------    Assessment:     Diagnosis Orders   1. Pre-op evaluation  CBC    Basic Metabolic Panel      2. Chronic diastolic CHF (congestive heart failure) ([address]        3. Coronary artery disease involving native coronary artery of native heart without angina pectoris        4. Valvular heart disease        5. Essential hypertension        6. Sinus node dysfunction (HCC)        7. Pacemaker            Chronic diastolic heart failure NYHA Class III, Stage C-EF 55 to 60% per echo 4/21. Kalie An Continue bisoprolol, spironolactone, Lasix, losartan. Nursing home generally does not follow through with regular weighing. He has been instructed to follow a low-sodium diet. He appears euvolemic today. Continue present treatment. CAD-stable without complaints of angina.   Previously treated in the [facility]    Valvular heart disease-2 valve replacements at the time of his CABG in 2010. Reviewed recent echocardiogram that shows functioning valves    Permanent atrial fibrillation-chronic and ongoing. Anticoagulated with Coumadin which is managed at the nursing home. Hypertension-stable on current regimen with bisoprolol and losartan HCTZ    Sinus node dysfunction/pacemaker  Pacemaker check showed battery status at 2.83 V, recommended replacement time at 2.83 V, but has not yet tripped to RRT  Mode:VVIR  Lead impedances are stable  Pacing:  AP 0.1%,  99 %. Appropriate diagnostics and safety margins noted. Sustained arrythmia: Chronic A. fib  Reprogramming done for sensitivity and threshold testing. Please see the scanned interrogation report    Patient unable to obtain a remote [employer] monitor. Patient is in a nursing home in [place] and is a hardship for him to travel here again for a repeat battery check. As mentioned above battery status at 2.83 V but has not yet trip to RRT which is at 2.83 V. Recommend going ahead and scheduling pacemaker GEN change      Plan    Return for as scheduled. Schedule pacemaker gen change.  to call nursing home to arrange  Bring assistant from Nursing home to visit      Call with any questionsor concerns  Follow up with Unknown Provider Result (Inactive) for non cardiac problems  Report any new problems  Cardiovascular Fitness-Exercise as tolerated. Strive for 15 minutes of exercise most days of the week. Cardiac / HealthyDiet  Continue current medications as directed  Continue plan of treatment  It is always recommended that you bring your medicationsbottles with you to each visit - this is for your safety!        Roy Siemens, APRN

## 2022-07-19 NOTE — TELEPHONE ENCOUNTER
Patient is scheduled for 7/27/22 at 930 arrival for 1130 procedure. Talked with Nursing home and advised he must be able to sign consent form and have a staff member stay with him the entire time and he is not to be just dropped off. Screen your patients for symptoms when scheduling them/giving them instructions for their procedure. Denies  Instructed patient that if symptoms of Covid occur (i.e., fever, cough, etc.. ) prior to procedure to call clinic to reschedule. Instructed patient that if he/she has been in close contact with someone who has tested positive for Covid to please call clinic to reschedule. Please state in a note that patient has been instructed of the above. [place] at the [facility] and [facility] located on the first floor of [place] [address] through hospital main entrance and turn immediately to your left. Allergies:  Patient has no known allergies. Generator replacement    Prep Instructions:    1. Stop warfarin/Coumadin three days prior. If coumadin is high Friday stop sooner. 2.  Do not eat or drink anything after midnight the night before your procedure. May take morning medications with a sip of water unless otherwise directed not to.  3.  You should arrange to have someone take you home rather than drive yourself. 4.  Further plans will be made following your procedure. If for any reason you are unable to keep this appointment, please contact [facility], [phone], as soon as possible to reschedule.

## 2022-07-26 ENCOUNTER — TELEPHONE (OUTPATIENT)
Dept: CARDIOLOGY CLINIC | Age: 87
End: 2022-07-26

## 2022-07-26 NOTE — TELEPHONE ENCOUNTER
Breonna Auguste with nursing home called stating pt PT/INR on Friday 7-22-22 was 20.3---1.83 and on Monday 7-25-22 PT/INR 18.3/1.64  Pt is to have a gen change tomorrow and they wanted to make sure you still wanted to do it with his numbers like this. Call Breonna Auguste at [phone] ask for her on [address].

## 2022-07-26 NOTE — TELEPHONE ENCOUNTER
Patient has been off coumadin since Friday. His numbers are okay to proceed with procedure tomorrow. Denies notified.

## 2022-07-27 ENCOUNTER — HOSPITAL ENCOUNTER (OUTPATIENT)
Dept: CARDIAC CATH/INVASIVE PROCEDURES | Age: 87
Discharge: HOME | End: 2022-07-27
Attending: INTERNAL MEDICINE | Admitting: INTERNAL MEDICINE
Payer: MEDICARE

## 2022-07-27 VITALS
SYSTOLIC BLOOD PRESSURE: 134 MMHG | OXYGEN SATURATION: 97 % | RESPIRATION RATE: 16 BRPM | WEIGHT: 220 LBS | BODY MASS INDEX: 31.5 KG/M2 | TEMPERATURE: 98 F | DIASTOLIC BLOOD PRESSURE: 63 MMHG | HEART RATE: 60 BPM | HEIGHT: 70 IN

## 2022-07-27 LAB
ALBUMIN SERPL-MCNC: 3.6 G/DL (ref 3.5–5.2)
ALP BLD-CCNC: 236 U/L (ref 40–130)
ALT SERPL-CCNC: 15 U/L (ref 5–41)
ANION GAP SERPL CALCULATED.3IONS-SCNC: 10 MMOL/L (ref 7–19)
AST SERPL-CCNC: 19 U/L (ref 5–40)
BILIRUB SERPL-MCNC: 0.5 MG/DL (ref 0.2–1.2)
BUN BLDV-MCNC: 32 MG/DL (ref 8–23)
CALCIUM SERPL-MCNC: 9 MG/DL (ref 8.8–10.2)
CHLORIDE BLD-SCNC: 99 MMOL/L (ref 98–111)
CO2: 27 MMOL/L (ref 22–29)
CREAT SERPL-MCNC: 1.8 MG/DL (ref 0.5–1.2)
EKG P AXIS: NORMAL DEGREES
EKG P-R INTERVAL: NORMAL MS
EKG Q-T INTERVAL: 484 MS
EKG QRS DURATION: 142 MS
EKG QTC CALCULATION (BAZETT): 485 MS
EKG T AXIS: 84 DEGREES
GFR AFRICAN AMERICAN: 43
GFR NON-AFRICAN AMERICAN: 36
GLUCOSE BLD-MCNC: 112 MG/DL (ref 74–109)
HCT VFR BLD CALC: 34.2 % (ref 42–52)
HEMOGLOBIN: 10.2 G/DL (ref 14–18)
INR BLD: 1.38 (ref 0.88–1.18)
MCH RBC QN AUTO: 26.6 PG (ref 27–31)
MCHC RBC AUTO-ENTMCNC: 29.8 G/DL (ref 33–37)
MCV RBC AUTO: 89.3 FL (ref 80–94)
PDW BLD-RTO: 17.6 % (ref 11.5–14.5)
PLATELET # BLD: 172 K/UL (ref 130–400)
PMV BLD AUTO: 10.4 FL (ref 9.4–12.4)
POTASSIUM REFLEX MAGNESIUM: 4.5 MMOL/L (ref 3.5–5)
PROTHROMBIN TIME: 17.1 SEC (ref 12–14.6)
RBC # BLD: 3.83 M/UL (ref 4.7–6.1)
SODIUM BLD-SCNC: 136 MMOL/L (ref 136–145)
TOTAL PROTEIN: 7.3 G/DL (ref 6.6–8.7)
WBC # BLD: 5.9 K/UL (ref 4.8–10.8)

## 2022-07-27 PROCEDURE — 2500000003 HC RX 250 WO HCPCS

## 2022-07-27 PROCEDURE — C1889 IMPLANT/INSERT DEVICE, NOC: HCPCS

## 2022-07-27 PROCEDURE — 80053 COMPREHEN METABOLIC PANEL: CPT

## 2022-07-27 PROCEDURE — 85027 COMPLETE CBC AUTOMATED: CPT

## 2022-07-27 PROCEDURE — 2709999900 HC NON-CHARGEABLE SUPPLY

## 2022-07-27 PROCEDURE — 99152 MOD SED SAME PHYS/QHP 5/>YRS: CPT | Performed by: INTERNAL MEDICINE

## 2022-07-27 PROCEDURE — 2580000003 HC RX 258: Performed by: INTERNAL MEDICINE

## 2022-07-27 PROCEDURE — 93010 ELECTROCARDIOGRAM REPORT: CPT | Performed by: INTERNAL MEDICINE

## 2022-07-27 PROCEDURE — 93005 ELECTROCARDIOGRAM TRACING: CPT | Performed by: INTERNAL MEDICINE

## 2022-07-27 PROCEDURE — 85610 PROTHROMBIN TIME: CPT

## 2022-07-27 PROCEDURE — 99152 MOD SED SAME PHYS/QHP 5/>YRS: CPT

## 2022-07-27 PROCEDURE — 99219 PR INITIAL OBSERVATION CARE/DAY 50 MINUTES: CPT | Performed by: INTERNAL MEDICINE

## 2022-07-27 PROCEDURE — 33228 REMV&REPLC PM GEN DUAL LEAD: CPT

## 2022-07-27 PROCEDURE — 33228 REMV&REPLC PM GEN DUAL LEAD: CPT | Performed by: INTERNAL MEDICINE

## 2022-07-27 PROCEDURE — 6370000000 HC RX 637 (ALT 250 FOR IP): Performed by: INTERNAL MEDICINE

## 2022-07-27 PROCEDURE — C1785 PMKR, DUAL, RATE-RESP: HCPCS

## 2022-07-27 PROCEDURE — 6360000002 HC RX W HCPCS

## 2022-07-27 RX ORDER — SODIUM CHLORIDE 9 MG/ML
INJECTION, SOLUTION INTRAVENOUS CONTINUOUS
Status: DISCONTINUED | OUTPATIENT
Start: 2022-07-27 | End: 2022-07-27 | Stop reason: HOSPADM

## 2022-07-27 RX ORDER — BISOPROLOL FUMARATE 5 MG/1
TABLET ORAL
COMMUNITY
Start: 2022-07-04

## 2022-07-27 RX ORDER — INSULIN ASPART 100 [IU]/ML
INJECTION, SOLUTION INTRAVENOUS; SUBCUTANEOUS
COMMUNITY
Start: 2022-04-23

## 2022-07-27 RX ORDER — SODIUM CHLORIDE 0.9 % (FLUSH) 0.9 %
5-40 SYRINGE (ML) INJECTION EVERY 12 HOURS SCHEDULED
Status: DISCONTINUED | OUTPATIENT
Start: 2022-07-27 | End: 2022-07-27 | Stop reason: HOSPADM

## 2022-07-27 RX ORDER — ONDANSETRON 2 MG/ML
4 INJECTION INTRAMUSCULAR; INTRAVENOUS EVERY 6 HOURS PRN
Status: DISCONTINUED | OUTPATIENT
Start: 2022-07-27 | End: 2022-07-27 | Stop reason: HOSPADM

## 2022-07-27 RX ORDER — SODIUM CHLORIDE 9 MG/ML
INJECTION, SOLUTION INTRAVENOUS PRN
Status: DISCONTINUED | OUTPATIENT
Start: 2022-07-27 | End: 2022-07-27 | Stop reason: HOSPADM

## 2022-07-27 RX ORDER — SODIUM CHLORIDE 0.9 % (FLUSH) 0.9 %
5-40 SYRINGE (ML) INJECTION PRN
Status: DISCONTINUED | OUTPATIENT
Start: 2022-07-27 | End: 2022-07-27 | Stop reason: HOSPADM

## 2022-07-27 RX ADMIN — CHLORHEXIDINE GLUCONATE: 4 LIQUID TOPICAL at 10:45

## 2022-07-27 RX ADMIN — MUPIROCIN: 20 OINTMENT TOPICAL at 13:49

## 2022-07-27 RX ADMIN — SODIUM CHLORIDE: 9 INJECTION, SOLUTION INTRAVENOUS at 10:38

## 2022-07-27 ASSESSMENT — ENCOUNTER SYMPTOMS
SHORTNESS OF BREATH: 0
RESPIRATORY NEGATIVE: 1
EYES NEGATIVE: 1
NAUSEA: 0
GASTROINTESTINAL NEGATIVE: 1
VOMITING: 0
DIARRHEA: 0

## 2022-07-27 NOTE — DISCHARGE INSTRUCTIONS
Recovery is within a short period of time. All activity can be resumed , once most of the discomfort is gone from the incision site. Usually discomfort at the incision site lats for 1-2 weeks. Tylenol is a safe medication to take for this discomfort,  Unless you have an allergy to this drug. INCISION SITE  1. Remove the dressing from the incision site the day following the procedure. 2.  Use only antibacterial soap and water to clean your incision. Do not use any ointments on your incision, unless directed by your cardiologist.  3.  If topical skin adhesive (or Dermabond) is used to close your incision, you may shower or bathe as usual. Gently blot your skin dry with a soft towel. The skin adhesive will gradually slough  (fall) off from  Your skin within 10-14 days. 4.  When steri-strips (small band-aides) are used, keep the incision clean and dry. Do not soak the wound until the steri-strips have fallen off, which should be withn 10-14 days. 5.  Check the incision site. If you notice signs of infection, such as increased soreness, redness or discharge, contact your cardiologist immediately.

## 2022-07-27 NOTE — H&P
[address] [facility]  History and Physical    Patient:  Ramón Fallon  MRN: [id number]    CHIEF COMPLAINT:  No chief complaint on file. History Obtained From: Patient    PCP: Milton Zheng    HISTORY OF PRESENT ILLNESS:   81 y.o. male with history of previous pacemaker implantation now presents for elective battery change due to battery now being demonstrated to be end-of-life. No recent chest pain or dyspnea or other complaints. REVIEW OF SYSTEMS:  Review of Systems   Constitutional: Negative. Negative for chills, fever and unexpected weight change. HENT: Negative. Eyes: Negative. Respiratory: Negative. Negative for shortness of breath. Cardiovascular: Negative. Negative for chest pain. Gastrointestinal: Negative. Negative for diarrhea, nausea and vomiting. Endocrine: Negative. Genitourinary: Negative. Musculoskeletal: Negative. Skin: Negative. Neurological: Negative. All other systems reviewed and are negative. Cardiac Specific Data:   Specialty Problems          Cardiology Problems    ACS (acute coronary syndrome) (HCC)        Coronary artery disease involving native coronary artery           Past Medical History:      Diagnosis Date    CAD (coronary artery disease)     Hypertension     TIA (transient ischemic attack)        Past Surgical History:      Procedure Laterality Date    APPENDECTOMY      BACK SURGERY      CARDIAC SURGERY      CORONARY ARTERY BYPASS GRAFT  2010    plus 2 valve replaacement- Bovine    DILATATION, ESOPHAGUS      HERNIA REPAIR      JOINT REPLACEMENT      PACEMAKER PLACEMENT         Medications Prior to Admission:    Prior to Admission medications    Medication Sig Start Date End Date Taking?  Authorizing Provider   insulin lispro (HUMALOG) 100 UNIT/ML injection vial Inject 1 Units into the skin nightly    Historical Provider, MD   losartan (COZAAR) 100 MG tablet  5/9/22   Historical Provider, MD   rosuvastatin (CRESTOR) 20 MG tablet Take 20 mg by mouth nightly 5/12/22   Historical Provider, MD   sucralfate (CARAFATE) 1 GM tablet Take 1 g by mouth 2 times daily    Historical Provider, MD   cyclobenzaprine (FLEXERIL) 5 MG tablet  5/4/22   Historical Provider, MD   glucagon 1 MG injection 1 mg    Historical Provider, MD   spironolactone (ALDACTONE) 25 MG tablet Take 1 tablet by mouth daily 5/25/22   AMANDA Mark   polyethylene glycol (GLYCOLAX) 17 GM/SCOOP powder Take 17 g by mouth daily    Historical Provider, MD   docusate sodium (COLACE) 100 MG capsule Take 100 mg by mouth 2 times daily    Historical Provider, MD   acetaminophen (TYLENOL) 500 MG tablet Take 650 mg by mouth every 6 hours as needed for Pain    Historical Provider, MD   Ipratropium-Albuterol ([address]) Inhale into the lungs as needed    Historical Provider, MD   famotidine (PEPCID) 20 MG tablet Take 20 mg by mouth 2 times daily    Historical Provider, MD   HYDROcodone-acetaminophen (Octavio Burks) 7.5-325 MG per tablet Take 1 tablet by mouth every 4 hours as needed for Pain.      Historical Provider, MD   insulin aspart (NOVOLOG) 100 UNIT/ML injection vial Inject into the skin 3 times daily (before meals) Sliding scale    Historical Provider, MD   furosemide (LASIX) 20 MG tablet Take 20 mg by mouth 2 times daily    Historical Provider, MD   cyanocobalamin 1000 MCG tablet Take 1,000 mcg by mouth daily    Historical Provider, MD   loperamide (IMODIUM) 2 MG capsule Take 2 mg by mouth as needed for Diarrhea    Historical Provider, MD   ondansetron (ZOFRAN) 4 MG tablet Take 4 mg by mouth every 8 hours as needed for Nausea or Vomiting    Historical Provider, MD   senna (SENOKOT) 8.6 MG tablet Take 1 tablet by mouth daily     Historical Provider, MD   tiotropium (SPIRIVA RESPIMAT) 2.5 MCG/ACT AERS inhaler Inhale 2 puffs into the lungs daily    Historical Provider, MD   warfarin (COUMADIN) 1 MG tablet Take 0.5 mg by mouth nightly    Historical Provider, MD   bisoprolol ([place]) 10 MG tablet Take 10 mg by mouth nightly    Historical Provider, MD   aspirin 81 MG tablet Take 81 mg by mouth daily    Historical Provider, MD   Cholecalciferol (VITAMIN D3) 2000 UNITS CAPS Take 2,000 Units by mouth daily    Historical Provider, MD       Allergies:  Patient has no known allergies. Past Social History:  Social History     Socioeconomic History    Marital status:      Spouse name: Thomas Forbes      Number of children: 2    Years of education: 14    Highest education level: Not on file   Occupational History    Occupation: retired    Tobacco Use    Smoking status: Former     Packs/day: 2.00     Years: 25.00     Pack years: 50.00     Types: Cigarettes     Quit date: 1967     Years since quittin.1    Smokeless tobacco: Never   Vaping Use    Vaping Use: Never used   Substance and Sexual Activity    Alcohol use: Not Currently     Alcohol/week: 0.0 standard drinks    Drug use: Never    Sexual activity: Not Currently   Other Topics Concern    Not on file   Social History Narrative    Not on file     Social Determinants of Health     Financial Resource Strain: Not on file   Food Insecurity: Not on file   Transportation Needs: Not on file   Physical Activity: Not on file   Stress: Not on file   Social Connections: Not on file   Intimate Partner Violence: Not on file   Housing Stability: Not on file       Family History:   No family history on file. Physical Exam:    Vitals: /72   Pulse 61   Resp 15   24HR INTAKE/OUTPUT:  No intake or output data in the 24 hours ending 22 0954    Physical Exam  Vitals reviewed. Constitutional:       General: He is not in acute distress. Appearance: He is well-developed. He is obese. He is not ill-appearing, toxic-appearing or diaphoretic. HENT:      Head: Normocephalic and atraumatic. Nose: Nose normal.      Mouth/Throat:      Mouth: Mucous membranes are dry. Eyes:      General: No scleral icterus.      Pupils: Pupils are equal, round, and reactive to light. Neck:      Vascular: No carotid bruit or JVD. Cardiovascular:      Rate and Rhythm: Normal rate and regular rhythm. Heart sounds: Normal heart sounds. No murmur heard. No friction rub. No gallop. Comments: Left infraclavicular pacemaker incision appears well-healed  Pulmonary:      Effort: Pulmonary effort is normal. No respiratory distress. Breath sounds: Normal breath sounds. No stridor. No wheezing, rhonchi or rales. Chest:      Chest wall: No tenderness. Abdominal:      General: Abdomen is flat. Bowel sounds are normal. There is no distension. Palpations: Abdomen is soft. There is no mass. Tenderness: There is no abdominal tenderness. There is no right CVA tenderness, left CVA tenderness, guarding or rebound. Hernia: No hernia is present. Musculoskeletal:         General: No swelling, tenderness, deformity or signs of injury. Cervical back: Normal range of motion and neck supple. No rigidity or tenderness. Right lower leg: No edema. Left lower leg: No edema. Lymphadenopathy:      Cervical: No cervical adenopathy. Skin:     General: Skin is warm and dry. Neurological:      General: No focal deficit present. Mental Status: He is alert and oriented to person, place, and time. Mental status is at baseline. Cranial Nerves: No cranial nerve deficit. Sensory: No sensory deficit. Motor: No weakness. Coordination: Coordination normal.   Psychiatric:         Mood and Affect: Mood normal.         Behavior: Behavior normal.         Thought Content: Thought content normal.         Judgment: Judgment normal.       LAB DATA:  CBC: No results for input(s): WBC, HGB, PLT in the last 72 hours. BMP:  No results for input(s): NA, K, CL, CO2, BUN, CREATININE, GLUCOSE in the last 72 hours. Hepatic: No results for input(s): AST, ALT, ALB, BILITOT, ALKPHOS in the last 72 hours.   CK, CKMB, Troponin: @LABRCNT (CKTOTAL:3, CKMB:3, TROPONINI:3)@  Pro-BNP: No results for input(s): BNP in the last 72 hours. Lipids: No results for input(s): CHOL, HDL, LDL in the last 72 hours. ABGs: No results for input(s): PHART, WXF6QFY, PO2ART, NKA9MQO, BEART, HGBAE, M1OGSDAB, CARBOXHGBART, 02THERAPY in the last 72 hours. INR: No results for input(s): INR in the last 72 hours. A1c:Invalid input(s): HEMOGLOBIN A1C  URINALYSIS:   -----------------------------------------------------------------  IMAGING:    No results found. Assessment:    Pacemaker end of battery life  Coronary artery disease  Hypertension  History of T[facility] 55% normal perfusion study  Echocardiogram 421 moderate MR mild TR RVSP 28 mildly dilated left atrium mild concentric LVH grade 2 diastolic dysfunction normal LV systolic function  Prior CABG 2010  Chronic anticoagulation with warfarin INR pending    Recommendations:    Proceed with battery replacement advise indication alternatives benefits and risk patient agreeable plan today. I have discussed with the patient regarding indications for the proposed procedure ICD/ Pacemaker implantation along with possible alternatives benefits and risks including but not limited to risks of death, myocardial infarction, stroke, contrast induced nephropathy which in some cases may lead to acute kidney failure requiring dialysis, allergic reactions, infections which may require treatment with antibiotics or removal of the device, bleeding requiring blood transfusion,  cardiac arrhthymias, respiratory failure which may require placing the patient on respiratory support such as a ventilator or breathing machine,risk of complications which may require vascular surgery,risks of collapsing the lung with development of a pneumothorax which may require placement of a chest tube, and risks of lead dislodgement which may require follow up surgery and repositioning of the leads.  In addition there are long term risks including infection, and device or component failure which may require removal and/or replacement of various components. Risk of wound nonhealing t subsequent erosion etc. also discussed. We also discussed the risk of potential stroke or thromboembolic phenomena during the timeframe that the patient may be off off of anticoagulation. The patient is advised the device battery will eventually become depleted and require replacement at some point. The patient is awake and alert and understands the issues involved and indicates willingness to proceed as ordered. The patient is  a reasonable candidate for moderate conscious sedation. ASA score:  ASA 3 - Patient with moderate systemic disease with functional limitations    Mallampati: I (soft palate, uvula, fauces, tonsillar pillars visible)    Preferred vascular access site will be: left subclavian vein.         Brayan Torres MD, MD 7/27/2022 @ P@

## 2022-07-27 NOTE — FLOWSHEET NOTE
Discharge instructions and AVS given and reviewed with patient and caregiver. Verbalized understanding.

## 2022-08-05 ENCOUNTER — NURSE ONLY (OUTPATIENT)
Dept: CARDIOLOGY CLINIC | Age: 87
End: 2022-08-05

## 2022-08-05 ENCOUNTER — TELEPHONE (OUTPATIENT)
Dept: CARDIOLOGY CLINIC | Age: 87
End: 2022-08-05

## 2022-08-05 DIAGNOSIS — Z51.89 VISIT FOR WOUND CHECK: Primary | ICD-10-CM

## 2022-08-05 PROCEDURE — 99024 POSTOP FOLLOW-UP VISIT: CPT | Performed by: NURSE PRACTITIONER

## 2022-08-05 NOTE — TELEPHONE ENCOUNTER
Haven with [facility] called stating patient is here for an appt and [employer] called her and told her our office was refusing to see patient because he doesn't have someone with him. I called Eloisa and asked her since she had already put a note in on patient and she said that patient was checked in and she was just waiting to speak with Ernesto Gallagher before she brought patient back. Provided this information to Constance Betancourt and she voiced understanding.

## 2022-08-05 NOTE — PROGRESS NOTES
Patient here for a wound check visit status post Pacemaker implant. Incision dry and intact. No redness, swelling, or drainage noted  Edges well approximated  Instructed patient to wash area with antibacterial soap and keep it clean and dry. Reviewed discharged instructions and questions answered.   Reviewed [facility] home monitor and patient verbalized understanding  Follow up as scheduled

## 2022-08-30 PROBLEM — Z45.010 PACEMAKER AT END OF BATTERY LIFE: Status: ACTIVE | Noted: 2022-08-30

## 2022-08-30 NOTE — PROCEDURES
Cardiac pacemaker generator replacement dual-chamber operative Report    Korin Mak  [id number]  8/30/2022    Surgeon: Scott Al    Anesthesia: Moderate IV conscious sedation    Procedure(s):   1. Removal of previously inserted pulse generator and insertion of a new pulse generator  2. Monitoring of conscious sedation      Indications:  1. Pacemaker battery end-of-life      Procedure Details  The risks, benefits, complications, treatment options, and expected outcomes were discussed with the patient. The patient and/or family concurred with the proposed plan, giving informed consent. Patient was prepped and draped in the usual strict sterile fashion. After the antibiotic was completely infused, 30 cc 2% xylocaine was infiltrated into the left Infraclavicular area. Next the previous incision was incised using the PlasmaBlade and utilizing sharp and blunt dissection the previous pacemaker pocket was identified entered and the device was located and subsequently explanted onto the operative field. The device was disconnected from the previously inserted leads and a new generator was attached. After having assured adequate hemostasis the leads were connected to the pulse generator and the pulse generator and leads were placed in the pocket. The pocket was copiously irrigated utilizing antibiotic solution. The pacemaker and leads system were visualized under fluoroscopy. Appropriate redundancy/slack in the leads were noted. The pins of the leads were beyond the set screws. Hemostasis was reverified. The pocket was then closed using 2-0 Vicryl for the deep layer and 3-0 Vicryl for the middle layer. Dermabond was then applied to the skin and sterile dressing was applied. At the end of the procedure instrument, needle, and sponge counts were correct. An arm immobilizer was applied at this point. An independent trained observer administered medications under my direction.   The patient was continuously monitored with respect to level of consciousness, and vital signs/physiologic status throughout the case. For further details regarding specific medications and doses please refer to Cath Lab procedural notes. Anesthesia start time:1538  Anesthesia stop time:1557      Pacemaker Data:     Atrial lead   Medtronic  Model   [id number]   serial #   [id number]  Volt   N/A V    PW    0.5 ms    Current   NA   ma       Impedance:   347   ohms        Slew rate:   NA   V/sec  P wave:   1.1 mv    Right Ventricular lead    Medtronic  Model   [id number]   serial #   [id number]  Volt    0.5    V     PW    0.4 ms     Current   NA   ma    I  Impedance:   417   ohms       Slew rate:   NA   V/sec  R wave:   4.5 mv      Generator  Medtronic  Model   [id number]  Serial   [id number]      Estimated Blood Loss:  Minimal         Complications:  None; patient tolerated the procedure well.            Disposition: Admitted to outpatient cath holding           Condition: stable      Kamran Broderick MD 8/30/2022 3:18 PM

## 2022-08-31 ENCOUNTER — TELEPHONE (OUTPATIENT)
Dept: CARDIOLOGY CLINIC | Age: 87
End: 2022-08-31

## 2022-09-01 ENCOUNTER — OFFICE VISIT (OUTPATIENT)
Dept: CARDIOLOGY CLINIC | Age: 87
End: 2022-09-01
Payer: MEDICARE

## 2022-09-01 VITALS
SYSTOLIC BLOOD PRESSURE: 112 MMHG | BODY MASS INDEX: 31.5 KG/M2 | WEIGHT: 220 LBS | HEIGHT: 70 IN | HEART RATE: 67 BPM | DIASTOLIC BLOOD PRESSURE: 62 MMHG

## 2022-09-01 DIAGNOSIS — R06.02 SHORTNESS OF BREATH: ICD-10-CM

## 2022-09-01 DIAGNOSIS — Z95.1 STATUS POST AORTO-CORONARY ARTERY BYPASS GRAFT: ICD-10-CM

## 2022-09-01 DIAGNOSIS — I50.32 CHRONIC DIASTOLIC CHF (CONGESTIVE HEART FAILURE) (HCC): Primary | ICD-10-CM

## 2022-09-01 DIAGNOSIS — I10 ESSENTIAL HYPERTENSION: ICD-10-CM

## 2022-09-01 DIAGNOSIS — Z95.0 PACEMAKER: ICD-10-CM

## 2022-09-01 DIAGNOSIS — Z79.01 CHRONIC ANTICOAGULATION: ICD-10-CM

## 2022-09-01 DIAGNOSIS — I48.20 CHRONIC ATRIAL FIBRILLATION (HCC): ICD-10-CM

## 2022-09-01 DIAGNOSIS — I25.10 CORONARY ARTERY DISEASE INVOLVING NATIVE CORONARY ARTERY OF NATIVE HEART WITHOUT ANGINA PECTORIS: ICD-10-CM

## 2022-09-01 DIAGNOSIS — I49.5 SINUS NODE DYSFUNCTION (HCC): ICD-10-CM

## 2022-09-01 PROCEDURE — 1036F TOBACCO NON-USER: CPT | Performed by: INTERNAL MEDICINE

## 2022-09-01 PROCEDURE — 1123F ACP DISCUSS/DSCN MKR DOCD: CPT | Performed by: INTERNAL MEDICINE

## 2022-09-01 PROCEDURE — G8417 CALC BMI ABV UP PARAM F/U: HCPCS | Performed by: INTERNAL MEDICINE

## 2022-09-01 PROCEDURE — G8427 DOCREV CUR MEDS BY ELIG CLIN: HCPCS | Performed by: INTERNAL MEDICINE

## 2022-09-01 PROCEDURE — 99213 OFFICE O/P EST LOW 20 MIN: CPT | Performed by: INTERNAL MEDICINE

## 2022-09-01 ASSESSMENT — ENCOUNTER SYMPTOMS
VOMITING: 0
SHORTNESS OF BREATH: 0
GASTROINTESTINAL NEGATIVE: 1
RESPIRATORY NEGATIVE: 1
NAUSEA: 0
EYES NEGATIVE: 1
DIARRHEA: 0

## 2022-09-01 NOTE — PROGRESS NOTES
[facility] Progress Note                            Date:  9/1/2022  Patient: Mahnaz Pettit  Age:  81 y.o., 1935      Reason for evaluation:         SUBJECTIVE:    Returns today follow-up assessment recent pacemaker battery change single-chamber. Has what appears to be chronic atrial fibrillation on anticoagulation denies any bleeding issues. Denies anginal chest pain dyspnea about the same. No other complaints. Blood pressure 112/62 heart 67. Device interrogated today functioning appropriately 100% atrial fibrillation burden. Review of Systems   Constitutional: Negative. Negative for chills, fever and unexpected weight change. HENT: Negative. Eyes: Negative. Respiratory: Negative. Negative for shortness of breath. Cardiovascular: Negative. Negative for chest pain. Gastrointestinal: Negative. Negative for diarrhea, nausea and vomiting. Endocrine: Negative. Genitourinary: Negative. Musculoskeletal: Negative. Skin: Negative. Neurological: Negative. All other systems reviewed and are negative. OBJECTIVE:     /62   Pulse 67   Ht 5' 10\" (1.778 m)   Wt 220 lb (99.8 kg)   BMI 31.57 kg/m²     Labs:   CBC: No results for input(s): WBC, HGB, HCT, PLT in the last 72 hours. BMP:No results for input(s): NA, K, CO2, BUN, CREATININE, LABGLOM, GLUCOSE in the last 72 hours. BNP: No results for input(s): BNP in the last 72 hours. PT/INR: No results for input(s): PROTIME, INR in the last 72 hours. APTT:No results for input(s): APTT in the last 72 hours. CARDIAC ENZYMES:No results for input(s): CKTOTAL, CKMB, CKMBINDEX, TROPONINI in the last 72 hours. FASTING LIPID PANEL:  Lab Results   Component Value Date/Time    HDL 37 06/12/2016 04:38 AM    LDLCALC 75 06/12/2016 04:38 AM    TRIG 124 06/12/2016 04:38 AM     LIVER PROFILE:No results for input(s): AST, ALT, LABALBU in the last 72 hours.         Past Medical History:   Diagnosis Date    Arthritis CAD (coronary artery disease)     Hypertension     TIA (transient ischemic attack)      Past Surgical History:   Procedure Laterality Date    APPENDECTOMY      BACK SURGERY      CARDIAC SURGERY      CORONARY ARTERY BYPASS GRAFT  2010    plus 2 valve replaacement- Bovine    DILATATION, ESOPHAGUS      HERNIA REPAIR      JOINT REPLACEMENT      PACEMAKER PLACEMENT       No family history on file. No Known Allergies  Current Outpatient Medications   Medication Sig Dispense Refill    bisoprolol (ZEBETA) 5 MG tablet       losartan (COZAAR) 100 MG tablet       rosuvastatin (CRESTOR) 20 MG tablet Take 20 mg by mouth nightly      sucralfate (CARAFATE) 1 GM tablet Take 1 g by mouth 2 times daily      glucagon 1 MG injection 1 mg      polyethylene glycol (GLYCOLAX) 17 GM/SCOOP powder Take 17 g by mouth daily      docusate sodium (COLACE) 100 MG capsule Take 100 mg by mouth 2 times daily      acetaminophen (TYLENOL) 500 MG tablet Take 650 mg by mouth every 6 hours as needed for Pain      Ipratropium-Albuterol (COMBIVENT RESPIMAT IN) Inhale into the lungs as needed      famotidine (PEPCID) 20 MG tablet Take 20 mg by mouth 2 times daily      HYDROcodone-acetaminophen (NORCO) 7.5-325 MG per tablet Take 1 tablet by mouth every 4 hours as needed for Pain.        furosemide (LASIX) 20 MG tablet Take 20 mg by mouth 2 times daily      cyanocobalamin 1000 MCG tablet Take 1,000 mcg by mouth daily      ondansetron (ZOFRAN) 4 MG tablet Take 4 mg by mouth every 8 hours as needed for Nausea or Vomiting      senna (SENOKOT) 8.6 MG tablet Take 1 tablet by mouth daily       tiotropium (SPIRIVA RESPIMAT) 2.5 MCG/ACT AERS inhaler Inhale 2 puffs into the lungs daily      warfarin (COUMADIN) 1 MG tablet Take 0.5 mg by mouth nightly      aspirin 81 MG tablet Take 81 mg by mouth daily      Cholecalciferol (VITAMIN D3) 2000 UNITS CAPS Take 2,000 Units by mouth daily      NOVOLOG FLEXPEN 100 UNIT/ML injection pen  (Patient not taking: Reported on 2022)      insulin lispro (HUMALOG) 100 UNIT/ML injection vial Inject 1 Units into the skin nightly (Patient not taking: Reported on 2022)      cyclobenzaprine (FLEXERIL) 5 MG tablet  (Patient not taking: Reported on 2022)      spironolactone (ALDACTONE) 25 MG tablet Take 1 tablet by mouth daily (Patient not taking: Reported on 2022) 30 tablet 5    loperamide (IMODIUM) 2 MG capsule Take 2 mg by mouth as needed for Diarrhea (Patient not taking: Reported on 2022)      bisoprolol (ZEBETA) 10 MG tablet Take 10 mg by mouth nightly (Patient not taking: Reported on 2022)       No current facility-administered medications for this visit. Social History     Socioeconomic History    Marital status:      Spouse name: Antwon Adorno      Number of children: 2    Years of education: 14    Highest education level: Not on file   Occupational History    Occupation: retired    Tobacco Use    Smoking status: Former     Packs/day: 2.00     Years: 25.00     Pack years: 50.00     Types: Cigarettes     Quit date: 1967     Years since quittin.2    Smokeless tobacco: Never   Vaping Use    Vaping Use: Never used   Substance and Sexual Activity    Alcohol use: Not Currently     Alcohol/week: 0.0 standard drinks    Drug use: Never    Sexual activity: Not Currently   Other Topics Concern    Not on file   Social History Narrative    Not on file     Social Determinants of Health     Financial Resource Strain: Not on file   Food Insecurity: Not on file   Transportation Needs: Not on file   Physical Activity: Not on file   Stress: Not on file   Social Connections: Not on file   Intimate Partner Violence: Not on file   Housing Stability: Not on file       Physical Examination:  /62   Pulse 67   Ht 5' 10\" (1.778 m)   Wt 220 lb (99.8 kg)   BMI 31.57 kg/m²   Physical Exam  Vitals reviewed. Constitutional:       Appearance: He is well-developed. Neck:      Vascular: No carotid bruit or JVD. Cardiovascular:      Rate and Rhythm: Normal rate and regular rhythm. Heart sounds: Normal heart sounds. No murmur heard. No friction rub. No gallop. Pulmonary:      Effort: Pulmonary effort is normal. No respiratory distress. Breath sounds: Normal breath sounds. No wheezing or rales. Abdominal:      General: There is no distension. Tenderness: There is no abdominal tenderness. Lymphadenopathy:      Cervical: No cervical adenopathy. Skin:     General: Skin is warm and dry. ASSESSMENT:     Diagnosis Orders   1. Chronic diastolic CHF (congestive heart failure) ([address]        2. Sinus node dysfunction (HCC)        3. Coronary artery disease involving native coronary artery of native heart without angina pectoris        4. Essential hypertension        5. Pacemaker        6. Status post aorto-coronary artery bypass graft        7. Chronic anticoagulation        8. Chronic atrial fibrillation (HCC)        9. Shortness of breath            PLAN:  No orders of the defined types were placed in this encounter. No orders of the defined types were placed in this encounter. Continue present medications  Recommend follow-up assessment in 6 months    Return in about 6 months (around 3/1/2023) for return to Dr. Bernardo Carty only. Heather Canchola MD 9/1/2022 2:11 PM CDT    [facility]      Thisdictation was generated by voice recognition computer software. Although all attempts are made to edit the dictation for accuracy, there may be errors in the transcription that are not intended.

## 2022-11-03 ENCOUNTER — TELEPHONE (OUTPATIENT)
Dept: CARDIOLOGY CLINIC | Age: 87
End: 2022-11-03

## 2023-01-31 ENCOUNTER — TELEPHONE (OUTPATIENT)
Dept: CARDIOLOGY CLINIC | Age: 88
End: 2023-01-31

## 2023-01-31 PROCEDURE — 93294 REM INTERROG EVL PM/LDLS PM: CPT | Performed by: INTERNAL MEDICINE

## 2023-01-31 PROCEDURE — 93296 REM INTERROG EVL PM/IDS: CPT | Performed by: INTERNAL MEDICINE

## 2023-01-31 NOTE — TELEPHONE ENCOUNTER
Called and reminded Roxanne, Pt's Nurse at [facility], to send carelink remote IPG interrogation. Zeny Quiros stated that she will plug in monitor and try to send in reading.

## 2023-02-01 DIAGNOSIS — I49.5 SINUS NODE DYSFUNCTION (HCC): ICD-10-CM

## 2023-02-01 DIAGNOSIS — I48.20 CHRONIC ATRIAL FIBRILLATION (HCC): ICD-10-CM

## 2023-02-01 DIAGNOSIS — Z95.0 PACEMAKER: Primary | ICD-10-CM

## 2023-04-03 ENCOUNTER — OFFICE VISIT (OUTPATIENT)
Dept: CARDIOLOGY CLINIC | Age: 88
End: 2023-04-03
Payer: MEDICARE

## 2023-04-03 VITALS
BODY MASS INDEX: 29.06 KG/M2 | HEIGHT: 70 IN | SYSTOLIC BLOOD PRESSURE: 122 MMHG | HEART RATE: 67 BPM | DIASTOLIC BLOOD PRESSURE: 66 MMHG | WEIGHT: 203 LBS

## 2023-04-03 DIAGNOSIS — I48.20 CHRONIC ATRIAL FIBRILLATION (HCC): ICD-10-CM

## 2023-04-03 DIAGNOSIS — I49.5 SINUS NODE DYSFUNCTION (HCC): ICD-10-CM

## 2023-04-03 DIAGNOSIS — Z95.1 STATUS POST AORTO-CORONARY ARTERY BYPASS GRAFT: ICD-10-CM

## 2023-04-03 DIAGNOSIS — I25.10 CORONARY ARTERY DISEASE INVOLVING NATIVE CORONARY ARTERY OF NATIVE HEART WITHOUT ANGINA PECTORIS: ICD-10-CM

## 2023-04-03 DIAGNOSIS — Z79.01 CHRONIC ANTICOAGULATION: ICD-10-CM

## 2023-04-03 DIAGNOSIS — I50.32 CHRONIC DIASTOLIC CHF (CONGESTIVE HEART FAILURE) (HCC): Primary | ICD-10-CM

## 2023-04-03 DIAGNOSIS — Z95.0 PACEMAKER: ICD-10-CM

## 2023-04-03 DIAGNOSIS — R06.02 SHORTNESS OF BREATH: ICD-10-CM

## 2023-04-03 DIAGNOSIS — I10 ESSENTIAL HYPERTENSION: ICD-10-CM

## 2023-04-03 PROCEDURE — G8417 CALC BMI ABV UP PARAM F/U: HCPCS | Performed by: INTERNAL MEDICINE

## 2023-04-03 PROCEDURE — 1036F TOBACCO NON-USER: CPT | Performed by: INTERNAL MEDICINE

## 2023-04-03 PROCEDURE — 1123F ACP DISCUSS/DSCN MKR DOCD: CPT | Performed by: INTERNAL MEDICINE

## 2023-04-03 PROCEDURE — 99214 OFFICE O/P EST MOD 30 MIN: CPT | Performed by: INTERNAL MEDICINE

## 2023-04-03 PROCEDURE — G8427 DOCREV CUR MEDS BY ELIG CLIN: HCPCS | Performed by: INTERNAL MEDICINE

## 2023-04-03 ASSESSMENT — ENCOUNTER SYMPTOMS
VOMITING: 0
DIARRHEA: 0
SHORTNESS OF BREATH: 0
GASTROINTESTINAL NEGATIVE: 1
EYES NEGATIVE: 1
RESPIRATORY NEGATIVE: 1
NAUSEA: 0

## 2023-04-03 NOTE — PROGRESS NOTES
into the lungs daily      warfarin (COUMADIN) 1 MG tablet Take 0.5 tablets by mouth nightly Indications: Ok to resume taking on 22      aspirin 81 MG tablet Take 1 tablet by mouth daily      Cholecalciferol (VITAMIN D3) 2000 UNITS CAPS Take 1 capsule by mouth daily      bisoprolol (ZEBETA) 10 MG tablet Take 10 mg by mouth nightly (Patient not taking: Reported on 4/3/2023)       No current facility-administered medications for this visit. Social History     Socioeconomic History    Marital status:      Spouse name: Maxine Marie      Number of children: 2    Years of education: 14    Highest education level: Not on file   Occupational History    Occupation: retired    Tobacco Use    Smoking status: Former     Packs/day: 2.00     Years: 25.00     Pack years: 50.00     Types: Cigarettes     Quit date: 1967     Years since quittin.8    Smokeless tobacco: Never   Vaping Use    Vaping Use: Never used   Substance and Sexual Activity    Alcohol use: Not Currently     Alcohol/week: 0.0 standard drinks    Drug use: Never    Sexual activity: Not Currently   Other Topics Concern    Not on file   Social History Narrative    Not on file     Social Determinants of Health     Financial Resource Strain: Not on file   Food Insecurity: Not on file   Transportation Needs: Not on file   Physical Activity: Not on file   Stress: Not on file   Social Connections: Not on file   Intimate Partner Violence: Not on file   Housing Stability: Not on file       Physical Examination:  /66   Pulse 67   Ht 5' 10\" (1.778 m)   Wt 203 lb (92.1 kg)   BMI 29.13 kg/m²   Physical Exam  Vitals reviewed. Constitutional:       Appearance: He is well-developed. Neck:      Vascular: No carotid bruit or JVD. Cardiovascular:      Rate and Rhythm: Normal rate and regular rhythm. Heart sounds: Normal heart sounds. No murmur heard. No friction rub. No gallop.    Pulmonary:      Effort: Pulmonary effort is normal. No

## 2023-07-05 DIAGNOSIS — I49.5 SINUS NODE DYSFUNCTION (HCC): ICD-10-CM

## 2023-07-05 DIAGNOSIS — I48.20 CHRONIC ATRIAL FIBRILLATION (HCC): ICD-10-CM

## 2023-07-05 DIAGNOSIS — Z95.0 PACEMAKER: Primary | ICD-10-CM

## 2023-07-05 PROCEDURE — 93296 REM INTERROG EVL PM/IDS: CPT | Performed by: INTERNAL MEDICINE

## 2023-07-05 PROCEDURE — 93294 REM INTERROG EVL PM/LDLS PM: CPT | Performed by: INTERNAL MEDICINE

## 2023-08-29 NOTE — PROGRESS NOTES
Continued Stay Note  [facility]     Patient Name: Jesus Smith  MRN: [id number]  Today's Date: 4/13/2020    Admit Date: 4/3/2020    Discharge Plan     Row Name 04/13/20 1124       Plan    Plan  [facility]    Patient/Family in Agreement with Plan  yes    Final Discharge Disposition Code  03 - skilled nursing facility (SNF)    Final Note  Pt is being d/c'ed to [facility] [phone] today at the skilled level. DC summary and rx faxed to them at [phone]. Pt is calling his sister in law to transport him.         Discharge Codes    No documentation.       Expected Discharge Date and Time     Expected Discharge Date Expected Discharge Time    Apr 13, 2020             MEGAN Knowles     Negative

## 2023-10-09 DIAGNOSIS — I49.5 SINUS NODE DYSFUNCTION (HCC): ICD-10-CM

## 2023-10-09 DIAGNOSIS — Z95.0 PACEMAKER: Primary | ICD-10-CM

## 2023-11-30 ENCOUNTER — OFFICE VISIT (OUTPATIENT)
Dept: CARDIOLOGY CLINIC | Age: 88
End: 2023-11-30
Payer: MEDICARE

## 2023-11-30 VITALS
WEIGHT: 218 LBS | BODY MASS INDEX: 31.21 KG/M2 | HEIGHT: 70 IN | HEART RATE: 63 BPM | SYSTOLIC BLOOD PRESSURE: 120 MMHG | DIASTOLIC BLOOD PRESSURE: 62 MMHG

## 2023-11-30 DIAGNOSIS — I49.5 SINUS NODE DYSFUNCTION (HCC): ICD-10-CM

## 2023-11-30 DIAGNOSIS — R06.02 SHORTNESS OF BREATH: ICD-10-CM

## 2023-11-30 DIAGNOSIS — I10 ESSENTIAL HYPERTENSION: ICD-10-CM

## 2023-11-30 DIAGNOSIS — Z79.01 CHRONIC ANTICOAGULATION: ICD-10-CM

## 2023-11-30 DIAGNOSIS — I48.20 CHRONIC ATRIAL FIBRILLATION (HCC): ICD-10-CM

## 2023-11-30 DIAGNOSIS — I50.32 CHRONIC DIASTOLIC CHF (CONGESTIVE HEART FAILURE) (HCC): Primary | ICD-10-CM

## 2023-11-30 DIAGNOSIS — Z95.1 STATUS POST AORTO-CORONARY ARTERY BYPASS GRAFT: ICD-10-CM

## 2023-11-30 DIAGNOSIS — Z95.0 PACEMAKER: ICD-10-CM

## 2023-11-30 DIAGNOSIS — I25.10 CORONARY ARTERY DISEASE INVOLVING NATIVE CORONARY ARTERY OF NATIVE HEART WITHOUT ANGINA PECTORIS: ICD-10-CM

## 2023-11-30 PROCEDURE — 93280 PM DEVICE PROGR EVAL DUAL: CPT | Performed by: INTERNAL MEDICINE

## 2023-11-30 PROCEDURE — 99213 OFFICE O/P EST LOW 20 MIN: CPT | Performed by: INTERNAL MEDICINE

## 2023-11-30 PROCEDURE — G8427 DOCREV CUR MEDS BY ELIG CLIN: HCPCS | Performed by: INTERNAL MEDICINE

## 2023-11-30 PROCEDURE — G8484 FLU IMMUNIZE NO ADMIN: HCPCS | Performed by: INTERNAL MEDICINE

## 2023-11-30 PROCEDURE — 1123F ACP DISCUSS/DSCN MKR DOCD: CPT | Performed by: INTERNAL MEDICINE

## 2023-11-30 PROCEDURE — G8417 CALC BMI ABV UP PARAM F/U: HCPCS | Performed by: INTERNAL MEDICINE

## 2023-11-30 PROCEDURE — 1036F TOBACCO NON-USER: CPT | Performed by: INTERNAL MEDICINE

## 2023-11-30 ASSESSMENT — ENCOUNTER SYMPTOMS
DIARRHEA: 0
NAUSEA: 0
VOMITING: 0
SHORTNESS OF BREATH: 0
EYES NEGATIVE: 1
RESPIRATORY NEGATIVE: 1
GASTROINTESTINAL NEGATIVE: 1

## 2023-11-30 NOTE — PROGRESS NOTES
Pacemaker interrogated  Presenting rhythm:  Afib/,  97.1%  Battey voltage 11.5 years  Lead status:  Lead impedance within range and stable  Sensing:  P waves 0.4 mV,  R waves 6.3 mV  Thresholds:  Ventricular 0.750 V @ 0.4ms  Observations:  2 monitored episodes VT, 9,355 monitored episodes AT/AF (80.8% burden)  See scanned report for details  Reprogramming for sensitivity and threshold testing  [facility]
Rhythm: Normal rate and regular rhythm. Heart sounds: Normal heart sounds. No murmur heard. No friction rub. No gallop. Pulmonary:      Effort: Pulmonary effort is normal. No respiratory distress. Breath sounds: Normal breath sounds. No wheezing or rales. Abdominal:      General: There is no distension. Tenderness: There is no abdominal tenderness. Lymphadenopathy:      Cervical: No cervical adenopathy. Skin:     General: Skin is warm and dry. ASSESSMENT:     Diagnosis Orders   1. Chronic diastolic CHF (congestive heart failure) ([address])        2. Coronary artery disease involving native coronary artery of native heart without angina pectoris        3. Pacemaker        4. Chronic anticoagulation        5. Shortness of breath        6. Sinus node dysfunction (HCC)        7. Chronic atrial fibrillation (HCC)        8. Status post aorto-coronary artery bypass graft        9. Essential hypertension            PLAN:  No orders of the defined types were placed in this encounter. No orders of the defined types were placed in this encounter. Continue present medications  Recommend follow-up assessment in 6 months    Return in about 6 months (around 5/30/2024). Angle oB MD 11/30/2023 11:36 AM [facility]      Thisdictation was generated by voice recognition computer software. Although all attempts are made to edit the dictation for accuracy, there may be errors in the transcription that are not intended.

## 2024-03-03 PROCEDURE — 93294 REM INTERROG EVL PM/LDLS PM: CPT | Performed by: NURSE PRACTITIONER

## 2024-03-03 PROCEDURE — 93296 REM INTERROG EVL PM/IDS: CPT | Performed by: NURSE PRACTITIONER

## 2024-03-04 DIAGNOSIS — I49.5 SINUS NODE DYSFUNCTION (HCC): ICD-10-CM

## 2024-03-04 DIAGNOSIS — I48.20 CHRONIC ATRIAL FIBRILLATION (HCC): ICD-10-CM

## 2024-03-04 DIAGNOSIS — Z95.0 PACEMAKER: Primary | ICD-10-CM

## 2024-06-04 DIAGNOSIS — I48.20 CHRONIC ATRIAL FIBRILLATION (HCC): ICD-10-CM

## 2024-06-04 DIAGNOSIS — I49.5 SINUS NODE DYSFUNCTION (HCC): ICD-10-CM

## 2024-06-04 DIAGNOSIS — Z95.0 PACEMAKER: Primary | ICD-10-CM

## 2024-07-15 ENCOUNTER — OFFICE VISIT (OUTPATIENT)
Dept: CARDIOLOGY CLINIC | Age: 89
End: 2024-07-15
Payer: MEDICARE

## 2024-07-15 VITALS
SYSTOLIC BLOOD PRESSURE: 124 MMHG | HEIGHT: 70 IN | DIASTOLIC BLOOD PRESSURE: 78 MMHG | BODY MASS INDEX: 31.28 KG/M2 | HEART RATE: 61 BPM

## 2024-07-15 DIAGNOSIS — Z95.0 PACEMAKER: ICD-10-CM

## 2024-07-15 DIAGNOSIS — I10 ESSENTIAL HYPERTENSION: ICD-10-CM

## 2024-07-15 DIAGNOSIS — R06.02 SHORTNESS OF BREATH: ICD-10-CM

## 2024-07-15 DIAGNOSIS — I50.32 CHRONIC DIASTOLIC CHF (CONGESTIVE HEART FAILURE) (HCC): Primary | ICD-10-CM

## 2024-07-15 DIAGNOSIS — Z95.1 STATUS POST AORTO-CORONARY ARTERY BYPASS GRAFT: ICD-10-CM

## 2024-07-15 DIAGNOSIS — I49.5 SINUS NODE DYSFUNCTION (HCC): ICD-10-CM

## 2024-07-15 DIAGNOSIS — Z79.01 CHRONIC ANTICOAGULATION: ICD-10-CM

## 2024-07-15 DIAGNOSIS — I48.20 CHRONIC ATRIAL FIBRILLATION (HCC): ICD-10-CM

## 2024-07-15 DIAGNOSIS — I25.10 CORONARY ARTERY DISEASE INVOLVING NATIVE CORONARY ARTERY OF NATIVE HEART WITHOUT ANGINA PECTORIS: ICD-10-CM

## 2024-07-15 PROCEDURE — 1123F ACP DISCUSS/DSCN MKR DOCD: CPT | Performed by: INTERNAL MEDICINE

## 2024-07-15 PROCEDURE — 99213 OFFICE O/P EST LOW 20 MIN: CPT | Performed by: INTERNAL MEDICINE

## 2024-07-15 PROCEDURE — G8417 CALC BMI ABV UP PARAM F/U: HCPCS | Performed by: INTERNAL MEDICINE

## 2024-07-15 PROCEDURE — 1036F TOBACCO NON-USER: CPT | Performed by: INTERNAL MEDICINE

## 2024-07-15 PROCEDURE — G8427 DOCREV CUR MEDS BY ELIG CLIN: HCPCS | Performed by: INTERNAL MEDICINE

## 2024-07-15 PROCEDURE — 93280 PM DEVICE PROGR EVAL DUAL: CPT | Performed by: INTERNAL MEDICINE

## 2024-07-15 RX ORDER — AMMONIUM LACTATE 12 G/100G
CREAM TOPICAL PRN
COMMUNITY

## 2024-07-15 RX ORDER — UMECLIDINIUM 62.5 UG/1
1 AEROSOL, POWDER ORAL DAILY
COMMUNITY

## 2024-07-15 RX ORDER — CETIRIZINE HYDROCHLORIDE 5 MG/1
5 TABLET ORAL DAILY
COMMUNITY

## 2024-07-15 RX ORDER — ALBUTEROL SULFATE 90 UG/1
2 AEROSOL, METERED RESPIRATORY (INHALATION) EVERY 6 HOURS PRN
COMMUNITY

## 2024-07-15 RX ORDER — NALOXONE HYDROCHLORIDE 0.4 MG/ML
0.4 INJECTION, SOLUTION INTRAMUSCULAR; INTRAVENOUS; SUBCUTANEOUS PRN
COMMUNITY

## 2024-07-15 ASSESSMENT — ENCOUNTER SYMPTOMS
SHORTNESS OF BREATH: 0
VOMITING: 0
DIARRHEA: 0
GASTROINTESTINAL NEGATIVE: 1
RESPIRATORY NEGATIVE: 1
EYES NEGATIVE: 1
NAUSEA: 0

## 2024-07-15 NOTE — PROGRESS NOTES
Pacemaker interrogated  Presenting rhythm:  Afib/,  98.4%  Battey voltage 10.8 years  Lead status:  Lead impedance within range and stable  Sensing:  P waves 0.4 mV,  R waves -- Paced  Thresholds: Ventricular 1.000 V @ 0.4ms  Observations:  1 monitored episode VT, 1 monitored episode Fast A&V, 1,080 monitored episodes AT/AF (99% burden)  See scanned report for details  Reprogramming for sensitivity and threshold testing  Next [facility] appointment:  10/16/24    
Associates      Thisdictation was generated by voice recognition computer software.  Although all attempts are made to edit the dictation for accuracy, there may be errors in the transcription that are not intended.

## 2024-10-16 DIAGNOSIS — I48.20 CHRONIC ATRIAL FIBRILLATION (HCC): ICD-10-CM

## 2024-10-16 DIAGNOSIS — Z95.0 PACEMAKER: Primary | ICD-10-CM

## 2024-10-16 DIAGNOSIS — I49.5 SINUS NODE DYSFUNCTION (HCC): ICD-10-CM

## 2025-01-02 ENCOUNTER — TELEPHONE (OUTPATIENT)
Dept: CARDIOLOGY CLINIC | Age: 89
End: 2025-01-02

## 2025-01-02 NOTE — TELEPHONE ENCOUNTER
Received unscheduled [facility] dated 1/1/25.  Carelink not due until 1/16/25.  Spoke with Prudence at nursing home.  She states that pt has been treated for a UTI and some respiratory symptoms but his labs look good and he's doing fine.  She wasn't there yesterday but there are no notes about sending a [facility].  Instructed on apt on 1/16/25 and she states she will set up transport.  Carelink report shows chronic AF with some atrial undersensing and a PVC.

## 2025-01-16 ENCOUNTER — OFFICE VISIT (OUTPATIENT)
Dept: CARDIOLOGY CLINIC | Age: 89
End: 2025-01-16
Payer: MEDICAID

## 2025-01-16 VITALS
WEIGHT: 211 LBS | SYSTOLIC BLOOD PRESSURE: 124 MMHG | DIASTOLIC BLOOD PRESSURE: 70 MMHG | BODY MASS INDEX: 30.21 KG/M2 | HEART RATE: 60 BPM | HEIGHT: 70 IN

## 2025-01-16 DIAGNOSIS — I50.32 CHRONIC DIASTOLIC CHF (CONGESTIVE HEART FAILURE) (HCC): ICD-10-CM

## 2025-01-16 DIAGNOSIS — Z79.01 CHRONIC ANTICOAGULATION: ICD-10-CM

## 2025-01-16 DIAGNOSIS — Z95.0 PACEMAKER: ICD-10-CM

## 2025-01-16 DIAGNOSIS — Z95.1 STATUS POST AORTO-CORONARY ARTERY BYPASS GRAFT: ICD-10-CM

## 2025-01-16 DIAGNOSIS — I10 ESSENTIAL HYPERTENSION: ICD-10-CM

## 2025-01-16 DIAGNOSIS — R06.02 SHORTNESS OF BREATH: ICD-10-CM

## 2025-01-16 DIAGNOSIS — I49.5 SINUS NODE DYSFUNCTION (HCC): ICD-10-CM

## 2025-01-16 DIAGNOSIS — I25.10 CORONARY ARTERY DISEASE INVOLVING NATIVE CORONARY ARTERY OF NATIVE HEART WITHOUT ANGINA PECTORIS: Primary | ICD-10-CM

## 2025-01-16 PROCEDURE — 1123F ACP DISCUSS/DSCN MKR DOCD: CPT | Performed by: INTERNAL MEDICINE

## 2025-01-16 PROCEDURE — 99213 OFFICE O/P EST LOW 20 MIN: CPT | Performed by: INTERNAL MEDICINE

## 2025-01-16 PROCEDURE — 1159F MED LIST DOCD IN RCRD: CPT | Performed by: INTERNAL MEDICINE

## 2025-01-16 ASSESSMENT — ENCOUNTER SYMPTOMS
DIARRHEA: 0
GASTROINTESTINAL NEGATIVE: 1
SHORTNESS OF BREATH: 0
VOMITING: 0
EYES NEGATIVE: 1
NAUSEA: 0
RESPIRATORY NEGATIVE: 1

## 2025-01-16 NOTE — PROGRESS NOTES
([facility])        3. Status post aorto-coronary artery bypass graft        4. Chronic anticoagulation        5. Shortness of breath        6. Essential hypertension        7. Pacemaker        8. Chronic diastolic CHF (congestive heart failure) ([facility])            PLAN:  No orders of the defined types were placed in this encounter.    No orders of the defined types were placed in this encounter.        Continue warfarin as directed  Zebeta 10 mg a day  Crestor 20 mg a day  Losartan 100 mg a day taken as 50 mg daily  Furosemide 80 mg a day  Aspirin 81 mg a day  Recommend follow-up assessment in 6 months    Return in about 6 months (around 7/16/2025) for return to Dr. Lowery only.      Denis Lowery MD 1/16/2025 2:41 PM CST    [facility]      Thisdictation was generated by voice recognition computer software.  Although all attempts are made to edit the dictation for accuracy, there may be errors in the transcription that are not intended.

## 2025-04-22 ENCOUNTER — RESULTS FOLLOW-UP (OUTPATIENT)
Dept: CARDIOLOGY CLINIC | Age: 89
End: 2025-04-22

## 2025-04-22 DIAGNOSIS — I49.5 SINUS NODE DYSFUNCTION (HCC): ICD-10-CM

## 2025-04-22 DIAGNOSIS — I24.9 ACS (ACUTE CORONARY SYNDROME) (HCC): ICD-10-CM

## 2025-04-22 DIAGNOSIS — Z95.0 PACEMAKER: Primary | ICD-10-CM

## (undated) DEVICE — ELECTRD BLD EDGE/INSUL1P 2.4X5.1MM STRL

## (undated) DEVICE — YANKAUER,BULB TIP WITH VENT: Brand: ARGYLE

## (undated) DEVICE — PK TURNOVER RM ADV

## (undated) DEVICE — POOLE SUCTION INSTRUMENT WITH REMOVABLE SHEATH: Brand: POOLE

## (undated) DEVICE — CLIP APPLIER: Brand: ENDO CLIP

## (undated) DEVICE — RESERVOIR,SUCTION,100CC,SILICONE: Brand: MEDLINE

## (undated) DEVICE — GLV SURG BIOGEL LTX PF 6 1/2

## (undated) DEVICE — PAD MAJOR: Brand: MEDLINE INDUSTRIES, INC.

## (undated) DEVICE — SUT SILK 2/0 SUTUPAK TIES 24IN SA75H

## (undated) DEVICE — ENDOPATH XCEL DILATING TIP TROCARS WITH STABILITY SLEEVES: Brand: ENDOPATH XCEL

## (undated) DEVICE — MONOPOLAR METZENBAUM SCISSOR, MINI BLADE TIP, DISPOSABLE: Brand: MONOPOLAR METZENBAUM SCISSOR, MINI BLADE TIP, DISPOSABLE

## (undated) DEVICE — SUT SILK 3/0 SUTUPAK TIES 24IN SA74H

## (undated) DEVICE — TRY PREP SCRB VAG PVP

## (undated) DEVICE — SUT PDS LP 1 TP1 96IN VIO PDP880GA

## (undated) DEVICE — TOWEL,OR,DSP,ST,BLUE,STD,4/PK,20PK/CS: Brand: MEDLINE

## (undated) DEVICE — ENDOPATH PNEUMONEEDLE INSUFFLATION NEEDLES WITH LUER LOCK CONNECTORS 120MM: Brand: ENDOPATH

## (undated) DEVICE — PROXIMATE RH ROTATING HEAD SKIN STAPLERS (35 WIDE) CONTAINS 35 STAINLESS STEEL STAPLES: Brand: PROXIMATE

## (undated) DEVICE — PAD LAP CHOLE: Brand: MEDLINE INDUSTRIES, INC.

## (undated) DEVICE — ENDOPOUCH RETRIEVER SPECIMEN RETRIEVAL BAGS: Brand: ENDOPOUCH RETRIEVER

## (undated) DEVICE — ENDOPATH XCEL WITH OPTIVIEW TECHNOLOGY UNIVERSAL TROCAR STABILITY SLEEVES: Brand: ENDOPATH XCEL OPTIVIEW

## (undated) DEVICE — 2, DISPOSABLE SUCTION/IRRIGATOR WITHOUT DISPOSABLE TIP: Brand: STRYKEFLOW

## (undated) DEVICE — SPNG GZ WOVN 4X4IN 12PLY 10/BX STRL

## (undated) DEVICE — ENDOPATH ETS-FLEX45 ARTICULATING ENDOSCOPIC LINEAR CUTTER, NO RELOAD: Brand: ENDOPATH

## (undated) DEVICE — ENDOPATH XCEL WITH OPTIVIEW TECHNOLOGY DILATING TIP TROCARS WITH STABILITY SLEEVES: Brand: ENDOPATH XCEL OPTIVIEW

## (undated) DEVICE — DRN JP RND NO TROC SIL 15F 3/16IN

## (undated) DEVICE — SPNG LAP PREWSH SFTPK 18X18IN STRL PK/5

## (undated) DEVICE — ANTIBACTERIAL UNDYED BRAIDED (POLYGLACTIN 910), SYNTHETIC ABSORBABLE SUTURE: Brand: COATED VICRYL

## (undated) DEVICE — 3M™ IOBAN™ 2 ANTIMICROBIAL INCISE DRAPE 6650EZ: Brand: IOBAN™ 2

## (undated) DEVICE — ELECTRD L HK EZ CLN 33CM

## (undated) DEVICE — 3M™ STERI-STRIP™ REINFORCED ADHESIVE SKIN CLOSURES, R1547, 1/2 IN X 4 IN (12 MM X 100 MM), 6 STRIPS/ENVELOPE: Brand: 3M™ STERI-STRIP™